# Patient Record
Sex: FEMALE | Race: WHITE | NOT HISPANIC OR LATINO | Employment: OTHER | URBAN - METROPOLITAN AREA
[De-identification: names, ages, dates, MRNs, and addresses within clinical notes are randomized per-mention and may not be internally consistent; named-entity substitution may affect disease eponyms.]

---

## 2017-01-02 DIAGNOSIS — M33.13 DERMATOMYOSITIS (H): Primary | ICD-10-CM

## 2017-01-03 RX ORDER — MYCOPHENOLATE MOFETIL 500 MG/1
TABLET ORAL
Qty: 90 TABLET | Refills: 0 | Status: SHIPPED | OUTPATIENT
Start: 2017-01-03 | End: 2017-01-24

## 2017-01-03 NOTE — TELEPHONE ENCOUNTER
mycophenolate /CELLCEPT 500 MG      Last Written Prescription Date:  7/7/16  Last Fill Quantity: 270,   # refills: 1  Last Office Visit: 7/26/16  Future Office visit:  1/24/17    CBC RESULTS:   Recent Labs   Lab Test  09/08/16   1101   WBC  3.6*   RBC  4.35   HGB  13.2   HCT  40.7   MCV  94   MCH  30.3   MCHC  32.4   RDW  13.2   PLT  189       CREATININE   Date Value Ref Range Status   09/08/2016 0.67 0.52 - 1.04 mg/dL Final   ]    Liver Function Studies -   Recent Labs   Lab Test  09/08/16   1101   05/27/15   0907   PROTTOTAL   --    --   6.3*   ALBUMIN  4.2   < >  3.9   BILITOTAL   --    --   0.5   ALKPHOS   --    --   35*   AST  27   < >  23   ALT  30   < >  24    < > = values in this interval not displayed.     Routing refill request to provider for review/approval because:OVER DUE LABS

## 2017-01-20 DIAGNOSIS — M33.13 DERMATOMYOSITIS (H): ICD-10-CM

## 2017-01-20 DIAGNOSIS — Z79.899 ENCOUNTER FOR LONG-TERM CURRENT USE OF MEDICATION: ICD-10-CM

## 2017-01-20 LAB
ALBUMIN SERPL-MCNC: 4.2 G/DL (ref 3.4–5)
ALT SERPL W P-5'-P-CCNC: 24 U/L (ref 0–50)
AST SERPL W P-5'-P-CCNC: 19 U/L (ref 0–45)
BASOPHILS # BLD AUTO: 0 10E9/L (ref 0–0.2)
BASOPHILS NFR BLD AUTO: 0.5 %
CK SERPL-CCNC: 68 U/L (ref 30–225)
CREAT SERPL-MCNC: 0.73 MG/DL (ref 0.52–1.04)
CRP SERPL-MCNC: <2.9 MG/L (ref 0–8)
DIFFERENTIAL METHOD BLD: ABNORMAL
EOSINOPHIL # BLD AUTO: 0 10E9/L (ref 0–0.7)
EOSINOPHIL NFR BLD AUTO: 0.8 %
ERYTHROCYTE [DISTWIDTH] IN BLOOD BY AUTOMATED COUNT: 12.9 % (ref 10–15)
GFR SERPL CREATININE-BSD FRML MDRD: 79 ML/MIN/1.7M2
HCT VFR BLD AUTO: 41.3 % (ref 35–47)
HGB BLD-MCNC: 13.4 G/DL (ref 11.7–15.7)
LYMPHOCYTES # BLD AUTO: 1 10E9/L (ref 0.8–5.3)
LYMPHOCYTES NFR BLD AUTO: 26.1 %
MCH RBC QN AUTO: 30.5 PG (ref 26.5–33)
MCHC RBC AUTO-ENTMCNC: 32.4 G/DL (ref 31.5–36.5)
MCV RBC AUTO: 94 FL (ref 78–100)
MONOCYTES # BLD AUTO: 0.4 10E9/L (ref 0–1.3)
MONOCYTES NFR BLD AUTO: 10.4 %
NEUTROPHILS # BLD AUTO: 2.3 10E9/L (ref 1.6–8.3)
NEUTROPHILS NFR BLD AUTO: 62.2 %
PLATELET # BLD AUTO: 201 10E9/L (ref 150–450)
RBC # BLD AUTO: 4.4 10E12/L (ref 3.8–5.2)
WBC # BLD AUTO: 3.8 10E9/L (ref 4–11)

## 2017-01-20 PROCEDURE — 86140 C-REACTIVE PROTEIN: CPT | Performed by: INTERNAL MEDICINE

## 2017-01-20 PROCEDURE — 82565 ASSAY OF CREATININE: CPT | Performed by: INTERNAL MEDICINE

## 2017-01-20 PROCEDURE — 84450 TRANSFERASE (AST) (SGOT): CPT | Performed by: INTERNAL MEDICINE

## 2017-01-20 PROCEDURE — 82040 ASSAY OF SERUM ALBUMIN: CPT | Performed by: INTERNAL MEDICINE

## 2017-01-20 PROCEDURE — 36415 COLL VENOUS BLD VENIPUNCTURE: CPT | Performed by: INTERNAL MEDICINE

## 2017-01-20 PROCEDURE — 84460 ALANINE AMINO (ALT) (SGPT): CPT | Performed by: INTERNAL MEDICINE

## 2017-01-20 PROCEDURE — 82550 ASSAY OF CK (CPK): CPT | Performed by: INTERNAL MEDICINE

## 2017-01-20 PROCEDURE — 85025 COMPLETE CBC W/AUTO DIFF WBC: CPT | Performed by: INTERNAL MEDICINE

## 2017-01-24 ENCOUNTER — OFFICE VISIT (OUTPATIENT)
Dept: RHEUMATOLOGY | Facility: CLINIC | Age: 72
End: 2017-01-24
Attending: INTERNAL MEDICINE
Payer: COMMERCIAL

## 2017-01-24 VITALS
OXYGEN SATURATION: 98 % | HEIGHT: 65 IN | HEART RATE: 73 BPM | WEIGHT: 139 LBS | BODY MASS INDEX: 23.16 KG/M2 | DIASTOLIC BLOOD PRESSURE: 91 MMHG | SYSTOLIC BLOOD PRESSURE: 134 MMHG

## 2017-01-24 DIAGNOSIS — M33.13 DERMATOMYOSITIS (H): Primary | ICD-10-CM

## 2017-01-24 DIAGNOSIS — Z79.899 HIGH RISK MEDICATIONS (NOT ANTICOAGULANTS) LONG-TERM USE: ICD-10-CM

## 2017-01-24 PROCEDURE — 99212 OFFICE O/P EST SF 10 MIN: CPT | Mod: ZF

## 2017-01-24 RX ORDER — MYCOPHENOLATE MOFETIL 500 MG/1
TABLET ORAL
Qty: 90 TABLET | Refills: 0 | Status: SHIPPED | OUTPATIENT
Start: 2017-01-24 | End: 2017-01-24

## 2017-01-24 RX ORDER — MYCOPHENOLATE MOFETIL 500 MG/1
TABLET ORAL
Qty: 240 TABLET | Refills: 0 | Status: SHIPPED | OUTPATIENT
Start: 2017-01-24 | End: 2017-04-22

## 2017-01-24 ASSESSMENT — PAIN SCALES - GENERAL: PAINLEVEL: NO PAIN (1)

## 2017-01-24 NOTE — PROGRESS NOTES
Rheumatology Clinic Visit     Mely Rashid MRN# [id number]   YOB: 1945 Age: 66 year old               Assessment and Plan:   71 year old female with  dermatomyositis presents for follow up. Disease is currently stable on 1500mg of mycophenylate and plaquenil 200mg once daily. She has no evidence of active myositis or synovitis at this time.      PLAN/RECOMMENDATION:     - Continue Plaquenil 200mg qday; f/u with ophthalmology once a year  - most recent PFT in 2014, completely normal. Will likely discuss repeating the PFT on our next visit, prior to making any changes in her CellCept. No concern for worsening lung disease at this time.  - Continue current dose of MMF 1.5g daily, will readdress at next visit whether we could taper down. As above, if we make change to the CellCept will need new baseline PFTs  -F/u w/ GI and yearly MRIs for pancreatic cysts, which have been stable  -F/u DEXA scan in 1-2 years; DEXA in 2016 compared from DEXA in 2012 is stable. T-score -2.1. Patient not taking medications that would increase her risk of fracture.  -CellCept labs q2-3 months; mildly low WBC is not unusual for patient taking CellCept    Follow up in 6 months     Patient seen with Dr. Farias who agrees with assessment and plan.    Boni Hernandez MD  Med-Derm, PGY-1    I saw the patient with the resident.  My exam and recomendations are as described.      Carlos Colon MD              Problem List:   1. Dermatomyositis with skin findings, minimal weakness on exam but some myalgias, and a daughter with a similar presentation. Steroid responsive  2. IgA deficiency per the outside records with high-titer BRE of 1:2560, other antibodies thus far negative.   3. 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          Interval History of Present Illness:   Mely Rashid is a 66 year old female who presents  "for follow up of dermatomyositis.      INTERVAL HISTORY:     The patient states that she is doing very well. Since 11/1/16, decreased her HCQ to 200 mg qday. The patient is also taking MMF 1.5 g daily. Strength and energy are good for her. Denies recent infections, F/C/night sweats. Denies weight loss. Denies SOB, CP cough. The patient is not walking as much since her previous dog was put down, so more deconditioned, but no TINEO. Denies lightheadedness, or headaches, but has some sinus congestion.  States that her finger are starting to show more of the Gottron's papules. Denies pain in the fingers. Used ointment previously for her fingers but hasn't used since her fingers haven't been bothering her. Fingers, ankles, and knees get stiff in the morning, but quickly improve once starts moving.  Deniews Raynauds. Denies new skin rash. Denies oral/genital ulcers. Denies photosensitivity. Mildly dry eyes and mouth. No visual changes.    The patient has history of cystic abnormalities of the pancreas and follows Dr. Hoang with GI and gets yearly MRI abdomen to monitor. Last MRI in October showed no worrisome changes compared to previous exam and recommended follow-up in 1 year. Patient denies N/V/abdominal pain. States she has chronic loose stools, but resolves with taking Imodium.    Last PFTs were done in 2014, which were WNL.    Patient's last DEXA was last year and T-score -2.1, stable from DEXA in 2012. Off fosamax since 2015 due to increased bleeding and jaw pain. States she has some jaw pain in the TMJ region. Unsure if she grinds her teeth at night. Has associated \"jaw cracking sound.\" Tenderness slightly worse in the morning.           Social History:     Social History     Social History     Marital Status: Single     Spouse Name: N/A     Number of Children: N/A     Years of Education: N/A     Occupational History     Not on file.     Social History Main Topics     Smoking status: Former Smoker     Quit date: " 04/10/1979     Smokeless tobacco: Never Used      Comment: quit 1985     Alcohol Use: 0.0 oz/week     0 Standard drinks or equivalent per week      Comment: rare     Drug Use: No     Sexual Activity: Not Currently     Other Topics Concern      Service No     Blood Transfusions No     Caffeine Concern No     Occupational Exposure No     Hobby Hazards No     Sleep Concern No     Stress Concern No     Weight Concern No     Special Diet No     Exercise Yes     walk, horseback ride     Seat Belt Yes     Parent/Sibling W/ Cabg, Mi Or Angioplasty Before 65f 55m? No     Social History Narrative       Lives independently          Family History:   Family History   Problem Relation Age of Onset     C.A.D. Father      Arthritis Father      DIABETES Brother      Glaucoma Brother      Breast Cancer Sister 35     Neurologic Disorder Sister      seizures     CANCER Sister      breast     Psoriasis Daughter      Rheumatologic Disease Daughter      Dermatomyositis     Hypertension No family hx of      CEREBROVASCULAR DISEASE No family hx of      Thyroid Disease No family hx of      Skin Cancer No family hx of      Macular Degeneration No family hx of               Allergies:   Allergies   Allergen Reactions     Penicillins Anaphylaxis              Medications:   Current Outpatient Prescriptions   Medication Sig Dispense Refill     mycophenolate (CELLCEPT - GENERIC EQUIVALENT) 500 MG tablet TAKE 2 TABLETS (1000 MG) EVERY MORNING AND TAKE 1 TABLET (500 MG) AT BEDTIME 90 tablet 0     diazepam (VALIUM) 5 MG tablet Take 1 tablet (5 mg) by mouth once as needed for anxiety or sleep (Take one on arrival for MRI. May repeat in 15 min if inadequate relief.) 2 tablet 0     latanoprost (XALATAN) 0.005 % ophthalmic solution Place 1 drop into the right eye At Bedtime 3 Bottle 3     Lactobacillus Rhamnosus, GG, (CULTURELLE PO)        hydroxychloroquine (PLAQUENIL) 200 MG tablet Take 1 tablet (200 mg) by mouth 2 times daily (Patient  "taking differently: Take 200 mg by mouth 2 times daily Patient states she is taking 1 tablet per day) 180 tablet 1     diazepam (VALIUM) 5 MG tablet Take 2 tablets as directed by mouth as needed. Take one 5 mg tablet of Valium 30 mins - 1 hour before scan. Then take the second 5 mg tab 15-20 mins before the scan if needed. 2 tablet 0     loperamide (IMODIUM A-D) 2 MG tablet Take 2 mg by mouth every morning       desoximetasone (TOPICORT) 0.25 % cream Apply topically 2 times daily 120 g 3     desonide (DESOWEN) 0.05 % cream Apply topically 2 times daily as needed (rash) 30 g 2     vitamin E 400 UNIT capsule Take by mouth daily       Calcium Carb-Cholecalciferol (CALCIUM 500 +D PO) Take 3 tablets by mouth daily.       omega-3 fatty acids (FISH OIL) 1200 MG capsule Take 1 capsule by mouth daily.       Cholecalciferol (VITAMIN D) 1000 UNIT capsule Take 1 capsule by mouth daily.       B Complex TABS Take 1 tablet by mouth daily.       glucosamine-chondroitinoitin (GLUCOSAMINE CHONDR COMPLEX) 500-400 MG CAPS Take 2 capsules by mouth daily.                Physical Exam:   Blood pressure 134/91, pulse 73, height 1.651 m (5' 5\"), weight 63.05 kg (139 lb), SpO2 98 %, not currently breastfeeding.  Wt Readings from Last 4 Encounters:   01/24/17 63.05 kg (139 lb)   10/17/16 63.73 kg (140 lb 8 oz)   07/26/16 63.957 kg (141 lb)   02/17/16 63.957 kg (141 lb)       Constitutional: well-appearing, well-developed, appearing stated age, cooperative, NAD  Eyes: EOMI, PERRL, normal conjunctiva and sclera  ENT: nl external ears, lips, teeth, gums, throat, no mucous membrane lesions, normal saliva pool  Neck: no mass or thyroid enlargement  Resp: lungs clear to auscultation, nl to palpation  CV: RRR, no murmurs, rubs or gallops, no edema  GI: no ABD mass or tenderness, no HSM  Lymph: no cervical, supraclavicular, or epitrochlear nodes  MS: All TMJ, neck, shoulder, elbow, wrist, MCP/PIP/DIP, spine, hip, knee, ankle, and foot MTP/IP joints " were examined and  found normal. +Heberden's and Tariq's nodes. No active synovitis or deformity other than mild findings of DJD. Full ROM.  Normal  strength. No dactylitis, tenosynovitis, enthesopathy.  Skin: no significant skin changes. +Periungual erythema and dilated capillary loops seen grossly on exam as well as on capillaroscopy, + modest Gottron's papules b/l  Neuro: nl cranial nerves, strength, sensation, DTRs. 5/5 strength hip flexors/extensors.Shoulder strength 5/5 b/l. Able to stand up without support of her arms.  Psych: nl judgement, orientation, memory, affect.         Data:     Results for orders placed or performed in visit on 01/20/17   Albumin level   Result Value Ref Range    Albumin 4.2 3.4 - 5.0 g/dL   ALT   Result Value Ref Range    ALT 24 0 - 50 U/L   AST   Result Value Ref Range    AST 19 0 - 45 U/L   CBC with platelets differential   Result Value Ref Range    WBC 3.8 (L) 4.0 - 11.0 10e9/L    RBC Count 4.40 3.8 - 5.2 10e12/L    Hemoglobin 13.4 11.7 - 15.7 g/dL    Hematocrit 41.3 35.0 - 47.0 %    MCV 94 78 - 100 fl    MCH 30.5 26.5 - 33.0 pg    MCHC 32.4 31.5 - 36.5 g/dL    RDW 12.9 10.0 - 15.0 %    Platelet Count 201 150 - 450 10e9/L    Diff Method Automated Method     % Neutrophils 62.2 %    % Lymphocytes 26.1 %    % Monocytes 10.4 %    % Eosinophils 0.8 %    % Basophils 0.5 %    Absolute Neutrophil 2.3 1.6 - 8.3 10e9/L    Absolute Lymphocytes 1.0 0.8 - 5.3 10e9/L    Absolute Monocytes 0.4 0.0 - 1.3 10e9/L    Absolute Eosinophils 0.0 0.0 - 0.7 10e9/L    Absolute Basophils 0.0 0.0 - 0.2 10e9/L   Creatinine   Result Value Ref Range    Creatinine 0.73 0.52 - 1.04 mg/dL    GFR Estimate 79 >60 mL/min/1.7m2    GFR Estimate If Black >90   GFR Calc   >60 mL/min/1.7m2   CRP inflammation   Result Value Ref Range    CRP Inflammation <2.9 0.0 - 8.0 mg/L   CK total   Result Value Ref Range    CK Total 68 30 - 225 U/L

## 2017-01-24 NOTE — NURSING NOTE
"Chief Complaint   Patient presents with     RECHECK     6 month follow up for Dermatomyositis        Initial /91 mmHg  Pulse 73  Ht 1.651 m (5' 5\")  Wt 63.05 kg (139 lb)  BMI 23.13 kg/m2  SpO2 98% Estimated body mass index is 23.13 kg/(m^2) as calculated from the following:    Height as of this encounter: 1.651 m (5' 5\").    Weight as of this encounter: 63.05 kg (139 lb).  BP completed using cuff size: mark Cao CMA    "

## 2017-01-24 NOTE — LETTER
1/24/2017      RE: Mely Rashid  [address] [place] [address]       Rheumatology Clinic Visit     Mely Rashid MRN# [id number]   YOB: 1945 Age: 66 year old               Assessment and Plan:   71 year old female with  dermatomyositis presents for follow up. Disease is currently stable on 1500mg of mycophenylate and plaquenil 200mg once daily. She has no evidence of active myositis or synovitis at this time.      PLAN/RECOMMENDATION:     - Continue Plaquenil 200mg qday; f/u with ophthalmology once a year  - most recent PFT in 2014, completely normal. Will likely discuss repeating the PFT on our next visit, prior to making any changes in her CellCept. No concern for worsening lung disease at this time.  - Continue current dose of MMF 1.5g daily, will readdress at next visit whether we could taper down. As above, if we make change to the CellCept will need new baseline PFTs  -F/u w/ GI and yearly MRIs for pancreatic cysts, which have been stable  -F/u DEXA scan in 1-2 years; DEXA in 2016 compared from DEXA in 2012 is stable. T-score -2.1. Patient not taking medications that would increase her risk of fracture.  -CellCept labs q2-3 months; mildly low WBC is not unusual for patient taking CellCept    Follow up in 6 months     Patient seen with Dr. Farias who agrees with assessment and plan.    Boni Hernandez MD  Med-Derm, PGY-1    I saw the patient with the resident.  My exam and recomendations are as described.      Carlos Colon MD              Problem List:   1. Dermatomyositis with skin findings, minimal weakness on exam but some myalgias, and a daughter with a similar presentation. Steroid responsive  2. IgA deficiency per the outside records with high-titer BRE of 1:2560, other antibodies thus far negative.   3. 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           "Interval History of Present Illness:   Mely Rashid is a 66 year old female who presents for follow up of dermatomyositis.      INTERVAL HISTORY:     The patient states that she is doing very well. Since 11/1/16, decreased her HCQ to 200 mg qday. The patient is also taking MMF 1.5 g daily. Strength and energy are good for her. Denies recent infections, F/C/night sweats. Denies weight loss. Denies SOB, CP cough. The patient is not walking as much since her previous dog was put down, so more deconditioned, but no TINEO. Denies lightheadedness, or headaches, but has some sinus congestion.  States that her finger are starting to show more of the Gottron's papules. Denies pain in the fingers. Used ointment previously for her fingers but hasn't used since her fingers haven't been bothering her. Fingers, ankles, and knees get stiff in the morning, but quickly improve once starts moving.  Deniews Raynauds. Denies new skin rash. Denies oral/genital ulcers. Denies photosensitivity. Mildly dry eyes and mouth. No visual changes.    The patient has history of cystic abnormalities of the pancreas and follows Dr. Hoang with GI and gets yearly MRI abdomen to monitor. Last MRI in October showed no worrisome changes compared to previous exam and recommended follow-up in 1 year. Patient denies N/V/abdominal pain. States she has chronic loose stools, but resolves with taking Imodium.    Last PFTs were done in 2014, which were WNL.    Patient's last DEXA was last year and T-score -2.1, stable from DEXA in 2012. Off fosamax since 2015 due to increased bleeding and jaw pain. States she has some jaw pain in the TMJ region. Unsure if she grinds her teeth at night. Has associated \"jaw cracking sound.\" Tenderness slightly worse in the morning.           Social History:     Social History     Social History     Marital Status: Single     Spouse Name: N/A     Number of Children: N/A     Years of Education: N/A     Occupational History     " Not on file.     Social History Main Topics     Smoking status: Former Smoker     Quit date: 04/10/1979     Smokeless tobacco: Never Used      Comment: quit 1985     Alcohol Use: 0.0 oz/week     0 Standard drinks or equivalent per week      Comment: rare     Drug Use: No     Sexual Activity: Not Currently     Other Topics Concern      Service No     Blood Transfusions No     Caffeine Concern No     Occupational Exposure No     Hobby Hazards No     Sleep Concern No     Stress Concern No     Weight Concern No     Special Diet No     Exercise Yes     walk, horseback ride     Seat Belt Yes     Parent/Sibling W/ Cabg, Mi Or Angioplasty Before 65f 55m? No     Social History Narrative       Lives independently          Family History:   Family History   Problem Relation Age of Onset     C.A.D. Father      Arthritis Father      DIABETES Brother      Glaucoma Brother      Breast Cancer Sister 35     Neurologic Disorder Sister      seizures     CANCER Sister      breast     Psoriasis Daughter      Rheumatologic Disease Daughter      Dermatomyositis     Hypertension No family hx of      CEREBROVASCULAR DISEASE No family hx of      Thyroid Disease No family hx of      Skin Cancer No family hx of      Macular Degeneration No family hx of               Allergies:   Allergies   Allergen Reactions     Penicillins Anaphylaxis              Medications:   Current Outpatient Prescriptions   Medication Sig Dispense Refill     mycophenolate (CELLCEPT - GENERIC EQUIVALENT) 500 MG tablet TAKE 2 TABLETS (1000 MG) EVERY MORNING AND TAKE 1 TABLET (500 MG) AT BEDTIME 90 tablet 0     diazepam (VALIUM) 5 MG tablet Take 1 tablet (5 mg) by mouth once as needed for anxiety or sleep (Take one on arrival for MRI. May repeat in 15 min if inadequate relief.) 2 tablet 0     latanoprost (XALATAN) 0.005 % ophthalmic solution Place 1 drop into the right eye At Bedtime 3 Bottle 3     Lactobacillus Rhamnosus, GG, (CULTURELLE PO)         "hydroxychloroquine (PLAQUENIL) 200 MG tablet Take 1 tablet (200 mg) by mouth 2 times daily (Patient taking differently: Take 200 mg by mouth 2 times daily Patient states she is taking 1 tablet per day) 180 tablet 1     diazepam (VALIUM) 5 MG tablet Take 2 tablets as directed by mouth as needed. Take one 5 mg tablet of Valium 30 mins - 1 hour before scan. Then take the second 5 mg tab 15-20 mins before the scan if needed. 2 tablet 0     loperamide (IMODIUM A-D) 2 MG tablet Take 2 mg by mouth every morning       desoximetasone (TOPICORT) 0.25 % cream Apply topically 2 times daily 120 g 3     desonide (DESOWEN) 0.05 % cream Apply topically 2 times daily as needed (rash) 30 g 2     vitamin E 400 UNIT capsule Take by mouth daily       Calcium Carb-Cholecalciferol (CALCIUM 500 +D PO) Take 3 tablets by mouth daily.       omega-3 fatty acids (FISH OIL) 1200 MG capsule Take 1 capsule by mouth daily.       Cholecalciferol (VITAMIN D) 1000 UNIT capsule Take 1 capsule by mouth daily.       B Complex TABS Take 1 tablet by mouth daily.       glucosamine-chondroitinoitin (GLUCOSAMINE CHONDR COMPLEX) 500-400 MG CAPS Take 2 capsules by mouth daily.                Physical Exam:   Blood pressure 134/91, pulse 73, height 1.651 m (5' 5\"), weight 63.05 kg (139 lb), SpO2 98 %, not currently breastfeeding.  Wt Readings from Last 4 Encounters:   01/24/17 63.05 kg (139 lb)   10/17/16 63.73 kg (140 lb 8 oz)   07/26/16 63.957 kg (141 lb)   02/17/16 63.957 kg (141 lb)       Constitutional: well-appearing, well-developed, appearing stated age, cooperative, NAD  Eyes: EOMI, PERRL, normal conjunctiva and sclera  ENT: nl external ears, lips, teeth, gums, throat, no mucous membrane lesions, normal saliva pool  Neck: no mass or thyroid enlargement  Resp: lungs clear to auscultation, nl to palpation  CV: RRR, no murmurs, rubs or gallops, no edema  GI: no ABD mass or tenderness, no HSM  Lymph: no cervical, supraclavicular, or epitrochlear nodes  MS: " All TMJ, neck, shoulder, elbow, wrist, MCP/PIP/DIP, spine, hip, knee, ankle, and foot MTP/IP joints were examined and  found normal. +Heberden's and Tariq's nodes. No active synovitis or deformity other than mild findings of DJD. Full ROM.  Normal  strength. No dactylitis, tenosynovitis, enthesopathy.  Skin: no significant skin changes. +Periungual erythema and dilated capillary loops seen grossly on exam as well as on capillaroscopy, + modest Gottron's papules b/l  Neuro: nl cranial nerves, strength, sensation, DTRs. 5/5 strength hip flexors/extensors.Shoulder strength 5/5 b/l. Able to stand up without support of her arms.  Psych: nl judgement, orientation, memory, affect.         Data:     Results for orders placed or performed in visit on 01/20/17   Albumin level   Result Value Ref Range    Albumin 4.2 3.4 - 5.0 g/dL   ALT   Result Value Ref Range    ALT 24 0 - 50 U/L   AST   Result Value Ref Range    AST 19 0 - 45 U/L   CBC with platelets differential   Result Value Ref Range    WBC 3.8 (L) 4.0 - 11.0 10e9/L    RBC Count 4.40 3.8 - 5.2 10e12/L    Hemoglobin 13.4 11.7 - 15.7 g/dL    Hematocrit 41.3 35.0 - 47.0 %    MCV 94 78 - 100 fl    MCH 30.5 26.5 - 33.0 pg    MCHC 32.4 31.5 - 36.5 g/dL    RDW 12.9 10.0 - 15.0 %    Platelet Count 201 150 - 450 10e9/L    Diff Method Automated Method     % Neutrophils 62.2 %    % Lymphocytes 26.1 %    % Monocytes 10.4 %    % Eosinophils 0.8 %    % Basophils 0.5 %    Absolute Neutrophil 2.3 1.6 - 8.3 10e9/L    Absolute Lymphocytes 1.0 0.8 - 5.3 10e9/L    Absolute Monocytes 0.4 0.0 - 1.3 10e9/L    Absolute Eosinophils 0.0 0.0 - 0.7 10e9/L    Absolute Basophils 0.0 0.0 - 0.2 10e9/L   Creatinine   Result Value Ref Range    Creatinine 0.73 0.52 - 1.04 mg/dL    GFR Estimate 79 >60 mL/min/1.7m2    GFR Estimate If Black >90   GFR Calc   >60 mL/min/1.7m2   CRP inflammation   Result Value Ref Range    CRP Inflammation <2.9 0.0 - 8.0 mg/L   CK total   Result Value  Ref Range    CK Total 68 30 - 225 U/L       Carlos Colon MD

## 2017-01-24 NOTE — MR AVS SNAPSHOT
After Visit Summary   1/24/2017    Mely Rashid    MRN: [id number]           Patient Information     Date Of Birth          1945        Visit Information        Provider Department      1/24/2017 1:00 PM Carlos Colon MD [facility] Rheumatology        Today's Diagnoses     Dermatomyositis (H)    -  1    High risk medications (not anticoagulants) long-term use           Follow-ups after your visit        Your next 10 appointments already scheduled     Aug 22, 2017  9:45 AM CDT   VISUAL FIELD with [facility] EYE VISUAL FIELD   Eye Clinic ([facility])    [address]  [address]   [address] [address]  [facility] [address]   [phone]            Aug 22, 2017 10:15 AM CDT   RETURN RETINA with Shakira Chamberlain MD   Eye Clinic ([facility])    [address]  [address]   [address] [address]  [facility] [address]   [phone]            Sep 29, 2017  9:15 AM CDT   VISUAL FIELD with [facility] EYE VISUAL FIELD   Eye Clinic ([facility])    [address]  [address]  [address] [address] [place] [address]   [phone]            Sep 29, 2017  9:45 AM CDT   RETURN GLAUCOMA with Sima Gambino MD   Eye Clinic ([facility])    [address]  [address]  [address] [address]  [facility] [address]   [phone]              Who to contact     If you have questions or need follow up information about today's clinic visit or your schedule please contact [facility] RHEUMATOLOGY directly at [phone].  Normal or non-critical lab and imaging results will be communicated to you by MyChart, letter or phone within 4 business days after the clinic has received the results. If you do not hear from us within 7 days, please contact the clinic through MyChart or phone. If you have a critical or abnormal lab result, we will notify you by phone as soon as possible.  Submit refill requests through [employer] or call your pharmacy  "and they will forward the refill request to us. Please allow 3 business days for your refill to be completed.          Additional Information About Your Visit        [employer] Information     [facility] gives you secure access to your electronic health record. If you see a primary care provider, you can also send messages to your care team and make appointments. If you have questions, please call your primary care clinic.  If you do not have a primary care provider, please call [phone] and they will assist you.        Care EveryWhere ID     This is your Care EveryWhere ID. This could be used by other organizations to access your [place] medical records  [id number]        Your Vitals Were     Pulse Height Pulse Oximetry BMI (Body Mass Index)          73 1.651 m (5' 5\") 98% 23.13 kg/m2         Blood Pressure from Last 3 Encounters:   01/24/17 (!) 134/91   10/17/16 118/79   07/26/16 113/69    Weight from Last 3 Encounters:   01/24/17 63 kg (139 lb)   10/17/16 63.7 kg (140 lb 8 oz)   07/26/16 64 kg (141 lb)                 Today's Medication Changes          These changes are accurate as of: 1/24/17 11:59 PM.  If you have any questions, ask your nurse or doctor.               Start taking these medicines.        Dose/Directions    mycophenolate 500 MG tablet   Commonly known as:  CELLCEPT - GENERIC EQUIVALENT   Used for:  Dermatomyositis (H)   Started by:  Carlos Colon MD        TAKE 2 TABLETS (1000 MG) EVERY MORNING AND TAKE 1 TABLET (500 MG) AT BEDTIME   Quantity:  240 tablet   Refills:  0         These medicines have changed or have updated prescriptions.        Dose/Directions    hydroxychloroquine 200 MG tablet   Commonly known as:  PLAQUENIL   This may have changed:  additional instructions   Used for:  Dermatomyositis (H)        Dose:  200 mg   Take 1 tablet (200 mg) by mouth 2 times daily   Quantity:  180 tablet   Refills:  1            Where to get your medicines      These medications were sent to " [facility] - [place], [place] - [address] [facility]  [address] [facility] [address]     Phone:  [phone]     mycophenolate 500 MG tablet                Primary Care Provider Office Phone # Fax #    ROSHAN Galloway [phone]       [facility] [address] [place] [address] [address]  [facility] [address]        Thank you!     Thank you for choosing [facility] RHEUMATOLOGY  for your care. Our goal is always to provide you with excellent care. Hearing back from our patients is one way we can continue to improve our services. Please take a few minutes to complete the written survey that you may receive in the mail after your visit with us. Thank you!             Your Updated Medication List - Protect others around you: Learn how to safely use, store and throw away your medicines at www.disposemymeds.org.          This list is accurate as of: 1/24/17 11:59 PM.  Always use your most recent med list.                   Brand Name Dispense Instructions for use    B Complex Tabs      Take 1 tablet by mouth daily.       CALCIUM 500 +D PO      Take 3 tablets by mouth daily.       CULTURELLE PO          desonide 0.05 % cream    DESOWEN    30 g    Apply topically 2 times daily as needed (rash)       desoximetasone 0.25 % cream    TOPICORT    120 g    Apply topically 2 times daily       * diazepam 5 MG tablet    VALIUM    2 tablet    Take 2 tablets as directed by mouth as needed. Take one 5 mg tablet of Valium 30 mins - 1 hour before scan. Then take the second 5 mg tab 15-20 mins before the scan if needed.       * diazepam 5 MG tablet    VALIUM    2 tablet    Take 1 tablet (5 mg) by mouth once as needed for anxiety or sleep (Take one on arrival for MRI. May repeat in 15 min if inadequate relief.)       Fish Oil 1200 MG Caps      Take 1 capsule by mouth daily.       GLUCOSAMINE CHONDR COMPLEX 500-400 MG Caps per capsule   Generic drug:  glucosamine-chondroitin      Take 2 capsules  by mouth daily.       hydroxychloroquine 200 MG tablet    PLAQUENIL    180 tablet    Take 1 tablet (200 mg) by mouth 2 times daily       latanoprost 0.005 % ophthalmic solution    XALATAN    3 Bottle    Place 1 drop into the right eye At Bedtime       loperamide 2 MG tablet    IMODIUM A-D     Take 2 mg by mouth every morning       mycophenolate 500 MG tablet    CELLCEPT - GENERIC EQUIVALENT    240 tablet    TAKE 2 TABLETS (1000 MG) EVERY MORNING AND TAKE 1 TABLET (500 MG) AT BEDTIME       vitamin D 1000 UNITS capsule      Take 1 capsule by mouth daily.       vitamin E 400 UNIT capsule      Take by mouth daily       * Notice:  This list has 2 medication(s) that are the same as other medications prescribed for you. Read the directions carefully, and ask your doctor or other care provider to review them with you.

## 2017-04-07 ENCOUNTER — MYC MEDICAL ADVICE (OUTPATIENT)
Dept: INTERNAL MEDICINE | Facility: CLINIC | Age: 72
End: 2017-04-07

## 2017-04-07 DIAGNOSIS — M33.13 DERMATOMYOSITIS (H): ICD-10-CM

## 2017-04-10 RX ORDER — HYDROXYCHLOROQUINE SULFATE 200 MG/1
200 TABLET, FILM COATED ORAL 2 TIMES DAILY
Qty: 180 TABLET | Refills: 0 | Status: SHIPPED | OUTPATIENT
Start: 2017-04-10 | End: 2017-10-01

## 2017-04-10 NOTE — TELEPHONE ENCOUNTER
PLAQUENIL  200 MG       Last Written Prescription Date:  6/3/16  Last Fill Quantity: 180,   # refills: 1  Last Office Visit : 1/24/17  Future Office visit:  NONE  EYE EXAM   8/29/16

## 2017-04-10 NOTE — TELEPHONE ENCOUNTER
Spoke with pt, stated that she is feeling much better and diarrhea is all cleared up.  Kelin Guerrier RN

## 2017-04-22 DIAGNOSIS — M33.13 DERMATOMYOSITIS (H): ICD-10-CM

## 2017-04-22 NOTE — LETTER
April 24, 2017      TO: Mely Rashid  [address] [place] [place] [address]         Dear Ms. Mely Rashid,  This letter is a reminder that you are overdue for needed labs. You must have appropriate labs drawn every 8-12 weeks for prescription refills.      You have been given a 30 day supply/refill of your mycophenolate/CELLCEPT while you get your labs completed.      Regards,  Rheumatology Clinic Staff

## 2017-04-24 RX ORDER — MYCOPHENOLATE MOFETIL 500 MG/1
TABLET ORAL
Qty: 90 TABLET | Refills: 0 | Status: SHIPPED | OUTPATIENT
Start: 2017-04-24 | End: 2017-05-30

## 2017-04-24 NOTE — TELEPHONE ENCOUNTER
mycophenolate       Last Written Prescription Date:  1/24/17  Last Fill Quantity: 240,   # refills: 0  Last Office Visit: 1/24/17  Future Office visit:  NONE    CBC RESULTS:   Recent Labs   Lab Test  01/20/17   1123   WBC  3.8*   RBC  4.40   HGB  13.4   HCT  41.3   MCV  94   MCH  30.5   MCHC  32.4   RDW  12.9   PLT  201       Creatinine   Date Value Ref Range Status   01/20/2017 0.73 0.52 - 1.04 mg/dL Final   ]    Liver Function Studies -   Recent Labs   Lab Test  01/20/17   1123   05/27/15   0907   PROTTOTAL   --    --   6.3*   ALBUMIN  4.2   < >  3.9   BILITOTAL   --    --   0.5   ALKPHOS   --    --   35*   AST  19   < >  23   ALT  24   < >  24    < > = values in this interval not displayed.       Routing refill request to provider for review/approval because:LABS OVER DUE  LABS DUE REMINDER LETTER SENT TO PT

## 2017-05-02 DIAGNOSIS — M33.13 DERMATOMYOSITIS (H): ICD-10-CM

## 2017-05-02 DIAGNOSIS — Z79.899 HIGH RISK MEDICATIONS (NOT ANTICOAGULANTS) LONG-TERM USE: ICD-10-CM

## 2017-05-02 LAB
ALBUMIN SERPL-MCNC: 4.4 G/DL (ref 3.4–5)
ALT SERPL W P-5'-P-CCNC: 29 U/L (ref 0–50)
AST SERPL W P-5'-P-CCNC: 22 U/L (ref 0–45)
BASOPHILS # BLD AUTO: 0 10E9/L (ref 0–0.2)
BASOPHILS NFR BLD AUTO: 0.6 %
CK SERPL-CCNC: 92 U/L (ref 30–225)
CREAT SERPL-MCNC: 0.57 MG/DL (ref 0.52–1.04)
CRP SERPL-MCNC: <2.9 MG/L (ref 0–8)
DIFFERENTIAL METHOD BLD: ABNORMAL
EOSINOPHIL # BLD AUTO: 0 10E9/L (ref 0–0.7)
EOSINOPHIL NFR BLD AUTO: 1.2 %
ERYTHROCYTE [DISTWIDTH] IN BLOOD BY AUTOMATED COUNT: 13.2 % (ref 10–15)
GFR SERPL CREATININE-BSD FRML MDRD: NORMAL ML/MIN/1.7M2
HCT VFR BLD AUTO: 44.8 % (ref 35–47)
HGB BLD-MCNC: 14.3 G/DL (ref 11.7–15.7)
LYMPHOCYTES # BLD AUTO: 0.8 10E9/L (ref 0.8–5.3)
LYMPHOCYTES NFR BLD AUTO: 25.8 %
MCH RBC QN AUTO: 30.3 PG (ref 26.5–33)
MCHC RBC AUTO-ENTMCNC: 31.9 G/DL (ref 31.5–36.5)
MCV RBC AUTO: 95 FL (ref 78–100)
MONOCYTES # BLD AUTO: 0.3 10E9/L (ref 0–1.3)
MONOCYTES NFR BLD AUTO: 9.3 %
NEUTROPHILS # BLD AUTO: 2 10E9/L (ref 1.6–8.3)
NEUTROPHILS NFR BLD AUTO: 63.1 %
PLATELET # BLD AUTO: 194 10E9/L (ref 150–450)
RBC # BLD AUTO: 4.72 10E12/L (ref 3.8–5.2)
WBC # BLD AUTO: 3.2 10E9/L (ref 4–11)

## 2017-05-02 PROCEDURE — 82550 ASSAY OF CK (CPK): CPT | Performed by: INTERNAL MEDICINE

## 2017-05-02 PROCEDURE — 36415 COLL VENOUS BLD VENIPUNCTURE: CPT | Performed by: INTERNAL MEDICINE

## 2017-05-02 PROCEDURE — 85025 COMPLETE CBC W/AUTO DIFF WBC: CPT | Performed by: INTERNAL MEDICINE

## 2017-05-02 PROCEDURE — 82040 ASSAY OF SERUM ALBUMIN: CPT | Performed by: INTERNAL MEDICINE

## 2017-05-02 PROCEDURE — 82565 ASSAY OF CREATININE: CPT | Performed by: INTERNAL MEDICINE

## 2017-05-02 PROCEDURE — 84450 TRANSFERASE (AST) (SGOT): CPT | Performed by: INTERNAL MEDICINE

## 2017-05-02 PROCEDURE — 86140 C-REACTIVE PROTEIN: CPT | Performed by: INTERNAL MEDICINE

## 2017-05-02 PROCEDURE — 84460 ALANINE AMINO (ALT) (SGPT): CPT | Performed by: INTERNAL MEDICINE

## 2017-05-15 ENCOUNTER — TELEPHONE (OUTPATIENT)
Dept: INTERNAL MEDICINE | Facility: CLINIC | Age: 72
End: 2017-05-15

## 2017-05-15 NOTE — TELEPHONE ENCOUNTER
Call from patient. She stubbed her right middle toe on Thu, 5/11/17. It was quite painful, but has improved somewhat with elevation of extremity, and icing. Toe is still quite bruised, and she has some tingling going up the back of her leg. Aside from the toe, no swelling if really noted. Patient has been walking on the outside of her foot more, and wondering if she maybe has a pinched nerve. Has been taking Tylenol as needed. She reports that when she wakes up in the morning, the area feels good, but symptoms increase as the day goes on. Advised that she is doing the correct things, but resting the area as able, elevating, and applying ice. If not otherwise contraindicated, could try ibuprofen of Aleve. Patient does have appointment for evaluation in [facility] tomorrow, which she will keep.

## 2017-05-16 ENCOUNTER — MYC MEDICAL ADVICE (OUTPATIENT)
Dept: RHEUMATOLOGY | Facility: CLINIC | Age: 72
End: 2017-05-16

## 2017-05-17 NOTE — TELEPHONE ENCOUNTER
Writer spoke with patient and informed her of Dr. Colon's note. Patient stated understanding and will schedule an appointment with the PFT clinic.   Shira Wisdom LPN

## 2017-05-17 NOTE — TELEPHONE ENCOUNTER
I think it is ok for her to decrease MMF to 500 mg bid.     However, she should get a new set of PFTs including volumes and DLCO AND she should get repeat labs with [facility] in about 2 mo; please set those orders up. I should see her in a few mo, as well.      Both drugs can photosensitize: HOWEVER, HER BIGGEST SUN EXPOSURE RISK IS  TRIGGERING A BAD DM FLARE, SO IT DOESN'T MATTER; SHE NEEDS TO AVOID DIRECT SUN EXPOSURE ANYWAY.

## 2017-05-18 ENCOUNTER — TELEPHONE (OUTPATIENT)
Dept: RHEUMATOLOGY | Facility: CLINIC | Age: 72
End: 2017-05-18

## 2017-05-18 DIAGNOSIS — R06.09 DOE (DYSPNEA ON EXERTION): ICD-10-CM

## 2017-05-18 DIAGNOSIS — M33.13 DERMATOMYOSITIS (H): Primary | ICD-10-CM

## 2017-05-18 NOTE — TELEPHONE ENCOUNTER
----- Message from Diego Michaud RN sent at 5/18/2017  9:42 AM CDT -----  Regarding: FW: Isaiah Pt- PFT order needed       ----- Message -----     From: Divya Danielle     Sent: 5/17/2017   2:26 PM       To: Adult Rheum Triage-  Subject: Isaiah Pt- PFT order needed                     Hello,     Pulmonary Function Lab called and advised that Dr Colon wants the pt to have a PFT test done and that an order needs to be entered.     Thank you!     Divya  Call Center    Please DO NOT send this message and/or reply back to sender.  Call Center Representatives DO NOT respond to messages.

## 2017-05-18 NOTE — TELEPHONE ENCOUNTER
Writer cued up order for provider to review and advise. No dx added, provider will need to provide a dx.   Shira Wisdom LPN

## 2017-05-30 DIAGNOSIS — M33.13 DERMATOMYOSITIS (H): ICD-10-CM

## 2017-05-31 RX ORDER — MYCOPHENOLATE MOFETIL 500 MG/1
TABLET ORAL
Qty: 90 TABLET | Refills: 2 | Status: SHIPPED | OUTPATIENT
Start: 2017-05-31 | End: 2017-07-20

## 2017-05-31 NOTE — TELEPHONE ENCOUNTER
CELLCEPT - GENERIC       Last Written Prescription Date:  4/24/17  Last Fill Quantity:  90   # refills: 0  Last Office Visit: 1/27/17  Future Office visit:  NONE    CBC RESULTS:   Recent Labs   Lab Test  05/02/17   1033   WBC  3.2*   RBC  4.72   HGB  14.3   HCT  44.8   MCV  95   MCH  30.3   MCHC  31.9   RDW  13.2   PLT  194       Creatinine   Date Value Ref Range Status   05/02/2017 0.57 0.52 - 1.04 mg/dL Final   ]    Liver Function Studies -   Recent Labs   Lab Test  05/02/17   1033   05/27/15   0907   PROTTOTAL   --    --   6.3*   ALBUMIN  4.4   < >  3.9   BILITOTAL   --    --   0.5   ALKPHOS   --    --   35*   AST  22   < >  23   ALT  29   < >  24    < > = values in this interval not displayed.

## 2017-06-21 ASSESSMENT — ACTIVITIES OF DAILY LIVING (ADL)
DO_MEMBERS_OF_YOUR_HOUSEHOLD_USE_SAFETY_HELMETS?: Y
ARE_THERE_SMOKE_DETECTORS_IN_YOUR_HOME?: Y
ARE_THERE_FIREARMS_IN_YOUR_HOME?: N
ARE_THERE_CARBON_MONOXIDE_DETECTORS_IN_YOUR_HOME?: Y
DO_MEMBERS_OF_YOUR_HOUSEHOLD_USE_SUNSCREEN?: Y
DO_MEMBERS_OF_YOUR_HOUSEHOLD_WEAR_SEAT_BELTS?: Y

## 2017-06-21 ASSESSMENT — ENCOUNTER SYMPTOMS
ARTHRALGIAS: 1
MUSCLE CRAMPS: 0
BACK PAIN: 0
MUSCLE WEAKNESS: 0
NAIL CHANGES: 1
MYALGIAS: 0
STIFFNESS: 1
JOINT SWELLING: 1
NECK PAIN: 0
SKIN CHANGES: 0
POOR WOUND HEALING: 0

## 2017-06-27 ENCOUNTER — OFFICE VISIT (OUTPATIENT)
Dept: DERMATOLOGY | Facility: CLINIC | Age: 72
End: 2017-06-27

## 2017-06-27 ENCOUNTER — OFFICE VISIT (OUTPATIENT)
Dept: INTERNAL MEDICINE | Facility: CLINIC | Age: 72
End: 2017-06-27

## 2017-06-27 VITALS
BODY MASS INDEX: 23.88 KG/M2 | RESPIRATION RATE: 16 BRPM | DIASTOLIC BLOOD PRESSURE: 80 MMHG | HEART RATE: 72 BPM | SYSTOLIC BLOOD PRESSURE: 119 MMHG | OXYGEN SATURATION: 100 % | HEIGHT: 65 IN | WEIGHT: 143.3 LBS

## 2017-06-27 DIAGNOSIS — L72.0 MILIA: ICD-10-CM

## 2017-06-27 DIAGNOSIS — Z85.828 HISTORY OF BASAL CELL CARCINOMA: ICD-10-CM

## 2017-06-27 DIAGNOSIS — Z92.25 HISTORY OF IMMUNOSUPPRESSION THERAPY: Primary | ICD-10-CM

## 2017-06-27 DIAGNOSIS — L82.1 SK (SEBORRHEIC KERATOSIS): ICD-10-CM

## 2017-06-27 DIAGNOSIS — L21.9 SEBORRHEIC DERMATITIS: Primary | ICD-10-CM

## 2017-06-27 DIAGNOSIS — D22.9 MULTIPLE NEVI: Primary | ICD-10-CM

## 2017-06-27 DIAGNOSIS — N63.10 BREAST MASS, RIGHT: ICD-10-CM

## 2017-06-27 DIAGNOSIS — M33.13 DERMATOMYOSITIS (H): ICD-10-CM

## 2017-06-27 DIAGNOSIS — C44.310 BCC (BASAL CELL CARCINOMA), FACE: ICD-10-CM

## 2017-06-27 RX ORDER — DESOXIMETASONE 2.5 MG/G
CREAM TOPICAL 2 TIMES DAILY
Qty: 120 G | Refills: 3 | Status: SHIPPED | OUTPATIENT
Start: 2017-06-27 | End: 2019-11-15

## 2017-06-27 RX ORDER — DESONIDE 0.5 MG/G
CREAM TOPICAL 2 TIMES DAILY PRN
Qty: 30 G | Refills: 2 | Status: SHIPPED | OUTPATIENT
Start: 2017-06-27 | End: 2020-04-09 | Stop reason: ALTCHOICE

## 2017-06-27 ASSESSMENT — ENCOUNTER SYMPTOMS
INSOMNIA: 0
MYALGIAS: 0
CHILLS: 0
SINUS PAIN: 0
WHEEZING: 0
TINGLING: 0
NUMBNESS: 0
RESPIRATORY PAIN: 0
NIGHT SWEATS: 0
SKIN CHANGES: 0
SHORTNESS OF BREATH: 0
LIGHT-HEADEDNESS: 0
COUGH DISTURBING SLEEP: 0
ABDOMINAL PAIN: 0
HYPOTENSION: 0
RECTAL PAIN: 0
SPEECH CHANGE: 0
EXTREMITY NUMBNESS: 0
POOR WOUND HEALING: 0
INCREASED ENERGY: 0
BLOOD IN STOOL: 0
SWOLLEN GLANDS: 0
SNORES LOUDLY: 0
ALTERED TEMPERATURE REGULATION: 0
TACHYCARDIA: 0
LEG SWELLING: 0
DECREASED LIBIDO: 0
EYE IRRITATION: 0
STIFFNESS: 1
DYSURIA: 0
VOMITING: 0
MEMORY LOSS: 0
FEVER: 0
NECK PAIN: 0
HEARTBURN: 0
NECK MASS: 0
BACK PAIN: 0
BLOATING: 0
DECREASED APPETITE: 0
BREAST MASS: 0
FATIGUE: 0
DECREASED CONCENTRATION: 0
DOUBLE VISION: 0
ORTHOPNEA: 0
TREMORS: 0
SINUS CONGESTION: 0
JOINT SWELLING: 1
EXERCISE INTOLERANCE: 0
CLAUDICATION: 0
POSTURAL DYSPNEA: 0
DIFFICULTY URINATING: 0
EYE REDNESS: 0
TASTE DISTURBANCE: 0
MUSCLE WEAKNESS: 0
PALPITATIONS: 0
POLYDIPSIA: 0
DIZZINESS: 0
WEAKNESS: 0
HOT FLASHES: 0
DISTURBANCES IN COORDINATION: 0
JAUNDICE: 0
SLEEP DISTURBANCES DUE TO BREATHING: 0
DEPRESSION: 0
BRUISES/BLEEDS EASILY: 0
SORE THROAT: 0
EYE WATERING: 0
WEIGHT LOSS: 0
SPUTUM PRODUCTION: 0
HALLUCINATIONS: 0
SYNCOPE: 0
EYE PAIN: 0
BOWEL INCONTINENCE: 0
BREAST PAIN: 0
HOARSE VOICE: 0
DIARRHEA: 0
NERVOUS/ANXIOUS: 0
RECTAL BLEEDING: 0
WEIGHT GAIN: 0
NAUSEA: 0
ARTHRALGIAS: 1
SEIZURES: 0
HEADACHES: 0
FLANK PAIN: 0
POLYPHAGIA: 0
PARALYSIS: 0
LEG PAIN: 0
CONSTIPATION: 0
NAIL CHANGES: 1
SMELL DISTURBANCE: 0
LOSS OF CONSCIOUSNESS: 0
DYSPNEA ON EXERTION: 0
MUSCLE CRAMPS: 0
HEMATURIA: 0
COUGH: 0
HYPERTENSION: 0
PANIC: 0
HEMOPTYSIS: 0
TROUBLE SWALLOWING: 0

## 2017-06-27 ASSESSMENT — PAIN SCALES - GENERAL: PAINLEVEL: NO PAIN (0)

## 2017-06-27 NOTE — NURSING NOTE
Dermatology Rooming Note    Mely Rashid's goals for this visit include:   Chief Complaint   Patient presents with     Skin Check     Skin check, hx of BCC. No lesions of cocnern noted.     Venice Cordero LPN

## 2017-06-27 NOTE — PATIENT INSTRUCTIONS
Primary Care Center Phone Number [phone]  Primary Care Center Medication Refill Request Information:  * Please contact your pharmacy regarding ANY request for medication refills.  ** [facility] Prescription Fax = [phone]  * Please allow 3 business days for routine medication refills.  * Please allow 5 business days for controlled substance medication refills.     Primary Care Center Test Result notification information:  *You will be notified with in 7-10 days of your appointment day regarding the results of your test.  If you are on MyChart you will be notified as soon as the provider has reviewed the results and signed off on them.    Please schedule the following appointments:  Dermatology [phone] ([facility] 3rd floor)

## 2017-06-27 NOTE — PROGRESS NOTES
Chief complaint:  Mely Rashid is a 71 year old female presents for   Chief Complaint   Patient presents with     Physical     pt is here for an annual physical        SUBJECTIVE:  Ms. Rashid is a 72 yo female with hx of dermatomyositis presents for her annual physical. The patient denies acute issues today. The patient does note that her balance has been more of an issue so she started doing Eleno Chi and has found this helpful. Ms. Rashid also reports that her daughter has mental health issues and has recently started living with the patient while her daughter gets help to find housing. Ms. Rashid states she took a course through [employer] and found it very helpful. She is not interested in seeing a counselor at this time.     Ms. Rashid notes that she had a spot removed from her back in the past and was supposed to follow up in a year for a full body scan but never did. The patient reports she sees her eye doctor annually for her glaucoma (R eye) and because she's on Plaquinil and will have an appointment in late summer. Ms. Rashid states that she had a colonoscopy through [facility] a while ago and thinks she had polyps removed.       Medications and allergies were reviewed and updated in the chart.     SocHx:   History   Smoking Status     Former Smoker     Quit date: 4/10/1979   Smokeless Tobacco     Never Used     Comment: quit 1985     Family history and PMH reviewed and updated in chart    Patient Active Problem List    Diagnosis Date Noted     Routine general medical examination at a health care facility 06/09/2016     Priority: Medium     Neoplasm of uncertain behavior of skin 02/28/2016     Priority: Medium     Osteopenia 04/16/2015     Priority: Medium     Oral ulcer 04/12/2015     Seborrheic dermatitis 04/12/2015     Encounter for long-term (current) use of steroids 04/17/2014     Encounter for long-term current use of medication 02/10/2014     Problem list name updated by automated process. Provider to review        Herpes zoster 06/14/2013     Problem list name updated by automated process. 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   HENT:  Negative for ear pain, hearing loss, tinnitus, nosebleeds, trouble swallowing, hoarse voice, mouth sores, sore throat, ear discharge, tooth pain, gum tenderness, taste disturbance, smell disturbance, hearing aid, bleeding gums, dry mouth, sinus pain, sinus congestion and neck mass.    Eyes:  Negative for double vision, pain, redness, eye pain, decreased vision, eye watering, eye bulging, eye dryness, flashing lights, spots, floaters, strabismus, tunnel vision, jaundice and eye irritation.   Respiratory:   Negative for cough, hemoptysis, sputum production, shortness of breath, wheezing, sleep disturbances due to breathing, snores loudly, respiratory pain, dyspnea on exertion, cough disturbing sleep and postural dyspnea.    Cardiovascular:  Negative for chest pain, dyspnea on exertion, palpitations, orthopnea, claudication, leg swelling, fingers/toes turn blue, hypertension, hypotension, syncope, history of heart murmur, chest pain on exertion, chest pain at rest, pacemaker, few scattered varicosities, leg pain, sleep disturbances due to breathing, tachycardia, light-headedness, exercise intolerance and edema.   Gastrointestinal:  Negative for heartburn, nausea, vomiting, abdominal pain, diarrhea, constipation, blood in stool, melena, rectal pain, bloating, hemorrhoids, bowel incontinence, jaundice, rectal bleeding, coffee ground emesis and change in stool.   Genitourinary:  Negative for bladder incontinence, dysuria,  "urgency, hematuria, flank pain, vaginal discharge, difficulty urinating, genital sores, dyspareunia, decreased libido, nocturia, voiding less frequently, arousal difficulty, abnormal vaginal bleeding, excessive menstruation, menstrual changes, hot flashes, vaginal dryness and postmenopausal bleeding.   Musculoskeletal:  Positive for joint swelling, arthralgias, stiffness and fracture. Negative for myalgias, back pain, muscle cramps, neck pain, bone pain and muscle weakness.   Skin:  Positive for nail changes, flaky skin and sensitivity to sunlight. Negative for itching, poor wound healing, rash, hair changes, skin changes, acne, warts, poor wound healing, scarring, Raynaud's phenomenon and skin thickening.   Neurological:  Negative for dizziness, tingling, tremors, speech change, seizures, loss of consciousness, weakness, light-headedness, numbness, headaches, disturbances in coordination, extremity numbness, memory loss, difficulty walking and paralysis.   Endo/Heme:  Negative for anemia, swollen glands and bruises/bleeds easily.   Psychiatric/Behavioral:  Negative for depression, hallucinations, memory loss, decreased concentration, mood swings and panic attacks.    Breast:  Negative for breast discharge, breast mass, breast pain and nipple retraction.   Endocrine:  Negative for altered temperature regulation, polyphagia, polydipsia, unwanted hair growth and change in facial hair.      /80  Pulse 72  Resp 16  Ht 1.651 m (5' 5\")  Wt 65 kg (143 lb 4.8 oz)  SpO2 100%  Breastfeeding? No  BMI 23.85 kg/m2  Physical Exam   Constitutional: She appears well-developed and well-nourished.   HENT:   Right Ear: External ear normal.   Left Ear: External ear normal.   Mouth/Throat: Oropharynx is clear and moist.   Eyes: EOM are normal. Pupils are equal, round, and reactive to light.   Neck: Neck supple. No thyromegaly present.   Cardiovascular: Normal rate and regular rhythm.    Pulmonary/Chest: Effort normal and " breath sounds normal. She has no wheezes. She has no rales.   Tenderness to palpation of lateral R breast with no masses noted.    Abdominal: Soft. She exhibits no distension. There is no tenderness.   Genitourinary: There is breast tenderness.   Musculoskeletal: She exhibits no edema.   Lymphadenopathy:     She has no cervical adenopathy.   Neurological: She is alert. She has normal reflexes.   Skin: Skin is warm and dry.   Psychiatric: She has a normal mood and affect. Her behavior is normal. Judgment and thought content normal.       ASSESSMENT/PLAN:    Right Breast Tenderness  Patient has not noted any issues, but tenderness to palpation of lateral right breast on exam. Last mammogram was 12/2016 and no sign of malignancy was noted.   -Referral to mammography    Hx of basal cell carcinoma  Immunosuppressed status  The patient reports she had a spot removed in the past and was supposed to follow up but didn't. Per EMR, Ms. Rashid had a basal cell carcinoma removed in 3/2016. She is also on mycophenolate for her dermatomyositis.   -Referral to derm    HM:  Colonoscopy  Patient states she had a colonoscopy through [facility] in [place] and thinks she had polyps removed but was advised to have usual 10 year follow-up. Patient will sign a release today to get records transferred here.  -ROBBIE to get records from [facility]    RTC:   Follow-up in one year or as needed.    Scribe Disclosure:   I, Dot Mccall, am serving as a scribe; to document services personally performed by Gavi Stacy- -based on data collection and the provider's statements to me.     Provider Disclosure:  I agree with above History, Review of Systems, Physical exam and Plan.  I have reviewed the content of the documentation and have edited it as needed. I have personally performed the services documented here and the documentation accurately represents those services and the decisions I have made.      Total time spent 40 minutes.  More than 50%  of the time spent with Ms. Rashid on counseling / coordinating her care

## 2017-06-27 NOTE — MR AVS SNAPSHOT
After Visit Summary   6/27/2017    Mely Rashid    MRN: [id number]           Patient Information     Date Of Birth          1945        Visit Information        Provider Department      6/27/2017 11:05 AM Gavi Stacy, APRN CNP M [facility]        Today's Diagnoses     History of immunosuppression therapy    -  1    BCC (basal cell carcinoma), face        Breast mass, right          Care Instructions    Primary Care Center Phone Number [phone]  Primary Care Center Medication Refill Request Information:  * Please contact your pharmacy regarding ANY request for medication refills.  ** [facility] Prescription Fax = [phone]  * Please allow 3 business days for routine medication refills.  * Please allow 5 business days for controlled substance medication refills.     Primary Care Center Test Result notification information:  *You will be notified with in 7-10 days of your appointment day regarding the results of your test.  If you are on MyChart you will be notified as soon as the provider has reviewed the results and signed off on them.    Please schedule the following appointments:  Dermatology [phone] ([facility]                  Follow-ups after your visit        Additional Services     DERMATOLOGY REFERRAL       Your provider has referred you to: [facility]: Dermatology Clinic [facility] [phone]   [email or web address]    Please be aware that coverage of these services is subject to the terms and limitations of your health insurance plan.  Call member services at your health plan with any benefit or coverage questions.      Please bring the following with you to your appointment:    (1) Any X-Rays, CTs or MRIs which have been performed.  Contact the facility where they were done to arrange for  prior to your scheduled appointment.    (2) List of current medications  (3) This referral request   (4) Any documents/labs  given to you for this referral                  Your next 10 appointments already scheduled     Aug 14, 2017  1:30 PM CDT   ERG with [facility] EYE ELECTRODIAGNOSTIC   Eye Clinic ([facility])    [address]  [address]  [address]  [facility] [address]   [phone]            Aug 28, 2017  9:45 AM CDT   VISUAL FIELD with [facility] EYE VISUAL FIELD   Eye Clinic ([facility])    [address]  [address]  [address]  [facility] [address]   [phone]            Aug 28, 2017 10:15 AM CDT   RETURN RETINA with Shakira Chamberlain MD   Eye Clinic ([facility])    [address]  [address]  [address] Clin [address]a  [facility] [address]   [phone]            Sep 29, 2017  9:15 AM CDT   VISUAL FIELD with [facility] EYE VISUAL FIELD   Eye Clinic ([facility])    [address]  [address]  [address] Clin [address]a  [facility] [address]   [phone]            Sep 29, 2017  9:45 AM CDT   RETURN GLAUCOMA with Sima Gambino MD   Eye Clinic ([facility])    [address]  [address]  [address] Clin [address]a  [facility] [address]   [phone]              Future tests that were ordered for you today     Open Future Orders        Priority Expected Expires Ordered    Mammogram, diagnostic Routine  6/27/2018 6/27/2017            Who to contact     Please call your clinic at [phone] to:    Ask questions about your health    Make or cancel appointments    Discuss your medicines    Learn about your test results    Speak to your doctor   If you have compliments or concerns about an experience at your clinic, or if you wish to file a complaint, please contact [facility] Patient Relations at [phone] or email us at [email or web address]         Additional Information About Your Visit        MyChart Information     [employer] gives you secure access to your electronic health record.  "If you see a primary care provider, you can also send messages to your care team and make appointments. If you have questions, please call your primary care clinic.  If you do not have a primary care provider, please call [phone] and they will assist you.      [employer] is an electronic gateway that provides easy, online access to your medical records. With [employer], you can request a clinic appointment, read your test results, renew a prescription or communicate with your care team.     To access your existing account, please contact your [facility] or call [phone] for assistance.        Care EveryWhere ID     This is your Care EveryWhere ID. This could be used by other organizations to access your [place] medical records  [id number]        Your Vitals Were     Pulse Respirations Height Pulse Oximetry Breastfeeding? BMI (Body Mass Index)    72 16 1.651 m (5' 5\") 100% No 23.85 kg/m2       Blood Pressure from Last 3 Encounters:   06/27/17 119/80   01/24/17 (!) 134/91   10/17/16 118/79    Weight from Last 3 Encounters:   06/27/17 65 kg (143 lb 4.8 oz)   01/24/17 63 kg (139 lb)   10/17/16 63.7 kg (140 lb 8 oz)              We Performed the Following     DERMATOLOGY REFERRAL          Today's Medication Changes          These changes are accurate as of: 6/27/17 11:45 AM.  If you have any questions, ask your nurse or doctor.               These medicines have changed or have updated prescriptions.        Dose/Directions    diazepam 5 MG tablet   Commonly known as:  VALIUM   This may have changed:  Another medication with the same name was removed. Continue taking this medication, and follow the directions you see here.   Used for:  Claustrophobia   Changed by:  Boston Rodriguez MD        Dose:  5 mg   Take 1 tablet (5 mg) by mouth once as needed for anxiety or sleep (Take one on arrival for MRI. May repeat in 15 min if inadequate relief.)   Quantity:  2 tablet   Refills:  0    "             Primary Care Provider Office Phone # Fax #    Gavi Stacy, ROSHAN [phone]       PRIMARY CARE CENTER [address] [place] [address] 741  [facility] [address]        Equal Access to Services     ELIAS FUCHS : Hadii aad ku hadelio Socucoali, waaxda luqadaha, qaybta kaalmada adeegyada, waxantonio echevarrian marehiginio covingtonedel minor. So [facility] [phone].    ATENCIÓN: Si habla español, tiene a puga disposición servicios gratuitos de asistencia lingüística. Llame al [phone].    We comply with applicable federal civil rights laws and [place] laws. We do not discriminate on the basis of race, color, national origin, age, disability sex, sexual orientation or gender identity.            Thank you!     Thank you for choosing [facility] PRIMARY CARE CLINIC  for your care. Our goal is always to provide you with excellent care. Hearing back from our patients is one way we can continue to improve our services. Please take a few minutes to complete the written survey that you may receive in the mail after your visit with us. Thank you!             Your Updated Medication List - Protect others around you: Learn how to safely use, store and throw away your medicines at www.disposemymeds.org.          This list is accurate as of: 6/27/17 11:45 AM.  Always use your most recent med list.                   Brand Name Dispense Instructions for use Diagnosis    B Complex Tabs      Take 1 tablet by mouth daily.    Myalgias, Rash       CALCIUM 500 +D PO      Take 3 tablets by mouth daily.    Myalgias, Rash       CULTURELLE PO           desonide 0.05 % cream    DESOWEN    30 g    Apply topically 2 times daily as needed (rash)    Dermatomyositis (H)       desoximetasone 0.25 % cream    TOPICORT    120 g    Apply topically 2 times daily    Dermatomyositis (H)       diazepam 5 MG tablet    VALIUM    2 tablet    Take 1 tablet (5 mg) by mouth once as needed for anxiety or sleep (Take one on arrival for MRI. May repeat in  15 min if inadequate relief.)    Claustrophobia       Fish Oil 1200 MG Caps      Take 1 capsule by mouth daily.    Myalgias, Rash       GLUCOSAMINE CHONDR COMPLEX 500-400 MG Caps per capsule   Generic drug:  glucosamine-chondroitin      Take 2 capsules by mouth daily.    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    Myalgias, Rash       vitamin E 400 UNIT capsule      Take by mouth daily    Dermatomyositis (H), Encounter for long-term (current) use of steroids

## 2017-06-27 NOTE — NURSING NOTE
Chief Complaint   Patient presents with     Physical     pt is here for an annual physical       Marilu Florez CMA at 10:20 AM on 6/27/2017

## 2017-06-27 NOTE — MR AVS SNAPSHOT
After Visit Summary   6/27/2017    Mely Rashid    MRN: [id number]           Patient Information     Date Of Birth          1945        Visit Information        Provider Department      6/27/2017 12:45 PM Shira Fragoso MD [facility] Dermatology        Today's Diagnoses     Dermatomyositis (H)          Care Instructions    Elta MD sunscreen          Follow-ups after your visit        Follow-up notes from your care team     Return in about 1 year (around 6/27/2018).      Your next 10 appointments already scheduled     Aug 14, 2017  1:30 PM CDT   ERG with [facility] EYE ELECTRODIAGNOSTIC   Eye Clinic ([facility])    [address]  [address]  [address] Clin [address]a  [facility] [address]   [phone]            Aug 28, 2017  9:45 AM CDT   VISUAL FIELD with [facility] EYE VISUAL FIELD   Eye Clinic ([facility])    [address]  [address]  [address]a  [facility] [address] [phone]            Aug 28, 2017 10:15 AM CDT   RETURN RETINA with Shakira Chamberlain MD   Eye Clinic ([facility])    [address]  [address]  [address] Clin [address]a  [facility] [address]   [phone]            Sep 29, 2017  9:15 AM CDT   VISUAL FIELD with [facility] EYE VISUAL FIELD   Eye Clinic ([facility])    [address]  [address]  [address] Clin [address]a  [facility] [address] [phone]            Sep 29, 2017  9:45 AM CDT   RETURN GLAUCOMA with Sima Gambino MD   Eye Clinic ([facility])    [address]  [address]  [address] Clin [address]a  [facility] [address]   [phone]              Future tests that were ordered for you today     Open Future Orders        Priority Expected Expires Ordered    Mammogram, diagnostic Routine  6/27/2018 6/27/2017            Who to contact     Please call your clinic at [phone] to:    Ask questions about your health    Make or cancel appointments    Discuss  your medicines    Learn about your test results    Speak to your doctor   If you have compliments or concerns about an experience at your clinic, or if you wish to file a complaint, please contact [facility] Patient Relations at [phone] or email us at [email or web address]         Additional Information About Your Visit        MyChart Information     [employer] corphart gives you secure access to your electronic health record. If you see a primary care provider, you can also send messages to your care team and make appointments. If you have questions, please call your primary care clinic.  If you do not have a primary care provider, please call [phone] and they will assist you.      [employer] is an electronic gateway that provides easy, online access to your medical records. With [employer], you can request a clinic appointment, read your test results, renew a prescription or communicate with your care team.     To access your existing account, please contact your [facility] or call [phone] for assistance.        Care EveryWhere ID     This is your Care EveryWhere ID. This could be used by other organizations to access your [place] medical records  [id number]         Blood Pressure from Last 3 Encounters:   06/27/17 119/80   01/24/17 (!) 134/91   10/17/16 118/79    Weight from Last 3 Encounters:   06/27/17 65 kg (143 lb 4.8 oz)   01/24/17 63 kg (139 lb)   10/17/16 63.7 kg (140 lb 8 oz)              Today, you had the following     No orders found for display         Today's Medication Changes          These changes are accurate as of: 6/27/17  1:05 PM.  If you have any questions, ask your nurse or doctor.               These medicines have changed or have updated prescriptions.        Dose/Directions    diazepam 5 MG tablet   Commonly known as:  VALIUM   This may have changed:  Another medication with the same name was removed. Continue taking this  medication, and follow the directions you see here.   Used for:  Claustrophobia   Changed by:  Boston Rodriguez MD        Dose:  5 mg   Take 1 tablet (5 mg) by mouth once as needed for anxiety or sleep (Take one on arrival for MRI. May repeat in 15 min if inadequate relief.)   Quantity:  2 tablet   Refills:  0            Where to get your medicines      These medications were sent to [facility] - [place] [address] AT [address] [facility] [address]     Phone:  [phone]     desonide 0.05 % cream    desoximetasone 0.25 % cream                Primary Care Provider Office Phone # Fax #    Gavi CHAVES Dainaangelicharvey, ROSHAN [phone]       PRIMARY CARE CENTER [address] [place] [address] 741  [facility] [address]        Equal Access to Services     SHANNON [facility] HospitalVERONICA : Hadii chris ku hadasho Soomaali, waaxda luqadaha, qaybta kaalmada adeegyada, waxay calebin hayaan franki borges . So [facility] [phone].    ATENCIÓN: Si habla español, tiene a puga disposición servicios gratuitos de asistencia lingüística. Alysha al [phone].    We comply with applicable federal civil rights laws and [place] laws. We do not discriminate on the basis of race, color, national origin, age, disability sex, sexual orientation or gender identity.            Thank you!     Thank you for choosing [facility] DERMATOLOGY  for your care. Our goal is always to provide you with excellent care. Hearing back from our patients is one way we can continue to improve our services. Please take a few minutes to complete the written survey that you may receive in the mail after your visit with us. Thank you!             Your Updated Medication List - Protect others around you: Learn how to safely use, store and throw away your medicines at www.disposemymeds.org.          This list is accurate as of: 6/27/17  1:05 PM.  Always use your most recent med list.                   Brand Name  Dispense Instructions for use Diagnosis    B Complex Tabs      Take 1 tablet by mouth daily.    Myalgias, Rash       CALCIUM 500 +D PO      Take 3 tablets by mouth daily.    Myalgias, Rash       CULTURELLE PO           desonide 0.05 % cream    DESOWEN    30 g    Apply topically 2 times daily as needed (rash)    Dermatomyositis (H)       desoximetasone 0.25 % cream    TOPICORT    120 g    Apply topically 2 times daily    Dermatomyositis (H)       diazepam 5 MG tablet    VALIUM    2 tablet    Take 1 tablet (5 mg) by mouth once as needed for anxiety or sleep (Take one on arrival for MRI. May repeat in 15 min if inadequate relief.)    Claustrophobia       Fish Oil 1200 MG Caps      Take 1 capsule by mouth daily.    Myalgias, Rash       GLUCOSAMINE CHONDR COMPLEX 500-400 MG Caps per capsule   Generic drug:  glucosamine-chondroitin      Take 2 capsules by mouth daily.    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    Myalgias, Rash       vitamin E 400 UNIT capsule      Take by mouth daily    Dermatomyositis (H), Encounter for long-term (current) use of steroids

## 2017-06-27 NOTE — PROGRESS NOTES
"[facility] Dermatology Note      Dermatology Problem List:  1.NMSC  - nBCC mid back s/p MMS 2/22/2016  2.DM dx in 2012 and is currently managed by Dr. Carlos Colon in Rheumatology with CellCept and hydroxychloroquine    Encounter Date: Jun 27, 2017    CC:   Chief Complaint   Patient presents with     Skin Check     Skin check, hx of BCC. No lesions of cocnern noted.         History of Present Illness:  Ms. Mely Rashid is a 71 year old female who presents as a follow-up for TBSE. The patient was last seen 3/2016 when she underwent MMS on her midback for a nBCC. No new areas of concern. Notes a small itchy area on L medial wrist which she says was a bug bite which became \"rashy\" and hasnt resolved. Very diligent with photoprotection in the setting of her dermatomyositis.     Past Medical History:   Patient Active Problem List   Diagnosis     Glaucoma, right     Dermatomyositis (H)     IgA deficiency (H)     Herpes zoster     Encounter for long-term current use of medication     Encounter for long-term (current) use of steroids     Oral ulcer     Seborrheic dermatitis     Osteopenia     Neoplasm of uncertain behavior of skin     Routine general medical examination at a health care facility     Past Medical History:   Diagnosis Date     Basal cell carcinoma      Dermatomyositis (H)      Glaucoma      IgA deficiency (H)      Nonsenile cataract      Past Surgical History:   Procedure Laterality Date     BIOPSY OF SKIN LESION       HC UGI ENDOSCOPY W EUS N/A 5/14/2014    Procedure: COMBINED ENDOSCOPIC ULTRASOUND, ESOPHAGOSCOPY, GASTROSCOPY, DUODENOSCOPY (EGD);  Surgeon: Boston Rodriguez MD;  Location: U GI     HYSTERECTOMY      partial, prolapse uterus.       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 May repeat in 15 min if inadequate relief.) 2 tablet 0     latanoprost (XALATAN) 0.005 % ophthalmic solution Place 1 drop into the right eye At Bedtime 3 Bottle 3     Lactobacillus Rhamnosus, GG, (CULTURELLE PO)        loperamide (IMODIUM A-D) 2 MG tablet Take 2 mg by mouth every morning       desoximetasone (TOPICORT) 0.25 % cream Apply topically 2 times daily 120 g 3     desonide (DESOWEN) 0.05 % cream Apply topically 2 times daily as needed (rash) 30 g 2     vitamin E 400 UNIT capsule Take by mouth daily       Calcium Carb-Cholecalciferol (CALCIUM 500 +D PO) Take 3 tablets by mouth daily.       omega-3 fatty acids (FISH OIL) 1200 MG capsule Take 1 capsule by mouth daily.       Cholecalciferol (VITAMIN D) 1000 UNIT capsule Take 1 capsule by mouth daily.       B Complex TABS Take 1 tablet by mouth daily.       glucosamine-chondroitinoitin (GLUCOSAMINE CHONDR COMPLEX) 500-400 MG CAPS Take 2 capsules by mouth daily.          Allergies   Allergen Reactions     Penicillins Anaphylaxis         Review of Systems:  -Constitutional: The patient denies fatigue, fevers, chills, unintended weight loss, and night sweats.  -HEENT: Patient denies nonhealing oral sores.  -Skin: As above in HPI. No additional skin concerns.    Physical exam:  Vitals: There were no vitals taken for this visit.  GEN: This is a well developed, well-nourished female in no acute distress, in a pleasant mood.    SKIN: Full skin, which includes the head/face, both arms, chest, back, abdomen,both legs, genitalia and/or groin buttocks, digits and/or nails, was  examined.  -well healed linear scar on mid lower back without erosions or telangectasias  -There are waxy stuck on tan to brown papules on the thighs, lower legs, inframammary folds.  -There are bright red ~1mm dome shaped papules and macules scattered on the trunk.   -There is a pink poorly defined erythematous patch on the volar L wrist.  -<1mm white round papules on forehead  -oval shaped dark brown <4mm papule on R flank with reticulated pigment network  -dilated capillaries on proximal nail folds  -No other lesions of concern on areas examined.     Impression/Plan:  1. Non irritated Seborrheic keratosis, milia on forehead, cherry angiomas    Reassured of benign nature of these processes. As they are not symptomatic, no intervention required    2. Dermatomyositis    Sun precaution was advised including the use of sun screens of SPF 30 or higher, sun protective clothing, and avoidance of tanning beds. Reviewed different sunscreens such as Elta MD.     Continue desoximetasone BID as needed to cuticles if they flare    3. Dermatitis L volar wrist    Irritation from excoriation, use desonide cream twice daily to this area until healed.     4. Milia forehead --- patient reassured benign  5. H/o NMSC and immunosuppression for her dermatomyositis    Continue annual skin checks        CC Gavi Stacy on close of this encounter.  Follow-up in 1 year, earlier for new or changing lesions.        staffed the patient.    Staff Involved:  Resident(Claribel Stone)/Staff(as above)    The Patient was seen in Dermatology Clinic by SHIRA FRAGOSO.  I performed the history, examination, and procedures noted above with the resident.  I have reviewed the history & exam as outlined in this note and made edits where appropriate. The assessment and plan were jointly made.    Shira Fragoso MD, PhD    Department of Dermatology

## 2017-06-27 NOTE — LETTER
"6/27/2017       RE: Mely Rashid  [address]   [place] [address]     Dear Colleague,    Thank you for referring your patient, Mely Rashid, to the [facility] DERMATOLOGY at [facility]. Please see a copy of my visit note below.    Formerly [facility] Dermatology Note      Dermatology Problem List:  1.NMSC  - nBCC mid back s/p MMS 2/22/2016  2.DM dx in 2012 and is currently managed by Dr. Carlos Colon in Rheumatology with CellCept and hydroxychloroquine    Encounter Date: Jun 27, 2017    CC:   Chief Complaint   Patient presents with     Skin Check     Skin check, hx of BCC. No lesions of cocnern noted.         History of Present Illness:  Ms. Mely Rashid is a 71 year old female who presents as a follow-up for TBSE. The patient was last seen 3/2016 when she underwent MMS on her midback for a nBCC. No new areas of concern. Notes a small itchy area on L medial wrist which she says was a bug bite which became \"rashy\" and hasnt resolved. Very diligent with photoprotection in the setting of her dermatomyositis.     Past Medical History:   Patient Active Problem List   Diagnosis     Glaucoma, right     Dermatomyositis (H)     IgA deficiency (H)     Herpes zoster     Encounter for long-term current use of medication     Encounter for long-term (current) use of steroids     Oral ulcer     Seborrheic dermatitis     Osteopenia     Neoplasm of uncertain behavior of skin     Routine general medical examination at a health care facility     Past Medical History:   Diagnosis Date     Basal cell carcinoma      Dermatomyositis (H)      Glaucoma      IgA deficiency (H)      Nonsenile cataract      Past Surgical History:   Procedure Laterality Date     BIOPSY OF SKIN LESION       HC UGI ENDOSCOPY W EUS N/A 5/14/2014    Procedure: COMBINED ENDOSCOPIC ULTRASOUND, ESOPHAGOSCOPY, GASTROSCOPY, DUODENOSCOPY (EGD);  Surgeon: Boston Rodriguez MD;  [facility] "     HYSTERECTOMY      partial, prolapse uterus.       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 May repeat in 15 min if inadequate relief.) 2 tablet 0     latanoprost (XALATAN) 0.005 % ophthalmic solution Place 1 drop into the right eye At Bedtime 3 Bottle 3     Lactobacillus Rhamnosus, GG, (CULTURELLE PO)        loperamide (IMODIUM A-D) 2 MG tablet Take 2 mg by mouth every morning       desoximetasone (TOPICORT) 0.25 % cream Apply topically 2 times daily 120 g 3     desonide (DESOWEN) 0.05 % cream Apply topically 2 times daily as needed (rash) 30 g 2     vitamin E 400 UNIT capsule Take by mouth daily       Calcium Carb-Cholecalciferol (CALCIUM 500 +D PO) Take 3 tablets by mouth daily.       omega-3 fatty acids (FISH OIL) 1200 MG capsule Take 1 capsule by mouth daily.       Cholecalciferol (VITAMIN D) 1000 UNIT capsule Take 1 capsule by mouth daily.       B Complex TABS Take 1 tablet by mouth daily.       glucosamine-chondroitinoitin (GLUCOSAMINE CHONDR COMPLEX) 500-400 MG CAPS Take 2 capsules by mouth daily.          Allergies   Allergen Reactions     Penicillins Anaphylaxis         Review of Systems:  -Constitutional: The patient denies fatigue, fevers, chills, unintended weight loss, and night sweats.  -HEENT: Patient denies nonhealing oral sores.  -Skin: As above in HPI. No additional skin concerns.    Physical exam:  Vitals: There  were no vitals taken for this visit.  GEN: This is a well developed, well-nourished female in no acute distress, in a pleasant mood.    SKIN: Full skin, which includes the head/face, both arms, chest, back, abdomen,both legs, genitalia and/or groin buttocks, digits and/or nails, was examined.  -well healed linear scar on mid lower back without erosions or telangectasias  -There are waxy stuck on tan to brown papules on the thighs, lower legs, inframammary folds.  -There are bright red ~1mm dome shaped papules and macules scattered on the trunk.   -There is a pink poorly defined erythematous patch on the volar L wrist.  -<1mm white round papules on forehead  -oval shaped dark brown <4mm papule on R flank with reticulated pigment network  -dilated capillaries on proximal nail folds  -No other lesions of concern on areas examined.     Impression/Plan:  1. Non irritated Seborrheic keratosis, milia on forehead, cherry angiomas    Reassured of benign nature of these processes. As they are not symptomatic, no intervention required    2. Dermatomyositis    Sun precaution was advised including the use of sun screens of SPF 30 or higher, sun protective clothing, and avoidance of tanning beds. Reviewed different sunscreens such as Elta MD.     Continue desoximetasone BID as needed to cuticles if they flare    3. Dermatitis L volar wrist    Irritation from excoriation, use desonide cream twice daily to this area until healed.     4. Milia forehead --- patient reassured benign  5. H/o NMSC and immunosuppression for her dermatomyositis    Continue annual skin checks    KIARA Stacy on close of this encounter.  Follow-up in 1 year, earlier for new or changing lesions.      staffed the patient.    Staff Involved:  Resident(Claribel Stone)/Staff(as above)    The Patient was seen in Dermatology Clinic by WALLY BANKS.  I performed the history, examination, and procedures noted above with the resident.   I have reviewed the history & exam as outlined in this note and made edits where appropriate. The assessment and plan were jointly made.    Shira Fragoso MD, PhD    Department of Dermatology

## 2017-07-12 ENCOUNTER — MYC REFILL (OUTPATIENT)
Dept: OPHTHALMOLOGY | Facility: CLINIC | Age: 72
End: 2017-07-12

## 2017-07-12 ENCOUNTER — MYC MEDICAL ADVICE (OUTPATIENT)
Dept: RHEUMATOLOGY | Facility: CLINIC | Age: 72
End: 2017-07-12

## 2017-07-12 DIAGNOSIS — M33.13 DERMATOMYOSITIS (H): ICD-10-CM

## 2017-07-12 DIAGNOSIS — H40.051 BORDERLINE GLAUCOMA OF RIGHT EYE WITH OCULAR HYPERTENSION: ICD-10-CM

## 2017-07-12 DIAGNOSIS — Z79.899 HIGH RISK MEDICATIONS (NOT ANTICOAGULANTS) LONG-TERM USE: ICD-10-CM

## 2017-07-12 LAB
ALBUMIN SERPL-MCNC: 4.1 G/DL (ref 3.4–5)
ALT SERPL W P-5'-P-CCNC: 31 U/L (ref 0–50)
AST SERPL W P-5'-P-CCNC: 21 U/L (ref 0–45)
BASOPHILS # BLD AUTO: 0 10E9/L (ref 0–0.2)
BASOPHILS NFR BLD AUTO: 0.3 %
CK SERPL-CCNC: 117 U/L (ref 30–225)
CREAT SERPL-MCNC: 0.67 MG/DL (ref 0.52–1.04)
CRP SERPL-MCNC: <2.9 MG/L (ref 0–8)
DIFFERENTIAL METHOD BLD: ABNORMAL
EOSINOPHIL # BLD AUTO: 0.1 10E9/L (ref 0–0.7)
EOSINOPHIL NFR BLD AUTO: 1.8 %
ERYTHROCYTE [DISTWIDTH] IN BLOOD BY AUTOMATED COUNT: 13 % (ref 10–15)
GFR SERPL CREATININE-BSD FRML MDRD: 87 ML/MIN/1.7M2
HCT VFR BLD AUTO: 41.4 % (ref 35–47)
HGB BLD-MCNC: 13.5 G/DL (ref 11.7–15.7)
LYMPHOCYTES # BLD AUTO: 0.9 10E9/L (ref 0.8–5.3)
LYMPHOCYTES NFR BLD AUTO: 23.7 %
MCH RBC QN AUTO: 30.8 PG (ref 26.5–33)
MCHC RBC AUTO-ENTMCNC: 32.6 G/DL (ref 31.5–36.5)
MCV RBC AUTO: 94 FL (ref 78–100)
MONOCYTES # BLD AUTO: 0.5 10E9/L (ref 0–1.3)
MONOCYTES NFR BLD AUTO: 12.1 %
NEUTROPHILS # BLD AUTO: 2.4 10E9/L (ref 1.6–8.3)
NEUTROPHILS NFR BLD AUTO: 62.1 %
PLATELET # BLD AUTO: 186 10E9/L (ref 150–450)
RBC # BLD AUTO: 4.39 10E12/L (ref 3.8–5.2)
WBC # BLD AUTO: 3.9 10E9/L (ref 4–11)

## 2017-07-12 PROCEDURE — 85025 COMPLETE CBC W/AUTO DIFF WBC: CPT | Performed by: INTERNAL MEDICINE

## 2017-07-12 PROCEDURE — 84450 TRANSFERASE (AST) (SGOT): CPT | Performed by: INTERNAL MEDICINE

## 2017-07-12 PROCEDURE — 84460 ALANINE AMINO (ALT) (SGPT): CPT | Performed by: INTERNAL MEDICINE

## 2017-07-12 PROCEDURE — 36415 COLL VENOUS BLD VENIPUNCTURE: CPT | Performed by: INTERNAL MEDICINE

## 2017-07-12 PROCEDURE — 82040 ASSAY OF SERUM ALBUMIN: CPT | Performed by: INTERNAL MEDICINE

## 2017-07-12 PROCEDURE — 82565 ASSAY OF CREATININE: CPT | Performed by: INTERNAL MEDICINE

## 2017-07-12 PROCEDURE — 86140 C-REACTIVE PROTEIN: CPT | Performed by: INTERNAL MEDICINE

## 2017-07-12 PROCEDURE — 82550 ASSAY OF CK (CPK): CPT | Performed by: INTERNAL MEDICINE

## 2017-07-14 RX ORDER — LATANOPROST 50 UG/ML
1 SOLUTION/ DROPS OPHTHALMIC AT BEDTIME
Qty: 3 BOTTLE | Refills: 0 | Status: SHIPPED | OUTPATIENT
Start: 2017-07-14 | End: 2017-09-29

## 2017-07-14 NOTE — TELEPHONE ENCOUNTER
latanoprost (XALATAN) 0.005 % ophthalmic solution       Last Written Prescription Date:  9-30-17  Last Fill Quantity: 3 btl,   # refills: 3  Last Office Visit: 9-30-17  Future Office visit:   9-29-17  Last Note:  Progress Notes      CC: ocular HTN f/u     HPI:Ocular HTN right eye . Denies any vision changes since MARIA LUISA.      Ocular gtts:   - latanoprost qhs RE      SWAP VF 9-30-16  Within normal limits both eyes     A/P:   1) history ocular hypertension, RE with cup:disc asymmetry                         Good intraocular pressure today                        Continue Latanoprost at bedtime right eye                         Octopus 24-2 (ordered as SWAP) reliable OU and within normal limits     2) Plaquenil usage                         Since 2011                        Followed by Dr. Hernandes (last visit 8/29/16)                        No toxicity. Decreasing dosage, monitored by rheumatology. Now taking 200mg BID four days a week Tu/Th/Sat/Sun, 200mg once daily M/W/F     3) Trace NS cataracts, OU                        Not visually significant      RTC 1 year with SWAP visual field both eyes and OCT retinal nerve fiber layer as baseline   Continue Latanoprost right eye at bedtime            Kathleen M Doege RN

## 2017-07-14 NOTE — TELEPHONE ENCOUNTER
Message from [facility]:  Fermín Hudson RN Wed Jul 12, 2017 2:35 PM        ----- Message -----   From: Mely Rashid   Sent: 7/12/2017 2:26 PM   To: Eye Triage-[facility]  Subject: Medication Renewal Request     Original authorizing provider: MD Mely Cason would like a refill of the following medications:  latanoprost (XALATAN) 0.005 % ophthalmic solution [Sima Gambino MD]    Preferred pharmacy: [facility] - [address]    Comment:  Dr Gambino-- I am starting my last bottle today. 7/12/17

## 2017-07-20 RX ORDER — MYCOPHENOLATE MOFETIL 500 MG/1
500 TABLET ORAL 2 TIMES DAILY
Qty: 180 TABLET | Refills: 0 | Status: SHIPPED | OUTPATIENT
Start: 2017-07-20 | End: 2017-10-01

## 2017-07-20 NOTE — TELEPHONE ENCOUNTER
Call placed to pt to discuss current Mycophenolate dosing. Mely said she decreased it after recent lab work.  Review of EMR and found MyChart note that it was ok to decrease it to 500 mg bid. Prescription updated to reflect current med dosing and routed to Dr Peralta for completion as Dr Colon is out of clinic.  Note from 5/16/17:   Carlos Colon MD   7:52 AM   Note      I think it is ok for her to decrease MMF to 500 mg bid.      However, she should get a new set of PFTs including volumes and DLCO AND she should get repeat labs with CK in about 2 mo; please set those orders up. I should see her in a few mo, as well.       Both drugs can photosensitize: HOWEVER, HER BIGGEST SUN EXPOSURE RISK IS  TRIGGERING A BAD DM FLARE, SO IT DOESN'T MATTER; SHE NEEDS TO AVOID DIRECT SUN EXPOSURE ANYWAY.         Sandor Mclaughlin, NOHEMYN RN  Rheumatology RN Coordinator  [facility]

## 2017-07-20 NOTE — TELEPHONE ENCOUNTER
Last seen: 1/24/17  Pending appt:  None   Medication: Mycophenolate  Last given: 5/35/17  Qty: 90  Lab:    WBC   Date Value Ref Range Status   07/12/2017 3.9 (L) 4.0 - 11.0 10e9/L Final     RBC Count   Date Value Ref Range Status   07/12/2017 4.39 3.8 - 5.2 10e12/L Final     Hemoglobin   Date Value Ref Range Status   07/12/2017 13.5 11.7 - 15.7 g/dL Final     Hematocrit   Date Value Ref Range Status   07/12/2017 41.4 35.0 - 47.0 % Final     MCV   Date Value Ref Range Status   07/12/2017 94 78 - 100 fl Final     MCH   Date Value Ref Range Status   07/12/2017 30.8 26.5 - 33.0 pg Final     Platelet Count   Date Value Ref Range Status   07/12/2017 186 150 - 450 10e9/L Final     % Lymphocytes   Date Value Ref Range Status   07/12/2017 23.7 % Final     AST   Date Value Ref Range Status   07/12/2017 21 0 - 45 U/L Final     ALT   Date Value Ref Range Status   07/12/2017 31 0 - 50 U/L Final     Albumin   Date Value Ref Range Status   07/12/2017 4.1 3.4 - 5.0 g/dL Final     Alkaline Phosphatase   Date Value Ref Range Status   05/27/2015 35 (L) 40 - 150 U/L Final     Creatinine   Date Value Ref Range Status   07/12/2017 0.67 0.52 - 1.04 mg/dL Final     GFR Estimate   Date Value Ref Range Status   07/12/2017 87 >60 mL/min/1.7m2 Final     Comment:     Non  GFR Calc     GFR Estimate If Black   Date Value Ref Range Status   07/12/2017 >90   GFR Calc   >60 mL/min/1.7m2 Final     Sed Rate   Date Value Ref Range Status   11/30/2015 5 0 - 30 mm/h Final     CRP Inflammation   Date Value Ref Range Status   07/12/2017 <2.9 0.0 - 8.0 mg/L Final     Previous CBC (5/2/17)  Component      Latest Ref Rng & Units 5/2/2017   WBC      4.0 - 11.0 10e9/L 3.2 (L)   RBC Count      3.8 - 5.2 10e12/L 4.72   Hemoglobin      11.7 - 15.7 g/dL 14.3   Hematocrit      35.0 - 47.0 % 44.8   MCV      78 - 100 fl 95   MCH      26.5 - 33.0 pg 30.3   MCHC      31.5 - 36.5 g/dL 31.9   RDW      10.0 - 15.0 % 13.2   Platelet Count       150 - 450 10e9/L 194   Diff Method       Automated Method   % Neutrophils      % 63.1   % Lymphocytes      % 25.8   % Monocytes      % 9.3   % Eosinophils      % 1.2   % Basophils      % 0.6   Absolute Neutrophil      1.6 - 8.3 10e9/L 2.0   Absolute Lymphocytes      0.8 - 5.3 10e9/L 0.8   Absolute Monocytes      0.0 - 1.3 10e9/L 0.3   Absolute Eosinophils      0.0 - 0.7 10e9/L 0.0   Absolute Basophils      0.0 - 0.2 10e9/L 0.0     Routed to on call rheumatologist.    NOHEMY FontaineN RN  Rheumatology RN Coordinator  [facility]

## 2017-08-14 ENCOUNTER — ALLIED HEALTH/NURSE VISIT (OUTPATIENT)
Dept: OPHTHALMOLOGY | Facility: CLINIC | Age: 72
End: 2017-08-14
Attending: OPHTHALMOLOGY
Payer: COMMERCIAL

## 2017-08-14 DIAGNOSIS — Z79.899 ENCOUNTER FOR LONG-TERM CURRENT USE OF MEDICATION: ICD-10-CM

## 2017-08-14 PROCEDURE — 40000269 ZZH STATISTIC NO CHARGE FACILITY FEE: Mod: ZF

## 2017-08-14 PROCEDURE — 92275 MFERG OU (BOTH EYES): CPT | Mod: ZF

## 2017-08-14 ASSESSMENT — VISUAL ACUITY
OS_CC+: -1
METHOD: SNELLEN - LINEAR
OS_CC: 20/20
OD_CC: 20/20

## 2017-08-14 ASSESSMENT — REFRACTION_WEARINGRX
OD_ADD: +2.25
OS_CYLINDER: +0.50
OD_SPHERE: -0.25
SPECS_TYPE: PAL
OS_AXIS: 045
OS_SPHERE: -0.75
OD_CYLINDER: +1.00
OS_ADD: +2.25
OD_AXIS: 053

## 2017-08-14 NOTE — MR AVS SNAPSHOT
After Visit Summary   8/14/2017    Mely Rashid    MRN: [id number]           Patient Information     Date Of Birth          1945        Visit Information        Provider Department      8/14/2017 1:30 PM [facility] EYE ELECTRODIAGNOSTIC Eye Clinic        Today's Diagnoses     Encounter for long-term current use of medication           Follow-ups after your visit        Your next 10 appointments already scheduled     Sep 29, 2017  9:15 AM CDT   VISUAL FIELD with [facility] EYE VISUAL FIELD   Eye Clinic ([facility])    Anil Bennettg  [address] [place] [address]   [phone]            Sep 29, 2017  9:45 AM CDT   RETURN GLAUCOMA with Sima Gambino MD   Eye Clinic ([facility])    Anil Bennettg  [address] [address] [phone]              Who to contact     Please call your clinic at [phone] to:    Ask questions about your health    Make or cancel appointments    Discuss your medicines    Learn about your test results    Speak to your doctor   If you have compliments or concerns about an experience at your clinic, or if you wish to file a complaint, please contact [facility] Patient Relations at [phone] or email us at [email or web address]         Additional Information About Your Visit        MyChart Information     [employer] gives you secure access to your electronic health record. If you see a primary care provider, you can also send messages to your care team and make appointments. If you have questions, please call your primary care clinic.  If you do not have a primary care provider, please call [phone] and they will assist you.      [employer] is an electronic gateway that provides easy, online access to your medical records. With [employer], you can request a clinic appointment, read your test results, renew a prescription or communicate with your  care team.     To access your existing account, please contact your [facility] or call [phone] for assistance.        Care EveryWhere ID     This is your Care EveryWhere ID. This could be used by other organizations to access your [facility] medical records  [id number]         Blood Pressure from Last 3 Encounters:   06/27/17 119/80   01/24/17 (!) 134/91   10/17/16 118/79    Weight from Last 3 Encounters:   06/27/17 65 kg (143 lb 4.8 oz)   01/24/17 63 kg (139 lb)   10/17/16 63.7 kg (140 lb 8 oz)              We Performed the Following     MFERG OU (both eyes)        Primary Care Provider Office Phone # Fax #    Gavi CHAVES Dainamary APRRIYA [phone]       [address] [place] [address] [address] [place] [id number]        Equal Access to Services     SHANNON [facility] CenterVERONICA : Hadii aad ku hadasho Soomaali, waaxda luqadaha, qaybta kaalmada adeegyada, waxay calebin hayaan adehiginio borges . So [facility] [phone].    ATENCIÓN: Si habla español, tiene a puga disposición servicios gratuitos de asistencia lingüística. Llame al [phone].    We comply with applicable federal civil rights laws and [place] laws. We do not discriminate on the basis of race, color, national origin, age, disability sex, sexual orientation or gender identity.            Thank you!     Thank you for choosing EYE CLINIC  for your care. Our goal is always to provide you with excellent care. Hearing back from our patients is one way we can continue to improve our services. Please take a few minutes to complete the written survey that you may receive in the mail after your visit with us. Thank you!             Your Updated Medication List - Protect others around you: Learn how to safely use, store and throw away your medicines at www.disposemymeds.org.          This list is accurate as of: 8/14/17 11:59 PM.  Always use your most recent med list.                   Brand Name Dispense Instructions for use Diagnosis    B  Complex Tabs      Take 1 tablet by mouth daily.    Myalgias, Rash       CALCIUM 500 +D PO      Take 3 tablets by mouth daily.    Myalgias, Rash       CULTURELLE PO           desonide 0.05 % cream    DESOWEN    30 g    Apply topically 2 times daily as needed (rash)    Dermatomyositis (H)       desoximetasone 0.25 % cream    TOPICORT    120 g    Apply topically 2 times daily    Dermatomyositis (H)       diazepam 5 MG tablet    VALIUM    2 tablet    Take 1 tablet (5 mg) by mouth once as needed for anxiety or sleep (Take one on arrival for MRI. May repeat in 15 min if inadequate relief.)    Claustrophobia       Fish Oil 1200 MG Caps      Take 1 capsule by mouth daily.    Myalgias, Rash       GLUCOSAMINE CHONDR COMPLEX 500-400 MG Caps per capsule   Generic drug:  glucosamine-chondroitin      Take 2 capsules by mouth daily.    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    Dermatomyositis (H)       vitamin D 1000 UNITS capsule      Take 1 capsule by mouth daily.    Myalgias, Rash       vitamin E 400 UNIT capsule      Take by mouth daily    Dermatomyositis (H), Encounter for long-term (current) use of steroids

## 2017-08-14 NOTE — NURSING NOTE
"Chief Complaints and History of Present Illnesses   Patient presents with     Procedure     mfERG for plaquenil monitoring     HPI    Symptoms:           Do you have eye pain now?:  No      Comments:  mfERG for plaquenil monitoring  History of Dermatomyositis  400mg/day since 2011-8/2016 then 200mg day since 8/2016  Height 5'6\", weight 130  Xalatan qd RE  Last mfERG 2-  Alanis Mccray COA 1:27 PM August 14, 2017                  "

## 2017-08-28 ENCOUNTER — APPOINTMENT (OUTPATIENT)
Dept: OPHTHALMOLOGY | Facility: CLINIC | Age: 72
End: 2017-08-28
Attending: OPHTHALMOLOGY
Payer: COMMERCIAL

## 2017-08-28 DIAGNOSIS — Z79.899 ENCOUNTER FOR LONG-TERM CURRENT USE OF MEDICATION: ICD-10-CM

## 2017-08-28 PROCEDURE — 92082 INTERMEDIATE VISUAL FIELD XM: CPT | Mod: ZF | Performed by: OPHTHALMOLOGY

## 2017-08-28 PROCEDURE — 92134 CPTRZ OPH DX IMG PST SGM RTA: CPT | Mod: ZF | Performed by: OPHTHALMOLOGY

## 2017-08-28 PROCEDURE — 92250 FUNDUS PHOTOGRAPHY W/I&R: CPT | Mod: ZF | Performed by: OPHTHALMOLOGY

## 2017-08-28 PROCEDURE — 99213 OFFICE O/P EST LOW 20 MIN: CPT | Mod: ZF

## 2017-08-28 ASSESSMENT — CONF VISUAL FIELD
OS_NORMAL: 1
OD_NORMAL: 1
METHOD: COUNTING FINGERS

## 2017-08-28 ASSESSMENT — REFRACTION_WEARINGRX
OD_ADD: +2.25
OS_CYLINDER: +0.50
OS_AXIS: 045
OS_ADD: +2.25
SPECS_TYPE: PAL
OD_CYLINDER: +1.00
OD_SPHERE: -0.25
OD_AXIS: 053
OS_SPHERE: -0.75

## 2017-08-28 ASSESSMENT — EXTERNAL EXAM - RIGHT EYE: OD_EXAM: NORMAL

## 2017-08-28 ASSESSMENT — CUP TO DISC RATIO
OS_RATIO: 0.4
OD_RATIO: 0.75

## 2017-08-28 ASSESSMENT — VISUAL ACUITY
METHOD: SNELLEN - LINEAR
OS_CC: 20/20
CORRECTION_TYPE: GLASSES
OD_CC: 20/20

## 2017-08-28 ASSESSMENT — SLIT LAMP EXAM - LIDS
COMMENTS: NORMAL
COMMENTS: NORMAL

## 2017-08-28 ASSESSMENT — TONOMETRY
OD_IOP_MMHG: 22
OS_IOP_MMHG: 20
IOP_METHOD: TONOPEN

## 2017-08-28 ASSESSMENT — EXTERNAL EXAM - LEFT EYE: OS_EXAM: NORMAL

## 2017-08-28 NOTE — PROGRESS NOTES
"CC -   plaquenil    INTERVAL HISTORY -   VA stable, plaquenil reduced to 200/day since MARIA LUISA with me.    HPI -   Mely Rashid is a  72 year old year-old patient presenting for plaquenil.  400 mg/day since 2011-8/2016, then 200/day since (brief period of 300/day)  height 5'6\", weigth 145#., no renal disease  H/o dermatomyositis, on plaquenil and cellcept.  Sees Isaiah.    Retired surgery scheduler for ENT @ [employer]    PAST OCULAR HISTORY  No surgeries    RETINAL IMAGING  OCT   8-28-17  OD-  Mild difuse outer changes, ?PVD  OS -  Mild diffuse outer changes, ?PVD    AF  8-28-17  OU - mild irregular, stable    OVF 10-2   8-28-17  OD - reliable, few spots, stable  OS - reliable, few spots, stable    mfERG 8/14/17  OU - normal      ASSESSMENT & PLAN    1.  Plaquenil use since 2011   - 400/day till 8/2016, then 200/day height 5'6\", weight 145#     - last mfERG 8/2017 normal   - mild irregularity on OVF 10-2, OCT, and AF stable   - ?d/t age, not typical for plaquenil     - will need to repeat mfERG every 2 years or so given difficulty interpreting other tests    2.   vitreous syneresis OU vs PVD   - advised S/Sx RD    3.  Glaucoma OU   - sees Immanuel   - on xalatan   - IOP good today    4.  Tr NS OU      return to clinic: 1 year, OCT/AF/OVF 10-2       ATTESTATION     Attending Physician Attestation:      Complete documentation of historical and exam elements from today's encounter can be found in the full encounter summary report (not reduplicated in this progress note).  I personally obtained the chief complaint(s) and history of present illness.  I confirmed and edited as necessary the review of systems, past medical/surgical history, family history, social history, and examination findings as documented by others; and I examined the patient myself.  I personally reviewed the relevant tests, images, and reports as documented above.  I formulated and edited as necessary the assessment and plan and discussed the findings and " management plan with the patient and family        Shakira Chamberlain MD, PhD  , Vitreoretinal Surgery  Department of Ophthalmology  [facility]

## 2017-08-28 NOTE — MR AVS SNAPSHOT
After Visit Summary   8/28/2017    Mely Rashid    MRN: [id number]           Patient Information     Date Of Birth          1945        Visit Information        Provider Department      8/28/2017 10:15 AM Shakira Chamberlain MD [facility]        Today's Diagnoses     Encounter for long-term current use of medication           Follow-ups after your visit        Follow-up notes from your care team     Return in about 1 year (around 8/28/2018) for OCT OU, AF OU, OVF 10-2 OU.      Your next 10 appointments already scheduled     Sep 29, 2017  9:15 AM CDT   VISUAL FIELD with [facility] EYE VISUAL FIELD   Eye Clinic ([facility])    [address]  [address]  [facility] [address]   [phone]            Sep 29, 2017  9:45 AM CDT   RETURN GLAUCOMA with Sima Gambino MD   Eye Clinic ([facility])    [address]  [address]  [facility] [address]   [phone]              Future tests that were ordered for you today     Open Future Orders        Priority Expected Expires Ordered    OCT Retina Spectralis OU (both eyes) Routine  3/1/2019 8/28/2017    Fundus Autofluorescence Image (FAF) OU (both eyes) Routine  3/1/2019 8/28/2017    OVF 10-2 Macula OU Routine  3/1/2019 8/28/2017            Who to contact     Please call your clinic at [phone] to:    Ask questions about your health    Make or cancel appointments    Discuss your medicines    Learn about your test results    Speak to your doctor   If you have compliments or concerns about an experience at your clinic, or if you wish to file a complaint, please contact [facility] Patient Relations at [phone] or email us at [email or web address]         Additional Information About Your Visit        MyChart Information     MyChart gives you secure access to your electronic health record. If you see a primary care provider,  you can also send messages to your care team and make appointments. If you have questions, please call your primary care clinic.  If you do not have a primary care provider, please call [phone] and they will assist you.      [employer] is an electronic gateway that provides easy, online access to your medical records. With [employer], you can request a clinic appointment, read your test results, renew a prescription or communicate with your care team.     To access your existing account, please contact your [facility] or call [phone] for assistance.        Care EveryWhere ID     This is your Care EveryWhere ID. This could be used by other organizations to access your [place] medical records  [id number]         Blood Pressure from Last 3 Encounters:   06/27/17 119/80   01/24/17 (!) 134/91   10/17/16 118/79    Weight from Last 3 Encounters:   06/27/17 65 kg (143 lb 4.8 oz)   01/24/17 63 kg (139 lb)   10/17/16 63.7 kg (140 lb 8 oz)              We Performed the Following     Fundus Autofluorescence Image (FAF) OU (both eyes)     OCT Retina Spectralis OU (both eyes)     OVF 10-2 Macula OU        Primary Care Provider Office Phone # Fax #    Gavi CHAVES Tawanna, APRN [phone]       [address] [place] [address]        Equal Access to Services     ELIAS FUCHS AH: Hadii chris ku hadasho Socucoali, waaxda luqadaha, qaybta kaalmada beatriz, ajith minor. So [facility] [phone].    ATENCIÓN: Si habla español, tiene a puga disposición servicios gratuitos de asistencia lingüística. Llame al [phone].    We comply with applicable federal civil rights laws and [place] laws. We do not discriminate on the basis of race, color, national origin, age, disability sex, sexual orientation or gender identity.            Thank you!     Thank you for choosing EYE CLINIC  for your care. Our goal is always to provide you with excellent care.  Hearing back from our patients is one way we can continue to improve our services. Please take a few minutes to complete the written survey that you may receive in the mail after your visit with us. Thank you!             Your Updated Medication List - Protect others around you: Learn how to safely use, store and throw away your medicines at www.disposemymeds.org.          This list is accurate as of: 8/28/17 11:10 PM.  Always use your most recent med list.                   Brand Name Dispense Instructions for use Diagnosis    B Complex Tabs      Take 1 tablet by mouth daily.    Myalgias, Rash       CALCIUM 500 +D PO      Take 3 tablets by mouth daily.    Myalgias, Rash       CULTURELLE PO           desonide 0.05 % cream    DESOWEN    30 g    Apply topically 2 times daily as needed (rash)    Dermatomyositis (H)       desoximetasone 0.25 % cream    TOPICORT    120 g    Apply topically 2 times daily    Dermatomyositis (H)       diazepam 5 MG tablet    VALIUM    2 tablet    Take 1 tablet (5 mg) by mouth once as needed for anxiety or sleep (Take one on arrival for MRI. May repeat in 15 min if inadequate relief.)    Claustrophobia       Fish Oil 1200 MG Caps      Take 1 capsule by mouth daily.    Myalgias, Rash       GLUCOSAMINE CHONDR COMPLEX 500-400 MG Caps per capsule   Generic drug:  glucosamine-chondroitin      Take 2 capsules by mouth daily.    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    Dermatomyositis (H)       vitamin D 1000 UNITS capsule      Take 1 capsule by mouth daily.    Myalgias, Rash       vitamin E 400 UNIT capsule      Take by mouth daily    Dermatomyositis (H), Encounter for long-term (current) use of steroids

## 2017-08-28 NOTE — NURSING NOTE
Chief Complaints and History of Present Illnesses   Patient presents with     Follow Up For     Plaquenil usage      HPI    Affected eye(s):  Both   Symptoms:     No blurred vision   No decreased vision   Dryness         Do you have eye pain now?:  No      Comments:  Jayda PERRY 10:27 AM August 28, 2017

## 2017-09-11 NOTE — PROGRESS NOTES
Multifocal ERG Results    Diagnostic Indication:   Plaquenil use at 400 mg/day from 3927-2084 then 200/day since.  Weight 145#.   An mfERG was obtained due to persistent difficulty interpreting OVF 10-2 and imaging results.    Diagnostic findings:  A 103 hexagon stimulus pattern was used to obtain a mfERG in both eyes.    For the right eye, the test was noted to be reliable by the technician.  The waveform tracings were normal with good SNR.  The amplitude plot showed a well formed foveal peak.  The amplitude values were within normal limits. The latencies did not show any abnormal increase.  The ring tracings were essentially normal.  The ring ratios were essentially within normal limits for ring amplitudes.     For the left eye, the test was noted to be reliable by the technician.  The waveform tracings were normal with good SNR.  The amplitude plot showed a well formed foveal peak.  The amplitude values were within normal limits. The latencies did not show any abnormal increase.  The ring tracings were essentially normal.  The ring ratios were essentially within normal limits for ring amplitudes.       Diagnostic impression:    1.  Normal mfERG both eyes.    2.  No evidence of  plaquenil toxicity.    Clinical recommendations: clinical correlation recommended.

## 2017-09-29 ENCOUNTER — OFFICE VISIT (OUTPATIENT)
Dept: OPHTHALMOLOGY | Facility: CLINIC | Age: 72
End: 2017-09-29
Attending: OPHTHALMOLOGY
Payer: COMMERCIAL

## 2017-09-29 DIAGNOSIS — H40.9 UNSPECIFIED GLAUCOMA(365.9): Primary | ICD-10-CM

## 2017-09-29 DIAGNOSIS — H40.051 OCULAR HYPERTENSION, RIGHT: ICD-10-CM

## 2017-09-29 DIAGNOSIS — H40.051 BORDERLINE GLAUCOMA OF RIGHT EYE WITH OCULAR HYPERTENSION: ICD-10-CM

## 2017-09-29 DIAGNOSIS — H40.1211 LOW TENSION GLAUCOMA OF RIGHT EYE, MILD STAGE: ICD-10-CM

## 2017-09-29 PROCEDURE — 92133 CPTRZD OPH DX IMG PST SGM ON: CPT | Mod: ZF | Performed by: OPHTHALMOLOGY

## 2017-09-29 PROCEDURE — 92083 EXTENDED VISUAL FIELD XM: CPT | Mod: ZF | Performed by: OPHTHALMOLOGY

## 2017-09-29 PROCEDURE — 99213 OFFICE O/P EST LOW 20 MIN: CPT | Mod: ZF

## 2017-09-29 RX ORDER — LATANOPROST 50 UG/ML
1 SOLUTION/ DROPS OPHTHALMIC AT BEDTIME
Qty: 3 BOTTLE | Refills: 11 | Status: SHIPPED | OUTPATIENT
Start: 2017-09-29 | End: 2018-11-14

## 2017-09-29 ASSESSMENT — SLIT LAMP EXAM - LIDS
COMMENTS: NORMAL
COMMENTS: NORMAL

## 2017-09-29 ASSESSMENT — CONF VISUAL FIELD
OD_INFERIOR_NASAL_RESTRICTION: 3
OS_NORMAL: 1
METHOD: COUNTING FINGERS

## 2017-09-29 ASSESSMENT — EXTERNAL EXAM - RIGHT EYE: OD_EXAM: NORMAL

## 2017-09-29 ASSESSMENT — CUP TO DISC RATIO
OD_RATIO: 0.7
OS_RATIO: 0.3

## 2017-09-29 ASSESSMENT — VISUAL ACUITY
OS_CC+: -1
METHOD: SNELLEN - LINEAR
OS_CC: 20/20
OD_CC+: -1
CORRECTION_TYPE: GLASSES
OD_CC: 20/20

## 2017-09-29 ASSESSMENT — EXTERNAL EXAM - LEFT EYE: OS_EXAM: NORMAL

## 2017-09-29 ASSESSMENT — TONOMETRY
IOP_METHOD: APPLANATION
OS_IOP_MMHG: 16
OD_IOP_MMHG: 15

## 2017-09-29 NOTE — PROGRESS NOTES
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 Pt reports vision has been stable. Eyes feel dry w/ change in weather. Otherwise no acute concerns.     Ocular gtts:    latanoprost at bedtime right eye     artificial tears as needed     VF SWAP testing 9/29/17:  OD: 20% FP. No deficits noted   OS: reliable. No deficits noted.     OCT RNFL 9/29/17 :  OD: superior and inferior significant thinning, w/ nasal borderline thinning (baseline)  OS: WNL all green (baseline)    A/P:   1) history ocular hypertension/preperimetric glaucoma, RE with cup:disc asymmetry    Sister w/ hx of low tension glaucoma- required surgery tube?   Good intraocular pressure today   Continue Latanoprost at bedtime right eye    Octopus SWAP both eyes within normal limits   OCT RNFL today 9/29/17: baseline testing demonstrating superior and inferior thinning in RE, w/ borderline nasal thinning.    2) Plaquenil usage    Since 2011   Followed by Dr. Hinds (last visit 8/29/16)   Mf ERG 8/2017- wnl no signs of palquenil toxicity     No toxicity. Decreasing dosage, monitored by rheumatology. Now taking 200mg BID four days a week Tu/Th/Sat/Sun, 200mg once daily M/W/F    3) Trace NS cataracts, OU   Not visually significant     Plan:  Diagnosis normotensive glaucoma right eye, mild/preperimetric.   Continue Latanoprost right eye at bedtime   RTC  1 year Octopus visual field SWAP and OCT retinal nerve fiber layer     Lucero Ott MD  Ophthalmology PGY3    Attending Physician Attestation:  Complete documentation of historical and exam elements from today's encounter can be found in the full encounter summary report (not reduplicated in this progress note). I personally obtained the chief complaint(s) and history of present illness. I confirmed and edited asnecessary the review of systems, past medical/surgical history, family history, social history, and examination findings as documented by others; and I  examined the patient myself. I personally reviewed the relevant tests, images, and reports as documented above. I formulated and edited as necessary the assessment and plan and discussed the findings and management plan with the patient and family.  - Sima Gambino MD 11:24 AM 9/29/2017

## 2017-09-29 NOTE — MR AVS SNAPSHOT
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      Who to contact     Please call your clinic at [phone] to:    Ask questions about your health    Make or cancel appointments    Discuss your medicines    Learn about your test results    Speak to your doctor   If you have compliments or concerns about an experience at your clinic, or if you wish to file a complaint, please contact [facility] Patient Relations at [phone] or email us at [email or web address]         Additional Information About Your Visit        MyChart Information     People [employer] Pages gives you secure access to your electronic health record. If you see a primary care provider, you can also send messages to your care team and make appointments. If you have questions, please call your primary care clinic.  If you do not have a primary care provider, please call [phone] and they will assist you.      People [employer] is an electronic gateway that provides easy, online access to your medical records. With [employer], you can request a clinic appointment, read your test results, renew a prescription or communicate with your care team.     To access your existing account, please contact your [facility] Clinic or call [phone] for assistance.        Care EveryWhere ID     This is your Care EveryWhere ID. This could be used by other  organizations to access your [place] medical records  [id number]         Blood Pressure from Last 3 Encounters:   06/27/17 119/80   01/24/17 (!) 134/91   10/17/16 118/79    Weight from Last 3 Encounters:   06/27/17 65 kg (143 lb 4.8 oz)   01/24/17 63 kg (139 lb)   10/17/16 63.7 kg (140 lb 8 oz)              We Performed the Following     OCT Optic Nerve RNFL Spectralis OU (both eyes)     Swap OU          Where to get your medicines      These medications were sent to [facility] - [address]  [address] [place] [address]     Phone:  [phone]     latanoprost 0.005 % ophthalmic solution          Primary Care Provider Office Phone # Fax #    Gavi Stacy, ROSHAN [phone]       [address] [place] [address]  [facility] [address]        Equal Access to Services     ELIAS FUCHS : Hadii aad ku hadasho Soomaali, waaxda luqadaha, qaybta kaalmada adeegyada, waxay calebin haypablo borges . So [facility] [phone].    ATENCIÓN: Si moriah lynn, tiene a puga disposición servicios gratuitos de asistencia lingüística. Llame al [phone].    We comply with applicable federal civil rights laws and [place] laws. We do not discriminate on the basis of race, color, national origin, age, disability sex, sexual orientation or gender identity.            Thank you!     Thank you for choosing EYE CLINIC  for your care. Our goal is always to provide you with excellent care. Hearing back from our patients is one way we can continue to improve our services. Please take a few minutes to complete the written survey that you may receive in the mail after your visit with us. Thank you!             Your Updated Medication List - Protect others around you: Learn how to safely use, store and throw away your medicines at www.disposemymeds.org.          This list is accurate as of: 9/29/17 11:35 AM.  Always use your most recent med list.                    Brand Name Dispense Instructions for use Diagnosis    B Complex Tabs      Take 1 tablet by mouth daily.    Myalgias, Rash       CALCIUM 500 +D PO      Take 3 tablets by mouth daily.    Myalgias, Rash       CULTURELLE PO           desonide 0.05 % cream    DESOWEN    30 g    Apply topically 2 times daily as needed (rash)    Dermatomyositis (H)       desoximetasone 0.25 % cream    TOPICORT    120 g    Apply topically 2 times daily    Dermatomyositis (H)       diazepam 5 MG tablet    VALIUM    2 tablet    Take 1 tablet (5 mg) by mouth once as needed for anxiety or sleep (Take one on arrival for MRI. May repeat in 15 min if inadequate relief.)    Claustrophobia       Fish Oil 1200 MG Caps      Take 1 capsule by mouth daily.    Myalgias, Rash       GLUCOSAMINE CHONDR COMPLEX 500-400 MG Caps per capsule   Generic drug:  glucosamine-chondroitin      Take 2 capsules by mouth daily.    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    Dermatomyositis (H)       vitamin D 1000 UNITS capsule      Take 1 capsule by mouth daily.    Myalgias, Rash       vitamin E 400 UNIT capsule      Take by mouth daily    Dermatomyositis (H), Encounter for long-term (current) use of steroids

## 2017-09-29 NOTE — NURSING NOTE
Chief Complaints and History of Present Illnesses   Patient presents with     Follow Up For     history ocular hypertension, RE with cup:disc asymmetry      HPI    Affected eye(s):  Both   Symptoms:     No blurred vision   No decreased vision         Do you have eye pain now?:  No      Comments:  Jayda PERRY 9:24 AM September 29, 2017

## 2017-10-01 DIAGNOSIS — M33.13 DERMATOMYOSITIS (H): ICD-10-CM

## 2017-10-01 NOTE — LETTER
October 3, 2017      TO: Mely Rashid  [address] [place] [place] [address]     Dear Ms. Mely Rashid,    Monitoring labs required every 8-12 weeks for medication refills.  LABS  7/12/17    PLEASE, SCHEDULE AN APPOINTMENT WITH CARLOS CASTANEDA MD.    Sincerely, [facility]

## 2017-10-03 DIAGNOSIS — M33.13 DERMATOMYOSITIS (H): ICD-10-CM

## 2017-10-03 LAB
DLCOUNC-%PRED-PRE: 87 %
DLCOUNC-PRE: 18.22 ML/MIN/MMHG
DLCOUNC-PRED: 20.92 ML/MIN/MMHG
ERV-%PRED-PRE: 147 %
ERV-PRE: 1.18 L
ERV-PRED: 0.8 L
EXPTIME-PRE: 7.29 SEC
FEF2575-%PRED-PRE: 97 %
FEF2575-PRE: 1.76 L/SEC
FEF2575-PRED: 1.81 L/SEC
FEFMAX-%PRED-PRE: 111 %
FEFMAX-PRE: 6.48 L/SEC
FEFMAX-PRED: 5.8 L/SEC
FEV1-%PRED-PRE: 118 %
FEV1-PRE: 2.53 L
FEV1FEV6-PRE: 74 %
FEV1FEV6-PRED: 79 %
FEV1FVC-PRE: 73 %
FEV1FVC-PRED: 79 %
FEV1SVC-PRE: 76 %
FEV1SVC-PRED: 67 %
FIFMAX-PRE: 5.88 L/SEC
FRCPLETH-%PRED-PRE: 121 %
FRCPLETH-PRE: 3.37 L
FRCPLETH-PRED: 2.77 L
FVC-%PRED-PRE: 127 %
FVC-PRE: 3.46 L
FVC-PRED: 2.72 L
IC-%PRED-PRE: 90 %
IC-PRE: 2.16 L
IC-PRED: 2.39 L
RVPLETH-%PRED-PRE: 103 %
RVPLETH-PRE: 2.19 L
RVPLETH-PRED: 2.11 L
TLCPLETH-%PRED-PRE: 108 %
TLCPLETH-PRE: 5.53 L
TLCPLETH-PRED: 5.11 L
VA-%PRED-PRE: 94 %
VA-PRE: 4.95 L
VC-%PRED-PRE: 104 %
VC-PRE: 3.34 L
VC-PRED: 3.19 L

## 2017-10-03 RX ORDER — MYCOPHENOLATE MOFETIL 500 MG/1
500 TABLET ORAL 2 TIMES DAILY
Qty: 60 TABLET | Refills: 0 | Status: SHIPPED | OUTPATIENT
Start: 2017-10-03 | End: 2017-10-24

## 2017-10-03 RX ORDER — MYCOPHENOLATE MOFETIL 500 MG/1
TABLET ORAL
Qty: 180 TABLET | Refills: 0 | OUTPATIENT
Start: 2017-10-03

## 2017-10-03 RX ORDER — HYDROXYCHLOROQUINE SULFATE 200 MG/1
200 TABLET, FILM COATED ORAL 2 TIMES DAILY
Qty: 180 TABLET | Refills: 0 | Status: SHIPPED | OUTPATIENT
Start: 2017-10-03 | End: 2018-01-24

## 2017-10-03 NOTE — TELEPHONE ENCOUNTER
PLAQUENIL 200 MG       Last Written Prescription Date:  4/10/17  Last Fill Quantity: 180,   # refills: 0  Last Office Visit : 1/24/17  Future Office visit:  NONE  EYE EXAM    8/28/17     OVERDUE MD APPT.  / REMINDER LETTER SENT

## 2017-10-03 NOTE — TELEPHONE ENCOUNTER
CELLCEPT      Last Written Prescription Date:  7/20/17  Last Fill Quantity: 180,   # refills: 0  Last Office Visit: 1/24/17  Future Office visit:  NONE    CBC RESULTS:   Recent Labs   Lab Test  07/12/17   1048   WBC  3.9*   RBC  4.39   HGB  13.5   HCT  41.4   MCV  94   MCH  30.8   MCHC  32.6   RDW  13.0   PLT  186       Creatinine   Date Value Ref Range Status   07/12/2017 0.67 0.52 - 1.04 mg/dL Final   ]    Liver Function Studies -   Recent Labs   Lab Test  07/12/17   1048   05/27/15   0907   PROTTOTAL   --    --   6.3*   ALBUMIN  4.1   < >  3.9   BILITOTAL   --    --   0.5   ALKPHOS   --    --   35*   AST  21   < >  23   ALT  31   < >  24    < > = values in this interval not displayed.       Routing refill request to provider for review/approval because: OVER DUE LABS/ MD APPT.  REMINDER LETTER SENT

## 2017-10-09 DIAGNOSIS — M33.13 DERMATOMYOSITIS (H): ICD-10-CM

## 2017-10-09 DIAGNOSIS — Z79.899 HIGH RISK MEDICATIONS (NOT ANTICOAGULANTS) LONG-TERM USE: ICD-10-CM

## 2017-10-09 LAB
ALBUMIN SERPL-MCNC: 3.9 G/DL (ref 3.4–5)
ALT SERPL W P-5'-P-CCNC: 30 U/L (ref 0–50)
AST SERPL W P-5'-P-CCNC: 20 U/L (ref 0–45)
BASOPHILS # BLD AUTO: 0 10E9/L (ref 0–0.2)
BASOPHILS NFR BLD AUTO: 0.5 %
CK SERPL-CCNC: 97 U/L (ref 30–225)
CREAT SERPL-MCNC: 0.72 MG/DL (ref 0.52–1.04)
CRP SERPL-MCNC: <2.9 MG/L (ref 0–8)
DIFFERENTIAL METHOD BLD: NORMAL
EOSINOPHIL # BLD AUTO: 0.1 10E9/L (ref 0–0.7)
EOSINOPHIL NFR BLD AUTO: 2.4 %
ERYTHROCYTE [DISTWIDTH] IN BLOOD BY AUTOMATED COUNT: 13.1 % (ref 10–15)
GFR SERPL CREATININE-BSD FRML MDRD: 80 ML/MIN/1.7M2
HCT VFR BLD AUTO: 39.7 % (ref 35–47)
HGB BLD-MCNC: 13 G/DL (ref 11.7–15.7)
LYMPHOCYTES # BLD AUTO: 1.1 10E9/L (ref 0.8–5.3)
LYMPHOCYTES NFR BLD AUTO: 25.4 %
MCH RBC QN AUTO: 30.7 PG (ref 26.5–33)
MCHC RBC AUTO-ENTMCNC: 32.7 G/DL (ref 31.5–36.5)
MCV RBC AUTO: 94 FL (ref 78–100)
MONOCYTES # BLD AUTO: 0.4 10E9/L (ref 0–1.3)
MONOCYTES NFR BLD AUTO: 10.7 %
NEUTROPHILS # BLD AUTO: 2.5 10E9/L (ref 1.6–8.3)
NEUTROPHILS NFR BLD AUTO: 61 %
PLATELET # BLD AUTO: 186 10E9/L (ref 150–450)
RBC # BLD AUTO: 4.23 10E12/L (ref 3.8–5.2)
WBC # BLD AUTO: 4.1 10E9/L (ref 4–11)

## 2017-10-09 PROCEDURE — 82040 ASSAY OF SERUM ALBUMIN: CPT | Performed by: INTERNAL MEDICINE

## 2017-10-09 PROCEDURE — 82565 ASSAY OF CREATININE: CPT | Performed by: INTERNAL MEDICINE

## 2017-10-09 PROCEDURE — 84460 ALANINE AMINO (ALT) (SGPT): CPT | Performed by: INTERNAL MEDICINE

## 2017-10-09 PROCEDURE — 86140 C-REACTIVE PROTEIN: CPT | Performed by: INTERNAL MEDICINE

## 2017-10-09 PROCEDURE — 82550 ASSAY OF CK (CPK): CPT | Performed by: INTERNAL MEDICINE

## 2017-10-09 PROCEDURE — 84450 TRANSFERASE (AST) (SGOT): CPT | Performed by: INTERNAL MEDICINE

## 2017-10-09 PROCEDURE — 36415 COLL VENOUS BLD VENIPUNCTURE: CPT | Performed by: INTERNAL MEDICINE

## 2017-10-09 PROCEDURE — 85025 COMPLETE CBC W/AUTO DIFF WBC: CPT | Performed by: INTERNAL MEDICINE

## 2017-10-24 DIAGNOSIS — M33.13 DERMATOMYOSITIS (H): ICD-10-CM

## 2017-10-26 RX ORDER — MYCOPHENOLATE MOFETIL 500 MG/1
500 TABLET ORAL 2 TIMES DAILY
Qty: 60 TABLET | Refills: 2 | Status: SHIPPED | OUTPATIENT
Start: 2017-10-26 | End: 2017-12-13

## 2017-10-26 NOTE — TELEPHONE ENCOUNTER
mycophenolate       Last Written Prescription Date:  10/3/17  Last Fill Quantity: 60,   # refills: 0  Last Office Visit: 1/24/17  Future Office visit:  NONE    CBC RESULTS:   Recent Labs   Lab Test  10/09/17   1403   WBC  4.1   RBC  4.23   HGB  13.0   HCT  39.7   MCV  94   MCH  30.7   MCHC  32.7   RDW  13.1   PLT  186       Creatinine   Date Value Ref Range Status   10/09/2017 0.72 0.52 - 1.04 mg/dL Final   ]    Liver Function Studies -   Recent Labs   Lab Test  10/09/17   1403   05/27/15   0907   PROTTOTAL   --    --   6.3*   ALBUMIN  3.9   < >  3.9   BILITOTAL   --    --   0.5   ALKPHOS   --    --   35*   AST  20   < >  23   ALT  30   < >  24    < > = values in this interval not displayed.

## 2017-12-06 DIAGNOSIS — M33.13 DERMATOMYOSITIS (H): ICD-10-CM

## 2017-12-06 DIAGNOSIS — Z79.899 HIGH RISK MEDICATIONS (NOT ANTICOAGULANTS) LONG-TERM USE: ICD-10-CM

## 2017-12-06 LAB
ALBUMIN SERPL-MCNC: 4.1 G/DL (ref 3.4–5)
ALT SERPL W P-5'-P-CCNC: 26 U/L (ref 0–50)
AST SERPL W P-5'-P-CCNC: 18 U/L (ref 0–45)
BASOPHILS # BLD AUTO: 0 10E9/L (ref 0–0.2)
BASOPHILS NFR BLD AUTO: 0.6 %
CK SERPL-CCNC: 77 U/L (ref 30–225)
CREAT SERPL-MCNC: 0.7 MG/DL (ref 0.52–1.04)
CRP SERPL-MCNC: <2.9 MG/L (ref 0–8)
DIFFERENTIAL METHOD BLD: ABNORMAL
EOSINOPHIL # BLD AUTO: 0.1 10E9/L (ref 0–0.7)
EOSINOPHIL NFR BLD AUTO: 2.6 %
ERYTHROCYTE [DISTWIDTH] IN BLOOD BY AUTOMATED COUNT: 12.9 % (ref 10–15)
GFR SERPL CREATININE-BSD FRML MDRD: 83 ML/MIN/1.7M2
HCT VFR BLD AUTO: 40.8 % (ref 35–47)
HGB BLD-MCNC: 13.3 G/DL (ref 11.7–15.7)
LYMPHOCYTES # BLD AUTO: 0.8 10E9/L (ref 0.8–5.3)
LYMPHOCYTES NFR BLD AUTO: 22.7 %
MCH RBC QN AUTO: 30.6 PG (ref 26.5–33)
MCHC RBC AUTO-ENTMCNC: 32.6 G/DL (ref 31.5–36.5)
MCV RBC AUTO: 94 FL (ref 78–100)
MONOCYTES # BLD AUTO: 0.5 10E9/L (ref 0–1.3)
MONOCYTES NFR BLD AUTO: 14.8 %
NEUTROPHILS # BLD AUTO: 2 10E9/L (ref 1.6–8.3)
NEUTROPHILS NFR BLD AUTO: 59.3 %
PLATELET # BLD AUTO: 203 10E9/L (ref 150–450)
RBC # BLD AUTO: 4.34 10E12/L (ref 3.8–5.2)
WBC # BLD AUTO: 3.4 10E9/L (ref 4–11)

## 2017-12-06 PROCEDURE — 84460 ALANINE AMINO (ALT) (SGPT): CPT | Performed by: INTERNAL MEDICINE

## 2017-12-06 PROCEDURE — 36415 COLL VENOUS BLD VENIPUNCTURE: CPT | Performed by: INTERNAL MEDICINE

## 2017-12-06 PROCEDURE — 85025 COMPLETE CBC W/AUTO DIFF WBC: CPT | Performed by: INTERNAL MEDICINE

## 2017-12-06 PROCEDURE — 82565 ASSAY OF CREATININE: CPT | Performed by: INTERNAL MEDICINE

## 2017-12-06 PROCEDURE — 82040 ASSAY OF SERUM ALBUMIN: CPT | Performed by: INTERNAL MEDICINE

## 2017-12-06 PROCEDURE — 86140 C-REACTIVE PROTEIN: CPT | Performed by: INTERNAL MEDICINE

## 2017-12-06 PROCEDURE — 84450 TRANSFERASE (AST) (SGOT): CPT | Performed by: INTERNAL MEDICINE

## 2017-12-06 PROCEDURE — 82550 ASSAY OF CK (CPK): CPT | Performed by: INTERNAL MEDICINE

## 2017-12-12 ENCOUNTER — TRANSFERRED RECORDS (OUTPATIENT)
Dept: HEALTH INFORMATION MANAGEMENT | Facility: CLINIC | Age: 72
End: 2017-12-12

## 2017-12-13 DIAGNOSIS — M33.13 DERMATOMYOSITIS (H): ICD-10-CM

## 2017-12-13 RX ORDER — MYCOPHENOLATE MOFETIL 500 MG/1
500 TABLET ORAL 2 TIMES DAILY
Qty: 180 TABLET | Refills: 0 | Status: SHIPPED | OUTPATIENT
Start: 2017-12-13 | End: 2018-02-28

## 2017-12-13 NOTE — TELEPHONE ENCOUNTER
Cellcept   Last Written Prescription Date:  10/26/17  Last Fill Quantity: 60,   # refills: 2  Last Office Visit: 1/24/17  Future Office visit:  1/24/18    CBC RESULTS:   Recent Labs   Lab Test  12/06/17   1146   WBC  3.4*   RBC  4.34   HGB  13.3   HCT  40.8   MCV  94   MCH  30.6   MCHC  32.6   RDW  12.9   PLT  203       Creatinine   Date Value Ref Range Status   12/06/2017 0.70 0.52 - 1.04 mg/dL Final   ]    Liver Function Studies -   Recent Labs   Lab Test  12/06/17   1146   05/27/15   0907   PROTTOTAL   --    --   6.3*   ALBUMIN  4.1   < >  3.9   BILITOTAL   --    --   0.5   ALKPHOS   --    --   35*   AST  18   < >  23   ALT  26   < >  24    < > = values in this interval not displayed.       Routing refill request to provider for review/approval because:  Drug not on the [facility], [facility] or [facility] refill protocol or controlled substance  Pt requesting 90 day supply

## 2018-01-24 ENCOUNTER — OFFICE VISIT (OUTPATIENT)
Dept: RHEUMATOLOGY | Facility: CLINIC | Age: 73
End: 2018-01-24
Payer: COMMERCIAL

## 2018-01-24 VITALS
DIASTOLIC BLOOD PRESSURE: 88 MMHG | BODY MASS INDEX: 24.25 KG/M2 | WEIGHT: 145.7 LBS | OXYGEN SATURATION: 97 % | TEMPERATURE: 98.2 F | HEART RATE: 70 BPM | SYSTOLIC BLOOD PRESSURE: 120 MMHG

## 2018-01-24 DIAGNOSIS — Z79.899 LONG-TERM USE OF PLAQUENIL: ICD-10-CM

## 2018-01-24 DIAGNOSIS — M33.13 DERMATOMYOSITIS (H): ICD-10-CM

## 2018-01-24 DIAGNOSIS — Z79.899 ENCOUNTER FOR LONG-TERM CURRENT USE OF MEDICATION: Primary | ICD-10-CM

## 2018-01-24 PROCEDURE — 99214 OFFICE O/P EST MOD 30 MIN: CPT | Performed by: INTERNAL MEDICINE

## 2018-01-24 RX ORDER — HYDROXYCHLOROQUINE SULFATE 200 MG/1
200 TABLET, FILM COATED ORAL DAILY
Qty: 90 TABLET | Refills: 3 | Status: SHIPPED | OUTPATIENT
Start: 2018-01-24 | End: 2019-04-09

## 2018-01-24 RX ORDER — MYCOPHENOLATE MOFETIL 250 MG/1
750 CAPSULE ORAL DAILY
Qty: 270 CAPSULE | Refills: 3 | Status: SHIPPED | OUTPATIENT
Start: 2018-01-24 | End: 2018-12-06

## 2018-01-24 NOTE — NURSING NOTE
"  Mely Rashid's goals for this visit include: Dermatomyositis    She requests these members of her care team be copied on today's visit information: yes    PCP: Gavi Stacy    Referring Provider:  No referring provider defined for this encounter.    Chief Complaint   Patient presents with     RECHECK       Initial /88 (BP Location: Left arm, Patient Position: Chair, Cuff Size: Adult Regular)  Pulse 70  Temp 98.2  F (36.8  C) (Oral)  Wt 66.1 kg (145 lb 11.2 oz)  SpO2 97%  BMI 24.25 kg/m2 Estimated body mass index is 24.25 kg/(m^2) as calculated from the following:    Height as of 6/27/17: 1.651 m (5' 5\").    Weight as of this encounter: 66.1 kg (145 lb 11.2 oz).  Medication Reconciliation: complete    Do you need any medication refills at today's visit? no    "

## 2018-01-24 NOTE — MR AVS SNAPSHOT
After Visit Summary   1/24/2018    Mely Rashid    MRN: [id number]           Patient Information     Date Of Birth          1945        Visit Information        Provider Department      1/24/2018 2:30 PM Carlos Colon MD [facility]        Today's Diagnoses     Encounter for long-term current use of medication    -  1    Dermatomyositis (H)        Long-term use of Plaquenil           Follow-ups after your visit        Your next 10 appointments already scheduled     Sep 26, 2018 10:00 AM CDT   Office Visit with PFT LAB   [facility] ([facility])    [address] [place] [id number] [phone]           Bring a current list of meds and any records pertaining to this visit. For Physicals, please bring immunization records and any forms needing to be filled out. Please arrive 10 minutes early to complete paperwork.            Sep 26, 2018 11:00 AM CDT   Return Visit with Carlos Colon MD   [facility] ([facility])    [address] [place] [id number] [phone]              Future tests that were ordered for you today     Open Future Orders        Priority Expected Expires Ordered    General PFT Lab (Please always keep checked) Routine  1/24/2019 1/24/2018    Pulmonary Function Test Routine  1/24/2019 1/24/2018            Who to contact     If you have questions or need follow up information about today's clinic visit or your schedule please contact [facility] directly at [phone].  Normal or non-critical lab and imaging results will be communicated to you by MyChart, letter or phone within 4 business days after the clinic has received the results. If you do not hear from us within 7 days, please contact the clinic through MyChart or phone. If you have a critical or abnormal lab result, we will notify you by phone as soon as possible.  Submit refill  requests through [employer] or call your pharmacy and they will forward the refill request to us. Please allow 3 business days for your refill to be completed.          Additional Information About Your Visit        [employer] Information     [employer] gives you secure access to your electronic health record. If you see a primary care provider, you can also send messages to your care team and make appointments. If you have questions, please call your primary care clinic.  If you do not have a primary care provider, please call [phone] and they will assist you.      [employer] is an electronic gateway that provides easy, online access to your medical records. With [employer], you can request a clinic appointment, read your test results, renew a prescription or communicate with your care team.     To access your existing account, please contact your [facility] or call [phone] for assistance.        Care EveryWhere ID     This is your Care EveryWhere ID. This could be used by other organizations to access your [place] medical records  [id number]        Your Vitals Were     Pulse Temperature Pulse Oximetry BMI (Body Mass Index)          70 98.2  F (36.8  C) (Oral) 97% 24.25 kg/m2         Blood Pressure from Last 3 Encounters:   01/24/18 120/88   06/27/17 119/80   01/24/17 (!) 134/91    Weight from Last 3 Encounters:   01/24/18 66.1 kg (145 lb 11.2 oz)   06/27/17 65 kg (143 lb 4.8 oz)   01/24/17 63 kg (139 lb)                 Today's Medication Changes          These changes are accurate as of 1/24/18  3:32 PM.  If you have any questions, ask your nurse or doctor.               These medicines have changed or have updated prescriptions.        Dose/Directions    hydroxychloroquine 200 MG tablet   Commonly known as:  PLAQUENIL   This may have changed:  when to take this   Used for:  Dermatomyositis (H), Encounter for long-term current use of medication, Long-term use of Plaquenil   Changed  by:  Carlos Colon MD        Dose:  200 mg   Take 1 tablet (200 mg) by mouth daily *PLEASE SCHEDULE MD APPT.*   Quantity:  90 tablet   Refills:  3       * mycophenolate 500 MG tablet   Commonly known as:  GENERIC EQUIVALENT   This may have changed:  Another medication with the same name was added. Make sure you understand how and when to take each.   Used for:  Dermatomyositis (H)   Changed by:  Carlos Colon MD        Dose:  500 mg   Take 1 tablet (500 mg) by mouth 2 times daily LABS DUE EVERY 8-12 WEEKS   Quantity:  180 tablet   Refills:  0       * mycophenolate 250 MG capsule   Commonly known as:  GENERIC EQUIVALENT   This may have changed:  You were already taking a medication with the same name, and this prescription was added. Make sure you understand how and when to take each.   Used for:  Dermatomyositis (H), Encounter for long-term current use of medication, Long-term use of Plaquenil   Changed by:  Carlos Colon MD        Dose:  750 mg   Take 3 capsules (750 mg) by mouth daily   Quantity:  270 capsule   Refills:  3       * Notice:  This list has 2 medication(s) that are the same as other medications prescribed for you. Read the directions carefully, and ask your doctor or other care provider to review them with you.         Where to get your medicines      These medications were sent to [facility] - [address] [address]     Phone:  [phone]     hydroxychloroquine 200 MG tablet    mycophenolate 250 MG capsule                Primary Care Provider Office Phone # Fax #    ROSHAN Galloway [phone]       [address] [place] [address]        Equal Access to Services     [facility]: Dany Robin, edson ellis, ajith lofton. So [facility] [phone].    ATENCIÓN: Si habla español, tiene a puga disposición  servicios gratuitos de asistencia lingüística. Alysha singh [phone].    We comply with applicable federal civil rights laws and [place] laws. We do not discriminate on the basis of race, color, national origin, age, disability, sex, sexual orientation, or gender identity.            Thank you!     Thank you for choosing [facility]  for your care. Our goal is always to provide you with excellent care. Hearing back from our patients is one way we can continue to improve our services. Please take a few minutes to complete the written survey that you may receive in the mail after your visit with us. Thank you!             Your Updated Medication List - Protect others around you: Learn how to safely use, store and throw away your medicines at www.disposemymeds.org.          This list is accurate as of 1/24/18  3:32 PM.  Always use your most recent med list.                   Brand Name Dispense Instructions for use Diagnosis    B Complex Tabs      Take 1 tablet by mouth daily.    Myalgias, Rash       CALCIUM 500 +D PO      Take 3 tablets by mouth daily.    Myalgias, Rash       CULTURELLE PO           desonide 0.05 % cream    DESOWEN    30 g    Apply topically 2 times daily as needed (rash)    Dermatomyositis (H)       desoximetasone 0.25 % cream    TOPICORT    120 g    Apply topically 2 times daily    Dermatomyositis (H)       diazepam 5 MG tablet    VALIUM    2 tablet    Take 1 tablet (5 mg) by mouth once as needed for anxiety or sleep (Take one on arrival for MRI. May repeat in 15 min if inadequate relief.)    Claustrophobia       fish Oil 1200 MG capsule      Take 1 capsule by mouth daily.    Myalgias, Rash       GLUCOSAMINE CHONDR COMPLEX 500-400 MG Caps per capsule   Generic drug:  glucosamine-chondroitin      Take 2 capsules by mouth daily.    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       loperamide 2 MG tablet    IMODIUM A-D     Take 2 mg by mouth every morning        * mycophenolate 500 MG tablet    GENERIC EQUIVALENT    180 tablet    Take 1 tablet (500 mg) by mouth 2 times daily LABS DUE EVERY 8-12 WEEKS    Dermatomyositis (H)       * mycophenolate 250 MG capsule    GENERIC EQUIVALENT    270 capsule    Take 3 capsules (750 mg) by mouth daily    Dermatomyositis (H), Encounter for long-term current use of medication, Long-term use of Plaquenil       PARoxetine 10 MG tablet    PAXIL    30 tablet    Take 1 tablet (10 mg) by mouth At Bedtime    Adjustment disorder with mixed anxiety and depressed mood       vitamin D 1000 UNITS capsule      Take 1 capsule by mouth daily.    Myalgias, Rash       vitamin E 400 UNIT capsule      Take by mouth daily    Dermatomyositis (H), Encounter for long-term (current) use of steroids       * Notice:  This list has 2 medication(s) that are the same as other medications prescribed for you. Read the directions carefully, and ask your doctor or other care provider to review them with you.

## 2018-02-12 NOTE — PROGRESS NOTES
Rheumatology Clinic Visit     Mely Rashid MRN# [id number]   YOB: 1945 Age: 66 year old               Assessment and Plan:   71 year old female with  dermatomyositis presents for follow up. Disease is currently stable on 1500mg of mycophenylate and plaquenil 200mg once daily. She has no evidence of active myositis or synovitis at this time.      PLAN/RECOMMENDATION:     - Continue Plaquenil 200mg qday; f/u with ophthalmology once a year  - most recent PFT in 2014, completely normal.  She does not have any new development of dyspnea so I think we do not need to be excessively worried about this but I would like her to have these tests repeated when she next sees me in 6-12 months.  In the meantime she is doing so well I think she can go down to 750 mg of MMF per day.    This is a very low dose but she is doing well on 1000 mg a day and she is small so she may be able to continue to do well on the lower dose or possibly is even in a long-term remission where she might not need it at all.  I do not want to take the chance however of taking her totally off of drug and would prefer to go down slowly.                  Problem List:   1. Dermatomyositis with skin findings, minimal weakness on exam but some myalgias, and a daughter with a similar presentation. Steroid responsive  2. IgA deficiency per the outside records with high-titer BRE of 1:2560, other antibodies thus far negative.   3. 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          Interval History of Present Illness:   Mely Rashid is a 66 year old female who presents for follow up of dermatomyositis.      INTERVAL HISTORY:     The patient states that she is doing very well. Since 11/1/16, decreased her HCQ to 200 mg qday. The patient is also taking MMF 1.5 g daily. Strength and energy are good for her. Denies recent infections, F/C/night  "sweats. Denies weight loss. Denies SOB, CP cough. The patient is not walking as much since her previous dog was put down, so more deconditioned, but no TINEO. Denies lightheadedness, or headaches, but has some sinus congestion.  States that her finger are starting to show more of the Gottron's papules. Denies pain in the fingers. Used ointment previously for her fingers but hasn't used since her fingers haven't been bothering her. Fingers, ankles, and knees get stiff in the morning, but quickly improve once starts moving.  Deniews Raynauds. Denies new skin rash. Denies oral/genital ulcers. Denies photosensitivity. Mildly dry eyes and mouth. No visual changes.    The patient has history of cystic abnormalities of the pancreas and follows Dr. Hoang with GI and gets yearly MRI abdomen to monitor. Last MRI in October showed no worrisome changes compared to previous exam and recommended follow-up in 1 year. Patient denies N/V/abdominal pain. States she has chronic loose stools, but resolves with taking Imodium.    Last PFTs were done in 2014, which were WNL.    Patient's last DEXA was last year and T-score -2.1, stable from DEXA in 2012. Off fosamax since 2015 due to increased bleeding and jaw pain. States she has some jaw pain in the TMJ region. Unsure if she grinds her teeth at night. Has associated \"jaw cracking sound.\" Tenderness slightly worse in the morning.    Jan 24, 2018 interval history: Over the last year the patient is continued to do much better than she had historically..  She also denies any significant rashes and really has not even been having Raynaud's phenomena.    Although she had had a lot of severe fatigue earlier on that is not bothering her much either.  She continues on MMF and has been taking 2 tablets every morning.  She has trouble remembering the second dose in the evening so is decided on this modification which she tolerates well.  She has no side effects with it and her laboratory tests " have also been stable with it.  She has not had any significant fevers or chills or other signs or symptoms of infection.    She denies any significant muscle weakness         Social History:     Social History     Social History     Marital status: Single     Spouse name: N/A     Number of children: N/A     Years of education: N/A     Occupational History     Not on file.     Social History Main Topics     Smoking status: Former Smoker     Quit date: 4/10/1979     Smokeless tobacco: Never Used      Comment: quit 1985     Alcohol use 0.0 oz/week     0 Standard drinks or equivalent per week      Comment: rare     Drug use: No     Sexual activity: Not Currently     Other Topics Concern      Service No     Blood Transfusions No     Caffeine Concern No     Occupational Exposure No     Hobby Hazards No     Sleep Concern No     Stress Concern No     Weight Concern No     Special Diet No     Exercise Yes     walk, horseback ride     Seat Belt Yes     Parent/Sibling W/ Cabg, Mi Or Angioplasty Before 65f 55m? No     Social History Narrative       Lives independently          Family History:   Family History   Problem Relation Age of Onset     C.A.D. Father      Arthritis Father      DIABETES Brother      Glaucoma Brother      Breast Cancer Sister 35     Neurologic Disorder Sister      seizures     CANCER Sister      breast     Psoriasis Daughter      Rheumatologic Disease Daughter      Dermatomyositis     Alzheimer Disease Mother      onset in 60s     Hypertension No family hx of      CEREBROVASCULAR DISEASE No family hx of      Thyroid Disease No family hx of      Skin Cancer No family hx of      Macular Degeneration No family hx of               Allergies:   Allergies   Allergen Reactions     Penicillins Anaphylaxis              Medications:   Current Outpatient Prescriptions   Medication Sig Dispense Refill     hydroxychloroquine (PLAQUENIL) 200 MG tablet Take 1 tablet (200 mg) by mouth daily *PLEASE  SCHEDULE MD APPT.* 90 tablet 3     mycophenolate (GENERIC EQUIVALENT) 250 MG capsule Take 3 capsules (750 mg) by mouth daily 270 capsule 3     PARoxetine (PAXIL) 10 MG tablet Take 1 tablet (10 mg) by mouth At Bedtime 30 tablet 3     mycophenolate (GENERIC EQUIVALENT) 500 MG tablet Take 1 tablet (500 mg) by mouth 2 times daily LABS DUE EVERY 8-12 WEEKS 180 tablet 0     latanoprost (XALATAN) 0.005 % ophthalmic solution Place 1 drop into the right eye At Bedtime 3 Bottle 11     desoximetasone (TOPICORT) 0.25 % cream Apply topically 2 times daily 120 g 3     desonide (DESOWEN) 0.05 % cream Apply topically 2 times daily as needed (rash) 30 g 2     diazepam (VALIUM) 5 MG tablet Take 1 tablet (5 mg) by mouth once as needed for anxiety or sleep (Take one on arrival for MRI. May repeat in 15 min if inadequate relief.) 2 tablet 0     Lactobacillus Rhamnosus, GG, (CULTURELLE PO)        loperamide (IMODIUM A-D) 2 MG tablet Take 2 mg by mouth every morning       vitamin E 400 UNIT capsule Take by mouth daily       Calcium Carb-Cholecalciferol (CALCIUM 500 +D PO) Take 3 tablets by mouth daily.       omega-3 fatty acids (FISH OIL) 1200 MG capsule Take 1 capsule by mouth daily.       Cholecalciferol (VITAMIN D) 1000 UNIT capsule Take 1 capsule by mouth daily.       B Complex TABS Take 1 tablet by mouth daily.       glucosamine-chondroitinoitin (GLUCOSAMINE CHONDR COMPLEX) 500-400 MG CAPS Take 2 capsules by mouth daily.                Physical Exam:   Blood pressure 120/88, pulse 70, temperature 98.2  F (36.8  C), temperature source Oral, weight 66.1 kg (145 lb 11.2 oz), SpO2 97 %, not currently breastfeeding.  Wt Readings from Last 4 Encounters:   01/24/18 66.1 kg (145 lb 11.2 oz)   06/27/17 65 kg (143 lb 4.8 oz)   01/24/17 63 kg (139 lb)   10/17/16 63.7 kg (140 lb 8 oz)       Constitutional: well-appearing, well-developed, appearing stated age, cooperative, NAD  Eyes: EOMI, PERRL, normal conjunctiva and sclera  ENT: nl external  ears, lips, teeth, gums, throat, no mucous membrane lesions, normal saliva pool  Neck: no mass or thyroid enlargement  Resp: lungs clear to auscultation, nl to palpation  CV: RRR, no murmurs, rubs or gallops, no edema  GI: no ABD mass or tenderness, no HSM  Lymph: no cervical, supraclavicular, or epitrochlear nodes  MS: All TMJ, neck, shoulder, elbow, wrist, MCP/PIP/DIP, spine, hip, knee, ankle, and foot MTP/IP joints were examined and  found normal. +Heberden's and Tariq's nodes. No active synovitis or deformity other than mild findings of DJD. Full ROM.  Normal  strength. No dactylitis, tenosynovitis, enthesopathy.  Skin: no significant skin changes. +Periungual erythema and dilated capillary loops seen grossly on exam as well as on capillaroscopy, + modest Gottron's papules b/l  Neuro: nl cranial nerves, strength, sensation, DTRs. 5/5 strength hip flexors/extensors.Shoulder strength 5/5 b/l. Able to stand up without support of her arms.  Psych: nl judgement, orientation, memory, affect.         Data:     Results for orders placed or performed in visit on 12/06/17   Albumin level   Result Value Ref Range    Albumin 4.1 3.4 - 5.0 g/dL   ALT   Result Value Ref Range    ALT 26 0 - 50 U/L   AST   Result Value Ref Range    AST 18 0 - 45 U/L   CBC with platelets differential   Result Value Ref Range    WBC 3.4 (L) 4.0 - 11.0 10e9/L    RBC Count 4.34 3.8 - 5.2 10e12/L    Hemoglobin 13.3 11.7 - 15.7 g/dL    Hematocrit 40.8 35.0 - 47.0 %    MCV 94 78 - 100 fl    MCH 30.6 26.5 - 33.0 pg    MCHC 32.6 31.5 - 36.5 g/dL    RDW 12.9 10.0 - 15.0 %    Platelet Count 203 150 - 450 10e9/L    Diff Method Automated Method     % Neutrophils 59.3 %    % Lymphocytes 22.7 %    % Monocytes 14.8 %    % Eosinophils 2.6 %    % Basophils 0.6 %    Absolute Neutrophil 2.0 1.6 - 8.3 10e9/L    Absolute Lymphocytes 0.8 0.8 - 5.3 10e9/L    Absolute Monocytes 0.5 0.0 - 1.3 10e9/L    Absolute Eosinophils 0.1 0.0 - 0.7 10e9/L    Absolute  Basophils 0.0 0.0 - 0.2 10e9/L   Creatinine   Result Value Ref Range    Creatinine 0.70 0.52 - 1.04 mg/dL    GFR Estimate 83 >60 mL/min/1.7m2    GFR Estimate If Black >90 >60 mL/min/1.7m2   CRP inflammation   Result Value Ref Range    CRP Inflammation <2.9 0.0 - 8.0 mg/L   CK total   Result Value Ref Range    CK Total 77 30 - 225 U/L

## 2018-02-16 ENCOUNTER — TRANSFERRED RECORDS (OUTPATIENT)
Dept: HEALTH INFORMATION MANAGEMENT | Facility: CLINIC | Age: 73
End: 2018-02-16

## 2018-02-28 ENCOUNTER — TELEPHONE (OUTPATIENT)
Dept: RHEUMATOLOGY | Facility: CLINIC | Age: 73
End: 2018-02-28

## 2018-02-28 DIAGNOSIS — M33.13 DERMATOMYOSITIS (H): ICD-10-CM

## 2018-02-28 NOTE — TELEPHONE ENCOUNTER
Called and left a message reminding patient is due for labs now. Advised did receive a request for a refill of the Cellcept, and will route to Dr Colon to review. Explained he will want her to get labs drawn now as she is due for them.

## 2018-02-28 NOTE — TELEPHONE ENCOUNTER
Patient returned clinics call, stated she does not need refill. If you have questions, you can call the patient. Thank you

## 2018-02-28 NOTE — TELEPHONE ENCOUNTER
Patient was called and message left that she is due for labs to be drawn now, please call and have drawn.     Medication/Dose: mycophenolate (GENERIC EQUIVALENT) 500 MG tablet [Pharmacy Med Name: MYCOPHENOLATE MOFETIL TABS 500MG]  TAKE 1 TABLET TWICE A DAY (LABS DUE EVERY 8 TO 12 WEEKS),   Last Written Prescription Date: 1213/17  Last Fill Quantity: 180, # refills: 0  Last Office Visit with Rheumatology Provider: 1/24/18     WBC   Date Value Ref Range Status   12/06/2017 3.4 (L) 4.0 - 11.0 10e9/L Final     RBC Count   Date Value Ref Range Status   12/06/2017 4.34 3.8 - 5.2 10e12/L Final     Hemoglobin   Date Value Ref Range Status   12/06/2017 13.3 11.7 - 15.7 g/dL Final     Hematocrit   Date Value Ref Range Status   12/06/2017 40.8 35.0 - 47.0 % Final     MCV   Date Value Ref Range Status   12/06/2017 94 78 - 100 fl Final     MCH   Date Value Ref Range Status   12/06/2017 30.6 26.5 - 33.0 pg Final     MCHC   Date Value Ref Range Status   12/06/2017 32.6 31.5 - 36.5 g/dL Final     RDW   Date Value Ref Range Status   12/06/2017 12.9 10.0 - 15.0 % Final     Platelet Count   Date Value Ref Range Status   12/06/2017 203 150 - 450 10e9/L Final     AST   Date Value Ref Range Status   12/06/2017 18 0 - 45 U/L Final     ALT   Date Value Ref Range Status   12/06/2017 26 0 - 50 U/L Final     Creatinine   Date Value Ref Range Status   12/06/2017 0.70 0.52 - 1.04 mg/dL Final     Albumin   Date Value Ref Range Status   12/06/2017 4.1 3.4 - 5.0 g/dL Final     Color Urine (no units)   Date Value   05/27/2015 Yellow     Appearance Urine (no units)   Date Value   05/27/2015 Clear     Glucose Urine (mg/dL)   Date Value   05/27/2015 Negative     Bilirubin Urine (no units)   Date Value   05/27/2015 Negative     Ketones Urine (mg/dL)   Date Value   05/27/2015 Negative     Specific Gravity Urine (no units)   Date Value   05/27/2015 1.018     pH Urine (pH)   Date Value   05/27/2015 5.5     Protein Albumin Urine (mg/dL)   Date Value    05/27/2015 10 (A)     Nitrite Urine (no units)   Date Value   05/27/2015 Negative     Leukocyte Esterase Urine (no units)   Date Value   05/27/2015 Negative       Prescription pended per Rheumatology Refill Protocol for 90 day supply and 0 refills.  Neeta Gutierrez LPN  [facility]  Rheumatology

## 2018-03-01 ENCOUNTER — TELEPHONE (OUTPATIENT)
Dept: GASTROENTEROLOGY | Facility: CLINIC | Age: 73
End: 2018-03-01

## 2018-03-01 RX ORDER — MYCOPHENOLATE MOFETIL 500 MG/1
TABLET ORAL
Qty: 180 TABLET | Refills: 0 | Status: SHIPPED | OUTPATIENT
Start: 2018-03-01 | End: 2018-12-06

## 2018-03-05 NOTE — TELEPHONE ENCOUNTER
Returned call to [facility]. Told pharmacist that pt did not need a refill according to refill note dated 2/28/18. They will void it.    Sandor Mclaughlin, BSN RN  Rheumatology RN Coordinator   [place]

## 2018-03-08 DIAGNOSIS — Z79.899 HIGH RISK MEDICATIONS (NOT ANTICOAGULANTS) LONG-TERM USE: ICD-10-CM

## 2018-03-08 DIAGNOSIS — M33.13 DERMATOMYOSITIS (H): ICD-10-CM

## 2018-03-08 LAB
ALBUMIN SERPL-MCNC: 3.8 G/DL (ref 3.4–5)
ALT SERPL W P-5'-P-CCNC: 21 U/L (ref 0–50)
AST SERPL W P-5'-P-CCNC: 18 U/L (ref 0–45)
BASOPHILS # BLD AUTO: 0 10E9/L (ref 0–0.2)
BASOPHILS NFR BLD AUTO: 0.2 %
CK SERPL-CCNC: 63 U/L (ref 30–225)
CREAT SERPL-MCNC: 0.58 MG/DL (ref 0.52–1.04)
CRP SERPL-MCNC: <2.9 MG/L (ref 0–8)
DIFFERENTIAL METHOD BLD: NORMAL
EOSINOPHIL # BLD AUTO: 0.1 10E9/L (ref 0–0.7)
EOSINOPHIL NFR BLD AUTO: 3.3 %
ERYTHROCYTE [DISTWIDTH] IN BLOOD BY AUTOMATED COUNT: 13.2 % (ref 10–15)
GFR SERPL CREATININE-BSD FRML MDRD: >90 ML/MIN/1.7M2
HCT VFR BLD AUTO: 40.1 % (ref 35–47)
HGB BLD-MCNC: 13.1 G/DL (ref 11.7–15.7)
LYMPHOCYTES # BLD AUTO: 1 10E9/L (ref 0.8–5.3)
LYMPHOCYTES NFR BLD AUTO: 23.1 %
MCH RBC QN AUTO: 30.8 PG (ref 26.5–33)
MCHC RBC AUTO-ENTMCNC: 32.7 G/DL (ref 31.5–36.5)
MCV RBC AUTO: 94 FL (ref 78–100)
MONOCYTES # BLD AUTO: 0.4 10E9/L (ref 0–1.3)
MONOCYTES NFR BLD AUTO: 9.6 %
NEUTROPHILS # BLD AUTO: 2.7 10E9/L (ref 1.6–8.3)
NEUTROPHILS NFR BLD AUTO: 63.8 %
PLATELET # BLD AUTO: 171 10E9/L (ref 150–450)
RBC # BLD AUTO: 4.26 10E12/L (ref 3.8–5.2)
WBC # BLD AUTO: 4.3 10E9/L (ref 4–11)

## 2018-03-08 PROCEDURE — 36415 COLL VENOUS BLD VENIPUNCTURE: CPT | Performed by: INTERNAL MEDICINE

## 2018-03-08 PROCEDURE — 82040 ASSAY OF SERUM ALBUMIN: CPT | Performed by: INTERNAL MEDICINE

## 2018-03-08 PROCEDURE — 84460 ALANINE AMINO (ALT) (SGPT): CPT | Performed by: INTERNAL MEDICINE

## 2018-03-08 PROCEDURE — 86140 C-REACTIVE PROTEIN: CPT | Performed by: INTERNAL MEDICINE

## 2018-03-08 PROCEDURE — 82550 ASSAY OF CK (CPK): CPT | Performed by: INTERNAL MEDICINE

## 2018-03-08 PROCEDURE — 85025 COMPLETE CBC W/AUTO DIFF WBC: CPT | Performed by: INTERNAL MEDICINE

## 2018-03-08 PROCEDURE — 82565 ASSAY OF CREATININE: CPT | Performed by: INTERNAL MEDICINE

## 2018-03-08 PROCEDURE — 84450 TRANSFERASE (AST) (SGOT): CPT | Performed by: INTERNAL MEDICINE

## 2018-06-13 DIAGNOSIS — Z79.899 HIGH RISK MEDICATIONS (NOT ANTICOAGULANTS) LONG-TERM USE: ICD-10-CM

## 2018-06-13 DIAGNOSIS — M33.13 DERMATOMYOSITIS (H): ICD-10-CM

## 2018-06-13 LAB
ALBUMIN SERPL-MCNC: 4 G/DL (ref 3.4–5)
ALT SERPL W P-5'-P-CCNC: 25 U/L (ref 0–50)
AST SERPL W P-5'-P-CCNC: 24 U/L (ref 0–45)
BASOPHILS # BLD AUTO: 0 10E9/L (ref 0–0.2)
BASOPHILS NFR BLD AUTO: 0.7 %
CK SERPL-CCNC: 108 U/L (ref 30–225)
CREAT SERPL-MCNC: 0.61 MG/DL (ref 0.52–1.04)
CRP SERPL-MCNC: <2.9 MG/L (ref 0–8)
DIFFERENTIAL METHOD BLD: ABNORMAL
EOSINOPHIL # BLD AUTO: 0.1 10E9/L (ref 0–0.7)
EOSINOPHIL NFR BLD AUTO: 3.3 %
ERYTHROCYTE [DISTWIDTH] IN BLOOD BY AUTOMATED COUNT: 12.8 % (ref 10–15)
GFR SERPL CREATININE-BSD FRML MDRD: >90 ML/MIN/1.7M2
HCT VFR BLD AUTO: 41.4 % (ref 35–47)
HGB BLD-MCNC: 13.4 G/DL (ref 11.7–15.7)
LYMPHOCYTES # BLD AUTO: 0.7 10E9/L (ref 0.8–5.3)
LYMPHOCYTES NFR BLD AUTO: 16.2 %
MCH RBC QN AUTO: 30.7 PG (ref 26.5–33)
MCHC RBC AUTO-ENTMCNC: 32.4 G/DL (ref 31.5–36.5)
MCV RBC AUTO: 95 FL (ref 78–100)
MONOCYTES # BLD AUTO: 0.4 10E9/L (ref 0–1.3)
MONOCYTES NFR BLD AUTO: 9.6 %
NEUTROPHILS # BLD AUTO: 3 10E9/L (ref 1.6–8.3)
NEUTROPHILS NFR BLD AUTO: 70.2 %
PLATELET # BLD AUTO: 193 10E9/L (ref 150–450)
RBC # BLD AUTO: 4.36 10E12/L (ref 3.8–5.2)
WBC # BLD AUTO: 4.3 10E9/L (ref 4–11)

## 2018-06-13 PROCEDURE — 85025 COMPLETE CBC W/AUTO DIFF WBC: CPT | Performed by: INTERNAL MEDICINE

## 2018-06-13 PROCEDURE — 82040 ASSAY OF SERUM ALBUMIN: CPT | Performed by: INTERNAL MEDICINE

## 2018-06-13 PROCEDURE — 82550 ASSAY OF CK (CPK): CPT | Performed by: INTERNAL MEDICINE

## 2018-06-13 PROCEDURE — 86140 C-REACTIVE PROTEIN: CPT | Performed by: INTERNAL MEDICINE

## 2018-06-13 PROCEDURE — 36415 COLL VENOUS BLD VENIPUNCTURE: CPT | Performed by: INTERNAL MEDICINE

## 2018-06-13 PROCEDURE — 84460 ALANINE AMINO (ALT) (SGPT): CPT | Performed by: INTERNAL MEDICINE

## 2018-06-13 PROCEDURE — 82565 ASSAY OF CREATININE: CPT | Performed by: INTERNAL MEDICINE

## 2018-06-13 PROCEDURE — 84450 TRANSFERASE (AST) (SGOT): CPT | Performed by: INTERNAL MEDICINE

## 2018-09-14 ENCOUNTER — DOCUMENTATION ONLY (OUTPATIENT)
Dept: LAB | Facility: CLINIC | Age: 73
End: 2018-09-14

## 2018-09-14 DIAGNOSIS — M33.13 DERMATOMYOSITIS (H): ICD-10-CM

## 2018-09-14 DIAGNOSIS — Z79.899 ENCOUNTER FOR LONG-TERM CURRENT USE OF MEDICATION: ICD-10-CM

## 2018-09-14 DIAGNOSIS — Z79.899 ENCOUNTER FOR LONG-TERM CURRENT USE OF MEDICATION: Primary | ICD-10-CM

## 2018-09-14 LAB
ALBUMIN SERPL-MCNC: 4 G/DL (ref 3.4–5)
BASOPHILS # BLD AUTO: 0 10E9/L (ref 0–0.2)
BASOPHILS NFR BLD AUTO: 0.5 %
CRP SERPL-MCNC: <2.9 MG/L (ref 0–8)
DIFFERENTIAL METHOD BLD: NORMAL
EOSINOPHIL # BLD AUTO: 0.1 10E9/L (ref 0–0.7)
EOSINOPHIL NFR BLD AUTO: 2.5 %
ERYTHROCYTE [DISTWIDTH] IN BLOOD BY AUTOMATED COUNT: 12.8 % (ref 10–15)
HCT VFR BLD AUTO: 41.1 % (ref 35–47)
HGB BLD-MCNC: 13.6 G/DL (ref 11.7–15.7)
LYMPHOCYTES # BLD AUTO: 0.9 10E9/L (ref 0.8–5.3)
LYMPHOCYTES NFR BLD AUTO: 22.3 %
MCH RBC QN AUTO: 31.1 PG (ref 26.5–33)
MCHC RBC AUTO-ENTMCNC: 33.1 G/DL (ref 31.5–36.5)
MCV RBC AUTO: 94 FL (ref 78–100)
MONOCYTES # BLD AUTO: 0.5 10E9/L (ref 0–1.3)
MONOCYTES NFR BLD AUTO: 12.4 %
NEUTROPHILS # BLD AUTO: 2.5 10E9/L (ref 1.6–8.3)
NEUTROPHILS NFR BLD AUTO: 62.3 %
PLATELET # BLD AUTO: 194 10E9/L (ref 150–450)
RBC # BLD AUTO: 4.37 10E12/L (ref 3.8–5.2)
WBC # BLD AUTO: 4 10E9/L (ref 4–11)

## 2018-09-14 PROCEDURE — 86140 C-REACTIVE PROTEIN: CPT | Performed by: INTERNAL MEDICINE

## 2018-09-14 PROCEDURE — 85025 COMPLETE CBC W/AUTO DIFF WBC: CPT | Performed by: INTERNAL MEDICINE

## 2018-09-14 PROCEDURE — 82040 ASSAY OF SERUM ALBUMIN: CPT | Performed by: INTERNAL MEDICINE

## 2018-09-14 PROCEDURE — 36415 COLL VENOUS BLD VENIPUNCTURE: CPT | Performed by: INTERNAL MEDICINE

## 2018-09-14 NOTE — PROGRESS NOTES
Patient is scheduled for a lab apt today, and the apt note states 'standing labs for .' Please place orders needed.  Thanks,   Jeremi Dawn [facility]

## 2018-09-28 ENCOUNTER — TELEPHONE (OUTPATIENT)
Dept: OPHTHALMOLOGY | Facility: CLINIC | Age: 73
End: 2018-09-28

## 2018-09-28 NOTE — TELEPHONE ENCOUNTER
Scheduled November 14th  Pt satisfied with date/time    Note to facilitator for review   Fermín Hudson RN 3:47 PM 09/28/18

## 2018-09-28 NOTE — TELEPHONE ENCOUNTER
[facility] Call Center    Phone Message    May a detailed message be left on voicemail: yes    Reason for Call: Other: Pt called and thought she had an appointment schedule for return with Dr. Gambino, but nothing is scheduled. Pt wondering if okay to schedule out in January, which is Dr. Gambino's first available.      Action Taken: Message routed to:  [facility]

## 2018-11-14 ENCOUNTER — OFFICE VISIT (OUTPATIENT)
Dept: OPHTHALMOLOGY | Facility: CLINIC | Age: 73
End: 2018-11-14
Attending: OPHTHALMOLOGY
Payer: COMMERCIAL

## 2018-11-14 DIAGNOSIS — H40.051 OCULAR HYPERTENSION, RIGHT: Primary | ICD-10-CM

## 2018-11-14 DIAGNOSIS — H40.051 BORDERLINE GLAUCOMA OF RIGHT EYE WITH OCULAR HYPERTENSION: ICD-10-CM

## 2018-11-14 DIAGNOSIS — H40.9 GLAUCOMA: Primary | ICD-10-CM

## 2018-11-14 DIAGNOSIS — H40.9 GLAUCOMA: ICD-10-CM

## 2018-11-14 PROCEDURE — 92133 CPTRZD OPH DX IMG PST SGM ON: CPT | Mod: ZF | Performed by: OPHTHALMOLOGY

## 2018-11-14 PROCEDURE — G0463 HOSPITAL OUTPT CLINIC VISIT: HCPCS | Mod: ZF

## 2018-11-14 PROCEDURE — 92083 EXTENDED VISUAL FIELD XM: CPT | Mod: ZF | Performed by: OPHTHALMOLOGY

## 2018-11-14 RX ORDER — LATANOPROST 50 UG/ML
1 SOLUTION/ DROPS OPHTHALMIC AT BEDTIME
Qty: 3 BOTTLE | Refills: 11 | Status: SHIPPED | OUTPATIENT
Start: 2018-11-14 | End: 2019-11-15

## 2018-11-14 ASSESSMENT — TONOMETRY
OS_IOP_MMHG: 18
OD_IOP_MMHG: 17
OD_IOP_MMHG: 17
IOP_METHOD: TONOPEN
IOP_METHOD: APPLANATION
OS_IOP_MMHG: 17

## 2018-11-14 ASSESSMENT — CUP TO DISC RATIO
OS_RATIO: 0.3
OD_RATIO: 0.7

## 2018-11-14 ASSESSMENT — REFRACTION_WEARINGRX
OS_CYLINDER: +0.50
SPECS_TYPE: PAL
OD_ADD: +2.25
OS_ADD: +2.25
OD_CYLINDER: +1.00
OS_AXIS: 045
OD_AXIS: 053
OD_SPHERE: -0.25
OS_SPHERE: -0.75

## 2018-11-14 ASSESSMENT — EXTERNAL EXAM - LEFT EYE: OS_EXAM: NORMAL

## 2018-11-14 ASSESSMENT — SLIT LAMP EXAM - LIDS
COMMENTS: NORMAL
COMMENTS: NORMAL

## 2018-11-14 ASSESSMENT — VISUAL ACUITY
OD_CC: 20/20
METHOD: SNELLEN - LINEAR
CORRECTION_TYPE: GLASSES
OS_CC+: -1
OS_CC: 20/20

## 2018-11-14 ASSESSMENT — EXTERNAL EXAM - RIGHT EYE: OD_EXAM: NORMAL

## 2018-11-14 NOTE — PATIENT INSTRUCTIONS
Future Appointments  Date Time Provider Department Center   12/4/2018 1:00 PM  ODETTE OJEDA [facility]   12/4/2018 2:00 PM Carlos Colon MD [facility]   11/15/2019 9:45 AM Sima Gambino MD [facility]

## 2018-11-14 NOTE — MR AVS SNAPSHOT
After Visit Summary   11/14/2018    Mely Rashid    MRN: [id number]           Patient Information     Date Of Birth          1945        Visit Information        Provider Department      11/14/2018 10:00 AM Sima Gambino MD [facility]        Today's Diagnoses     Ocular hypertension, right [365.04]    -  1    Glaucoma        Borderline glaucoma of right eye with ocular hypertension          Care Instructions    Future Appointments  Date Time Provider Department Center   12/4/2018 1:00 PM UC PFL A PFT [facility]   12/4/2018 2:00 PM Carlos Colon MD RHEU [facility]   11/15/2019 9:45 AM Sima Gambino MD [facility] CLIN               Follow-ups after your visit        Follow-up notes from your care team     Return in about 1 year (around 11/14/2019) for OCT rnfl, visual field GTOP.      Your next 10 appointments already scheduled     Dec 04, 2018  1:00 PM CST   FULL PULMONARY FUNCTION with UC PFL A   [facility] Pulmonary Function Testing ([facility] [place])    [address]  3rd Floor  [facility] [address] [phone]            Dec 04, 2018  2:00 PM CST   (Arrive by 1:45 PM)   Return Visit with Carlos Colon MD   [facility] Rheumatology ([facility])    [address]  [address] [place] [address] [phone]              Who to contact     Please call your clinic at [phone] to:    Ask questions about your health    Make or cancel appointments    Discuss your medicines    Learn about your test results    Speak to your doctor            Additional Information About Your Visit        MyChart Information     [employer] gives you secure access to your electronic health record. If you see a primary care provider, you can also send messages to your care team and make appointments. If you have questions, please call your primary care clinic.  If you do not have a primary care provider, please call [phone] and they  will assist you.      [employer]eg[employer] is an electronic gateway that provides easy, online access to your medical records. With Meg[employer], you can request a clinic appointment, read your test results, renew a prescription or communicate with your care team.     To access your existing account, please contact your [facility] or call [phone] for assistance.        Care EveryWhere ID     This is your Care EveryWhere ID. This could be used by other organizations to access your [place] medical records  [id number]         Blood Pressure from Last 3 Encounters:   01/24/18 120/88   06/27/17 119/80   01/24/17 (!) 134/91    Weight from Last 3 Encounters:   01/24/18 66.1 kg (145 lb 11.2 oz)   06/27/17 65 kg (143 lb 4.8 oz)   01/24/17 63 kg (139 lb)              We Performed the Following     OCT Optic Nerve RNFL Spectralis OU (both eyes)     Swap OU          Where to get your medicines      These medications were sent to [facility] [address] [address]     Phone:  [phone]     latanoprost 0.005 % ophthalmic solution          Primary Care Provider Office Phone # Fax #    Gavi CHAVES Tawanna, APRN [phone]       [address] [place] [id number]        Equal Access to Services     ELIAS FUCHS AH: Hadii chris ibarra hadasho Sobernarda, waaxda luqadaha, qaybta kaalmada beatriz, ajith minor. So [facility] [phone].    ATENCIÓN: Si habla español, tiene a puga disposición servicios gratuitos de asistencia lingüística. Geenaame al [phone].    We comply with applicable federal civil rights laws and [place] laws. We do not discriminate on the basis of race, color, national origin, age, disability, sex, sexual orientation, or gender identity.            Thank you!     Thank you for choosing EYE CLINIC  for your care. Our goal is always to provide you with excellent care.  Hearing back from our patients is one way we can continue to improve our services. Please take a few minutes to complete the written survey that you may receive in the mail after your visit with us. Thank you!             Your Updated Medication List - Protect others around you: Learn how to safely use, store and throw away your medicines at www.disposemymeds.org.          This list is accurate as of 11/14/18 11:58 AM.  Always use your most recent med list.                   Brand Name Dispense Instructions for use Diagnosis    B Complex Tabs      Take 1 tablet by mouth daily.    Myalgias, Rash       CALCIUM 500 +D PO      Take 3 tablets by mouth daily.    Myalgias, Rash       desonide 0.05 % cream    DESOWEN    30 g    Apply topically 2 times daily as needed (rash)    Dermatomyositis (H)       desoximetasone 0.25 % cream    TOPICORT    120 g    Apply topically 2 times daily    Dermatomyositis (H)       diazepam 5 MG tablet    VALIUM    2 tablet    Take 1 tablet (5 mg) by mouth once as needed for anxiety or sleep (Take one on arrival for MRI. May repeat in 15 min if inadequate relief.)    Claustrophobia       fish Oil 1200 MG capsule      Take 1 capsule by mouth daily.    Myalgias, Rash       GLUCOSAMINE CHONDR COMPLEX 500-400 MG Caps per capsule   Generic drug:  glucosamine-chondroitin      Take 2 capsules by mouth daily.    Myalgias, Rash       hydroxychloroquine 200 MG tablet    PLAQUENIL    90 tablet    Take 1 tablet (200 mg) by mouth daily *PLEASE SCHEDULE MD APPT.*    Dermatomyositis (H), Encounter for long-term current use of medication, Long-term use of Plaquenil       latanoprost 0.005 % ophthalmic solution    XALATAN    3 Bottle    Place 1 drop into the right eye At Bedtime    Borderline glaucoma of right eye with ocular hypertension       loperamide 2 MG tablet    IMODIUM A-D     Take 2 mg by mouth every morning        * mycophenolate 250 MG capsule    GENERIC EQUIVALENT    270 capsule    Take 3 capsules  (750 mg) by mouth daily    Dermatomyositis (H), Encounter for long-term current use of medication, Long-term use of Plaquenil       * mycophenolate 500 MG tablet    GENERIC EQUIVALENT    180 tablet    TAKE 1 TABLET TWICE A DAY (LABS DUE EVERY 8 TO 12 WEEKS)    Dermatomyositis (H)       vitamin D 1000 units capsule      Take 1 capsule by mouth daily.    Myalgias, Rash       vitamin E 400 UNIT capsule      Take by mouth daily    Dermatomyositis (H), Encounter for long-term (current) use of steroids       * Notice:  This list has 2 medication(s) that are the same as other medications prescribed for you. Read the directions carefully, and ask your doctor or other care provider to review them with you.

## 2018-11-14 NOTE — PROGRESS NOTES
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 Pt reports vision is slightly worse compared to last year and notes that she sees better without glasses when driving. Using artificial tears again for dryness.     Ocular gtts:    latanoprost at bedtime right eye     artificial tears as needed     VF SWAP testing 11/14/18:  OD: reliable. Decreased sensitivity paracentrally compared to prior. MD -2.9 from -1.6. Likely testing artifact.     OS: reliable. No deficits noted.     OCT RNFL 11/14/18 :  OD: superior and inferior significant thinning, w/ nasal borderline thinning, inferotemporal change compared to prior  OS: WNL all green (baseline)    A/P:   1) history ocular hypertension/preperimetric glaucoma, RE with cup:disc asymmetry    Sister w/ hx of low tension glaucoma- required surgery tube?   Good intraocular pressure today   Continue Latanoprost at bedtime right eye    Octopus SWAP both eyes within normal limits likely testing artifact. Defect does not correspond to inferotemporal thinning on OCT rNFL   OCT RNFL today 11/14/18: inferotemporal change in the right eye compared to last visit, otherwise stable    2) Plaquenil usage    Since 2011   Followed by Dr. Hinds (last visit 8/29/16)   Mf ERG 8/2017- wnl no signs of palquenil toxicity     No toxicity. Decreasing dosage, monitored by rheumatology. Now taking 200mg BID four days a week Tu/Th/Sat/Sun, 200mg once daily M/W/F    3) Trace NS cataracts, OU   Not visually significant     Plan:  Diagnosis ocular hypertension vs poag glaucoma right eye, mild/preperimetric.   Continue Latanoprost right eye at bedtime   RTC  1 year Octopus visual field SWAP and OCT retinal nerve fiber layer     Asad Mcwilliams MD  Ophthalmology, PGY-4    Attending Physician Attestation:  Complete documentation of historical and exam elements from today's encounter can be found in the full encounter summary report (not reduplicated in this  progress note). I personally obtained the chief complaint(s) and history of present illness. I confirmed and edited asnecessary the review of systems, past medical/surgical history, family history, social history, and examination findings as documented by others; and I examined the patient myself. I personally reviewed the relevant tests, images, and reports as documented above. I formulated and edited as necessary the assessment and plan and discussed the findings and management plan with the patient and family.  - Sima Gambino MD 11:43 AM 11/14/2018

## 2018-11-14 NOTE — NURSING NOTE
Chief Complaints and History of Present Illnesses   Patient presents with     Follow Up For     Ocular systemic hypertension/early open angle glaucoma right eye     HPI    Affected eye(s):  Both   Symptoms:     No blurred vision   No floaters   No flashes   Dryness      Duration:  1 year   Frequency:  Constant       Do you have eye pain now?:  No      Comments:  Patient presents for 1yr f/u of Ocular systemic hypertension/early open angle glaucoma right eye. Since the last visit the vision has been stable, wants new rx soon. The eyes are tearing with the change in weather, cold now, otherwise they feel dry. No redness or itching. Started using ATS PRN BE. No flashes or floaters. No CLS wear.

## 2018-11-29 ENCOUNTER — PATIENT OUTREACH (OUTPATIENT)
Dept: CARE COORDINATION | Facility: CLINIC | Age: 73
End: 2018-11-29

## 2018-11-29 NOTE — PROGRESS NOTES
[facility]  Rheumatology Clinic  Carlos Colon MD  2018     Name: Mely Rashid  MRN: [id number]  Age: 73 year old  : 1945  Referring provider: Referred Self     Problem list:   1. Dermatomyositis with skin findings, minimal weakness on exam but some myalgias, and a daughter with a similar presentation. Steroid responsive  2. IgA deficiency per the outside records with high-titer BRE of 1:2560, other antibodies thus far negative.   3. 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   Assessment and Plan:  # Dermatomyositis (H)  # Encounter for long-term current use of medication  Today the patient is doing well. Her breathing is stable. Her PFTs from today show stability over the past 5 years. CRP from 12/3/2018 is normal and stable. HGB has been normal and gradually improving over the past 6 months. CK was last checked in 2018, at which time it was normal. We will repeat this with next labs.     We will plan to redo an HRCT in the next several weeks prior to further reducing MMF, as we need to ensure she does not have  low grade ILD that is being controlled with Cellcept. The patient is unclear what dose of Cellcept she has been taking and will update us on this. If she is on 1500 mg, we will decrease to 1000 mg, if she is on 1000 mg, we will decrease to 750, and if she is on 750 mg, we will decrease to 500 mg. If in the future she is stable at 500 mg, we will plan to discontinue the MMF. She will monitor for any skin symptoms, muscle aches, muscle weakness, fatigue, breathing changes, or persistent dry cough lasting for longer than 6-8 weeks. We will repeat PFTs in 6-9 months after decreasing MMF.     - CT Chest w/o contrast (High Resolution)  - General PFT Lab (Please always keep checked)  - 6 minute walk test  - Pulmonary Function Test  - CK total     Follow-up:  6-9 months, with PFTs at that time      HPI:   Mely Rashid is a 73 year old female with a history of dermatomyositis who presents for follow up. The patient was last evaluated on 01/24/2018, at which time disease was stable on 1500 mg mycophenolate and plaquenil 200 mg daily. She had no evidence of active myositis or synovitis. She was continued on 200 mg plaquenil crooks and MMF was decreased to 750 mg daily.     The patient was evaluated by Dr. Gambino of ophthalmology on 11/14/2018, at which time there was no evidence of plaquenil toxicity.     Today, the patient is doing well. She does develop mild shortness of breath on exertion but feels stable. 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 She reports that her nails and hair are continuing to grow faster but she denies any rash on the hands. She denies any Raynaud's symptoms. She has morning stiffness only briefly and has no further stiffness throughout the day. She has continued on 200 mg plaquenil but is unsure what dose of Cellcept she is taking daily.     Review of Systems:   Pertinent items are noted in HPI or as below, remainder of complete ROS is negative.      No recent problems with hearing or vision. No swallowing problems.   No coughing, or wheezing  No chest pain or palpitations  No heart burn, indigestion, abdominal pain, nausea, vomiting, diarrhea  No urination problems, no bloody, cloudy urine, no dysuria  No numbing, tingling, weakness  No headaches or confusion  No rashes. No easy bleeding or bruising.   + morning stiffness  + dyspnea on exertion, stable     Active Medications:     Current Outpatient Prescriptions:      B Complex TABS, Take 1 tablet by mouth daily., Disp: , Rfl:      Calcium Carb-Cholecalciferol (CALCIUM 500 +D PO), Take 3 tablets by mouth daily., Disp: , Rfl:      Cholecalciferol (VITAMIN D) 1000 UNIT capsule, Take 1 capsule by mouth daily., Disp: , Rfl:      desonide (DESOWEN) 0.05 % cream, Apply topically 2 times daily as needed  (rash), Disp: 30 g, Rfl: 2     desoximetasone (TOPICORT) 0.25 % cream, Apply topically 2 times daily, Disp: 120 g, Rfl: 3     diazepam (VALIUM) 5 MG tablet, Take 1 tablet (5 mg) by mouth once as needed for anxiety or sleep (Take one on arrival for MRI. May repeat in 15 min if inadequate relief.), Disp: 2 tablet, Rfl: 0     glucosamine-chondroitinoitin (GLUCOSAMINE CHONDR COMPLEX) 500-400 MG CAPS, Take 2 capsules by mouth daily., Disp: , Rfl:      hydroxychloroquine (PLAQUENIL) 200 MG tablet, Take 1 tablet (200 mg) by mouth daily *PLEASE SCHEDULE MD APPT.*, Disp: 90 tablet, Rfl: 3     latanoprost (XALATAN) 0.005 % ophthalmic solution, Place 1 drop into the right eye At Bedtime, Disp: 3 Bottle, Rfl: 11     loperamide (IMODIUM A-D) 2 MG tablet, Take 2 mg by mouth every morning, Disp: , Rfl:      mycophenolate (GENERIC EQUIVALENT) 250 MG capsule, Take 3 capsules (750 mg) by mouth daily, Disp: 270 capsule, Rfl: 3     mycophenolate (GENERIC EQUIVALENT) 500 MG tablet, TAKE 1 TABLET TWICE A DAY (LABS DUE EVERY 8 TO 12 WEEKS), Disp: 180 tablet, Rfl: 0     omega-3 fatty acids (FISH OIL) 1200 MG capsule, Take 1 capsule by mouth daily., Disp: , Rfl:      vitamin E 400 UNIT capsule, Take by mouth daily, Disp: , Rfl:       Allergies:   Penicillins      Past Medical History:  Basal cell carcinoma   Dermatomyositis   Glaucoma   IgA deficiency   Non senile cataract   Herpes zoster   Oral ulcer   Seborrheic dermatitis   Osteopenia   Skin neoplasm     Past Surgical History:  Skin lesion biopsy   Esophagogastroduodenoscopy 2014   Hysterectomy partial, prolapse uterus     Family History:   Father: coronary artery disease, arthritis   Brother: diabetes mellitus, glaucoma  Sister: breast cancer, glaucoma, seizures  Daughter: psoriasis, dermatomyositis   Mother: alzheimer's     Social History:   Former smoker, quit on 04/10/1079.   Rare alcohol use.   Lives independently      Physical Exam:   /82  Pulse 61  Temp 98  F (36.7  C)  "(Oral)  Ht 1.651 m (5' 5\")  Wt 66 kg (145 lb 6.4 oz)  SpO2 98%  BMI 24.2 kg/m2   Wt Readings from Last 4 Encounters:   12/04/18 66 kg (145 lb 6.4 oz)   01/24/18 66.1 kg (145 lb 11.2 oz)   06/27/17 65 kg (143 lb 4.8 oz)   01/24/17 63 kg (139 lb)     Constitutional: Well-developed, appearing stated age; cooperative  Eyes: Normal EOM, PERRLA, vision, conjunctiva, sclera  ENT: Normal external ears, nose, hearing, lips, teeth, gums, throat. No mucous membrane lesions, normal saliva pool  Neck: No mass or thyroid enlargement  Resp: Lungs clear to auscultation, nl to palpation  CV: RRR, no murmurs, rubs or gallops, no edema  GI: No ABD mass or tenderness, no HSM  : Not tested  Lymph: No cervical, supraclavicular, inguinal or epitrochlear nodes  MS: The TMJ, neck, shoulder, elbow, wrist, MCP/PIP/DIP, spine, hip, knee, ankle, and foot MTP/IP joints were examined and found normal. No active synovitis or altered joint anatomy. Full joint ROM. Normal  strength. No dactylitis,  tenosynovitis, enthesopathy.  Skin: No nail pitting, alopecia, rash, nodules or lesions  Neuro: Normal cranial nerves, sensation, DTRs. 4+/5 strength in the lower extremities. 5/5 strength in the shoulders.   Psych: Normal judgement, orientation, memory, affect.     Laboratory:   RHEUM RESULTS Latest Ref Rng & Units 6/13/2018 9/14/2018 12/3/2018   COMPLEMENT C3 76 - 169 mg/dL - - -   COMPLEMENT C4 15 - 50 mg/dL - - -   SED RATE 0 - 30 mm/h - - -   CRP, INFLAMMATION 0.0 - 8.0 mg/L <2.9 <2.9 <2.9   CK TOTAL 30 - 225 U/L 108 - -   AST 0 - 45 U/L 24 - -   ALT 0 - 50 U/L 25 - -   ALBUMIN 3.4 - 5.0 g/dL 4.0 4.0 4.4   WBC 4.0 - 11.0 10e9/L 4.3 4.0 4.2   RBC 3.8 - 5.2 10e12/L 4.36 4.37 4.52   HGB 11.7 - 15.7 g/dL 13.4 13.6 14.0   HCT 35.0 - 47.0 % 41.4 41.1 42.4   MCV 78 - 100 fl 95 94 94   MCHC 31.5 - 36.5 g/dL 32.4 33.1 33.0   RDW 10.0 - 15.0 % 12.8 12.8 12.6    - 450 10e9/L 193 194 186   CREATININE 0.52 - 1.04 mg/dL 0.61 - -   GFR ESTIMATE, IF " BLACK >60 mL/min/1.7m2 >90 - -   GFR ESTIMATE >60 mL/min/1.7m2 >90 - -   HEPATITIS C ANTIBODY NR - - -       Centromere Trisha IgG   Date Value Ref Range Status   03/22/2012 0  Final     Comment:     Reference range: 0 to 40  Unit: AU/mL  (Note)  INTERPRETIVE INFORMATION: Centromere Ab, IgG   29 AU/mL or Less ............. Negative   30 - 40 AU/mL ................ Equivocal   41 AU/mL or Greater .......... Positive  Centromere antibodies are present in 80-90% of individuals  with CREST variant scleroderma.  This antibody is also seen  in 30% of Raynaud patients, 12% of patients with mixed  connective-tissue disease, diffuse scleroderma,  interstitial pulmonary fibrosis, primary biliary cirrhosis,  and in a smaller percent of patients with systemic lupus  erythematosus (SLE) and RA.  Performed by [employer],  [address] [place] [id number] [phone]  [email or web address], Dayana Kraft MD, Lab. Director     Scribe Disclosure:   I, Sulma Cordova, am serving as a scribe to document services personally performed by Carlos Colon MD at this visit, based upon the provider's statements to me. All documentation has been reviewed by the aforementioned provider prior to being entered into the official medical record.     Portions of this medical record were completed by a scribe. UPON MY REVIEW AND AUTHENTICATION BY ELECTRONIC SIGNATURE, this confirms (a) I performed the applicable clinical services, and (b) the record is accurate.    ASHISH Colon MD, PhD    Rheumatology

## 2018-12-03 DIAGNOSIS — Z79.899 ENCOUNTER FOR LONG-TERM CURRENT USE OF MEDICATION: ICD-10-CM

## 2018-12-03 DIAGNOSIS — M33.13 DERMATOMYOSITIS (H): ICD-10-CM

## 2018-12-03 LAB
ALBUMIN SERPL-MCNC: 4.4 G/DL (ref 3.4–5)
BASOPHILS # BLD AUTO: 0 10E9/L (ref 0–0.2)
BASOPHILS NFR BLD AUTO: 0.5 %
CRP SERPL-MCNC: <2.9 MG/L (ref 0–8)
DIFFERENTIAL METHOD BLD: NORMAL
EOSINOPHIL # BLD AUTO: 0.1 10E9/L (ref 0–0.7)
EOSINOPHIL NFR BLD AUTO: 1.2 %
ERYTHROCYTE [DISTWIDTH] IN BLOOD BY AUTOMATED COUNT: 12.6 % (ref 10–15)
HCT VFR BLD AUTO: 42.4 % (ref 35–47)
HGB BLD-MCNC: 14 G/DL (ref 11.7–15.7)
LYMPHOCYTES # BLD AUTO: 0.8 10E9/L (ref 0.8–5.3)
LYMPHOCYTES NFR BLD AUTO: 20.2 %
MCH RBC QN AUTO: 31 PG (ref 26.5–33)
MCHC RBC AUTO-ENTMCNC: 33 G/DL (ref 31.5–36.5)
MCV RBC AUTO: 94 FL (ref 78–100)
MONOCYTES # BLD AUTO: 0.4 10E9/L (ref 0–1.3)
MONOCYTES NFR BLD AUTO: 9.9 %
NEUTROPHILS # BLD AUTO: 2.8 10E9/L (ref 1.6–8.3)
NEUTROPHILS NFR BLD AUTO: 68.2 %
PLATELET # BLD AUTO: 186 10E9/L (ref 150–450)
RBC # BLD AUTO: 4.52 10E12/L (ref 3.8–5.2)
WBC # BLD AUTO: 4.2 10E9/L (ref 4–11)

## 2018-12-03 PROCEDURE — 36415 COLL VENOUS BLD VENIPUNCTURE: CPT | Performed by: INTERNAL MEDICINE

## 2018-12-03 PROCEDURE — 82040 ASSAY OF SERUM ALBUMIN: CPT | Performed by: INTERNAL MEDICINE

## 2018-12-03 PROCEDURE — 85025 COMPLETE CBC W/AUTO DIFF WBC: CPT | Performed by: INTERNAL MEDICINE

## 2018-12-03 PROCEDURE — 86140 C-REACTIVE PROTEIN: CPT | Performed by: INTERNAL MEDICINE

## 2018-12-04 ENCOUNTER — OFFICE VISIT (OUTPATIENT)
Dept: RHEUMATOLOGY | Facility: CLINIC | Age: 73
End: 2018-12-04
Attending: INTERNAL MEDICINE
Payer: COMMERCIAL

## 2018-12-04 VITALS
OXYGEN SATURATION: 98 % | BODY MASS INDEX: 24.22 KG/M2 | WEIGHT: 145.4 LBS | HEIGHT: 65 IN | SYSTOLIC BLOOD PRESSURE: 134 MMHG | TEMPERATURE: 98 F | HEART RATE: 61 BPM | DIASTOLIC BLOOD PRESSURE: 82 MMHG

## 2018-12-04 DIAGNOSIS — M33.13 DERMATOMYOSITIS (H): Primary | ICD-10-CM

## 2018-12-04 DIAGNOSIS — Z79.899 ENCOUNTER FOR LONG-TERM CURRENT USE OF MEDICATION: ICD-10-CM

## 2018-12-04 DIAGNOSIS — M33.13 DERMATOMYOSITIS (H): ICD-10-CM

## 2018-12-04 DIAGNOSIS — Z79.899 LONG-TERM USE OF PLAQUENIL: ICD-10-CM

## 2018-12-04 PROCEDURE — G0463 HOSPITAL OUTPT CLINIC VISIT: HCPCS | Mod: ZF

## 2018-12-04 ASSESSMENT — PAIN SCALES - GENERAL: PAINLEVEL: NO PAIN (0)

## 2018-12-04 NOTE — LETTER
2018      RE: Mely Rashid  [address] [facility]   [facility] [address]       [facility]  Rheumatology Clinic  Carlos Colon MD  2018     Name: Mely Rashid  MRN: [id number]  Age: 73 year old  : 1945  Referring provider: Referred Self     Problem list:   1. Dermatomyositis with skin findings, minimal weakness on exam but some myalgias, and a daughter with a similar presentation. Steroid responsive  2. IgA deficiency per the outside records with high-titer BRE of 1:2560, other antibodies thus far negative.   3. 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   Assessment and Plan:  # Dermatomyositis (H)  # Encounter for long-term current use of medication  Today the patient is doing well. Her breathing is stable. Her PFTs from today show stability over the past 5 years. CRP from 12/3/2018 is normal and stable. HGB has been normal and gradually improving over the past 6 months. CK was last checked in 2018, at which time it was normal. We will repeat this with next labs.     We will plan to redo an HRCT in the next several weeks prior to further reducing MMF, as we need to ensure she does not have  low grade ILD that is being controlled with Cellcept. The patient is unclear what dose of Cellcept she has been taking and will update us on this. If she is on 1500 mg, we will decrease to 1000 mg, if she is on 1000 mg, we will decrease to 750, and if she is on 750 mg, we will decrease to 500 mg. If in the future she is stable at 500 mg, we will plan to discontinue the MMF. She will monitor for any skin symptoms, muscle aches, muscle weakness, fatigue, breathing changes, or persistent dry cough lasting for longer than 6-8 weeks. We will repeat PFTs in 6-9 months after decreasing MMF.     - CT Chest w/o contrast (High Resolution)  - General PFT Lab (Please always keep checked)  - 6 minute walk test  -  Pulmonary Function Test  - CK total     Follow-up:  6-9 months, with PFTs at that time     HPI:   Mely Rashid is a 73 year old female with a history of dermatomyositis who presents for follow up. The patient was last evaluated on 01/24/2018, at which time disease was stable on 1500 mg mycophenolate and plaquenil 200 mg daily. She had no evidence of active myositis or synovitis. She was continued on 200 mg plaquenil crooks and MMF was decreased to 750 mg daily.     The patient was evaluated by Dr. Gambino of ophthalmology on 11/14/2018, at which time there was no evidence of plaquenil toxicity.     Today, the patient is doing well. She does develop mild shortness of breath on exertion but feels stable. 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 She reports that her nails and hair are continuing to grow faster but she denies any rash on the hands. She denies any Raynaud's symptoms. She has morning stiffness only briefly and has no further stiffness throughout the day. She has continued on 200 mg plaquenil but is unsure what dose of Cellcept she is taking daily.     Review of Systems:   Pertinent items are noted in HPI or as below, remainder of complete ROS is negative.      No recent problems with hearing or vision. No swallowing problems.   No coughing, or wheezing  No chest pain or palpitations  No heart burn, indigestion, abdominal pain, nausea, vomiting, diarrhea  No urination problems, no bloody, cloudy urine, no dysuria  No numbing, tingling, weakness  No headaches or confusion  No rashes. No easy bleeding or bruising.   + morning stiffness  + dyspnea on exertion, stable     Active Medications:     Current Outpatient Prescriptions:      B Complex TABS, Take 1 tablet by mouth daily., Disp: , Rfl:      Calcium Carb-Cholecalciferol (CALCIUM 500 +D PO), Take 3 tablets by mouth daily., Disp: , Rfl:      Cholecalciferol (VITAMIN D) 1000 UNIT capsule, Take 1 capsule by mouth daily.,  Disp: , Rfl:      desonide (DESOWEN) 0.05 % cream, Apply topically 2 times daily as needed (rash), Disp: 30 g, Rfl: 2     desoximetasone (TOPICORT) 0.25 % cream, Apply topically 2 times daily, Disp: 120 g, Rfl: 3     diazepam (VALIUM) 5 MG tablet, Take 1 tablet (5 mg) by mouth once as needed for anxiety or sleep (Take one on arrival for MRI. May repeat in 15 min if inadequate relief.), Disp: 2 tablet, Rfl: 0     glucosamine-chondroitinoitin (GLUCOSAMINE CHONDR COMPLEX) 500-400 MG CAPS, Take 2 capsules by mouth daily., Disp: , Rfl:      hydroxychloroquine (PLAQUENIL) 200 MG tablet, Take 1 tablet (200 mg) by mouth daily *PLEASE SCHEDULE MD APPT.*, Disp: 90 tablet, Rfl: 3     latanoprost (XALATAN) 0.005 % ophthalmic solution, Place 1 drop into the right eye At Bedtime, Disp: 3 Bottle, Rfl: 11     loperamide (IMODIUM A-D) 2 MG tablet, Take 2 mg by mouth every morning, Disp: , Rfl:      mycophenolate (GENERIC EQUIVALENT) 250 MG capsule, Take 3 capsules (750 mg) by mouth daily, Disp: 270 capsule, Rfl: 3     mycophenolate (GENERIC EQUIVALENT) 500 MG tablet, TAKE 1 TABLET TWICE A DAY (LABS DUE EVERY 8 TO 12 WEEKS), Disp: 180 tablet, Rfl: 0     omega-3 fatty acids (FISH OIL) 1200 MG capsule, Take 1 capsule by mouth daily., Disp: , Rfl:      vitamin E 400 UNIT capsule, Take by mouth daily, Disp: , Rfl:       Allergies:   Penicillins      Past Medical History:  Basal cell carcinoma   Dermatomyositis   Glaucoma   IgA deficiency   Non senile cataract   Herpes zoster   Oral ulcer   Seborrheic dermatitis   Osteopenia   Skin neoplasm     Past Surgical History:  Skin lesion biopsy   Esophagogastroduodenoscopy 2014   Hysterectomy partial, prolapse uterus     Family History:   Father: coronary artery disease, arthritis   Brother: diabetes mellitus, glaucoma  Sister: breast cancer, glaucoma, seizures  Daughter: psoriasis, dermatomyositis   Mother: alzheimer's     Social History:   Former smoker, quit on 04/10/1079.   Rare alcohol use.  "  Lives independently      Physical Exam:   /82  Pulse 61  Temp 98  F (36.7  C) (Oral)  Ht 1.651 m (5' 5\")  Wt 66 kg (145 lb 6.4 oz)  SpO2 98%  BMI 24.2 kg/m2   Wt Readings from Last 4 Encounters:   12/04/18 66 kg (145 lb 6.4 oz)   01/24/18 66.1 kg (145 lb 11.2 oz)   06/27/17 65 kg (143 lb 4.8 oz)   01/24/17 63 kg (139 lb)     Constitutional: Well-developed, appearing stated age; cooperative  Eyes: Normal EOM, PERRLA, vision, conjunctiva, sclera  ENT: Normal external ears, nose, hearing, lips, teeth, gums, throat. No mucous membrane lesions, normal saliva pool  Neck: No mass or thyroid enlargement  Resp: Lungs clear to auscultation, nl to palpation  CV: RRR, no murmurs, rubs or gallops, no edema  GI: No ABD mass or tenderness, no HSM  : Not tested  Lymph: No cervical, supraclavicular, inguinal or epitrochlear nodes  MS: The TMJ, neck, shoulder, elbow, wrist, MCP/PIP/DIP, spine, hip, knee, ankle, and foot MTP/IP joints were examined and found normal. No active synovitis or altered joint anatomy. Full joint ROM. Normal  strength. No dactylitis,  tenosynovitis, enthesopathy.  Skin: No nail pitting, alopecia, rash, nodules or lesions  Neuro: Normal cranial nerves, sensation, DTRs. 4+/5 strength in the lower extremities. 5/5 strength in the shoulders.   Psych: Normal judgement, orientation, memory, affect.     Laboratory:   RHEUM RESULTS Latest Ref Rng & Units 6/13/2018 9/14/2018 12/3/2018   COMPLEMENT C3 76 - 169 mg/dL - - -   COMPLEMENT C4 15 - 50 mg/dL - - -   SED RATE 0 - 30 mm/h - - -   CRP, INFLAMMATION 0.0 - 8.0 mg/L <2.9 <2.9 <2.9   CK TOTAL 30 - 225 U/L 108 - -   AST 0 - 45 U/L 24 - -   ALT 0 - 50 U/L 25 - -   ALBUMIN 3.4 - 5.0 g/dL 4.0 4.0 4.4   WBC 4.0 - 11.0 10e9/L 4.3 4.0 4.2   RBC 3.8 - 5.2 10e12/L 4.36 4.37 4.52   HGB 11.7 - 15.7 g/dL 13.4 13.6 14.0   HCT 35.0 - 47.0 % 41.4 41.1 42.4   MCV 78 - 100 fl 95 94 94   MCHC 31.5 - 36.5 g/dL 32.4 33.1 33.0   RDW 10.0 - 15.0 % 12.8 12.8 12.6   PLT " 150 - 450 10e9/L 193 194 186   CREATININE 0.52 - 1.04 mg/dL 0.61 - -   GFR ESTIMATE, IF BLACK >60 mL/min/1.7m2 >90 - -   GFR ESTIMATE >60 mL/min/1.7m2 >90 - -   HEPATITIS C ANTIBODY NR - - -       Centromere Trisha IgG   Date Value Ref Range Status   03/22/2012 0  Final     Comment:     Reference range: 0 to 40  Unit: AU/mL  (Note)  INTERPRETIVE INFORMATION: Centromere Ab, IgG   29 AU/mL or Less ............. Negative   30 - 40 AU/mL ................ Equivocal   41 AU/mL or Greater .......... Positive  Centromere antibodies are present in 80-90% of individuals  with CREST variant scleroderma.  This antibody is also seen  in 30% of Raynaud patients, 12% of patients with mixed  connective-tissue disease, diffuse scleroderma,  interstitial pulmonary fibrosis, primary biliary cirrhosis,  and in a smaller percent of patients with systemic lupus  erythematosus (SLE) and RA.  Performed by [employer],  [address] [place] [id number] [phone]  [email or web address], Dayana Kraft MD, Lab. Director     Scribe Disclosure:   I, Sulma Cordova, am serving as a scribe to document services personally performed by Carlos Colon MD at this visit, based upon the provider's statements to me. All documentation has been reviewed by the aforementioned provider prior to being entered into the official medical record.     Portions of this medical record were completed by a scribe. UPON MY REVIEW AND AUTHENTICATION BY ELECTRONIC SIGNATURE, this confirms (a) I performed the applicable clinical services, and (b) the record is accurate.    ASHISH Colon MD, PhD    Rheumatology

## 2018-12-04 NOTE — MR AVS SNAPSHOT
After Visit Summary   12/4/2018    Mely Rashid    MRN: [id number]           Patient Information     Date Of Birth          1945        Visit Information        Provider Department      12/4/2018 2:00 PM Carlos Colon MD [facility] Rheumatology        Today's Diagnoses     Dermatomyositis (H)    -  1    Encounter for long-term current use of medication           Follow-ups after your visit        Your next 10 appointments already scheduled     Nov 15, 2019  9:45 AM CST   RETURN GLAUCOMA with Sima Gambino MD   Eye Clinic ([facility])    [address]  [address]  [facility] [address]   [phone]              Future tests that were ordered for you today     Open Standing Orders        Priority Remaining Interval Expires Ordered    CK total Routine 99/99 q 2 mo  12/4/2019 12/4/2018          Open Future Orders        Priority Expected Expires Ordered    CT Chest w/o contrast (High Resolution) Routine  7/2/2019 12/4/2018    General PFT Lab (Please always keep checked) Routine  12/4/2019 12/4/2018    6 minute walk test Routine  12/4/2019 12/4/2018    Pulmonary Function Test Routine  12/4/2019 12/4/2018            Who to contact     If you have questions or need follow up information about today's clinic visit or your schedule please contact [facility] RHEUMATOLOGY directly at [phone].  Normal or non-critical lab and imaging results will be communicated to you by MyChart, letter or phone within 4 business days after the clinic has received the results. If you do not hear from us within 7 days, please contact the clinic through MyChart or phone. If you have a critical or abnormal lab result, we will notify you by phone as soon as possible.  Submit refill requests through [employer] or call your pharmacy and they will forward the refill request to us. Please allow 3 business days for your refill to be completed.          Additional Information  "About Your Visit        MyChart Information     [employer] gives you secure access to your electronic health record. If you see a primary care provider, you can also send messages to your care team and make appointments. If you have questions, please call your primary care clinic.  If you do not have a primary care provider, please call [phone] and they will assist you.        Care EveryWhere ID     This is your Care EveryWhere ID. This could be used by other organizations to access your [facility] medical records  [id number]        Your Vitals Were     Pulse Temperature Height Pulse Oximetry BMI (Body Mass Index)       61 98  F (36.7  C) (Oral) 1.651 m (5' 5\") 98% 24.2 kg/m2        Blood Pressure from Last 3 Encounters:   12/04/18 134/82   01/24/18 120/88   06/27/17 119/80    Weight from Last 3 Encounters:   12/04/18 66 kg (145 lb 6.4 oz)   01/24/18 66.1 kg (145 lb 11.2 oz)   06/27/17 65 kg (143 lb 4.8 oz)               Primary Care Provider Office Phone # Fax #    Gavi Stacy, APRN [phone]       [address] [place] [address] [phone]  [place] [address]        Equal Access to Services     ELIAS FUCHS AH: Hadii aad ku hadasho Soomaali, waaxda luqadaha, qaybta kaalmada adeegyada, waxay idiin hayfelixn franki cifuentes laalpesh . So [facility] [phone].    ATENCIÓN: Si habla español, tiene a puga disposición servicios gratuitos de asistencia lingüística. Llame al [phone].    We comply with applicable federal civil rights laws and [place] laws. We do not discriminate on the basis of race, color, national origin, age, disability, sex, sexual orientation, or gender identity.            Thank you!     Thank you for choosing [facility]  for your care. Our goal is always to provide you with excellent care. Hearing back from our patients is one way we can continue to improve our services. Please take a few minutes to complete the written survey that you may receive in the mail after your visit with " us. Thank you!             Your Updated Medication List - Protect others around you: Learn how to safely use, store and throw away your medicines at www.disposemymeds.org.          This list is accurate as of 12/4/18  4:15 PM.  Always use your most recent med list.                   Brand Name Dispense Instructions for use Diagnosis    CALCIUM 500 +D PO      Take 3 tablets by mouth daily.    Myalgias, Rash       desonide 0.05 % external cream    DESOWEN    30 g    Apply topically 2 times daily as needed (rash)    Dermatomyositis (H)       desoximetasone 0.25 % external cream    TOPICORT    120 g    Apply topically 2 times daily    Dermatomyositis (H)       diazepam 5 MG tablet    VALIUM    2 tablet    Take 1 tablet (5 mg) by mouth once as needed for anxiety or sleep (Take one on arrival for MRI. May repeat in 15 min if inadequate relief.)    Claustrophobia       fish Oil 1200 MG capsule      Take 1 capsule by mouth daily.    Myalgias, Rash       GLUCOSAMINE CHONDR COMPLEX 500-400 MG Caps per capsule   Generic drug:  glucosamine-chondroitin      Take 2 capsules by mouth daily.    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    Myalgias, Rash       vitamin E 400 units (180 mg) capsule    TOCOPHEROL     Take by mouth daily    Dermatomyositis (H), Encounter for long-term (current) use of steroids       vitamin tablet    B COMPLEX     Take 1 tablet by mouth daily.    Myalgias, Rash       * Notice:  This list has 2 medication(s) that are the same as other medications prescribed for you. Read the directions carefully, and ask your doctor or other care provider to review them with you.

## 2018-12-05 LAB
DLCOCOR-%PRED-PRE: 83 %
DLCOCOR-PRE: 17.43 ML/MIN/MMHG
DLCOUNC-%PRED-PRE: 85 %
DLCOUNC-PRE: 17.74 ML/MIN/MMHG
DLCOUNC-PRED: 20.76 ML/MIN/MMHG
ERV-%PRED-PRE: 135 %
ERV-PRE: 1.08 L
ERV-PRED: 0.8 L
EXPTIME-PRE: 7.59 SEC
FEF2575-%PRED-PRE: 92 %
FEF2575-PRE: 1.63 L/SEC
FEF2575-PRED: 1.77 L/SEC
FEFMAX-%PRED-PRE: 110 %
FEFMAX-PRE: 6.3 L/SEC
FEFMAX-PRED: 5.69 L/SEC
FEV1-%PRED-PRE: 117 %
FEV1-PRE: 2.45 L
FEV1FEV6-PRE: 73 %
FEV1FEV6-PRED: 79 %
FEV1FVC-PRE: 73 %
FEV1FVC-PRED: 76 %
FEV1SVC-PRE: 74 %
FEV1SVC-PRED: 66 %
FIFMAX-PRE: 3.88 L/SEC
FRCPLETH-%PRED-PRE: 124 %
FRCPLETH-PRE: 3.45 L
FRCPLETH-PRED: 2.77 L
FVC-%PRED-PRE: 126 %
FVC-PRE: 3.37 L
FVC-PRED: 2.67 L
IC-%PRED-PRE: 94 %
IC-PRE: 2.22 L
IC-PRED: 2.36 L
RVPLETH-%PRED-PRE: 110 %
RVPLETH-PRE: 2.36 L
RVPLETH-PRED: 2.13 L
TLCPLETH-%PRED-PRE: 110 %
TLCPLETH-PRE: 5.67 L
TLCPLETH-PRED: 5.11 L
VA-%PRED-PRE: 85 %
VA-PRE: 4.46 L
VC-%PRED-PRE: 104 %
VC-PRE: 3.31 L
VC-PRED: 3.16 L

## 2018-12-14 ENCOUNTER — TRANSFERRED RECORDS (OUTPATIENT)
Dept: HEALTH INFORMATION MANAGEMENT | Facility: CLINIC | Age: 73
End: 2018-12-14

## 2018-12-18 ENCOUNTER — ANCILLARY PROCEDURE (OUTPATIENT)
Dept: CT IMAGING | Facility: CLINIC | Age: 73
End: 2018-12-18
Attending: INTERNAL MEDICINE
Payer: COMMERCIAL

## 2018-12-18 DIAGNOSIS — Z79.899 ENCOUNTER FOR LONG-TERM CURRENT USE OF MEDICATION: ICD-10-CM

## 2018-12-18 DIAGNOSIS — M33.13 DERMATOMYOSITIS (H): ICD-10-CM

## 2018-12-18 PROCEDURE — 71250 CT THORAX DX C-: CPT | Performed by: RADIOLOGY

## 2019-01-23 ENCOUNTER — DOCUMENTATION ONLY (OUTPATIENT)
Dept: INTERNAL MEDICINE | Facility: CLINIC | Age: 74
End: 2019-01-23

## 2019-02-26 DIAGNOSIS — M33.13 DERMATOMYOSITIS (H): ICD-10-CM

## 2019-02-26 DIAGNOSIS — Z79.899 ENCOUNTER FOR LONG-TERM CURRENT USE OF MEDICATION: ICD-10-CM

## 2019-02-26 LAB
ALBUMIN SERPL-MCNC: 4.1 G/DL (ref 3.4–5)
BASOPHILS # BLD AUTO: 0 10E9/L (ref 0–0.2)
BASOPHILS NFR BLD AUTO: 0.5 %
CK SERPL-CCNC: 69 U/L (ref 30–225)
CRP SERPL-MCNC: <2.9 MG/L (ref 0–8)
DIFFERENTIAL METHOD BLD: NORMAL
EOSINOPHIL # BLD AUTO: 0.1 10E9/L (ref 0–0.7)
EOSINOPHIL NFR BLD AUTO: 1.5 %
ERYTHROCYTE [DISTWIDTH] IN BLOOD BY AUTOMATED COUNT: 12.8 % (ref 10–15)
HCT VFR BLD AUTO: 42 % (ref 35–47)
HGB BLD-MCNC: 13.8 G/DL (ref 11.7–15.7)
LYMPHOCYTES # BLD AUTO: 0.9 10E9/L (ref 0.8–5.3)
LYMPHOCYTES NFR BLD AUTO: 21.7 %
MCH RBC QN AUTO: 30.9 PG (ref 26.5–33)
MCHC RBC AUTO-ENTMCNC: 32.9 G/DL (ref 31.5–36.5)
MCV RBC AUTO: 94 FL (ref 78–100)
MONOCYTES # BLD AUTO: 0.4 10E9/L (ref 0–1.3)
MONOCYTES NFR BLD AUTO: 10.8 %
NEUTROPHILS # BLD AUTO: 2.6 10E9/L (ref 1.6–8.3)
NEUTROPHILS NFR BLD AUTO: 65.5 %
PLATELET # BLD AUTO: 208 10E9/L (ref 150–450)
RBC # BLD AUTO: 4.46 10E12/L (ref 3.8–5.2)
WBC # BLD AUTO: 4 10E9/L (ref 4–11)

## 2019-02-26 PROCEDURE — 85025 COMPLETE CBC W/AUTO DIFF WBC: CPT | Performed by: INTERNAL MEDICINE

## 2019-02-26 PROCEDURE — 36415 COLL VENOUS BLD VENIPUNCTURE: CPT | Performed by: INTERNAL MEDICINE

## 2019-02-26 PROCEDURE — 82040 ASSAY OF SERUM ALBUMIN: CPT | Performed by: INTERNAL MEDICINE

## 2019-02-26 PROCEDURE — 82550 ASSAY OF CK (CPK): CPT | Performed by: INTERNAL MEDICINE

## 2019-02-26 PROCEDURE — 86140 C-REACTIVE PROTEIN: CPT | Performed by: INTERNAL MEDICINE

## 2019-03-01 DIAGNOSIS — Z79.899 LONG-TERM USE OF PLAQUENIL: ICD-10-CM

## 2019-03-01 DIAGNOSIS — M33.13 DERMATOMYOSITIS (H): ICD-10-CM

## 2019-03-01 DIAGNOSIS — Z79.899 ENCOUNTER FOR LONG-TERM CURRENT USE OF MEDICATION: ICD-10-CM

## 2019-03-01 NOTE — TELEPHONE ENCOUNTER
mycophenolate (GENERIC EQUIVALENT) 250 MG capsule        Last Written Prescription Date:  Not on med list;   Last Office Visit: 12-4-18  Future Office visit:  7-30-19    CBC RESULTS:   Recent Labs   Lab Test 02/26/19  1229   WBC 4.0   RBC 4.46   HGB 13.8   HCT 42.0   MCV 94   MCH 30.9   MCHC 32.9   RDW 12.8          Creatinine   Date Value Ref Range Status   06/13/2018 0.61 0.52 - 1.04 mg/dL Final   ]    Liver Function Studies -   Recent Labs   Lab Test 02/26/19  1229  06/13/18  1033  05/27/15  0907   PROTTOTAL  --   --   --   --  6.3*   ALBUMIN 4.1   < > 4.0   < > 3.9   BILITOTAL  --   --   --   --  0.5   ALKPHOS  --   --   --   --  35*   AST  --   --  24   < > 23   ALT  --   --  25   < > 24    < > = values in this interval not displayed.       Routing refill request to  RN for review/approval because:  Drug not active on med list  Clarify dosage change    The source prescription was discontinued on 12/6/2018 by Sandor Mclaughlin, RN for the following reason: Medication Reconciliation Clean Up.  On Med list: Mycophenolate 500 mg tab, take one tab by mouth daily: Listed as Historical.  12-5-19 my chart encounter- Dosage change

## 2019-04-09 DIAGNOSIS — M33.13 DERMATOMYOSITIS (H): ICD-10-CM

## 2019-04-09 DIAGNOSIS — Z79.899 LONG-TERM USE OF PLAQUENIL: ICD-10-CM

## 2019-04-09 DIAGNOSIS — Z79.899 ENCOUNTER FOR LONG-TERM CURRENT USE OF MEDICATION: ICD-10-CM

## 2019-04-10 RX ORDER — HYDROXYCHLOROQUINE SULFATE 200 MG/1
200 TABLET, FILM COATED ORAL DAILY
Qty: 90 TABLET | Refills: 1 | Status: SHIPPED | OUTPATIENT
Start: 2019-04-10 | End: 2019-10-04

## 2019-04-10 NOTE — TELEPHONE ENCOUNTER
Last Clinic Visit: 12/4/2018 [facility] Rheumatology  Last Eye Exam: 11-14-18  Next Clinic Visit: 7-30-18

## 2019-05-17 NOTE — TELEPHONE ENCOUNTER
It had been deleted because there are 2 Mycophenolates listed on pt's med list. Will route this to Dr Colon.    Sandor Mclaughlin, BSN RN  Rheumatology RN Coordinator   [place]

## 2019-05-19 RX ORDER — MYCOPHENOLATE MOFETIL 250 MG/1
750 CAPSULE ORAL DAILY
Qty: 270 CAPSULE | Refills: 0 | Status: SHIPPED | OUTPATIENT
Start: 2019-05-19 | End: 2019-07-16 | Stop reason: DRUGHIGH

## 2019-05-23 DIAGNOSIS — Z79.899 ENCOUNTER FOR LONG-TERM CURRENT USE OF MEDICATION: ICD-10-CM

## 2019-05-23 DIAGNOSIS — M33.13 DERMATOMYOSITIS (H): ICD-10-CM

## 2019-05-23 LAB
ALBUMIN SERPL-MCNC: 3.9 G/DL (ref 3.4–5)
BASOPHILS # BLD AUTO: 0 10E9/L (ref 0–0.2)
BASOPHILS NFR BLD AUTO: 0.6 %
CK SERPL-CCNC: 116 U/L (ref 30–225)
CRP SERPL-MCNC: <2.9 MG/L (ref 0–8)
DIFFERENTIAL METHOD BLD: ABNORMAL
EOSINOPHIL # BLD AUTO: 0.1 10E9/L (ref 0–0.7)
EOSINOPHIL NFR BLD AUTO: 3 %
ERYTHROCYTE [DISTWIDTH] IN BLOOD BY AUTOMATED COUNT: 13.1 % (ref 10–15)
HCT VFR BLD AUTO: 40.4 % (ref 35–47)
HGB BLD-MCNC: 13.1 G/DL (ref 11.7–15.7)
LYMPHOCYTES # BLD AUTO: 0.8 10E9/L (ref 0.8–5.3)
LYMPHOCYTES NFR BLD AUTO: 24.2 %
MCH RBC QN AUTO: 31 PG (ref 26.5–33)
MCHC RBC AUTO-ENTMCNC: 32.4 G/DL (ref 31.5–36.5)
MCV RBC AUTO: 96 FL (ref 78–100)
MONOCYTES # BLD AUTO: 0.4 10E9/L (ref 0–1.3)
MONOCYTES NFR BLD AUTO: 13 %
NEUTROPHILS # BLD AUTO: 2 10E9/L (ref 1.6–8.3)
NEUTROPHILS NFR BLD AUTO: 59.2 %
PLATELET # BLD AUTO: 185 10E9/L (ref 150–450)
RBC # BLD AUTO: 4.23 10E12/L (ref 3.8–5.2)
WBC # BLD AUTO: 3.3 10E9/L (ref 4–11)

## 2019-05-23 PROCEDURE — 82550 ASSAY OF CK (CPK): CPT | Performed by: INTERNAL MEDICINE

## 2019-05-23 PROCEDURE — 85025 COMPLETE CBC W/AUTO DIFF WBC: CPT | Performed by: INTERNAL MEDICINE

## 2019-05-23 PROCEDURE — 86140 C-REACTIVE PROTEIN: CPT | Performed by: INTERNAL MEDICINE

## 2019-05-23 PROCEDURE — 82040 ASSAY OF SERUM ALBUMIN: CPT | Performed by: INTERNAL MEDICINE

## 2019-05-23 PROCEDURE — 36415 COLL VENOUS BLD VENIPUNCTURE: CPT | Performed by: INTERNAL MEDICINE

## 2019-05-24 ENCOUNTER — TELEPHONE (OUTPATIENT)
Dept: RHEUMATOLOGY | Facility: CLINIC | Age: 74
End: 2019-05-24

## 2019-05-24 DIAGNOSIS — D72.9 ABNORMAL WBC COUNT: ICD-10-CM

## 2019-05-24 DIAGNOSIS — Z79.899 HIGH RISK MEDICATION USE: ICD-10-CM

## 2019-05-24 DIAGNOSIS — M33.13 DERMATOMYOSITIS (H): Primary | ICD-10-CM

## 2019-05-24 NOTE — TELEPHONE ENCOUNTER
----- Message from Paige Brownlee RN sent at 5/24/2019  8:51 AM CDT -----      ----- Message -----  From: Carlos Colon MD  Sent: 5/23/2019   5:37 PM  To: Adult Rheum Triage-[facility]    Please clarify what dose of CellCept she is taking.  Her white count is slightly low.  This is not by itself worrisome but we might want to consider repeating this in a month and if it is lower would lower her CellCept dose slightly.

## 2019-05-24 NOTE — TELEPHONE ENCOUNTER
Returned call to pt. She is currently taking 500 mg of Mycophenolate per day, and hydroxychloroquine 200 mg per day Mely states her dermatomyositis has been stable, skin has been fine. She will have days where she aches and other days she doesn't. Pt is has been working outside more, has been wearing a broad spectrum sunscreen, SPF 70 and wearing sun block clothing.  Reminded pt to wear a hat and lip balm with a sunscreen.     Pt is agreeable to repeat the CBC in 1 month. Order for CBC placed.    The patient was given an opportunity to ask questions and have those questions answered to her satisfaction.      MARIE Fontaine RN  Rheumatology RN Coordinator  [facility]

## 2019-06-18 ENCOUNTER — DOCUMENTATION ONLY (OUTPATIENT)
Dept: CARE COORDINATION | Facility: CLINIC | Age: 74
End: 2019-06-18

## 2019-06-25 DIAGNOSIS — Z79.899 HIGH RISK MEDICATION USE: ICD-10-CM

## 2019-06-25 DIAGNOSIS — M33.13 DERMATOMYOSITIS (H): ICD-10-CM

## 2019-06-25 DIAGNOSIS — D72.9 ABNORMAL WBC COUNT: ICD-10-CM

## 2019-06-25 LAB
BASOPHILS # BLD AUTO: 0 10E9/L (ref 0–0.2)
BASOPHILS NFR BLD AUTO: 0.5 %
DIFFERENTIAL METHOD BLD: NORMAL
EOSINOPHIL # BLD AUTO: 0.1 10E9/L (ref 0–0.7)
EOSINOPHIL NFR BLD AUTO: 2.1 %
ERYTHROCYTE [DISTWIDTH] IN BLOOD BY AUTOMATED COUNT: 13.1 % (ref 10–15)
HCT VFR BLD AUTO: 39.4 % (ref 35–47)
HGB BLD-MCNC: 12.8 G/DL (ref 11.7–15.7)
LYMPHOCYTES # BLD AUTO: 1 10E9/L (ref 0.8–5.3)
LYMPHOCYTES NFR BLD AUTO: 23 %
MCH RBC QN AUTO: 30.8 PG (ref 26.5–33)
MCHC RBC AUTO-ENTMCNC: 32.5 G/DL (ref 31.5–36.5)
MCV RBC AUTO: 95 FL (ref 78–100)
MONOCYTES # BLD AUTO: 0.5 10E9/L (ref 0–1.3)
MONOCYTES NFR BLD AUTO: 12.6 %
NEUTROPHILS # BLD AUTO: 2.6 10E9/L (ref 1.6–8.3)
NEUTROPHILS NFR BLD AUTO: 61.8 %
PLATELET # BLD AUTO: 176 10E9/L (ref 150–450)
RBC # BLD AUTO: 4.15 10E12/L (ref 3.8–5.2)
WBC # BLD AUTO: 4.2 10E9/L (ref 4–11)

## 2019-06-25 PROCEDURE — 36415 COLL VENOUS BLD VENIPUNCTURE: CPT | Performed by: INTERNAL MEDICINE

## 2019-06-25 PROCEDURE — 85025 COMPLETE CBC W/AUTO DIFF WBC: CPT | Performed by: INTERNAL MEDICINE

## 2019-07-16 ENCOUNTER — MYC MEDICAL ADVICE (OUTPATIENT)
Dept: RHEUMATOLOGY | Facility: CLINIC | Age: 74
End: 2019-07-16

## 2019-07-16 ENCOUNTER — OFFICE VISIT (OUTPATIENT)
Dept: RHEUMATOLOGY | Facility: CLINIC | Age: 74
End: 2019-07-16
Attending: INTERNAL MEDICINE
Payer: COMMERCIAL

## 2019-07-16 VITALS
WEIGHT: 145.4 LBS | DIASTOLIC BLOOD PRESSURE: 67 MMHG | OXYGEN SATURATION: 97 % | HEART RATE: 71 BPM | TEMPERATURE: 97.8 F | SYSTOLIC BLOOD PRESSURE: 127 MMHG | BODY MASS INDEX: 24.22 KG/M2 | HEIGHT: 65 IN

## 2019-07-16 DIAGNOSIS — M33.13 DERMATOMYOSITIS (H): ICD-10-CM

## 2019-07-16 DIAGNOSIS — Z79.899 ENCOUNTER FOR LONG-TERM CURRENT USE OF MEDICATION: ICD-10-CM

## 2019-07-16 DIAGNOSIS — Z79.899 LONG-TERM USE OF PLAQUENIL: ICD-10-CM

## 2019-07-16 DIAGNOSIS — M33.13 DERMATOMYOSITIS (H): Primary | ICD-10-CM

## 2019-07-16 DIAGNOSIS — Z79.899 HIGH RISK MEDICATION USE: ICD-10-CM

## 2019-07-16 PROCEDURE — G0463 HOSPITAL OUTPT CLINIC VISIT: HCPCS | Mod: ZF

## 2019-07-16 ASSESSMENT — PAIN SCALES - GENERAL: PAINLEVEL: NO PAIN (0)

## 2019-07-16 ASSESSMENT — MIFFLIN-ST. JEOR: SCORE: 1170.41

## 2019-07-16 NOTE — NURSING NOTE
"Chief Complaint   Patient presents with     RECHECK     Follow up     /67 (BP Location: Right arm, Patient Position: Sitting, Cuff Size: Adult Regular)   Pulse 71   Temp 97.8  F (36.6  C) (Oral)   Ht 1.651 m (5' 5\")   Wt 66 kg (145 lb 6.4 oz)   SpO2 97%   BMI 24.20 kg/m    Lucie Rice on 7/16/2019 at 2:57 PM    "

## 2019-07-16 NOTE — LETTER
2019      RE: Mely Rashid  [address]   [place] [address]       [facility]  Rheumatology Clinic  Carlos Colon MD  2019     Name: Mely Rashid  MRN: [id number]  Age: 74 year old  : 1945  Referring provider: Referred Self     Problem list:   1. Dermatomyositis with skin findings, minimal weakness on exam but some myalgias, and a daughter with a similar presentation. Steroid responsive  2. IgA deficiency per the outside records with high-titer BRE of 1:2560, other antibodies thus far negative.   3. 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     Assessment and Plan:  Patient relates seasonal worsening of muscle aches, skin symptoms, and fatigue this summer. However, her strength has been stable. Labs are largely unchanged since . PFTs are stable. She may decrease her CellCept from 500mg to 250mg daily for the time being. If this is going well, she may discontinue the medication completely. While off of CellCept, we will evaluate her muscle enzymes on a monthly basis and she should diligently monitor for rashes and for any significant TINEO, though she has not had evidence of significant ILD . If her symptoms worsen while off of CellCept, she should contact us immediately and she will resume the medication. She should also continue with her current plaquenil dose of 200mg daily.       Follow-up: 1 year.     HPI:   Mely Rashid is a 74 year old female with a history of dermatomyositis who presents for follow-up. I last evaluated her on 18 at which time she reported mild shortness of breath on exertion, but was otherwise stable and had no skin, muscle, or joint symptoms. Plan was to redo HRCT prior to further reducing MMF.     Today, she reports that she is doing okay overall. She feels that some of her symptoms have worsened this summer, noting worsening symptoms of  "muscle aches. These symptoms are mostly limited to her upper body, and she denies deficits in strength. She also feels that her skin symptoms have worsened, noting that her hands feel \"raw.\" She has been treating her skin symptoms with a topical cream, but she is unsure what kind of cream. He fatigue has also worsened in the summer, which she attributes to sleeping less. She also notes that her scalp has been itching, causing her to lose hair. She is continued on 1 tablet of plaquenil (200mg) per day. Her last ophthalmologic exam in 11/2018 was unremarkable for plaquenil toxicity. She is also continued on 500mg CellCept daily.        Review of Systems:   Pertinent items are noted in HPI or as below, remainder of complete ROS is negative.      No recent problems with hearing or vision. No swallowing problems.   No breathing difficulty, shortness of breath, coughing, or wheezing  No chest pain or palpitations  No heart burn, indigestion, abdominal pain, nausea, vomiting, diarrhea  No urination problems, no bloody, cloudy urine, no dysuria  No numbing, tingling, weakness  No headaches or confusion  No rashes. No easy bleeding or bruising.     Active Medications:   Current Outpatient Medications:      B Complex TABS, Take 1 tablet by mouth daily., Disp: , Rfl:      Calcium Carb-Cholecalciferol (CALCIUM 500 +D PO), Take 3 tablets by mouth daily., Disp: , Rfl:      Cholecalciferol (VITAMIN D) 1000 UNIT capsule, Take 1 capsule by mouth daily., Disp: , Rfl:      desonide (DESOWEN) 0.05 % cream, Apply topically 2 times daily as needed (rash), Disp: 30 g, Rfl: 2     desoximetasone (TOPICORT) 0.25 % cream, Apply topically 2 times daily, Disp: 120 g, Rfl: 3     diazepam (VALIUM) 5 MG tablet, Take 1 tablet (5 mg) by mouth once as needed for anxiety or sleep (Take one on arrival for MRI. May repeat in 15 min if inadequate relief.), Disp: 2 tablet, Rfl: 0     glucosamine-chondroitinoitin (GLUCOSAMINE CHONDR COMPLEX) 500-400 MG " "CAPS, Take 2 capsules by mouth daily., Disp: , Rfl:      hydroxychloroquine (PLAQUENIL) 200 MG tablet, Take 1 tablet (200 mg) by mouth daily (*annual eye exam/plaquenil screening- due ), Disp: 90 tablet, Rfl: 1     latanoprost (XALATAN) 0.005 % ophthalmic solution, Place 1 drop into the right eye At Bedtime, Disp: 3 Bottle, Rfl: 11     loperamide (IMODIUM A-D) 2 MG tablet, Take 2 mg by mouth every morning, Disp: , Rfl:      mycophenolate (GENERIC EQUIVALENT) 500 MG tablet, Take 1 tablet (500 mg) by mouth daily, Disp: 1 tablet, Rfl: 0     omega-3 fatty acids (FISH OIL) 1200 MG capsule, Take 1 capsule by mouth daily., Disp: , Rfl:      vitamin E 400 UNIT capsule, Take by mouth daily, Disp: , Rfl:       Allergies:   Penicillins      Past Medical History:  Basal cell carcinoma   Dermatomyositis   Glaucoma   IgA deficiency   Non senile cataract   Herpes zoster   Oral ulcer   Seborrheic dermatitis   Osteopenia   Skin neoplasm      Past Surgical History:  Skin lesion biopsy   Esophagogastroduodenoscopy 2014   Hysterectomy partial, prolapse uterus     Family History:   Father: coronary artery disease, arthritis   Brother: diabetes mellitus, glaucoma  Sister: breast cancer, glaucoma, seizures  Daughter: psoriasis, dermatomyositis   Mother: alzheimer's        Social History:   Former smoker, quit on 04/10/1079.   Rare alcohol use.   Lives independently      Physical Exam:   /67 (BP Location: Right arm, Patient Position: Sitting, Cuff Size: Adult Regular)   Pulse 71   Temp 97.8  F (36.6  C) (Oral)   Ht 1.651 m (5' 5\")   Wt 66 kg (145 lb 6.4 oz)   SpO2 97%   BMI 24.20 kg/m      Wt Readings from Last 4 Encounters:   07/16/19 66 kg (145 lb 6.4 oz)   12/04/18 66 kg (145 lb 6.4 oz)   01/24/18 66.1 kg (145 lb 11.2 oz)   06/27/17 65 kg (143 lb 4.8 oz)     Constitutional: Well-developed, appearing stated age; cooperative  Eyes: Normal EOM, PERRLA, vision, conjunctiva, sclera  ENT: Normal external ears, nose, " hearing, lips, teeth, gums, throat. No mucous membrane lesions, normal saliva pool  Neck: No mass or thyroid enlargement  Resp: Lungs clear to auscultation, nl to palpation  CV: RRR, no murmurs, rubs or gallops, no edema  GI: No ABD mass or tenderness, no HSM  : Not tested  Lymph: No cervical, supraclavicular, inguinal or epitrochlear nodes  MS: The TMJ, neck, shoulder, elbow, wrist, spine, hip, knee, ankle, and foot MTP/IP joints were examined and found normal. No active synovitis or altered joint anatomy. Full joint ROM. Normal  strength. No dactylitis,  tenosynovitis, enthesopathy.  Skin: No nail pitting, alopecia, rash, or lesions. 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 This is tender. Subtle erythema over MCP joints with a slight scale.   Neuro: Normal cranial nerves, strength, sensation, DTRs.   Psych: Normal judgement, orientation, memory, affect.     Laboratory:   RHEUM RESULTS Latest Ref Rng & Units 2/26/2019 5/23/2019 6/25/2019   COMPLEMENT C3 76 - 169 mg/dL - - -   COMPLEMENT C4 15 - 50 mg/dL - - -   SED RATE 0 - 30 mm/h - - -   CRP, INFLAMMATION 0.0 - 8.0 mg/L <2.9 <2.9 -   CK TOTAL 30 - 225 U/L 69 116 -   AST 0 - 45 U/L - - -   ALT 0 - 50 U/L - - -   ALBUMIN 3.4 - 5.0 g/dL 4.1 3.9 -   WBC 4.0 - 11.0 10e9/L 4.0 3.3(L) 4.2   RBC 3.8 - 5.2 10e12/L 4.46 4.23 4.15   HGB 11.7 - 15.7 g/dL 13.8 13.1 12.8   HCT 35.0 - 47.0 % 42.0 40.4 39.4   MCV 78 - 100 fl 94 96 95   MCHC 31.5 - 36.5 g/dL 32.9 32.4 32.5   RDW 10.0 - 15.0 % 12.8 13.1 13.1    - 450 10e9/L 208 185 176   CREATININE 0.52 - 1.04 mg/dL - - -   GFR ESTIMATE, IF BLACK >60 mL/min/1.7m2 - - -   GFR ESTIMATE >60 mL/min/1.7m2 - - -   HEPATITIS C ANTIBODY NR - - -         Ribonucleic Protein IgG Antibody   Date Value Ref Range Status   03/22/2012 0  Final     Comment:     Reference range: 0 to 40  Unit: AU/mL  (Note)  INTERPRETIVE INFORMATION:  Ribonucleic Protein (LIGIA), IgG   29 AU/mL or Less ............. Negative   30 - 40 AU/mL ................ Equivocal   41 AU/mL or Greater .......... Positive  RNP antibody is seen in % of mixed connective tissue  disease and is considered specific for this syndrome if  other antibodies are negative. RNP is also present in  20-30% of systemic lupus erythematosus (SLE) and 15-25% of  progressive systemic sclerosis (PSS).     Smith Antibody IgG   Date Value Ref Range Status   03/22/2012 0  Final     Comment:     Reference range: 0 to 40  Unit: AU/mL  (Note)  INTERPRETIVE INFORMATION: FITZGERALD (LIGIA) Ab, IgG   29 AU/mL or Less ............. Negative   30 - 40 AU/mL ................ Equivocal   41 AU/mL or Greater .......... Positive  Fitzgerald antibody is very specific for systemic lupus  erythematosus (SLE) but only occurs in 30-35% of SLE cases.  The presence of antibodies to Fitzgerald is often associated  with renal disease.     SSA (RO) Antibody IgG   Date Value Ref Range Status   03/22/2012 0  Final     Comment:     Reference range: 0 to 40  Unit: AU/mL  (Note)  INTERPRETIVE INFORMATION: SSA (Ro) (LIGIA) Ab, IgG   29 AU/mL or Less ............. Negative   30 - 40 AU/mL ................ Equivocal   41 AU/mL or Greater .......... Positive  SSA (Ro) antibody is seen in 70-75% of Sjogren syndrome  cases, 30-40% of systemic lupus erythematosus (SLE) and  5-10% of progressive systemic sclerosis (PSS).     SSB (LA) Antibody IgG   Date Value Ref Range Status   03/22/2012 0  Final     Comment:     Reference range: 0 to 40  Unit: AU/mL  (Note)  INTERPRETIVE INFORMATION: SSB (La) (LIGIA) Ab, IgG   29 AU/mL or Less ............. Negative   30 - 40 AU/mL ................ Equivocal   41 AU/mL or Greater .......... Positive  SSB (La) antibody is seen in 50-60% of Sjogren syndrome  cases and is specific if it is the only LIGIA antibody  present. 15-25% of patients with systemic lupus  erythematosus (SLE) and 5-10% of patients with  progressive  systemic sclerosis (PSS) also have this antibody.     Scleroderma Antibody IgG   Date Value Ref Range Status   03/22/2012 0  Final     Comment:     Reference range: 0 to 40  Unit: AU/mL  (Note)  INTERPRETIVE INFORMATION: Scleroderma (Scl-70) (LIGIA) Ab, IgG   29 AU/mL or Less ............. Negative   30 - 40 AU/mL ................ Equivocal   41 AU/mL or Greater .......... Positive  Scleroderma (Scl-70) antibody is seen in 20-60% of patients  with scleroderma and is considered diagnostic and specific  for scleroderma if it is the only LIGIA antibody present.  Scl-70 is also seen in approximately 25% of progressive  systemic sclerosis (PSS).  Performed by [facility],  [address],[place] [id number] [phone]  [email or web address], Dayana Kraft MD, Lab. Director     Centromere Trisha IgG   Date Value Ref Range Status   03/22/2012 0  Final     Comment:     Reference range: 0 to 40  Unit: AU/mL  (Note)  INTERPRETIVE INFORMATION: Centromere Ab, IgG   29 AU/mL or Less ............. Negative   30 - 40 AU/mL ................ Equivocal   41 AU/mL or Greater .......... Positive  Centromere antibodies are present in 80-90% of individuals  with CREST variant scleroderma.  This antibody is also seen  in 30% of Raynaud patients, 12% of patients with mixed  connective-tissue disease, diffuse scleroderma,  interstitial pulmonary fibrosis, primary biliary cirrhosis,  and in a smaller percent of patients with systemic lupus  erythematosus (SLE) and RA.  Performed by [facility],  [address] [facility],[place] [id number] [phone]  [email or web address], Dayana Kraft MD, Lab. Director     Scribe Disclosure:  IYovany, am serving as a scribe to document services personally performed by Carlos Colon MD at this visit, based upon the provider's statements to me. All documentation has been reviewed by the aforementioned provider prior to being entered into the official medical record.      Carlos Colon  MD

## 2019-07-16 NOTE — PROGRESS NOTES
[facility]  Rheumatology Clinic  Carlos Colon MD  2019     Name: Mely Rashid  MRN: [id number]  Age: 74 year old  : 1945  Referring provider: Referred Self     Problem list:   1. Dermatomyositis with skin findings, minimal weakness on exam but some myalgias, and a daughter with a similar presentation. Steroid responsive  2. IgA deficiency per the outside records with high-titer BRE of 1:2560, other antibodies thus far negative.   3. 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     Assessment and Plan:  Patient relates seasonal worsening of muscle aches, skin symptoms, and fatigue this summer. However, her strength has been stable. Labs are largely unchanged since . PFTs are stable. She may decrease her CellCept from 500mg to 250mg daily for the time being. If this is going well, she may discontinue the medication completely. While off of CellCept, we will evaluate her muscle enzymes on a monthly basis and she should diligently monitor for rashes and for any significant TINEO, though she has not had evidence of significant ILD . If her symptoms worsen while off of CellCept, she should contact us immediately and she will resume the medication. She should also continue with her current plaquenil dose of 200mg daily.       Follow-up: 1 year.     HPI:   Mely Rashid is a 74 year old female with a history of dermatomyositis who presents for follow-up. I last evaluated her on 18 at which time she reported mild shortness of breath on exertion, but was otherwise stable and had no skin, muscle, or joint symptoms. Plan was to redo HRCT prior to further reducing MMF.     Today, she reports that she is doing okay overall. She feels that some of her symptoms have worsened this summer, noting worsening symptoms of muscle aches. These symptoms are mostly limited to her upper body, and she denies deficits in  "strength. She also feels that her skin symptoms have worsened, noting that her hands feel \"raw.\" She has been treating her skin symptoms with a topical cream, but she is unsure what kind of cream. He fatigue has also worsened in the summer, which she attributes to sleeping less. She also notes that her scalp has been itching, causing her to lose hair. She is continued on 1 tablet of plaquenil (200mg) per day. Her last ophthalmologic exam in 11/2018 was unremarkable for plaquenil toxicity. She is also continued on 500mg CellCept daily.        Review of Systems:   Pertinent items are noted in HPI or as below, remainder of complete ROS is negative.      No recent problems with hearing or vision. No swallowing problems.   No breathing difficulty, shortness of breath, coughing, or wheezing  No chest pain or palpitations  No heart burn, indigestion, abdominal pain, nausea, vomiting, diarrhea  No urination problems, no bloody, cloudy urine, no dysuria  No numbing, tingling, weakness  No headaches or confusion  No rashes. No easy bleeding or bruising.     Active Medications:   Current Outpatient Medications:      B Complex TABS, Take 1 tablet by mouth daily., Disp: , Rfl:      Calcium Carb-Cholecalciferol (CALCIUM 500 +D PO), Take 3 tablets by mouth daily., Disp: , Rfl:      Cholecalciferol (VITAMIN D) 1000 UNIT capsule, Take 1 capsule by mouth daily., Disp: , Rfl:      desonide (DESOWEN) 0.05 % cream, Apply topically 2 times daily as needed (rash), Disp: 30 g, Rfl: 2     desoximetasone (TOPICORT) 0.25 % cream, Apply topically 2 times daily, Disp: 120 g, Rfl: 3     diazepam (VALIUM) 5 MG tablet, Take 1 tablet (5 mg) by mouth once as needed for anxiety or sleep (Take one on arrival for MRI. May repeat in 15 min if inadequate relief.), Disp: 2 tablet, Rfl: 0     glucosamine-chondroitinoitin (GLUCOSAMINE CHONDR COMPLEX) 500-400 MG CAPS, Take 2 capsules by mouth daily., Disp: , Rfl:      hydroxychloroquine (PLAQUENIL) 200 MG " "tablet, Take 1 tablet (200 mg) by mouth daily (*annual eye exam/plaquenil screening- due ), Disp: 90 tablet, Rfl: 1     latanoprost (XALATAN) 0.005 % ophthalmic solution, Place 1 drop into the right eye At Bedtime, Disp: 3 Bottle, Rfl: 11     loperamide (IMODIUM A-D) 2 MG tablet, Take 2 mg by mouth every morning, Disp: , Rfl:      mycophenolate (GENERIC EQUIVALENT) 500 MG tablet, Take 1 tablet (500 mg) by mouth daily, Disp: 1 tablet, Rfl: 0     omega-3 fatty acids (FISH OIL) 1200 MG capsule, Take 1 capsule by mouth daily., Disp: , Rfl:      vitamin E 400 UNIT capsule, Take by mouth daily, Disp: , Rfl:       Allergies:   Penicillins      Past Medical History:  Basal cell carcinoma   Dermatomyositis   Glaucoma   IgA deficiency   Non senile cataract   Herpes zoster   Oral ulcer   Seborrheic dermatitis   Osteopenia   Skin neoplasm      Past Surgical History:  Skin lesion biopsy   Esophagogastroduodenoscopy 2014   Hysterectomy partial, prolapse uterus     Family History:   Father: coronary artery disease, arthritis   Brother: diabetes mellitus, glaucoma  Sister: breast cancer, glaucoma, seizures  Daughter: psoriasis, dermatomyositis   Mother: alzheimer's       Social History:   Former smoker, quit on 04/10/1079.   Rare alcohol use.   Lives independently      Physical Exam:   /67 (BP Location: Right arm, Patient Position: Sitting, Cuff Size: Adult Regular)   Pulse 71   Temp 97.8  F (36.6  C) (Oral)   Ht 1.651 m (5' 5\")   Wt 66 kg (145 lb 6.4 oz)   SpO2 97%   BMI 24.20 kg/m     Wt Readings from Last 4 Encounters:   07/16/19 66 kg (145 lb 6.4 oz)   12/04/18 66 kg (145 lb 6.4 oz)   01/24/18 66.1 kg (145 lb 11.2 oz)   06/27/17 65 kg (143 lb 4.8 oz)     Constitutional: Well-developed, appearing stated age; cooperative  Eyes: Normal EOM, PERRLA, vision, conjunctiva, sclera  ENT: Normal external ears, nose, hearing, lips, teeth, gums, throat. No mucous membrane lesions, normal saliva pool  Neck: No mass or " thyroid enlargement  Resp: Lungs clear to auscultation, nl to palpation  CV: RRR, no murmurs, rubs or gallops, no edema  GI: No ABD mass or tenderness, no HSM  : Not tested  Lymph: No cervical, supraclavicular, inguinal or epitrochlear nodes  MS: The TMJ, neck, shoulder, elbow, wrist, spine, hip, knee, ankle, and foot MTP/IP joints were examined and found normal. No active synovitis or altered joint anatomy. Full joint ROM. Normal  strength. No dactylitis,  tenosynovitis, enthesopathy.  Skin: No nail pitting, alopecia, rash, or lesions. 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 This is tender. Subtle erythema over MCP joints with a slight scale.   Neuro: Normal cranial nerves, strength, sensation, DTRs.   Psych: Normal judgement, orientation, memory, affect.     Laboratory:   RHEUM RESULTS Latest Ref Rng & Units 2/26/2019 5/23/2019 6/25/2019   COMPLEMENT C3 76 - 169 mg/dL - - -   COMPLEMENT C4 15 - 50 mg/dL - - -   SED RATE 0 - 30 mm/h - - -   CRP, INFLAMMATION 0.0 - 8.0 mg/L <2.9 <2.9 -   CK TOTAL 30 - 225 U/L 69 116 -   AST 0 - 45 U/L - - -   ALT 0 - 50 U/L - - -   ALBUMIN 3.4 - 5.0 g/dL 4.1 3.9 -   WBC 4.0 - 11.0 10e9/L 4.0 3.3(L) 4.2   RBC 3.8 - 5.2 10e12/L 4.46 4.23 4.15   HGB 11.7 - 15.7 g/dL 13.8 13.1 12.8   HCT 35.0 - 47.0 % 42.0 40.4 39.4   MCV 78 - 100 fl 94 96 95   MCHC 31.5 - 36.5 g/dL 32.9 32.4 32.5   RDW 10.0 - 15.0 % 12.8 13.1 13.1    - 450 10e9/L 208 185 176   CREATININE 0.52 - 1.04 mg/dL - - -   GFR ESTIMATE, IF BLACK >60 mL/min/1.7m2 - - -   GFR ESTIMATE >60 mL/min/1.7m2 - - -   HEPATITIS C ANTIBODY NR - - -         Ribonucleic Protein IgG Antibody   Date Value Ref Range Status   03/22/2012 0  Final     Comment:     Reference range: 0 to 40  Unit: AU/mL  (Note)  INTERPRETIVE INFORMATION: Ribonucleic Protein (LIGIA), IgG   29 AU/mL or Less ............. Negative   30 - 40 AU/mL ................  Equivocal   41 AU/mL or Greater .......... Positive  RNP antibody is seen in % of mixed connective tissue  disease and is considered specific for this syndrome if  other antibodies are negative. RNP is also present in  20-30% of systemic lupus erythematosus (SLE) and 15-25% of  progressive systemic sclerosis (PSS).     Smith Antibody IgG   Date Value Ref Range Status   03/22/2012 0  Final     Comment:     Reference range: 0 to 40  Unit: AU/mL  (Note)  INTERPRETIVE INFORMATION: FITZGERALD (LIGIA) Ab, IgG   29 AU/mL or Less ............. Negative   30 - 40 AU/mL ................ Equivocal   41 AU/mL or Greater .......... Positive  Fitzgerald antibody is very specific for systemic lupus  erythematosus (SLE) but only occurs in 30-35% of SLE cases.  The presence of antibodies to Fitzgerald is often associated  with renal disease.     SSA (RO) Antibody IgG   Date Value Ref Range Status   03/22/2012 0  Final     Comment:     Reference range: 0 to 40  Unit: AU/mL  (Note)  INTERPRETIVE INFORMATION: SSA (Ro) (LIGIA) Ab, IgG   29 AU/mL or Less ............. Negative   30 - 40 AU/mL ................ Equivocal   41 AU/mL or Greater .......... Positive  SSA (Ro) antibody is seen in 70-75% of Sjogren syndrome  cases, 30-40% of systemic lupus erythematosus (SLE) and  5-10% of progressive systemic sclerosis (PSS).     SSB (LA) Antibody IgG   Date Value Ref Range Status   03/22/2012 0  Final     Comment:     Reference range: 0 to 40  Unit: AU/mL  (Note)  INTERPRETIVE INFORMATION: SSB (La) (LIGIA) Ab, IgG   29 AU/mL or Less ............. Negative   30 - 40 AU/mL ................ Equivocal   41 AU/mL or Greater .......... Positive  SSB (La) antibody is seen in 50-60% of Sjogren syndrome  cases and is specific if it is the only LIIGA antibody  present. 15-25% of patients with systemic lupus  erythematosus (SLE) and 5-10% of patients with progressive  systemic sclerosis (PSS) also have this antibody.     Scleroderma Antibody IgG   Date Value Ref Range  Status   03/22/2012 0  Final     Comment:     Reference range: 0 to 40  Unit: AU/mL  (Note)  INTERPRETIVE INFORMATION: Scleroderma (Scl-70) (LIGIA) Ab, IgG   29 AU/mL or Less ............. Negative   30 - 40 AU/mL ................ Equivocal   41 AU/mL or Greater .......... Positive  Scleroderma (Scl-70) antibody is seen in 20-60% of patients  with scleroderma and is considered diagnostic and specific  for scleroderma if it is the only LIGIA antibody present.  Scl-70 is also seen in approximately 25% of progressive  systemic sclerosis (PSS).  Performed by [email or web address],  [address] [facility],[place] [id number] [phone]  [email or web address], Dayana Kraft MD, Lab. Director     Centromere Trisha IgG   Date Value Ref Range Status   03/22/2012 0  Final     Comment:     Reference range: 0 to 40  Unit: AU/mL  (Note)  INTERPRETIVE INFORMATION: Centromere Ab, IgG   29 AU/mL or Less ............. Negative   30 - 40 AU/mL ................ Equivocal   41 AU/mL or Greater .......... Positive  Centromere antibodies are present in 80-90% of individuals  with CREST variant scleroderma.  This antibody is also seen  in 30% of Raynaud patients, 12% of patients with mixed  connective-tissue disease, diffuse scleroderma,  interstitial pulmonary fibrosis, primary biliary cirrhosis,  and in a smaller percent of patients with systemic lupus  erythematosus (SLE) and RA.  Performed by [email or web address],  [address] [facility],[place] [id number] [phone]  [email or web address], Dayana Kraft MD, Lab. Director              Scribe Disclosure:  I, Yovany Rdz, am serving as a scribe to document services personally performed by Carlos Colon MD at this visit, based upon the provider's statements to me. All documentation has been reviewed by the aforementioned provider prior to being entered into the official medical record.

## 2019-07-18 LAB
DLCOUNC-%PRED-PRE: 88 %
DLCOUNC-PRE: 17.69 ML/MIN/MMHG
DLCOUNC-PRED: 19.98 ML/MIN/MMHG
ERV-%PRED-PRE: 150 %
ERV-PRE: 1.19 L
ERV-PRED: 0.79 L
EXPTIME-PRE: 7.3 SEC
FEF2575-%PRED-PRE: 103 %
FEF2575-PRE: 1.81 L/SEC
FEF2575-PRED: 1.75 L/SEC
FEFMAX-%PRED-PRE: 110 %
FEFMAX-PRE: 6.22 L/SEC
FEFMAX-PRED: 5.65 L/SEC
FEV1-%PRED-PRE: 115 %
FEV1-PRE: 2.39 L
FEV1FEV6-PRE: 75 %
FEV1FEV6-PRED: 79 %
FEV1FVC-PRE: 75 %
FEV1FVC-PRED: 79 %
FEV1SVC-PRE: 75 %
FEV1SVC-PRED: 66 %
FIFMAX-PRE: 4 L/SEC
FRCPLETH-%PRED-PRE: 114 %
FRCPLETH-PRE: 3.17 L
FRCPLETH-PRED: 2.77 L
FVC-%PRED-PRE: 120 %
FVC-PRE: 3.2 L
FVC-PRED: 2.65 L
IC-%PRED-PRE: 84 %
IC-PRE: 1.98 L
IC-PRED: 2.35 L
RVPLETH-%PRED-PRE: 92 %
RVPLETH-PRE: 1.98 L
RVPLETH-PRED: 2.14 L
TLCPLETH-%PRED-PRE: 100 %
TLCPLETH-PRE: 5.15 L
TLCPLETH-PRED: 5.11 L
VA-%PRED-PRE: 94 %
VA-PRE: 4.61 L
VC-%PRED-PRE: 100 %
VC-PRE: 3.17 L
VC-PRED: 3.15 L

## 2019-07-24 ENCOUNTER — MYC MEDICAL ADVICE (OUTPATIENT)
Dept: RHEUMATOLOGY | Facility: CLINIC | Age: 74
End: 2019-07-24

## 2019-07-24 DIAGNOSIS — M33.13 DERMATOMYOSITIS (H): Primary | ICD-10-CM

## 2019-07-24 NOTE — TELEPHONE ENCOUNTER
In MyChart message pt reported she needed prescription for Desoximetasone  cream USP, 0.25%. This was set up and routed to Dr Colon to review and approve.    NOHEMY FontaineN RN  Rheumatology RN Coordinator   [place]

## 2019-07-25 RX ORDER — DESOXIMETASONE 2.5 MG/G
CREAM TOPICAL 2 TIMES DAILY
Qty: 60 G | Refills: 3 | Status: SHIPPED | OUTPATIENT
Start: 2019-07-25 | End: 2019-11-15

## 2019-07-29 ENCOUNTER — TELEPHONE (OUTPATIENT)
Dept: RHEUMATOLOGY | Facility: CLINIC | Age: 74
End: 2019-07-29

## 2019-07-29 NOTE — TELEPHONE ENCOUNTER
Prior Authorization Retail Medication Request    Medication/Dose: desoximetasone 0.25% cream  ICD code (if different than what is on RX):  M33.90  Previously Tried and Failed:    Rationale:      Insurance Name:    Insurance ID:        Pharmacy Information (if different than what is on RX)  Name:    Phone:

## 2019-08-02 DIAGNOSIS — Z79.899 ENCOUNTER FOR LONG-TERM CURRENT USE OF MEDICATION: ICD-10-CM

## 2019-08-02 DIAGNOSIS — Z79.899 LONG-TERM USE OF PLAQUENIL: ICD-10-CM

## 2019-08-02 DIAGNOSIS — Z79.899 HIGH RISK MEDICATION USE: Primary | ICD-10-CM

## 2019-08-02 DIAGNOSIS — M33.13 DERMATOMYOSITIS (H): ICD-10-CM

## 2019-08-02 NOTE — TELEPHONE ENCOUNTER
Prior Authorization Approval    Authorization Effective Date: 7/3/2019  Authorization Expiration Date: 8/1/2020  Medication: desoximetasone (TOPICORT) 0.25 % external cream-APPROVED  Approved Dose/Quantity:   Reference #: [id number]   Insurance Company: [employer] - Phone [phone] Fax [phone]  Expected CoPay:       CoPay Card Available:      Foundation Assistance Needed:    Which Pharmacy is filling the prescription (Not needed for infusion/clinic administered): [id number] - [address]  AT [facility]  Pharmacy Notified: Yes-Pharmacy will notify patient when ready.  Patient Notified: No

## 2019-08-07 RX ORDER — MYCOPHENOLATE MOFETIL 250 MG/1
500 CAPSULE ORAL DAILY
Qty: 180 CAPSULE | Refills: 2 | Status: SHIPPED | OUTPATIENT
Start: 2019-08-07 | End: 2019-11-15

## 2019-08-07 NOTE — TELEPHONE ENCOUNTER
Called pt to verify her current dose of Mycophenolate. She is taking 500 mg daily, decided to wait to decrease dose to 250 mg daily until the fall. Mely just didn't want to take a chance of not feeling well with the recent hot and humid weather we have been having. Prescription set up for 500 mg daily and routed to Dr Colon to complete. Reminded pt that she is due for her med monitoring labs the end of this month. Standing orders were placed along with [facility] as a disease monitoring lab.    Refill request for MMF    Medication last filled 12/6/18 Quantity 180 Refills 0    Last rheumatology office visit 7/16/19  Future rheumatology office visit: not scheduled, to be seen in 1 year.    WBC   Date Value Ref Range Status   06/25/2019 4.2 4.0 - 11.0 10e9/L Final     RBC Count   Date Value Ref Range Status   06/25/2019 4.15 3.8 - 5.2 10e12/L Final     Hemoglobin   Date Value Ref Range Status   06/25/2019 12.8 11.7 - 15.7 g/dL Final     Hematocrit   Date Value Ref Range Status   06/25/2019 39.4 35.0 - 47.0 % Final     MCV   Date Value Ref Range Status   06/25/2019 95 78 - 100 fl Final     MCH   Date Value Ref Range Status   06/25/2019 30.8 26.5 - 33.0 pg Final     Platelet Count   Date Value Ref Range Status   06/25/2019 176 150 - 450 10e9/L Final     % Lymphocytes   Date Value Ref Range Status   06/25/2019 23.0 % Final     AST   Date Value Ref Range Status   06/13/2018 24 0 - 45 U/L Final     ALT   Date Value Ref Range Status   06/13/2018 25 0 - 50 U/L Final     Albumin   Date Value Ref Range Status   05/23/2019 3.9 3.4 - 5.0 g/dL Final     Alkaline Phosphatase   Date Value Ref Range Status   05/27/2015 35 (L) 40 - 150 U/L Final     Creatinine   Date Value Ref Range Status   06/13/2018 0.61 0.52 - 1.04 mg/dL Final     GFR Estimate   Date Value Ref Range Status   06/13/2018 >90 >60 mL/min/1.7m2 Final     Comment:     Non  GFR Calc     GFR Estimate If Black   Date Value Ref Range Status   06/13/2018 >90  >60 mL/min/1.7m2 Final     Comment:      GFR Calc     Sed Rate   Date Value Ref Range Status   11/30/2015 5 0 - 30 mm/h Final     CRP Inflammation   Date Value Ref Range Status   05/23/2019 <2.9 0.0 - 8.0 mg/L Final         NOHEMY FontaineN RN  Rheumatology RN Coordinator  [facility]

## 2019-08-13 NOTE — TELEPHONE ENCOUNTER
This was completed in encounter dated 7/24/19.    Sandor Mclaughlin, BSN RN  Rheumatology RN Coordinator  [facility]

## 2019-09-05 DIAGNOSIS — Z79.899 HIGH RISK MEDICATION USE: ICD-10-CM

## 2019-09-05 DIAGNOSIS — M33.13 DERMATOMYOSITIS (H): ICD-10-CM

## 2019-09-05 LAB
ALBUMIN SERPL-MCNC: 4.1 G/DL (ref 3.4–5)
BASOPHILS # BLD AUTO: 0 10E9/L (ref 0–0.2)
BASOPHILS NFR BLD AUTO: 0.3 %
CK SERPL-CCNC: 160 U/L (ref 30–225)
CRP SERPL-MCNC: <2.9 MG/L (ref 0–8)
DIFFERENTIAL METHOD BLD: NORMAL
EOSINOPHIL # BLD AUTO: 0.1 10E9/L (ref 0–0.7)
EOSINOPHIL NFR BLD AUTO: 2.8 %
ERYTHROCYTE [DISTWIDTH] IN BLOOD BY AUTOMATED COUNT: 13.2 % (ref 10–15)
HCT VFR BLD AUTO: 40 % (ref 35–47)
HGB BLD-MCNC: 12.9 G/DL (ref 11.7–15.7)
LYMPHOCYTES # BLD AUTO: 0.8 10E9/L (ref 0.8–5.3)
LYMPHOCYTES NFR BLD AUTO: 21.1 %
MCH RBC QN AUTO: 30.7 PG (ref 26.5–33)
MCHC RBC AUTO-ENTMCNC: 32.3 G/DL (ref 31.5–36.5)
MCV RBC AUTO: 95 FL (ref 78–100)
MONOCYTES # BLD AUTO: 0.6 10E9/L (ref 0–1.3)
MONOCYTES NFR BLD AUTO: 15.6 %
NEUTROPHILS # BLD AUTO: 2.4 10E9/L (ref 1.6–8.3)
NEUTROPHILS NFR BLD AUTO: 60.2 %
PLATELET # BLD AUTO: 192 10E9/L (ref 150–450)
RBC # BLD AUTO: 4.2 10E12/L (ref 3.8–5.2)
WBC # BLD AUTO: 4 10E9/L (ref 4–11)

## 2019-09-05 PROCEDURE — 82550 ASSAY OF CK (CPK): CPT | Performed by: INTERNAL MEDICINE

## 2019-09-05 PROCEDURE — 82040 ASSAY OF SERUM ALBUMIN: CPT | Performed by: INTERNAL MEDICINE

## 2019-09-05 PROCEDURE — 85025 COMPLETE CBC W/AUTO DIFF WBC: CPT | Performed by: INTERNAL MEDICINE

## 2019-09-05 PROCEDURE — 86140 C-REACTIVE PROTEIN: CPT | Performed by: INTERNAL MEDICINE

## 2019-09-05 PROCEDURE — 36415 COLL VENOUS BLD VENIPUNCTURE: CPT | Performed by: INTERNAL MEDICINE

## 2019-10-03 ENCOUNTER — HEALTH MAINTENANCE LETTER (OUTPATIENT)
Age: 74
End: 2019-10-03

## 2019-10-04 DIAGNOSIS — Z79.899 ENCOUNTER FOR LONG-TERM CURRENT USE OF MEDICATION: ICD-10-CM

## 2019-10-04 DIAGNOSIS — Z79.899 LONG-TERM USE OF PLAQUENIL: ICD-10-CM

## 2019-10-04 DIAGNOSIS — M33.13 DERMATOMYOSITIS (H): ICD-10-CM

## 2019-10-07 RX ORDER — HYDROXYCHLOROQUINE SULFATE 200 MG/1
TABLET, FILM COATED ORAL
Qty: 90 TABLET | Refills: 0 | Status: SHIPPED | OUTPATIENT
Start: 2019-10-07 | End: 2019-12-19

## 2019-10-07 NOTE — TELEPHONE ENCOUNTER
Last Clinic Visit: 7/16/2019  [facility]  Last Eye Exam: 11-14-18

## 2019-10-21 DIAGNOSIS — M33.13 DERMATOMYOSITIS (H): ICD-10-CM

## 2019-10-21 DIAGNOSIS — Z79.899 ENCOUNTER FOR LONG-TERM CURRENT USE OF MEDICATION: ICD-10-CM

## 2019-10-21 DIAGNOSIS — Z79.899 HIGH RISK MEDICATION USE: ICD-10-CM

## 2019-10-21 LAB
ALBUMIN SERPL-MCNC: 4 G/DL (ref 3.4–5)
BASOPHILS # BLD AUTO: 0 10E9/L (ref 0–0.2)
BASOPHILS NFR BLD AUTO: 0.5 %
CK SERPL-CCNC: 152 U/L (ref 30–225)
CRP SERPL-MCNC: <2.9 MG/L (ref 0–8)
DIFFERENTIAL METHOD BLD: NORMAL
EOSINOPHIL # BLD AUTO: 0.1 10E9/L (ref 0–0.7)
EOSINOPHIL NFR BLD AUTO: 2.5 %
ERYTHROCYTE [DISTWIDTH] IN BLOOD BY AUTOMATED COUNT: 13.5 % (ref 10–15)
HCT VFR BLD AUTO: 39.6 % (ref 35–47)
HGB BLD-MCNC: 12.7 G/DL (ref 11.7–15.7)
LYMPHOCYTES # BLD AUTO: 0.8 10E9/L (ref 0.8–5.3)
LYMPHOCYTES NFR BLD AUTO: 20.5 %
MCH RBC QN AUTO: 30.9 PG (ref 26.5–33)
MCHC RBC AUTO-ENTMCNC: 32.1 G/DL (ref 31.5–36.5)
MCV RBC AUTO: 96 FL (ref 78–100)
MONOCYTES # BLD AUTO: 0.6 10E9/L (ref 0–1.3)
MONOCYTES NFR BLD AUTO: 14.6 %
NEUTROPHILS # BLD AUTO: 2.5 10E9/L (ref 1.6–8.3)
NEUTROPHILS NFR BLD AUTO: 61.9 %
PLATELET # BLD AUTO: 193 10E9/L (ref 150–450)
RBC # BLD AUTO: 4.11 10E12/L (ref 3.8–5.2)
WBC # BLD AUTO: 4.1 10E9/L (ref 4–11)

## 2019-10-21 PROCEDURE — 85025 COMPLETE CBC W/AUTO DIFF WBC: CPT | Performed by: INTERNAL MEDICINE

## 2019-10-21 PROCEDURE — 36415 COLL VENOUS BLD VENIPUNCTURE: CPT | Performed by: INTERNAL MEDICINE

## 2019-10-21 PROCEDURE — 82550 ASSAY OF CK (CPK): CPT | Performed by: INTERNAL MEDICINE

## 2019-10-21 PROCEDURE — 86140 C-REACTIVE PROTEIN: CPT | Performed by: INTERNAL MEDICINE

## 2019-10-21 PROCEDURE — 82040 ASSAY OF SERUM ALBUMIN: CPT | Performed by: INTERNAL MEDICINE

## 2019-10-28 ENCOUNTER — MYC MEDICAL ADVICE (OUTPATIENT)
Dept: RHEUMATOLOGY | Facility: CLINIC | Age: 74
End: 2019-10-28

## 2019-10-28 NOTE — TELEPHONE ENCOUNTER
Her lab tests are fine; bentley make sure she is not noting any weaknes.     Assumming that's ok, I would REC she be seen by Dr. Garcias in Derm if he has availability. This sounds like dominant skin manifestations of her DM.     Failing that, I will need to find a time to add her.     Pics might be useful.     Shekhar, can you add her?

## 2019-10-29 NOTE — TELEPHONE ENCOUNTER
Spoke with patient and scheduled her 11/07/19 with Dr. Garcias.     Earlene Davidson, [facility]

## 2019-10-29 NOTE — TELEPHONE ENCOUNTER
Shekhar Garcias MD  You; Carlos Colon MD; Madai Mcgee, RN 3 hours ago (8:55 AM)      I'd be happy to see her.     Earlene - can you contact the patient and put her in a NEX slot at next available?     Thanks!  Keagan

## 2019-10-29 NOTE — TELEPHONE ENCOUNTER
She should resume her MMF.     If she wishes, we can give her a burst of prednisone; I would REC she start at 30 mg/day, and we can see how she does and taper depending on that starting in 1-2 weeks.     Will need to get her scheduled for f/u as well.

## 2019-10-30 NOTE — TELEPHONE ENCOUNTER
FUTURE VISIT INFORMATION      FUTURE VISIT INFORMATION:    Date: 11.07.2019    Time: 12:40pm    Location: [facility]  REFERRAL INFORMATION:    Referring provider:  Dr. Colon    Referring providers clinic:  [facility]    Reason for visit/diagnosis  DM    RECORDS REQUESTED FROM:       Clinic name Comments Records Status Imaging Status   [facility] 07.16.2019 Office visit  Plus many more encounters with Provider Epic N/A

## 2019-11-07 ENCOUNTER — PRE VISIT (OUTPATIENT)
Dept: DERMATOLOGY | Facility: CLINIC | Age: 74
End: 2019-11-07

## 2019-11-07 ENCOUNTER — OFFICE VISIT (OUTPATIENT)
Dept: DERMATOLOGY | Facility: CLINIC | Age: 74
End: 2019-11-07
Payer: COMMERCIAL

## 2019-11-07 VITALS — SYSTOLIC BLOOD PRESSURE: 134 MMHG | DIASTOLIC BLOOD PRESSURE: 84 MMHG | HEART RATE: 92 BPM

## 2019-11-07 DIAGNOSIS — M33.13 DERMATOMYOSITIS (H): Primary | ICD-10-CM

## 2019-11-07 RX ORDER — BETAMETHASONE DIPROPIONATE 0.5 MG/G
CREAM TOPICAL 2 TIMES DAILY
Qty: 150 G | Refills: 3 | Status: SHIPPED | OUTPATIENT
Start: 2019-11-07 | End: 2020-12-08

## 2019-11-07 RX ORDER — MYCOPHENOLATE MOFETIL 500 MG/1
500 TABLET ORAL 2 TIMES DAILY
Qty: 180 TABLET | Refills: 3 | Status: SHIPPED | OUTPATIENT
Start: 2019-11-07 | End: 2020-04-09 | Stop reason: ALTCHOICE

## 2019-11-07 RX ORDER — CLOBETASOL PROPIONATE 0.5 MG/ML
SOLUTION TOPICAL 2 TIMES DAILY
Qty: 150 ML | Refills: 3 | Status: SHIPPED | OUTPATIENT
Start: 2019-11-07 | End: 2022-08-08

## 2019-11-07 ASSESSMENT — PATIENT HEALTH QUESTIONNAIRE - PHQ9: SUM OF ALL RESPONSES TO PHQ QUESTIONS 1-9: 0

## 2019-11-07 ASSESSMENT — PAIN SCALES - GENERAL: PAINLEVEL: NO PAIN (0)

## 2019-11-07 NOTE — LETTER
11/7/2019       RE: Mely Rashid  [address]   [place] [address]     Dear Colleague,    Thank you for referring your patient, Mely Rashid, to the [facility] at [facility]. Please see a copy of my visit note below.    [facility] Dermatology Note      Dermatology Problem List:  1. 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  2. History of IgA deficiency  3. History of BCC on back s/p excision (2016)    Encounter Date: Nov 7, 2019    CC:  Chief Complaint   Patient presents with     Derm Problem     Mely is here today for consult for DM with Dr. Garcias         History of Present Illness:  Ms. Mely Rashid is a 74 year old female who presents as a referral from Dr. Colon for dermatomyositis. She was diagnosed with dermatomyositis in 2012 after experiencing cuticle pain and fatigue, and her daughter had previously been diagnosed with dermatomyositis. She follows with rheumatology and gets regular PFTs (DLCO 88% 7/16/19). She comes to clinic with worsening of hand pain, bleeding, and redness, as well as joint pain of wrists, hands, and digits. She feels like she has lost her  and no longer has fingerprints. If she hits her thumb, it feels like an electric shock. Usually her hand symptoms are only active in the summer months. Of note, she discontinued mycophenolate in September after discussing the low dose she was taking with Dr. Colon. Since then, her joint symptoms have worsened. She has been sleeping with wrist splints on to alleviate joint pain. She  admits to redness of periorbital area, forehead, temples, lateral thighs, knees, and elbows. She has difficulty opening jars, putting her dog's leash on, and it takes her 5 minutes to get dressed or undressed. She cannot lift above her head well. She has pain and soreness of her muscles. She denies any shortness of breath. One time a couple weeks ago, she had trouble swallowing a pill, but denies any other troubles with swallowing. Her only joint pain is that of her wrists and hands. She denies Raynaud's symptoms. She has also noticed increasing hair loss, scalp pruritus, and scalp pain since the summer. She also states she has pruritus of the ears and upper back.     Past Medical History:   Patient Active Problem List   Diagnosis     Glaucoma, right     Dermatomyositis (H)     IgA deficiency (H)     Herpes zoster     Encounter for long-term current use of medication     Encounter for long-term (current) use of steroids     Oral ulcer     Seborrheic dermatitis     Osteopenia     Neoplasm of uncertain behavior of skin     Routine general medical examination at a health care facility     Past Medical History:   Diagnosis Date     Basal cell carcinoma      Dermatomyositis (H)      Glaucoma      IgA deficiency (H)      Nonsenile cataract      Past Surgical History:   Procedure Laterality Date     BIOPSY OF SKIN LESION       [facility] UGI ENDOSCOPY W EUS N/A 5/14/2014    Procedure: COMBINED ENDOSCOPIC ULTRASOUND, ESOPHAGOSCOPY, GASTROSCOPY, DUODENOSCOPY (EGD);  Surgeon: Boston Rodriguez MD;  Location:  GI     HYSTERECTOMY      partial, prolapse uterus.       Social History:  Patient reports that she quit smoking about 40 years ago. She has never used smokeless tobacco. She reports current alcohol use. She reports that she does not use drugs. She is retired and formerly worked as an ENT surgery coordinator.    Family History:  Family History   Problem Relation Age of Onset     C.A.D. Father      Arthritis Father       Diabetes Brother      Glaucoma Brother      Breast Cancer Sister 35     Glaucoma Sister      Neurologic Disorder Sister         seizures     Cancer Sister         breast     Psoriasis Daughter      Rheumatologic Disease Daughter         Dermatomyositis     Alzheimer Disease Mother         onset in 60s     Hypertension No family hx of      Cerebrovascular Disease No family hx of      Thyroid Disease No family hx of      Skin Cancer No family hx of      Macular Degeneration No family hx of        Medications:  Current Outpatient Medications   Medication Sig Dispense Refill     augmented betamethasone dipropionate (DIPROLENE-AF) 0.05 % external cream Apply topically 2 times daily For hands only 150 g 3     B Complex TABS Take 1 tablet by mouth daily.       Calcium Carb-Cholecalciferol (CALCIUM 500 +D PO) Take 3 tablets by mouth daily.       Cholecalciferol (VITAMIN D) 1000 UNIT capsule Take 1 capsule by mouth daily.       clobetasol (TEMOVATE) 0.05 % external solution Apply topically 2 times daily For scalp 150 mL 3     desonide (DESOWEN) 0.05 % cream Apply topically 2 times daily as needed (rash) 30 g 2     diazepam (VALIUM) 5 MG tablet Take 1 tablet (5 mg) by mouth once as needed for anxiety or sleep (Take one on arrival for MRI. May repeat in 15 min if inadequate relief.) 2 tablet 0     glucosamine-chondroitinoitin (GLUCOSAMINE CHONDR COMPLEX) 500-400 MG CAPS Take 2 capsules by mouth daily.       hydroxychloroquine (PLAQUENIL) 200 MG tablet TAKE 1 TABLET DAILY (ANNUAL EYE EXAM/PLAQUENIL SCREENING DUE ) 90 tablet 0     latanoprost (XALATAN) 0.005 % ophthalmic solution Place 1 drop into the right eye At Bedtime 3 Bottle 11     loperamide (IMODIUM A-D) 2 MG tablet Take 2 mg by mouth every morning       mycophenolate (GENERIC EQUIVALENT) 500 MG tablet Take 1 tablet (500 mg) by mouth 2 times daily 180 tablet 3     omega-3 fatty acids (FISH OIL) 1200 MG capsule Take 1 capsule by mouth daily.       vitamin E 400  UNIT capsule Take by mouth daily       desoximetasone (TOPICORT) 0.25 % cream Apply topically 2 times daily (Patient not taking: Reported on 11/7/2019) 120 g 3     desoximetasone (TOPICORT) 0.25 % external cream Apply topically 2 times daily (Patient not taking: Reported on 11/7/2019) 60 g 3     mycophenolate (GENERIC EQUIVALENT) 250 MG capsule Take 2 capsules (500 mg) by mouth daily 180 capsule 2     Allergies   Allergen Reactions     Penicillins Anaphylaxis         Review of Systems:  -As per HPI  -Constitutional: Otherwise feeling well today, in usual state of health.  -CV: Denies shortness of breath.  -HEENT: Denies trouble swallowing.  -Musculoskel: Joint pain in wrists and hands.  -Skin: As above in HPI. No additional skin concerns.    Physical exam:  Vitals: /84 (BP Location: Right arm, Patient Position: Sitting, Cuff Size: Adult Regular)   Pulse 92   GEN: This is a well developed, well-nourished female in no acute distress, in a pleasant mood.    SKIN: Total skin excluding the undergarment and feet areas was performed. The exam included the head/face, neck, both arms, chest, back, abdomen, both legs, digits and/or nails.   -Pryor skin type: II  -There is mild erythema of the scalp with minimal scale.  -There is pink erythema of the periorbital area, temples, and lateral forehead. There is poikiloderma of the R temple.  -There are telangiectasias on the lateral face.  -There is pinkish to red erythema of the posterior neck and upper L back.  -There is pink erythema with scale and telangiectasias on the bilateral lateral thighs.  -There is pink erythema of the bilateral arms.  -There is reddish erythema, reddish papules, and scale on the lateral 2nd digits and over the joints of the hands.  -There are scattered papules and erythema of the dorsal digits excluding the skin overlying joints.  -There are many periungual telangiectasias.  -There is erythema and scale overlying the knees and  elbows.  -No other lesions of concern on areas examined.     Impression/Plan:  1. Dermatomyositis, flaring in past two months after discontinuing mycophenolate with joint pain of the hands and wrists, muscle weakness and pain, and moderate skin findings including Gottron's papules of the hands.    Continue hydroxychloroquine 200 mg daily.    Restart mycophenolate at 500 mg twice daily.    Start betamethasone cream to hands twice daily.    Start clobetasol solution to scalp twice daily.    Discussed possible treatment with prednisone for flare, patient preferred to avoid this treatment.    Labs (CBC, CMP) in two weeks.    Follow up in 6-8 weeks.    2. Intraductal papillary mucinous neoplasm of the pancreas.    Recommend another visit with gastroenterology as 3 years since last MRI.    CC Carlos Colon MD  [address] [place] [address] on close of this encounter.  Follow-up in 6-8 weeks, earlier for new or changing lesions.     Staff Involved:  I, Lucero Bueno, MS3, saw and examined the patient in the presence of Dr. Shekhar Garcias MD.       Staff Physician:  I was present with the medical student who participated in the service and in the documentation of the note. I have verified the history and personally performed the physical exam and medical decision making. I edited the assessment and plan of care as documented in the note.     Shekhar Garcias MD   of Dermatology  Department of Dermatology  [facility] [employer]        Cutaneous Dermatomyositis Disease Area and Severity Index (CDASI) ver02    Activity     Damage    Anatomic location Erythema Scale Erosion/  ulceration  Poikiloderma (dyspigmentation or telangiectasia) Calcinosis    0-absent  1-pink; faint erythema  2-red  3-dark red 0-absent  1-scale  2-crust; lichenification 0-absent  1-present  0-absent  1-present 0-absent  1-present   Scalp 1 1 0  0 0   Malar area 1 0 0  0 0   Periorbital 1 0 0  1  0   Rest of face 1 0 0  1 0   V-area neck (frontal) 1 0 0  1 0   Posterior neck 2 0 0  0 0   Upper back & shoulders 1 0 0  0 0   Rest of back & buttocks 0 0 0  0 0   Abdomen 0 0 0  0 0   Lateral upper thigh 1 1 0  1 0   Rest of leg & feet 0 0 0  0 0   Arm 1 0 0  1 0   's hand 2 2 0  0 0   Dorsum of hands (not over joints) 2 2 0  0 0   Gottron's - not on hands 2 1 0  1 0            Gottron's - hands  - Activity: double erythema score if papules present  - Damage: 0-absent, 1-dyspigmentation;  2-scarring 2x2  0  0    Periungual changes  0-absent  1-pink; red erythema/ microscopic telangiectasias  2-visible telangiectasia 2 0 0      Alopecia (last 30d as reported by patient)  0-absent  1-present 1 0 0        Total activity score: 30    Total damage score: 6

## 2019-11-07 NOTE — PATIENT INSTRUCTIONS
1. Restart mycophenolate at 500 mg twice daily.  2. Use betamethasone cream for hands twice daily.  3. Use clobetasol solution for scalp twice daily.  4. Labs in 2 weeks.    Follow up in 6-8 weeks.    Preventive Care:    Colorectal Cancer Screening: During our visit today, we discussed that it is recommended you receive colorectal cancer screening. Please call or make an appointment with your primary care provider to discuss this. You may also call the [employer] scheduling line ([phone]) to set up a colonoscopy appointment.

## 2019-11-07 NOTE — PROGRESS NOTES
[facility] Dermatology Note      Dermatology Problem List:  1. 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  2. History of IgA deficiency  3. History of BCC on back s/p excision (2016)    Encounter Date: Nov 7, 2019    CC:  Chief Complaint   Patient presents with     Derm Problem     Mely is here today for consult for DM with Dr. Garcias         History of Present Illness:  Ms. Mely Rashid is a 74 year old female who presents as a referral from Dr. Colon for dermatomyositis. She was diagnosed with dermatomyositis in 2012 after experiencing cuticle pain and fatigue, and her daughter had previously been diagnosed with dermatomyositis. She follows with rheumatology and gets regular PFTs (DLCO 88% 7/16/19). She comes to clinic with worsening of hand pain, bleeding, and redness, as well as joint pain of wrists, hands, and digits. She feels like she has lost her  and no longer has fingerprints. If she hits her thumb, it feels like an electric shock. Usually her hand symptoms are only active in the summer months. Of note, she discontinued mycophenolate in September after discussing the low dose she was taking with Dr. Colon. Since then, her joint symptoms have worsened. She has been sleeping with wrist splints on to alleviate joint pain. She admits to redness of periorbital area, forehead, temples, lateral thighs, knees, and elbows. She has difficulty opening jars, putting her dog's leash on, and it takes her 5 minutes to get dressed or undressed. She cannot lift above her head well. She has pain and soreness of her muscles.  She denies any shortness of breath. One time a couple weeks ago, she had trouble swallowing a pill, but denies any other troubles with swallowing. Her only joint pain is that of her wrists and hands. She denies Raynaud's symptoms. She has also noticed increasing hair loss, scalp pruritus, and scalp pain since the summer. She also states she has pruritus of the ears and upper back.     Past Medical History:   Patient Active Problem List   Diagnosis     Glaucoma, right     Dermatomyositis (H)     IgA deficiency (H)     Herpes zoster     Encounter for long-term current use of medication     Encounter for long-term (current) use of steroids     Oral ulcer     Seborrheic dermatitis     Osteopenia     Neoplasm of uncertain behavior of skin     Routine general medical examination at a health care facility     Past Medical History:   Diagnosis Date     Basal cell carcinoma      Dermatomyositis (H)      Glaucoma      IgA deficiency (H)      Nonsenile cataract      Past Surgical History:   Procedure Laterality Date     BIOPSY OF SKIN LESION       [facility] UGI ENDOSCOPY W EUS N/A 5/14/2014    Procedure: COMBINED ENDOSCOPIC ULTRASOUND, ESOPHAGOSCOPY, GASTROSCOPY, DUODENOSCOPY (EGD);  Surgeon: Boston Rodriguez MD;  Location: [facility]     HYSTERECTOMY      partial, prolapse uterus.       Social History:  Patient reports that she quit smoking about 40 years ago. She has never used smokeless tobacco. She reports current alcohol use. She reports that she does not use drugs. She is retired and formerly worked as an ENT surgery coordinator.    Family History:  Family History   Problem Relation Age of Onset     C.A.D. Father      Arthritis Father      Diabetes Brother      Glaucoma Brother      Breast Cancer Sister 35     Glaucoma Sister      Neurologic Disorder Sister         seizures     Cancer Sister         breast     Psoriasis Daughter      Rheumatologic Disease Daughter         Dermatomyositis     Alzheimer Disease Mother          onset in 60s     Hypertension No family hx of      Cerebrovascular Disease No family hx of      Thyroid Disease No family hx of      Skin Cancer No family hx of      Macular Degeneration No family hx of        Medications:  Current Outpatient Medications   Medication Sig Dispense Refill     augmented betamethasone dipropionate (DIPROLENE-AF) 0.05 % external cream Apply topically 2 times daily For hands only 150 g 3     B Complex TABS Take 1 tablet by mouth daily.       Calcium Carb-Cholecalciferol (CALCIUM 500 +D PO) Take 3 tablets by mouth daily.       Cholecalciferol (VITAMIN D) 1000 UNIT capsule Take 1 capsule by mouth daily.       clobetasol (TEMOVATE) 0.05 % external solution Apply topically 2 times daily For scalp 150 mL 3     desonide (DESOWEN) 0.05 % cream Apply topically 2 times daily as needed (rash) 30 g 2     diazepam (VALIUM) 5 MG tablet Take 1 tablet (5 mg) by mouth once as needed for anxiety or sleep (Take one on arrival for MRI. May repeat in 15 min if inadequate relief.) 2 tablet 0     glucosamine-chondroitinoitin (GLUCOSAMINE CHONDR COMPLEX) 500-400 MG CAPS Take 2 capsules by mouth daily.       hydroxychloroquine (PLAQUENIL) 200 MG tablet TAKE 1 TABLET DAILY (ANNUAL EYE EXAM/PLAQUENIL SCREENING DUE ) 90 tablet 0     latanoprost (XALATAN) 0.005 % ophthalmic solution Place 1 drop into the right eye At Bedtime 3 Bottle 11     loperamide (IMODIUM A-D) 2 MG tablet Take 2 mg by mouth every morning       mycophenolate (GENERIC EQUIVALENT) 500 MG tablet Take 1 tablet (500 mg) by mouth 2 times daily 180 tablet 3     omega-3 fatty acids (FISH OIL) 1200 MG capsule Take 1 capsule by mouth daily.       vitamin E 400 UNIT capsule Take by mouth daily       desoximetasone (TOPICORT) 0.25 % cream Apply topically 2 times daily (Patient not taking: Reported on 11/7/2019) 120 g 3     desoximetasone (TOPICORT) 0.25 % external cream Apply topically 2 times daily (Patient not taking: Reported on 11/7/2019) 60 g 3      mycophenolate (GENERIC EQUIVALENT) 250 MG capsule Take 2 capsules (500 mg) by mouth daily 180 capsule 2     Allergies   Allergen Reactions     Penicillins Anaphylaxis         Review of Systems:  -As per HPI  -Constitutional: Otherwise feeling well today, in usual state of health.  -CV: Denies shortness of breath.  -HEENT: Denies trouble swallowing.  -Musculoskel: Joint pain in wrists and hands.  -Skin: As above in HPI. No additional skin concerns.    Physical exam:  Vitals: /84 (BP Location: Right arm, Patient Position: Sitting, Cuff Size: Adult Regular)   Pulse 92   GEN: This is a well developed, well-nourished female in no acute distress, in a pleasant mood.    SKIN: Total skin excluding the undergarment and feet areas was performed. The exam included the head/face, neck, both arms, chest, back, abdomen, both legs, digits and/or nails.   -Pryor skin type: II  -There is mild erythema of the scalp with minimal scale.  -There is pink erythema of the periorbital area, temples, and lateral forehead. There is poikiloderma of the R temple.  -There are telangiectasias on the lateral face.  -There is pinkish to red erythema of the posterior neck and upper L back.  -There is pink erythema with scale and telangiectasias on the bilateral lateral thighs.  -There is pink erythema of the bilateral arms.  -There is reddish erythema, reddish papules, and scale on the lateral 2nd digits and over the joints of the hands.  -There are scattered papules and erythema of the dorsal digits excluding the skin overlying joints.  -There are many periungual telangiectasias.  -There is erythema and scale overlying the knees and elbows.  -No other lesions of concern on areas examined.     Impression/Plan:  1. Dermatomyositis, flaring in past two months after discontinuing mycophenolate with joint pain of the hands and wrists, muscle weakness and pain, and moderate skin findings including Gottron's papules of the  hands.    Continue hydroxychloroquine 200 mg daily.    Restart mycophenolate at 500 mg twice daily.    Start betamethasone cream to hands twice daily.    Start clobetasol solution to scalp twice daily.    Discussed possible treatment with prednisone for flare, patient preferred to avoid this treatment.    Labs (CBC, CMP) in two weeks.    Follow up in 6-8 weeks.    2. Intraductal papillary mucinous neoplasm of the pancreas.    Recommend another visit with gastroenterology as 3 years since last MRI.    CC Carlos Colon MD  [address] [place] [address] on close of this encounter.  Follow-up in 6-8 weeks, earlier for new or changing lesions.     Staff Involved:  I, Lucero Bueno, MS3, saw and examined the patient in the presence of Dr. Shekhar Garcias MD.       Staff Physician:  I was present with the medical student who participated in the service and in the documentation of the note. I have verified the history and personally performed the physical exam and medical decision making. I edited the assessment and plan of care as documented in the note.     Shekhar Garcias MD   of Dermatology  Department of Dermatology  [employer]

## 2019-11-07 NOTE — NURSING NOTE
Chief Complaint   Patient presents with     Derm Problem     Mely is here today for consult for DM with Dr. Katerine Sanchez, EDDIEN

## 2019-11-08 NOTE — PROGRESS NOTES
Cutaneous Dermatomyositis Disease Area and Severity Index (CDASI) ver02    Activity     Damage    Anatomic location Erythema Scale Erosion/  ulceration  Poikiloderma (dyspigmentation or telangiectasia) Calcinosis    0-absent  1-pink; faint erythema  2-red  3-dark red 0-absent  1-scale  2-crust; lichenification 0-absent  1-present  0-absent  1-present 0-absent  1-present   Scalp 1 1 0  0 0   Malar area 1 0 0  0 0   Periorbital 1 0 0  1 0   Rest of face 1 0 0  1 0   V-area neck (frontal) 1 0 0  1 0   Posterior neck 2 0 0  0 0   Upper back & shoulders 1 0 0  0 0   Rest of back & buttocks 0 0 0  0 0   Abdomen 0 0 0  0 0   Lateral upper thigh 1 1 0  1 0   Rest of leg & feet 0 0 0  0 0   Arm 1 0 0  1 0   's hand 2 2 0  0 0   Dorsum of hands (not over joints) 2 2 0  0 0   Gottron's - not on hands 2 1 0  1 0            Gottron's - hands  - Activity: double erythema score if papules present  - Damage: 0-absent, 1-dyspigmentation;  2-scarring 2x2  0  0    Periungual changes  0-absent  1-pink; red erythema/ microscopic telangiectasias  2-visible telangiectasia 2 0 0      Alopecia (last 30d as reported by patient)  0-absent  1-present 1 0 0        Total activity score: 30    Total damage score: 6

## 2019-11-10 DIAGNOSIS — M33.13 DERMATOMYOSITIS (H): Primary | ICD-10-CM

## 2019-11-10 RX ORDER — PREDNISONE 10 MG/1
30 TABLET ORAL DAILY
Qty: 90 TABLET | Refills: 1 | Status: SHIPPED | OUTPATIENT
Start: 2019-11-10 | End: 2020-01-14

## 2019-11-15 ENCOUNTER — OFFICE VISIT (OUTPATIENT)
Dept: OPHTHALMOLOGY | Facility: CLINIC | Age: 74
End: 2019-11-15
Attending: OPHTHALMOLOGY
Payer: COMMERCIAL

## 2019-11-15 DIAGNOSIS — H40.051 BORDERLINE GLAUCOMA OF RIGHT EYE WITH OCULAR HYPERTENSION: Primary | ICD-10-CM

## 2019-11-15 PROCEDURE — 92083 EXTENDED VISUAL FIELD XM: CPT | Mod: ZF | Performed by: OPHTHALMOLOGY

## 2019-11-15 PROCEDURE — G0463 HOSPITAL OUTPT CLINIC VISIT: HCPCS | Mod: ZF

## 2019-11-15 PROCEDURE — 92133 CPTRZD OPH DX IMG PST SGM ON: CPT | Mod: ZF | Performed by: OPHTHALMOLOGY

## 2019-11-15 RX ORDER — LATANOPROST 50 UG/ML
1 SOLUTION/ DROPS OPHTHALMIC AT BEDTIME
Qty: 3 BOTTLE | Refills: 11 | Status: SHIPPED | OUTPATIENT
Start: 2019-11-15 | End: 2020-11-18

## 2019-11-15 ASSESSMENT — SLIT LAMP EXAM - LIDS
COMMENTS: NORMAL
COMMENTS: NORMAL

## 2019-11-15 ASSESSMENT — VISUAL ACUITY
OD_CC: 20/20
METHOD: SNELLEN - LINEAR
CORRECTION_TYPE: GLASSES
OS_CC: 20/20
OS_CC+: -1

## 2019-11-15 ASSESSMENT — CONF VISUAL FIELD
OD_NORMAL: 1
METHOD: COUNTING FINGERS
OS_NORMAL: 1

## 2019-11-15 ASSESSMENT — TONOMETRY
OD_IOP_MMHG: 17
IOP_METHOD: APPLANATION
OS_IOP_MMHG: 19

## 2019-11-15 ASSESSMENT — REFRACTION_WEARINGRX
OS_ADD: +2.25
OD_SPHERE: +0.25
SPECS_TYPE: PAL
OS_SPHERE: PLANO
OS_AXIS: 175
OS_CYLINDER: +0.50
OD_CYLINDER: SPHERE
OD_ADD: +2.25

## 2019-11-15 ASSESSMENT — EXTERNAL EXAM - LEFT EYE: OS_EXAM: NORMAL

## 2019-11-15 ASSESSMENT — EXTERNAL EXAM - RIGHT EYE: OD_EXAM: NORMAL

## 2019-11-15 ASSESSMENT — CUP TO DISC RATIO
OD_RATIO: 0.7
OS_RATIO: 0.3

## 2019-11-15 NOTE — PROGRESS NOTES
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 Pt reports vision is slightly worse compared to last year and notes that she sees better without glasses when driving. Using artificial tears again for dryness.     Ocular gtts:    latanoprost at bedtime right eye     artificial tears as needed     VF SWAP testing 11/15/19:  OD: reliable. Decreased MD -2.9 from -1.6..     OS: reliable. No deficits noted.     OCT RNFL 11/14/18 :  OD: superior and inferior significant thinning, w/ nasal borderline thinning, inferotemporal change compared to prior  OS: WNL all green (baseline)    A/P:   1) history ocular hypertension/preperimetric glaucoma, RE with cup:disc asymmetry    Sister w/ hx of low tension glaucoma- required surgery tube?   Good intraocular pressure today   Continue Latanoprost at bedtime right eye    Octopus SWAP both eyes without focal defect   OCT RNFL today slight downward slope right eye     2) Plaquenil usage    Since 2011   Followed by Dr. Hinds (last visit 8/29/16)   Mf ERG 8/2017- wnl no signs of palquenil toxicity     No toxicity. Decreasing dosage, monitored by rheumatology. Now taking 200mg BID four days a week Tu/Th/Sat/Sun, 200mg once daily M/W/F    3) Trace NS cataracts, OU   Not visually significant     Plan:  Diagnosis ocular hypertension vs poag glaucoma right eye, mild/preperimetric.   Continue Latanoprost right eye at bedtime   RTC  1 year Octopus visual field SWAP and OCT retinal nerve fiber layer   See Retina for Plaquenil monitoring    Attending Physician Attestation:  Complete documentation of historical and exam elements from today's encounter can be found in the full encounter summary report (not reduplicated in this progress note). I personally obtained the chief complaint(s) and history of present illness. I confirmed and edited asnecessary the review of systems, past medical/surgical history, family history, social history, and  examination findings as documented by others; and I examined the patient myself. I personally reviewed the relevant tests, images, and reports as documented above. I formulated and edited as necessary the assessment and plan and discussed the findings and management plan with the patient and family.  - Sima Gambino MD 11:37 AM 11/15/2019

## 2019-11-15 NOTE — PATIENT INSTRUCTIONS
Future Appointments   Date Time Provider Department Center   1/3/2020 10:10 AM Shekhar Garcias MD [facility]   1/7/2020 10:10 AM Shakira Chamberlain MD [facility]   7/28/2020  2:00 PM Carlos Colon MD [facility]   11/20/2020  9:45 AM Sima Gambino MD [facility]

## 2019-11-15 NOTE — NURSING NOTE
Chief Complaints and History of Present Illnesses   Patient presents with     Follow Up     Ocular hypertension     Chief Complaint(s) and History of Present Illness(es)     Follow Up     Laterality: right eye    Associated symptoms: dryness and tearing (if outside in the wind and the cold).  Negative for eye pain and redness    Pain scale: 0/10    Comments: Ocular hypertension              Comments     She states that her vision has seemed stable in both eyes since her last eye exam.   Both eyes seem intermittently dry.  Using artificial tears does resolve the discomfort.  She is very compliant in her use of Latanoprost as instructed.    AKILA Varma 10:13 AM  November 15, 2019

## 2019-11-21 DIAGNOSIS — M33.13 DERMATOMYOSITIS (H): ICD-10-CM

## 2019-11-21 DIAGNOSIS — Z79.899 HIGH RISK MEDICATION USE: ICD-10-CM

## 2019-11-21 DIAGNOSIS — Z79.899 ENCOUNTER FOR LONG-TERM CURRENT USE OF MEDICATION: ICD-10-CM

## 2019-11-21 LAB
ALBUMIN SERPL-MCNC: 4.1 G/DL (ref 3.4–5)
BASOPHILS # BLD AUTO: 0 10E9/L (ref 0–0.2)
BASOPHILS NFR BLD AUTO: 0.4 %
CK SERPL-CCNC: 72 U/L (ref 30–225)
CRP SERPL-MCNC: <2.9 MG/L (ref 0–8)
DIFFERENTIAL METHOD BLD: NORMAL
EOSINOPHIL # BLD AUTO: 0.1 10E9/L (ref 0–0.7)
EOSINOPHIL NFR BLD AUTO: 1.6 %
ERYTHROCYTE [DISTWIDTH] IN BLOOD BY AUTOMATED COUNT: 13.6 % (ref 10–15)
HCT VFR BLD AUTO: 42.2 % (ref 35–47)
HGB BLD-MCNC: 13.8 G/DL (ref 11.7–15.7)
LYMPHOCYTES # BLD AUTO: 1 10E9/L (ref 0.8–5.3)
LYMPHOCYTES NFR BLD AUTO: 20.1 %
MCH RBC QN AUTO: 31.3 PG (ref 26.5–33)
MCHC RBC AUTO-ENTMCNC: 32.7 G/DL (ref 31.5–36.5)
MCV RBC AUTO: 96 FL (ref 78–100)
MONOCYTES # BLD AUTO: 0.9 10E9/L (ref 0–1.3)
MONOCYTES NFR BLD AUTO: 16.9 %
NEUTROPHILS # BLD AUTO: 3.1 10E9/L (ref 1.6–8.3)
NEUTROPHILS NFR BLD AUTO: 61 %
PLATELET # BLD AUTO: 199 10E9/L (ref 150–450)
RBC # BLD AUTO: 4.41 10E12/L (ref 3.8–5.2)
WBC # BLD AUTO: 5.1 10E9/L (ref 4–11)

## 2019-11-21 PROCEDURE — 86140 C-REACTIVE PROTEIN: CPT | Performed by: INTERNAL MEDICINE

## 2019-11-21 PROCEDURE — 82040 ASSAY OF SERUM ALBUMIN: CPT | Performed by: INTERNAL MEDICINE

## 2019-11-21 PROCEDURE — 36415 COLL VENOUS BLD VENIPUNCTURE: CPT | Performed by: INTERNAL MEDICINE

## 2019-11-21 PROCEDURE — 82550 ASSAY OF CK (CPK): CPT | Performed by: INTERNAL MEDICINE

## 2019-11-21 PROCEDURE — 85025 COMPLETE CBC W/AUTO DIFF WBC: CPT | Performed by: INTERNAL MEDICINE

## 2019-12-05 ENCOUNTER — DOCUMENTATION ONLY (OUTPATIENT)
Dept: CARE COORDINATION | Facility: CLINIC | Age: 74
End: 2019-12-05

## 2019-12-16 ENCOUNTER — TRANSFERRED RECORDS (OUTPATIENT)
Dept: HEALTH INFORMATION MANAGEMENT | Facility: CLINIC | Age: 74
End: 2019-12-16

## 2019-12-18 DIAGNOSIS — M33.13 DERMATOMYOSITIS (H): ICD-10-CM

## 2019-12-18 DIAGNOSIS — Z79.899 LONG-TERM USE OF PLAQUENIL: ICD-10-CM

## 2019-12-18 DIAGNOSIS — Z79.899 ENCOUNTER FOR LONG-TERM CURRENT USE OF MEDICATION: ICD-10-CM

## 2019-12-18 NOTE — LETTER
Mely Rashid  [address] [place] [address]    Dear Mely Rashid,     Regular eye exams are required while taking hydroxychloroquine (Plaquenil). Eye exams should be completed by an eye specialist who is experienced in monitoring for hydroxychloroquine toxicity (a rare effect of the drug that can damage your eyes and vision).  These may be yearly or as determined by your eye specialist.     Although vision problems and loss of sight while taking hydroxychloroquine are very rare, notify your doctor immediately if you notice changes in your vision. The goal of screening is to detect toxicity before your vision is significantly or noticeably impacted. Failing to get proper screening exams puts you at risk of vision changes which may or may not be reversible.    Per the American Academy of Ophthalmology recommendations (2016), screening tests performed may include a 10-2 visual field test, spectral-domain optical coherence tomography (SD OCT), or other screening tests as determined by the eye specialist, including a multifocal electroretinogram (mfERG) or fundus auto-fluorescence (FAF).    We received a refill request from your pharmacy. A copy of your current eye exam was not found in your medical record. Your hydroxychloroquine prescription has been refilled with a limited supply pending confirmation you have had an eye exam to test for hydroxychloroquine toxicity.      We encourage you to bring this letter to your eye exam to discuss the exams that will be performed during your visit. Please request your eye clinic fax or mail a copy of your eye exam report to our clinic indicating that testing was completed for hydroxychloroquine toxicity screening. The exam notes must specifically comment on the potential for hydroxychloroquine toxicity and outline recommended follow-up.    If you have questions about hydroxychloroquine or the information in this letter, please call the clinic at [phone]  or talk to your provider at your next office visit.                        2    Eye Specialist Letter  Dear Eye Specialist,  To ensure safe use of Plaquenil (hydroxychloroquine), we are requesting your assistance in providing the following information. Please return this form to our clinic via mail or fax, or incorporate this information into your visit summary. Your note must indicate whether or not there is evidence of toxicity from Plaquenil use. For questions regarding this form, please call [phone].  Patient Name:   :             Date of Exam:    The following exams were completed during this visit in accordance with the American Academy of Ophthalmology recommendations (2016):  ? 10-2 automated visual field  ? Spectral-domain optical coherence tomography (SD OCT)  ? Multifocal electroretinogram (mfERG)  ? Fundus autofluorescence (FAF)  ? Other (please specify)  Please select from the following:  ? The findings from the above exams are not suggestive of toxicity from Plaquenil (hydroxychloroquine).  ? The findings from the above exams may suggest toxicity from Plaquenil (hydroxychloroquine).  Directly contact the clinic and fax this form.  Please provide additional guidance on whether or not the medication may be continued from your perspective:  Date of next recommended Plaquenil (hydroxychloroquine) screening eye exam:   ? 5 years  ? 1 year  ? 6 months  Other (please specify):   Eye Specialist Name (print):                                                                Date:  Eye Specialist Signature:

## 2019-12-19 NOTE — TELEPHONE ENCOUNTER
HYDROXYCHLOROQUINE SULFATE TABS 200MG  Plaquenil      Last Written Prescription Date:  10/7/2019  Last Fill Quantity: 90,   # refills: 0  Last Office Visit: 7/16/2019  Future Office visit: 7/28/2020  Last Eye Exam: 11/14/2018    Routing refill request to provider for review/approval because:  Eye Exam Due      Letter Sent        Referred to Provider for review.  Drug not on the [facility], P or  Health refill protocol or controlled substance      Kacy Muller RN  Central Triage Red Flags/Med Refills

## 2019-12-20 RX ORDER — HYDROXYCHLOROQUINE SULFATE 200 MG/1
200 TABLET, FILM COATED ORAL DAILY
Qty: 90 TABLET | Refills: 1 | Status: SHIPPED | OUTPATIENT
Start: 2019-12-20 | End: 2020-07-13

## 2019-12-23 ENCOUNTER — PRE VISIT (OUTPATIENT)
Dept: INTERNAL MEDICINE | Facility: CLINIC | Age: 74
End: 2019-12-23

## 2019-12-23 DIAGNOSIS — Z00.00 HEALTHCARE MAINTENANCE: Primary | ICD-10-CM

## 2019-12-23 NOTE — TELEPHONE ENCOUNTER
Sent DFine message to patient to have fasting lab completed before physical appointment Lisa Luo CMA at 9:25 AM on 12/23/2019.

## 2019-12-27 ENCOUNTER — DOCUMENTATION ONLY (OUTPATIENT)
Dept: INTERNAL MEDICINE | Facility: CLINIC | Age: 74
End: 2019-12-27

## 2020-01-02 DIAGNOSIS — Z79.899 ENCOUNTER FOR LONG-TERM (CURRENT) USE OF HIGH-RISK MEDICATION: Primary | ICD-10-CM

## 2020-01-03 ENCOUNTER — OFFICE VISIT (OUTPATIENT)
Dept: DERMATOLOGY | Facility: CLINIC | Age: 75
End: 2020-01-03
Payer: COMMERCIAL

## 2020-01-03 VITALS — DIASTOLIC BLOOD PRESSURE: 78 MMHG | SYSTOLIC BLOOD PRESSURE: 124 MMHG | HEART RATE: 78 BPM

## 2020-01-03 DIAGNOSIS — M33.13 DERMATOMYOSITIS (H): ICD-10-CM

## 2020-01-03 DIAGNOSIS — Z79.899 HIGH RISK MEDICATION USE: ICD-10-CM

## 2020-01-03 DIAGNOSIS — M33.13 DERMATOMYOSITIS (H): Primary | ICD-10-CM

## 2020-01-03 DIAGNOSIS — Z00.00 HEALTHCARE MAINTENANCE: ICD-10-CM

## 2020-01-03 DIAGNOSIS — K86.2 PANCREATIC CYST: ICD-10-CM

## 2020-01-03 LAB
ALBUMIN SERPL-MCNC: 4.2 G/DL (ref 3.4–5)
ALP SERPL-CCNC: 48 U/L (ref 40–150)
ALT SERPL W P-5'-P-CCNC: 40 U/L (ref 0–50)
ANION GAP SERPL CALCULATED.3IONS-SCNC: 4 MMOL/L (ref 3–14)
AST SERPL W P-5'-P-CCNC: 36 U/L (ref 0–45)
BASOPHILS # BLD AUTO: 0 10E9/L (ref 0–0.2)
BASOPHILS NFR BLD AUTO: 0.7 %
BILIRUB SERPL-MCNC: 0.8 MG/DL (ref 0.2–1.3)
BUN SERPL-MCNC: 10 MG/DL (ref 7–30)
CALCIUM SERPL-MCNC: 9.4 MG/DL (ref 8.5–10.1)
CHLORIDE SERPL-SCNC: 108 MMOL/L (ref 94–109)
CHOLEST SERPL-MCNC: 216 MG/DL
CK SERPL-CCNC: 121 U/L (ref 30–225)
CO2 SERPL-SCNC: 28 MMOL/L (ref 20–32)
CREAT SERPL-MCNC: 0.5 MG/DL (ref 0.52–1.04)
CRP SERPL-MCNC: <2.9 MG/L (ref 0–8)
DIFFERENTIAL METHOD BLD: ABNORMAL
EOSINOPHIL # BLD AUTO: 0.1 10E9/L (ref 0–0.7)
EOSINOPHIL NFR BLD AUTO: 1.7 %
ERYTHROCYTE [DISTWIDTH] IN BLOOD BY AUTOMATED COUNT: 13.4 % (ref 10–15)
GFR SERPL CREATININE-BSD FRML MDRD: >90 ML/MIN/{1.73_M2}
GLUCOSE SERPL-MCNC: 86 MG/DL (ref 70–99)
HCT VFR BLD AUTO: 43.4 % (ref 35–47)
HDLC SERPL-MCNC: 67 MG/DL
HGB BLD-MCNC: 13.6 G/DL (ref 11.7–15.7)
IMM GRANULOCYTES # BLD: 0 10E9/L (ref 0–0.4)
IMM GRANULOCYTES NFR BLD: 0.2 %
LDLC SERPL CALC-MCNC: 131 MG/DL
LYMPHOCYTES # BLD AUTO: 0.8 10E9/L (ref 0.8–5.3)
LYMPHOCYTES NFR BLD AUTO: 18.5 %
MCH RBC QN AUTO: 30 PG (ref 26.5–33)
MCHC RBC AUTO-ENTMCNC: 31.3 G/DL (ref 31.5–36.5)
MCV RBC AUTO: 96 FL (ref 78–100)
MONOCYTES # BLD AUTO: 0.6 10E9/L (ref 0–1.3)
MONOCYTES NFR BLD AUTO: 15.6 %
NEUTROPHILS # BLD AUTO: 2.6 10E9/L (ref 1.6–8.3)
NEUTROPHILS NFR BLD AUTO: 63.3 %
NONHDLC SERPL-MCNC: 150 MG/DL
NRBC # BLD AUTO: 0 10*3/UL
NRBC BLD AUTO-RTO: 0 /100
PLATELET # BLD AUTO: 187 10E9/L (ref 150–450)
POTASSIUM SERPL-SCNC: 4.1 MMOL/L (ref 3.4–5.3)
PROT SERPL-MCNC: 6.8 G/DL (ref 6.8–8.8)
RBC # BLD AUTO: 4.53 10E12/L (ref 3.8–5.2)
SODIUM SERPL-SCNC: 140 MMOL/L (ref 133–144)
TRIGL SERPL-MCNC: 91 MG/DL
WBC # BLD AUTO: 4.1 10E9/L (ref 4–11)

## 2020-01-03 ASSESSMENT — PAIN SCALES - GENERAL: PAINLEVEL: MODERATE PAIN (5)

## 2020-01-03 NOTE — PATIENT INSTRUCTIONS
- Follow up with gastroenterology given history of pancreatic cystic neoplasm, referral placed today    - Continue medications without any changes   - Discuss mental fog and mood changes with PCP at upcoming appointment   - Get labs done today

## 2020-01-03 NOTE — PROGRESS NOTES
Cutaneous Dermatomyositis Disease Area and Severity Index (CDASI) ver02    Activity     Damage    Anatomic location Erythema Scale Erosion/  ulceration  Poikiloderma (dyspigmentation or telangiectasia) Calcinosis    0-absent  1-pink; faint erythema  2-red  3-dark red 0-absent  1-scale  2-crust; lichenification 0-absent  1-present  0-absent  1-present 0-absent  1-present   Scalp 1 0 0  0 0   Malar area 1 0 0  1 0   Periorbital 0 0 0  1 0   Rest of face 1 1 0  1 0   V-area neck (frontal) 1 0 0  1 0   Posterior neck 2 0 0  1 0   Upper back & shoulders 2 1 0  1 0   Rest of back & buttocks 0 0 0  0 0   Abdomen 0 0 0  0 0   Lateral upper thigh 1 0 0  0 0   Rest of leg & feet 0 0 0  0 0   Arm 1 0 0  0 0   's hand 0 1 0  0 0   Dorsum of hands (not over joints) 1 0 0  0 0   Gottron's - not on hands 1 1 0  1 0            Gottron's - hands  - Activity: double erythema score if papules present  - Damage: 0-absent, 1-dyspigmentation;  2-scarring 1x2  0  1    Periungual changes  0-absent  1-pink; red erythema/ microscopic telangiectasias  2-visible telangiectasia 2 0 0      Alopecia (last 30d as reported by patient)  0-absent  1-present 1 0 0        Total activity score: 21    Total damage score: 8     [FreeTextEntry1] : 29 year old male returns for follow up. We reviewed blood work which was essentially normal, no hormonal abnormalities. He still has not achieved paternity. Wife has undergone female evaluation and she has no barriers to fertility. \par \par PMH: Patient is a 29-year-old gentleman who presents with the chief complaint of impaired fertility for evaluation. I reviewed the questionnaire he had completed with him in detail. His wife, age 26, was able to accompany him to the visit today. She has not had any prior pregnancies. She has irregular cycles and is being evaluated by a provider already. They have been trying to achieve pregnancy for the past 10 months without conception. He has no known drug allergies. His past medical history demonstrates no significant urologic issues (see patient questionnaire). In his present occupation he has no known toxin exposure. He does not smoke and drinks only socially. He has no known drug allergies. His review of systems is non-contributory. His family history is not significant.\par \par They bring in a SA that shows: \par -conc: 56.5 mil/cc\par -motility 57%\par -morphology: 3

## 2020-01-03 NOTE — NURSING NOTE
Dermatology Rooming Note    Mely Rashid's goals for this visit include:   Chief Complaint   Patient presents with     Derm Problem     Dermatomyositis - Mely says there has been improvement but taking longer than she thought it would     Earlene Davidson, CMA

## 2020-01-03 NOTE — LETTER
"1/3/2020       RE: Mely Rashid  [address]   [place] [address]     Dear Colleague,    Thank you for referring your patient, Mely Rashid, to the [facility] DERMATOLOGY at [facility]. Please see a copy of my visit note below.    [facility] Dermatology Note      Dermatology Problem List:  1. 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  2. History of IgA deficiency  3. History of BCC on back s/p excision (2016)    Encounter Date: Chaz 3, 2020    CC:   Chief Complaint   Patient presents with     Derm Problem     Dermatomyositis - Mely says there has been improvement but taking longer than she thought it would         History of Present Illness:  Ms. Mely Rashid is a 74 year old female who presents today for follow-up of her classic dermatomyositis.  Since her last visit on 11/7/19, she has been taking mycophenolate 500 mg twice daily and hydroxychloroquine 200 mg daily as advised.  She is using the betamethasone cream only once daily as she frequently washes her hands during the day.  She uses the clobetasol solution only intermittently, less than once a week when she feels scalp symptoms.    Has been having some balance issues and brain fog since re-starting the mycophenolate. In retrospect, she believes that she had similar symptoms when she was previously on mycophenolate. Also having an \"achy, heavy feeling\" in the head, which she did not have previously. Has not been severe enough to require OTC pain medication. Does not necessarily " interfere with activities of daily living, but is always present. Denies any falls, just has to hold on to the rails when walking down the stairs. Denies any GI distress, increased infections, or vision changes. Has regular follow up with Ophthalmology. Joint pains and muscle pains wax and wane.  Overall, she feels that her symptoms are improving since starting the mycophenolate.  However, she continues to have some depressed mood as she feels that she may never fully improved.  She lives alone with her dog but has family and friends who are supportive.  She denies any SI or HI.  Has previously tried a short course of oral antidepressant therapy and is not interested in that at this time.  She will see her PCP soon and will bring of her mood changes at that visit.    No other concerns today.      Past Medical History:   Patient Active Problem List   Diagnosis     Glaucoma, right     Dermatomyositis (H)     IgA deficiency (H)     Herpes zoster     Encounter for long-term current use of medication     Encounter for long-term (current) use of steroids     Oral ulcer     Seborrheic dermatitis     Osteopenia     Neoplasm of uncertain behavior of skin     Routine general medical examination at a health care facility     Past Medical History:   Diagnosis Date     Basal cell carcinoma      Dermatomyositis (H)      Glaucoma      IgA deficiency (H)      Nonsenile cataract      Past Surgical History:   Procedure Laterality Date     BIOPSY OF SKIN LESION       [facility] UGI ENDOSCOPY W EUS N/A 5/14/2014    Procedure: COMBINED ENDOSCOPIC ULTRASOUND, ESOPHAGOSCOPY, GASTROSCOPY, DUODENOSCOPY (EGD);  Surgeon: Boston Rodriguez MD;  Location: [facility]     HYSTERECTOMY      partial, prolapse uterus.       Social History:  Patient reports that she quit smoking about 40 years ago. She has never used smokeless tobacco. She reports current alcohol use. She reports that she does not use drugs.    Family History:  Family History   Problem  Relation Age of Onset     C.A.D. Father      Arthritis Father      Diabetes Brother      Glaucoma Brother      Breast Cancer Sister 35     Glaucoma Sister      Neurologic Disorder Sister         seizures     Cancer Sister         breast     Psoriasis Daughter      Rheumatologic Disease Daughter         Dermatomyositis     Alzheimer Disease Mother         onset in 60s     Hypertension No family hx of      Cerebrovascular Disease No family hx of      Thyroid Disease No family hx of      Skin Cancer No family hx of      Macular Degeneration No family hx of        Medications:  Current Outpatient Medications   Medication Sig Dispense Refill     augmented betamethasone dipropionate (DIPROLENE-AF) 0.05 % external cream Apply topically 2 times daily For hands only 150 g 3     B Complex TABS Take 1 tablet by mouth daily.       Calcium Carb-Cholecalciferol (CALCIUM 500 +D PO) Take 3 tablets by mouth daily.       Cholecalciferol (VITAMIN D) 1000 UNIT capsule Take 1 capsule by mouth daily.       clobetasol (TEMOVATE) 0.05 % external solution Apply topically 2 times daily For scalp 150 mL 3     desonide (DESOWEN) 0.05 % cream Apply topically 2 times daily as needed (rash) 30 g 2     diazepam (VALIUM) 5 MG tablet Take 1 tablet (5 mg) by mouth once as needed for anxiety or sleep (Take one on arrival for MRI. May repeat in 15 min if inadequate relief.) 2 tablet 0     glucosamine-chondroitinoitin (GLUCOSAMINE CHONDR COMPLEX) 500-400 MG CAPS Take 2 capsules by mouth daily.       hydroxychloroquine (PLAQUENIL) 200 MG tablet Take 1 tablet (200 mg) by mouth daily 90 tablet 1     latanoprost (XALATAN) 0.005 % ophthalmic solution Place 1 drop into the right eye At Bedtime 3 Bottle 11     loperamide (IMODIUM A-D) 2 MG tablet Take 2 mg by mouth every morning       mycophenolate (GENERIC EQUIVALENT) 500 MG tablet Take 1 tablet (500 mg) by mouth 2 times daily 180 tablet 3     omega-3 fatty acids (FISH OIL) 1200 MG capsule Take 1 capsule by  mouth daily.       vitamin C w/JD HIPS 500 MG tablet        vitamin E 400 UNIT capsule Take by mouth daily       predniSONE (DELTASONE) 10 MG tablet Take 3 tablets (30 mg) by mouth daily (Patient not taking: Reported on 1/3/2020) 90 tablet 1        Allergies   Allergen Reactions     Penicillins Anaphylaxis       Review of Systems:  -As per HPI  -Constitutional: Otherwise feeling well today, in usual state of health.  -HEENT: Patient denies nonhealing oral sores.  -Skin: As above in HPI. No additional skin concerns.    Physical exam:  Vitals: /78 (BP Location: Right arm, Patient Position: Sitting, Cuff Size: Adult Regular)   Pulse 78   GEN: This is a well developed, well-nourished female in no acute distress, in a pleasant mood.    SKIN: Total skin excluding the undergarment areas was performed. The exam included the head/face, neck, both arms, chest, back, abdomen, both legs, digits and/or nails.   -Pryor skin type: II  -Scalp with mild erythema  -Periorbital areas, temples, lateral forehead with pink erythema and scale.  Poikiloderma of the right temple.  -Left posterior neck with red erythema and poikiloderma.  -Pink erythema and telangiectasias on the lateral thighs.  -Pink erythema with some subtle papules and scale on the digits.  -Many periungual telangiectasias.  -Arms and remainder of legs clear.  -No other lesions of concern on areas examined.     Impression/Plan:  1. Dermatomyositis: now improving on mycophenolate. Continues to have some muscle stiffness and joint pains as well as mental fog. Moderate skin findings today including Gottron's papules of the hands.  Patient's brain fog may be related to the mycophenolate, however expect this to improve over time.  She will discuss her mood changes with her PCP.  -Continue mycophenolate 500 mg twice daily, can consider decreasing dose to 250 mg twice or once daily once disease is stabilized  -Continue hydroxychloroquine 200 mg daily  -Continue  betamethasone cream to hands twice daily as needed  -Continue clobetasol solution to scalp twice daily as needed  -CBC, CMP, and CK today  -Follow-up in 3 months    2. Intraductal papillary mucinous neoplasm of the pancreas  - Referral to GI placed today       CC Shekhar Garcias MD  [address] [place] [address] on close of this encounter.  Follow-up in 3 months, earlier for new or changing lesions.     Dr. Garcias  staffed the patient.    Staff Involved:  Beatriz Fair MD (PGY2)/Staff    Staff Physician Comments:   I saw and evaluated the patient with the resident and I edited the assessment and plan as documented in the note. I was present for the examination.    Shekhar Garcias MD   of Dermatology  Department of Dermatology  [facility]    Cutaneous Dermatomyositis Disease Area and Severity Index (CDASI) ver02    Activity     Damage    Anatomic location Erythema Scale Erosion/  ulceration  Poikiloderma (dyspigmentation or telangiectasia) Calcinosis    0-absent  1-pink; faint erythema  2-red  3-dark red 0-absent  1-scale  2-crust; lichenification 0-absent  1-present  0-absent  1-present 0-absent  1-present   Scalp 1 0 0  0 0   Malar area 1 0 0  1 0   Periorbital 0 0 0  1 0   Rest of face 1 1 0  1 0   V-area neck (frontal) 1 0 0  1 0   Posterior neck 2 0 0  1 0   Upper back & shoulders 2 1 0  1 0   Rest of back & buttocks 0 0 0  0 0   Abdomen 0 0 0  0 0   Lateral upper thigh 1 0 0  0 0   Rest of leg & feet 0 0 0  0 0   Arm 1 0 0  0 0   's hand 0 1 0  0 0   Dorsum of hands (not over joints) 1 0 0  0 0   Gottron's - not on hands 1 1 0  1 0            Gottron's - hands  - Activity: double erythema score if papules present  - Damage: 0-absent, 1-dyspigmentation;  2-scarring 1x2  0  1    Periungual changes  0-absent  1-pink; red erythema/ microscopic telangiectasias  2-visible telangiectasia 2 0 0      Alopecia (last 30d as reported by  patient)  0-absent  1-present 1 0 0        Total activity score: 21    Total damage score: 8

## 2020-01-03 NOTE — PROGRESS NOTES
"[facility] Dermatology Note      Dermatology Problem List:  1. 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  2. History of IgA deficiency  3. History of BCC on back s/p excision (2016)    Encounter Date: Chaz 3, 2020    CC:   Chief Complaint   Patient presents with     Derm Problem     Dermatomyositis - Mely says there has been improvement but taking longer than she thought it would         History of Present Illness:  Ms. Mely Rashid is a 74 year old female who presents today for follow-up of her classic dermatomyositis.  Since her last visit on 11/7/19, she has been taking mycophenolate 500 mg twice daily and hydroxychloroquine 200 mg daily as advised.  She is using the betamethasone cream only once daily as she frequently washes her hands during the day.  She uses the clobetasol solution only intermittently, less than once a week when she feels scalp symptoms.    Has been having some balance issues and brain fog since re-starting the mycophenolate. In retrospect, she believes that she had similar symptoms when she was previously on mycophenolate. Also having an \"achy, heavy feeling\" in the head, which she did not have previously. Has not been severe enough to require OTC pain medication. Does not necessarily interfere with activities of daily living, but is always present. Denies any falls, just has to hold on to the rails when walking down the stairs. Denies any GI distress, increased infections, or vision changes. Has regular follow up with Ophthalmology. Joint pains and muscle pains wax " and wane.  Overall, she feels that her symptoms are improving since starting the mycophenolate.  However, she continues to have some depressed mood as she feels that she may never fully improved.  She lives alone with her dog but has family and friends who are supportive.  She denies any SI or HI.  Has previously tried a short course of oral antidepressant therapy and is not interested in that at this time.  She will see her PCP soon and will bring of her mood changes at that visit.    No other concerns today.      Past Medical History:   Patient Active Problem List   Diagnosis     Glaucoma, right     Dermatomyositis (H)     IgA deficiency (H)     Herpes zoster     Encounter for long-term current use of medication     Encounter for long-term (current) use of steroids     Oral ulcer     Seborrheic dermatitis     Osteopenia     Neoplasm of uncertain behavior of skin     Routine general medical examination at a health care facility     Past Medical History:   Diagnosis Date     Basal cell carcinoma      Dermatomyositis (H)      Glaucoma      IgA deficiency (H)      Nonsenile cataract      Past Surgical History:   Procedure Laterality Date     BIOPSY OF SKIN LESION       [facility] UGI ENDOSCOPY W EUS N/A 5/14/2014    Procedure: COMBINED ENDOSCOPIC ULTRASOUND, ESOPHAGOSCOPY, GASTROSCOPY, DUODENOSCOPY (EGD);  Surgeon: Boston Rodriguez MD;  Location: [facility]     HYSTERECTOMY      partial, prolapse uterus.       Social History:  Patient reports that she quit smoking about 40 years ago. She has never used smokeless tobacco. She reports current alcohol use. She reports that she does not use drugs.    Family History:  Family History   Problem Relation Age of Onset     C.A.D. Father      Arthritis Father      Diabetes Brother      Glaucoma Brother      Breast Cancer Sister 35     Glaucoma Sister      Neurologic Disorder Sister         seizures     Cancer Sister         breast     Psoriasis Daughter      Rheumatologic Disease  Daughter         Dermatomyositis     Alzheimer Disease Mother         onset in 60s     Hypertension No family hx of      Cerebrovascular Disease No family hx of      Thyroid Disease No family hx of      Skin Cancer No family hx of      Macular Degeneration No family hx of        Medications:  Current Outpatient Medications   Medication Sig Dispense Refill     augmented betamethasone dipropionate (DIPROLENE-AF) 0.05 % external cream Apply topically 2 times daily For hands only 150 g 3     B Complex TABS Take 1 tablet by mouth daily.       Calcium Carb-Cholecalciferol (CALCIUM 500 +D PO) Take 3 tablets by mouth daily.       Cholecalciferol (VITAMIN D) 1000 UNIT capsule Take 1 capsule by mouth daily.       clobetasol (TEMOVATE) 0.05 % external solution Apply topically 2 times daily For scalp 150 mL 3     desonide (DESOWEN) 0.05 % cream Apply topically 2 times daily as needed (rash) 30 g 2     diazepam (VALIUM) 5 MG tablet Take 1 tablet (5 mg) by mouth once as needed for anxiety or sleep (Take one on arrival for MRI. May repeat in 15 min if inadequate relief.) 2 tablet 0     glucosamine-chondroitinoitin (GLUCOSAMINE CHONDR COMPLEX) 500-400 MG CAPS Take 2 capsules by mouth daily.       hydroxychloroquine (PLAQUENIL) 200 MG tablet Take 1 tablet (200 mg) by mouth daily 90 tablet 1     latanoprost (XALATAN) 0.005 % ophthalmic solution Place 1 drop into the right eye At Bedtime 3 Bottle 11     loperamide (IMODIUM A-D) 2 MG tablet Take 2 mg by mouth every morning       mycophenolate (GENERIC EQUIVALENT) 500 MG tablet Take 1 tablet (500 mg) by mouth 2 times daily 180 tablet 3     omega-3 fatty acids (FISH OIL) 1200 MG capsule Take 1 capsule by mouth daily.       vitamin C w/JD HIPS 500 MG tablet        vitamin E 400 UNIT capsule Take by mouth daily       predniSONE (DELTASONE) 10 MG tablet Take 3 tablets (30 mg) by mouth daily (Patient not taking: Reported on 1/3/2020) 90 tablet 1        Allergies   Allergen Reactions      Penicillins Anaphylaxis       Review of Systems:  -As per HPI  -Constitutional: Otherwise feeling well today, in usual state of health.  -HEENT: Patient denies nonhealing oral sores.  -Skin: As above in HPI. No additional skin concerns.    Physical exam:  Vitals: /78 (BP Location: Right arm, Patient Position: Sitting, Cuff Size: Adult Regular)   Pulse 78   GEN: This is a well developed, well-nourished female in no acute distress, in a pleasant mood.    SKIN: Total skin excluding the undergarment areas was performed. The exam included the head/face, neck, both arms, chest, back, abdomen, both legs, digits and/or nails.   -Pryor skin type: II  -Scalp with mild erythema  -Periorbital areas, temples, lateral forehead with pink erythema and scale.  Poikiloderma of the right temple.  -Left posterior neck with red erythema and poikiloderma.  -Pink erythema and telangiectasias on the lateral thighs.  -Pink erythema with some subtle papules and scale on the digits.  -Many periungual telangiectasias.  -Arms and remainder of legs clear.  -No other lesions of concern on areas examined.     Impression/Plan:  1. Dermatomyositis: now improving on mycophenolate. Continues to have some muscle stiffness and joint pains as well as mental fog. Moderate skin findings today including Gottron's papules of the hands.  Patient's brain fog may be related to the mycophenolate, however expect this to improve over time.  She will discuss her mood changes with her PCP.  -Continue mycophenolate 500 mg twice daily, can consider decreasing dose to 250 mg twice or once daily once disease is stabilized  -Continue hydroxychloroquine 200 mg daily  -Continue betamethasone cream to hands twice daily as needed  -Continue clobetasol solution to scalp twice daily as needed  -CBC, CMP, and CK today  -Follow-up in 3 months    2. Intraductal papillary mucinous neoplasm of the pancreas  - Referral to GI placed today       CC Shekhar Garcias,  MD  [address] [place] [id number] on close of this encounter.  Follow-up in 3 months, earlier for new or changing lesions.     Dr. Garcias  staffed the patient.    Staff Involved:  Beatriz Fair MD (PGY2)/Staff    Staff Physician Comments:   I saw and evaluated the patient with the resident and I edited the assessment and plan as documented in the note. I was present for the examination.    Shekhar Garcias MD   of Dermatology  Department of Dermatology  [facility]

## 2020-01-06 ENCOUNTER — MYC MEDICAL ADVICE (OUTPATIENT)
Dept: INTERNAL MEDICINE | Facility: CLINIC | Age: 75
End: 2020-01-06

## 2020-01-06 DIAGNOSIS — Z13.220 SCREENING FOR HYPERLIPIDEMIA: Primary | ICD-10-CM

## 2020-01-07 ENCOUNTER — OFFICE VISIT (OUTPATIENT)
Dept: OPHTHALMOLOGY | Facility: CLINIC | Age: 75
End: 2020-01-07
Attending: OPHTHALMOLOGY
Payer: COMMERCIAL

## 2020-01-07 ENCOUNTER — CARE COORDINATION (OUTPATIENT)
Dept: GASTROENTEROLOGY | Facility: CLINIC | Age: 75
End: 2020-01-07

## 2020-01-07 DIAGNOSIS — F40.240 CLAUSTROPHOBIA: ICD-10-CM

## 2020-01-07 DIAGNOSIS — Z79.899 ENCOUNTER FOR LONG-TERM (CURRENT) USE OF HIGH-RISK MEDICATION: ICD-10-CM

## 2020-01-07 DIAGNOSIS — K86.2 PANCREAS CYST: Primary | ICD-10-CM

## 2020-01-07 PROCEDURE — 92082 INTERMEDIATE VISUAL FIELD XM: CPT | Mod: ZF | Performed by: OPHTHALMOLOGY

## 2020-01-07 PROCEDURE — 92134 CPTRZ OPH DX IMG PST SGM RTA: CPT | Mod: ZF | Performed by: OPHTHALMOLOGY

## 2020-01-07 PROCEDURE — G0463 HOSPITAL OUTPT CLINIC VISIT: HCPCS | Mod: ZF

## 2020-01-07 PROCEDURE — 92250 FUNDUS PHOTOGRAPHY W/I&R: CPT | Mod: ZF | Performed by: OPHTHALMOLOGY

## 2020-01-07 RX ORDER — DIAZEPAM 5 MG
5 TABLET ORAL ONCE
Qty: 2 TABLET | Refills: 0 | Status: SHIPPED | OUTPATIENT
Start: 2020-01-07 | End: 2020-01-07

## 2020-01-07 ASSESSMENT — REFRACTION_WEARINGRX
OS_CYLINDER: +0.50
OS_SPHERE: PLANO
OD_SPHERE: +0.25
OD_ADD: +2.25
OD_CYLINDER: SPHERE
SPECS_TYPE: PAL
OS_ADD: +2.25
OS_AXIS: 175

## 2020-01-07 ASSESSMENT — TONOMETRY
OS_IOP_MMHG: 19
OD_IOP_MMHG: 20
IOP_METHOD: TONOPEN

## 2020-01-07 ASSESSMENT — CONF VISUAL FIELD
METHOD: COUNTING FINGERS
OS_NORMAL: 1
OD_NORMAL: 1

## 2020-01-07 ASSESSMENT — SLIT LAMP EXAM - LIDS
COMMENTS: NORMAL
COMMENTS: NORMAL

## 2020-01-07 ASSESSMENT — VISUAL ACUITY
CORRECTION_TYPE: GLASSES
OS_CC: 20/20
OD_CC+: -1
OD_CC: 20/20
METHOD: SNELLEN - LINEAR

## 2020-01-07 ASSESSMENT — EXTERNAL EXAM - LEFT EYE: OS_EXAM: NORMAL

## 2020-01-07 ASSESSMENT — CUP TO DISC RATIO
OS_RATIO: 0.4
OD_RATIO: 0.75

## 2020-01-07 ASSESSMENT — EXTERNAL EXAM - RIGHT EYE: OD_EXAM: NORMAL

## 2020-01-07 NOTE — NURSING NOTE
Chief Complaints and History of Present Illnesses   Patient presents with     Retinal Evaluation     Retina Eval for plaquenil use     Chief Complaint(s) and History of Present Illness(es)     Retinal Evaluation     Laterality: both eyes    Course: stable    Associated symptoms: tearing.  Negative for eye pain, discharge, dryness, flashes and floaters    Treatments tried: eye drops    Pain scale: 0/10    Comments: Retina Eval for plaquenil use              Comments     Pt denies any significant vision changes in either eye since last visit.  Pt notes she has been on plaquenil since 2012.  Denies any flashes, floaters, pain, pressure, irritation, and discharge.  Currently using Latanoprost at bedtime LILIBETH Giraldo OT 10:28 AM January 7, 2020

## 2020-01-07 NOTE — PROGRESS NOTES
Care Coordination Telephone Call  [facility]   Called patient to discuss plan for follow up that was due in 2017 with Dr. Rodriguez with MRI of pancreas and clinic visit.     Per patient she remembered that it was due but unpleasant to have MRI and since we didn't call she decided maybe not necessary.    I have asked the patient to call with any additional questions or concerns and have provided my contact information.    Plan:  MRI with Valium for claustrophobia - prescription called to [facility] per patient request  Clinic visit with Dr. Jennifer Gaffney  BSN, HNBC, STAR-T  RN Care Coordinator  [facility]  Ph: [phone]  FAX: [phone]

## 2020-01-07 NOTE — PROGRESS NOTES
"CC -   plaquenil    INTERVAL HISTORY -   VA stable, plaquenil reduced to 200/day still, last saw me 2 years ago    HPI -   Mely Rashid is a  74 year old year-old patient presenting for plaquenil.  400 mg/day since 2011-8/2016, then 200/day since (brief period of 300/day)  height 5'6\", weigth 145#., no renal disease  H/o dermatomyositis, on plaquenil and cellcept.  Sees Isaiah.    Retired surgery scheduler for ENT @ [employer]    PAST OCULAR HISTORY  No surgeries    RETINAL IMAGING  OCT   1-7-20  OD-  Mild difuse outer changes, ?PVD  OS -  Mild diffuse outer changes, ?PVD    AF  1-7-20  OU - mild irregular, stable    OVF 10-2   1-7-20  OD - reliable, several spots worse from 2017  OS - reliable, few spots, worse from 2017    mfERG 8/14/17  OU - normal      ASSESSMENT & PLAN    1.  Plaquenil use since 2011   - 400/day till 8/2016, then 200/day height 5'6\", weight 145#     - last mfERG 8/2017 normal   - mild irregularity on OVF 10-2, OCT, and AF    - ?worse OVF 10-2 OD   - ?d/t age, not typical for plaquenil   - repeat mfERG 3 monts   - return to see me in 4 months with repeat testing       - will need to repeat mfERG every 2 years or so given difficulty interpreting other tests    2.   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  Glaucoma OU   - sees Immanuel   - on xalatan   - has right APD likely d/t glaucoma    4.  Tr NS OU      return to clinic: 4 months mfERG, repeat OCT/FAF/OVF 10-2       ATTESTATION     Attending Physician Attestation:      Complete documentation of historical and exam elements from today's encounter can be found in the full encounter summary report (not reduplicated in this progress note).  I personally obtained the chief complaint(s) and history of present illness.  I confirmed and edited as necessary the review of systems, past medical/surgical history, family history, social history, and examination findings as documented by others; and I examined the patient myself.  I " personally reviewed the relevant tests, images, and reports as documented above.  I formulated and edited as necessary the assessment and plan and discussed the findings and management plan with the patient and family    Shakira Chamberlain MD, PhD  , Vitreoretinal Surgery  Department of Ophthalmology  [facility]

## 2020-01-09 ENCOUNTER — PRE VISIT (OUTPATIENT)
Dept: INTERNAL MEDICINE | Facility: CLINIC | Age: 75
End: 2020-01-09

## 2020-01-13 DIAGNOSIS — Z13.220 SCREENING FOR HYPERLIPIDEMIA: ICD-10-CM

## 2020-01-13 LAB
CHOLEST SERPL-MCNC: 207 MG/DL
HDLC SERPL-MCNC: 63 MG/DL
LDLC SERPL CALC-MCNC: 127 MG/DL
NONHDLC SERPL-MCNC: 144 MG/DL
TRIGL SERPL-MCNC: 85 MG/DL

## 2020-01-13 PROCEDURE — 36415 COLL VENOUS BLD VENIPUNCTURE: CPT | Performed by: NURSE PRACTITIONER

## 2020-01-13 PROCEDURE — 80061 LIPID PANEL: CPT | Performed by: NURSE PRACTITIONER

## 2020-01-13 ASSESSMENT — ENCOUNTER SYMPTOMS
DEPRESSION: 1
NECK MASS: 0
SINUS CONGESTION: 0
WEAKNESS: 1
PANIC: 0
ARTHRALGIAS: 1
SPEECH CHANGE: 0
BOWEL INCONTINENCE: 0
NAUSEA: 1
MYALGIAS: 1
BACK PAIN: 0
CONSTIPATION: 0
SMELL DISTURBANCE: 0
JOINT SWELLING: 1
BLOATING: 0
HEARTBURN: 1
TREMORS: 0
NUMBNESS: 0
RECTAL PAIN: 0
NAIL CHANGES: 1
MUSCLE WEAKNESS: 1
HOARSE VOICE: 0
HEADACHES: 0
MEMORY LOSS: 0
ABDOMINAL PAIN: 0
VOMITING: 0
DISTURBANCES IN COORDINATION: 0
NERVOUS/ANXIOUS: 0
NECK PAIN: 0
DIARRHEA: 0
TINGLING: 0
LOSS OF CONSCIOUSNESS: 0
SKIN CHANGES: 0
TASTE DISTURBANCE: 0
POOR WOUND HEALING: 0
MUSCLE CRAMPS: 0
SINUS PAIN: 0
DECREASED CONCENTRATION: 1
DIZZINESS: 1
STIFFNESS: 1
INSOMNIA: 1
SEIZURES: 0
TROUBLE SWALLOWING: 0
PARALYSIS: 0
SORE THROAT: 0
JAUNDICE: 0
BLOOD IN STOOL: 0

## 2020-01-13 ASSESSMENT — ACTIVITIES OF DAILY LIVING (ADL)
IN_THE_PAST_7_DAYS,_DID_YOU_NEED_HELP_FROM_OTHERS_TO_TAKE_CARE_OF_THINGS_SUCH_AS_LAUNDRY_AND_HOUSEKEEPING,_BANKING,_SHOPPING,_USING_THE_TELEPHONE,_FOOD_PREPARATION,_TRANSPORTATION,_OR_TAKING_YOUR_OWN_MEDICATIONS?: N
IN_THE_PAST_7_DAYS,_DID_YOU_NEED_HELP_FROM_OTHERS_TO_PERFORM_EVERYDAY_ACTIVITIES_SUCH_AS_EATING,_GETTING_DRESSED,_GROOMING,_BATHING,_WALKING,_OR_USING_THE_TOILET: N

## 2020-01-14 ENCOUNTER — OFFICE VISIT (OUTPATIENT)
Dept: INTERNAL MEDICINE | Facility: CLINIC | Age: 75
End: 2020-01-14
Payer: COMMERCIAL

## 2020-01-14 VITALS
OXYGEN SATURATION: 97 % | DIASTOLIC BLOOD PRESSURE: 70 MMHG | HEART RATE: 88 BPM | SYSTOLIC BLOOD PRESSURE: 113 MMHG | BODY MASS INDEX: 22.8 KG/M2 | WEIGHT: 137 LBS

## 2020-01-14 DIAGNOSIS — Z12.11 SCREENING FOR COLON CANCER: Primary | ICD-10-CM

## 2020-01-14 DIAGNOSIS — Z13.820 SCREENING FOR OSTEOPOROSIS: ICD-10-CM

## 2020-01-14 DIAGNOSIS — M85.80 OSTEOPENIA, UNSPECIFIED LOCATION: ICD-10-CM

## 2020-01-14 DIAGNOSIS — Z78.0 ASYMPTOMATIC MENOPAUSAL STATE: ICD-10-CM

## 2020-01-14 ASSESSMENT — PAIN SCALES - GENERAL: PAINLEVEL: NO PAIN (0)

## 2020-01-14 NOTE — PROGRESS NOTES
[facility]  Primary Care Center   Gavi CHAVESAurora Tawanna, ROSHAN CNP  01/14/2020      Chief Complaint:   Physical    History of Present Illness:   Mely Rashid is a 74 year old female with a history of basal cell carcinoma, glaucoma, dermatomyositis, osteopenia, and seborrheic dermatitis who presents for a physical.    Colitis: She used to have diarrhea, but has improved over the past 6 months. She attributes this possibly to medications or lessened stress since her daughter has moved out. She last colonoscopy with polyps removed was in 2011 and showed microscopic colitis.    Eye: She has glaucoma in one eye and is on Plaquenil 200 mg per day. She follows up with Dr. Chamberlain at ophthalmology. Last visit was last week.     Dermatomyositis: She follows up with Dr. Garcias for itchiness and pain on her hands and scalp. She is managing with mycophenolate 500 mg twice daily, hydroxychloroquine 200 mg daily, betamethasone cream to hands twice daily, clobetasol solution to scalp twice daily.     She is also seeing rheumatologist Dr. Colon to help monitor. Has had flare ups recently causing pain and was given Prednisone. 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 She is now only taking mycophenolate 500 mg twice daily. Per patient the only consequence of not taking the prednisone is that the mycophenolate will take longer to improve symptoms. She was unable to receive Zoster due to it being a live vaccine, but will discuss with Dr. Colon about whether or not she can receive Shingrix.    Mood: She has a history of feeling depressed and has been prescribed antidepressants in the past. However, she only took them for a few days and stopped because she did not believed she felt bad enough. She believes the low mood may be due to issues with her daughter, which has since improved.    Health Maintenance:  - Last mammogram 12/16/2019. Mammogram was normal.  - Last  colonoscopy at [facility] with polyps removed 2011. It showed microscopic colitis. Due.  - Has not had Shingrix. She is unable to have live vaccines.  - Immunizations up to date  - Eye exams up to date. She visits often and her last visit was last week  - Dental up to date. Visits every 6 months.  - Last bone density test 2016, which showed low bone density. - On calcium, vitamin D, B complex. Also receives Ca through diet.     Other concerns discussed:  - Recent blood work done 1/3/2020 was discussed. Results were normal other than LDL at 127.   - She has an MRI soon for her pancreas to follow-up on pancreatic cyst and was prescribed Valium for the imaging.   - She has been exercising on an indoor bike.  - She has lost weight from 145 lbs -> 137 lbs unintentionally.     Review of Systems:   Pertinent items are noted in HPI or as in patient entered ROS below, remainder of complete ROS is negative.  Answers for HPI/ROS submitted by the patient on 1/13/2020   General Symptoms: No  Skin Symptoms: Yes  HENT Symptoms: Yes  EYE SYMPTOMS: No  HEART SYMPTOMS: No  LUNG SYMPTOMS: No  INTESTINAL SYMPTOMS: Yes  URINARY SYMPTOMS: No  GYNECOLOGIC SYMPTOMS: No  BREAST SYMPTOMS: No  SKELETAL SYMPTOMS: Yes  BLOOD SYMPTOMS: No  NERVOUS SYSTEM SYMPTOMS: Yes  MENTAL HEALTH SYMPTOMS: Yes  Changes in hair: Yes  Changes in moles/birth marks: No  Itching: Yes  Rashes: Yes  Changes in nails: Yes  Acne: No  Hair in places you don't want it: No  Change in facial hair: No  Warts: No  Non-healing sores: No  Scarring: No  Flaking of skin: No  Color changes of hands/feet in cold : No  Sun sensitivity: No  Skin thickening: No  Ear pain: No  Ear discharge: No  Hearing loss: No  Tinnitus: No  Nosebleeds: No  Congestion: No  Sinus pain: No  Trouble swallowing: No   Voice hoarseness: No  Mouth sores: No  Sore throat: No  Tooth pain: No  Gum tenderness: Yes  Bleeding gums: Yes  Change in taste: No  Change in sense of smell: No  Dry mouth:  Yes  Hearing aid used: No  Neck lump: No  Heart burn or indigestion: Yes  Nausea: Yes  Vomiting: No  Abdominal pain: No  Bloating: No  Constipation: No  Diarrhea: No  Blood in stool: No  Black stools: No  Rectal or Anal pain: No  Fecal incontinence: No  Yellowing of skin or eyes: No  Vomit with blood: No  Change in stools: No  Back pain: No  Muscle aches: Yes  Neck pain: No  Swollen joints: Yes  Joint pain: Yes  Bone pain: No  Muscle cramps: No  Muscle weakness: Yes  Joint stiffness: Yes  Bone fracture: No  Trouble with coordination: No  Dizziness or trouble with balance: Yes  Fainting or black-out spells: No  Memory loss: No  Headache: No  Seizures: No  Speech problems: No  Tingling: No  Tremor: No  Weakness: Yes  Difficulty walking: No  Paralysis: No  Numbness: No  Nervous or Anxious: No  Depression: Yes  Trouble sleeping: Yes  Trouble thinking or concentrating: Yes  Mood changes: No  Panic attacks: No       Active Medications:      augmented betamethasone dipropionate (DIPROLENE-AF) 0.05 % external cream, Apply topically 2 times daily For hands only, Disp: 150 g, Rfl: 3     B Complex TABS, Take 1 tablet by mouth daily., Disp: , Rfl:      Calcium Carb-Cholecalciferol (CALCIUM 500 +D PO), Take 3 tablets by mouth daily., Disp: , Rfl:      Cholecalciferol (VITAMIN D) 1000 UNIT capsule, Take 1 capsule by mouth daily., Disp: , Rfl:      clobetasol (TEMOVATE) 0.05 % external solution, Apply topically 2 times daily For scalp, Disp: 150 mL, Rfl: 3     desonide (DESOWEN) 0.05 % cream, Apply topically 2 times daily as needed (rash), Disp: 30 g, Rfl: 2     diazepam (VALIUM) 5 MG tablet, Take 1 tablet (5 mg) by mouth once as needed for anxiety or sleep (Take one on arrival for MRI. May repeat in 15 min if inadequate relief.), Disp: 2 tablet, Rfl: 0     glucosamine-chondroitinoitin (GLUCOSAMINE CHONDR COMPLEX) 500-400 MG CAPS, Take 2 capsules by mouth daily., Disp: , Rfl:      hydroxychloroquine (PLAQUENIL) 200 MG tablet, Take  1 tablet (200 mg) by mouth daily, Disp: 90 tablet, Rfl: 1     latanoprost (XALATAN) 0.005 % ophthalmic solution, Place 1 drop into the right eye At Bedtime, Disp: 3 Bottle, Rfl: 11     loperamide (IMODIUM A-D) 2 MG tablet, Take 2 mg by mouth every morning, Disp: , Rfl:      mycophenolate (GENERIC EQUIVALENT) 500 MG tablet, Take 1 tablet (500 mg) by mouth 2 times daily, Disp: 180 tablet, Rfl: 3     omega-3 fatty acids (FISH OIL) 1200 MG capsule, Take 1 capsule by mouth daily., Disp: , Rfl:      predniSONE (DELTASONE) 10 MG tablet, Take 3 tablets (30 mg) by mouth daily, Disp: 90 tablet, Rfl: 1     vitamin C w/JD HIPS 500 MG tablet, , Disp: , Rfl:      vitamin E 400 UNIT capsule, Take by mouth daily, Disp: , Rfl:       Allergies:   Penicillins      Past Medical History:  Basal cell carcinoma  Dermatomyositis  Glaucoma  IgA deficiency  nonsenile cataract  Herpes zoster  Oral ulcer  Seborrheic dermatitis  osteopenia     Past Surgical History:  Biopsy of skin lesion  UGI endoscopy w EUS  Partial hysterectomy    Family History:   CAD - father  Arthritis - father  Diabetes - brother  glaucoma - brother, sister  Seizures - sister  Breast cancer - sister  Dermatomyositis - daughter  Alzheimer disease - mother     Immunizations:  Immunization History   Administered Date(s) Administered     Flu 65+ Years 09/28/2018, 10/04/2019     Influenza (H1N1) 11/13/2009     Influenza (High Dose) 3 valent vaccine 09/24/2015, 10/23/2017     Influenza (IIV3) PF 09/28/2009, 10/01/2011, 10/23/2014     Pneumo Conj 13-V (2010&after) 12/31/2015     Pneumococcal 23 valent 06/10/2014     Tdap (Adacel,Boostrix) 03/20/2009, 03/12/2012          Social History:   This patient presented alone.  Smoking status: former smoker, quit 1985  Smokeless tobacco: no  Alcohol use: rarely  Drug use: no     Physical Exam:   /70 (BP Location: Right arm, Patient Position: Sitting, Cuff Size: Adult Regular)   Pulse 88   Wt 62.1 kg (137 lb)   SpO2 97%   BMI  22.80 kg/m     Wt Readings from Last 1 Encounters:   01/14/20 62.1 kg (137 lb)     Constitutional: no distress, comfortable, pleasant   Eyes: anicteric, conjunctiva pink, glasses.   Ears, Nose and Throat: tympanic membranes clear,  throat clear.   Neck: supple with full range of motion, no thyromegaly.   Cardiovascular: regular rate and rhythm, normal S1 and S2, no murmurs, rubs or gallops, peripheral pulses full and symmetric   Respiratory: clear to auscultation, no wheezes or crackles, normal breath sounds   Gastrointestinal: positive bowel sounds, nontender, no hepatosplenomegaly, no masses.   Breasts: No masses.  Musculoskeletal: full range of motion, no edema   Skin: no concerning lesions, no jaundice, temp normal   Neurological:  normal speech, no tremor. A and O x 3, good historian.  Psychological: appropriate mood, good eye contact, normal affect   Lymph: no axillary, cervical,  supraclavicular, infraclavicular or inguinal nodes.     Blood work 1/3/2020  Component      Latest Ref Rng & Units 1/3/2020   WBC      4.0 - 11.0 10e9/L 4.1   RBC Count      3.8 - 5.2 10e12/L 4.53   Hemoglobin      11.7 - 15.7 g/dL 13.6   Hematocrit      35.0 - 47.0 % 43.4   MCV      78 - 100 fl 96   MCH      26.5 - 33.0 pg 30.0   MCHC      31.5 - 36.5 g/dL 31.3 (L)   RDW      10.0 - 15.0 % 13.4   Platelet Count      150 - 450 10e9/L 187   Diff Method       Automated Method   % Neutrophils      % 63.3   % Lymphocytes      % 18.5   % Monocytes      % 15.6   % Eosinophils      % 1.7   % Basophils      % 0.7   % Immature Granulocytes      % 0.2   Nucleated RBCs      0 /100 0   Absolute Neutrophil      1.6 - 8.3 10e9/L 2.6   Absolute Lymphocytes      0.8 - 5.3 10e9/L 0.8   Absolute Monocytes      0.0 - 1.3 10e9/L 0.6   Absolute Eosinophils      0.0 - 0.7 10e9/L 0.1   Absolute Basophils      0.0 - 0.2 10e9/L 0.0   Abs Immature Granulocytes      0 - 0.4 10e9/L 0.0   Absolute Nucleated RBC       0.0   Sodium      133 - 144 mmol/L 140    Potassium      3.4 - 5.3 mmol/L 4.1   Chloride      94 - 109 mmol/L 108   Carbon Dioxide      20 - 32 mmol/L 28   Anion Gap      3 - 14 mmol/L 4   Glucose      70 - 99 mg/dL 86   Urea Nitrogen      7 - 30 mg/dL 10   Creatinine      0.52 - 1.04 mg/dL 0.50 (L)   GFR Estimate      >60 mL/min/1.73:m2 >90   GFR Estimate If Black      >60 mL/min/1.73:m2 >90   Calcium      8.5 - 10.1 mg/dL 9.4   Bilirubin Total      0.2 - 1.3 mg/dL 0.8   Albumin      3.4 - 5.0 g/dL 4.2   Protein Total      6.8 - 8.8 g/dL 6.8   Alkaline Phosphatase      40 - 150 U/L 48   ALT      0 - 50 U/L 40   AST      0 - 45 U/L 36   Cholesterol      <200 mg/dL 216 (H)   Triglycerides      <150 mg/dL 91   HDL Cholesterol      >49 mg/dL 67   LDL Cholesterol Calculated      <100 mg/dL 131 (H)   Non HDL Cholesterol      <130 mg/dL 150 (H)   CRP Inflammation      0.0 - 8.0 mg/L <2.9   CK Total      30 - 225 U/L 121       Mammogram 12/16/2019  There is no mammographic evidence of malignancy.  NEGATIVE     Assessment and Plan:  Screening for colon cancer  Last colonoscopy with polyps removed was in 2011. It showed microscopic colititis. Order sent for colonoscopy.  - GASTROENTEROLOGY ADULT REF PROCEDURE ONLY    Osteopenia, unspecified location  Screening for osteoporosis  Asymptomatic menopausal state  Last bone density scan 2016, results showed low one density but no osteoporosis. Sent in order for DEXA scan.  - Dexa hip/pelvis/spine     Discuss Shingrix with rheumatologist.     Total time spent 40 minutes.  More than 50% of the time spent with Ms. Rashid on counseling / coordinating her care.    Gavi ISLAS CNP      Scribe Disclosure:  I, Michelle Nieves and Ezio Snider, am serving as a scribe to document services personally performed by ROSHAN Pavon CNP at this visit, based upon the provider's statements to me. All documentation has been reviewed by the aforementioned provider prior to being entered into the official medical  record.     Portions of this medical record were completed by a scribe. UPON MY REVIEW AND AUTHENTICATION BY ELECTRONIC SIGNATURE, this confirms (a) I performed the applicable clinical services, and (b) the record is accurate.

## 2020-01-14 NOTE — NURSING NOTE
Chief Complaint   Patient presents with     Physical     pt here for physical       Willie Mckeon CMA, EMT at 3:30 PM on 1/14/2020.

## 2020-01-14 NOTE — PATIENT INSTRUCTIONS
[facility] Medication Refill Request Information:  * Please contact your pharmacy regarding ANY request for medication refills.  ** [facility] Prescription Fax = [phone]  * Please allow 3 business days for routine medication refills.  * Please allow 5 business days for controlled substance medication refills.     [facility] Test Result notification information:  *You will be notified with in 7-10 days of your appointment day regarding the results of your test.  If you are on MyChart you will be notified as soon as the provider has reviewed the results and signed off on them.    [facility]: [phone]

## 2020-01-15 ENCOUNTER — DOCUMENTATION ONLY (OUTPATIENT)
Dept: OTHER | Facility: CLINIC | Age: 75
End: 2020-01-15

## 2020-01-15 ENCOUNTER — TELEPHONE (OUTPATIENT)
Dept: GASTROENTEROLOGY | Facility: CLINIC | Age: 75
End: 2020-01-15

## 2020-02-13 ENCOUNTER — ANCILLARY PROCEDURE (OUTPATIENT)
Dept: MRI IMAGING | Facility: CLINIC | Age: 75
End: 2020-02-13
Attending: INTERNAL MEDICINE
Payer: COMMERCIAL

## 2020-02-13 DIAGNOSIS — K86.2 PANCREAS CYST: ICD-10-CM

## 2020-02-13 RX ORDER — GADOBUTROL 604.72 MG/ML
7.5 INJECTION INTRAVENOUS ONCE
Status: COMPLETED | OUTPATIENT
Start: 2020-02-13 | End: 2020-02-13

## 2020-02-13 RX ADMIN — GADOBUTROL 6 ML: 604.72 INJECTION INTRAVENOUS at 16:00

## 2020-02-13 NOTE — DISCHARGE INSTRUCTIONS
MRI Contrast Discharge Instructions    The IV contrast you received today will pass out of your body in your  urine. This will happen in the next 24 hours. You will not feel this process.  Your urine will not change color.    Drink at least 4 extra glasses of water or juice today (unless your doctor  has restricted your fluids). This reduces the stress on your kidneys.  You may take your regular medicines.    If you are on dialysis: It is best to have dialysis today.    If you have a reaction: Most reactions happen right away. If you have  any new symptoms after leaving the hospital (such as hives or swelling),  call your hospital at the correct number below. Or call your family doctor.  If you have breathing distress or wheezing, call 911.    Special instructions: ***    I have read and understand the above information.    Signature:______________________________________ Date:___________    Staff:__________________________________________ Date:___________     Time:__________    [facility] Departments:    ___[place]: [phone]  ___[facility]: [phone]  ___[place]: [phone] ___[facility]: [phone]  ___[facility]: [phone]  ___[facility]: [phone]  ___[phone]  ___[facility]: [phone]  ___[phone]

## 2020-02-17 ENCOUNTER — OFFICE VISIT (OUTPATIENT)
Dept: GASTROENTEROLOGY | Facility: CLINIC | Age: 75
End: 2020-02-17
Attending: INTERNAL MEDICINE
Payer: COMMERCIAL

## 2020-02-17 ENCOUNTER — ANCILLARY PROCEDURE (OUTPATIENT)
Dept: BONE DENSITY | Facility: CLINIC | Age: 75
End: 2020-02-17
Attending: NURSE PRACTITIONER
Payer: COMMERCIAL

## 2020-02-17 VITALS
WEIGHT: 136.4 LBS | BODY MASS INDEX: 22.73 KG/M2 | DIASTOLIC BLOOD PRESSURE: 87 MMHG | TEMPERATURE: 98.2 F | RESPIRATION RATE: 14 BRPM | HEIGHT: 65 IN | SYSTOLIC BLOOD PRESSURE: 129 MMHG | HEART RATE: 80 BPM | OXYGEN SATURATION: 100 %

## 2020-02-17 DIAGNOSIS — Z13.820 SCREENING FOR OSTEOPOROSIS: ICD-10-CM

## 2020-02-17 DIAGNOSIS — Z78.0 ASYMPTOMATIC MENOPAUSAL STATE: ICD-10-CM

## 2020-02-17 DIAGNOSIS — K86.2 PANCREAS CYST: Primary | ICD-10-CM

## 2020-02-17 PROCEDURE — G0463 HOSPITAL OUTPT CLINIC VISIT: HCPCS | Mod: ZF

## 2020-02-17 ASSESSMENT — MIFFLIN-ST. JEOR: SCORE: 1129.59

## 2020-02-17 ASSESSMENT — PAIN SCALES - GENERAL: PAINLEVEL: NO PAIN (0)

## 2020-02-17 NOTE — PROGRESS NOTES
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    Last seen 10/17/2016 at which time I recommended 1 yr follow-up MRI. She did not follow-up for this and describes that had a lot of other issues on her mind at the time and did not want to have repeat MRI due to claustrophobia.    Recently recontacted and scheduled MRI with valium, which she states went well.    Cysts initially seen on CT 3/2012 during evaluation for dermatomyositis when largest was 14 x 11 mm. MRI 2014 with enlargement to 21 x 12 mm and thus recommended EUS, which was done 5/14/14 and without high risk or worrisome features. FNA not performed.  Had annual MRIs 2015 and 2016 the lost to follow-up as above.    States feels well. Reports 8-9 lb weight loss which she attributes to change in medications withi mycophenolate, which requires npo status and has altered her eating pattern. Occasional single loose stools but no persistent diarrhea. 0-2 BM per day. No jaundice or abdominal pain. No N/V.     /87 (BP Location: Right arm, Patient Position: Chair, Cuff Size: Adult Regular)   Pulse 80   Temp 98.2  F (36.8  C) (Oral)   Resp 14   Ht 1.651 m (5' 5\")   Wt 61.9 kg (136 lb 6.4 oz)   SpO2 100%   BMI 22.70 kg/m    Not examined.    MRI reviewed: IMPRESSION:   Numerous cystic lesions throughout the pancreas which likely represent  sidebranch IPMNs. A few of these cysts are slightly increased in size  since 2016, and a few contain internal septations. No concerning new  nodule or suspicious features associated with any of these cysts.       A/P: Branch duct IPMN. No worrisome or high-risk features. Discussed potential risk of malignant transformation and quoted approximaltely 1 in 400 risk per year of changes triggering consideration of resection.  Discussed recent guidelines including growth as concerning feature, but typically considered at > 2 mm per year, which she has not had develop.     Discussed that guidelines " suggest that can move to every 2-3 year MRIs if stable, however I would not consider her cysts stable. I do not see indication for EUS at this time, but recommended a repeat MRI in 1 yr. She is in agreement.    If MRI in 1 year stable, then plan for single repeat MRI in 3 yrs then stop if stable (although guidelines do not specifically state this, most centers stop at around age 75 as is analagous to recommendation for colon screening).    Instructed to contact for earlier evaluation if develops continued weight loss of more than 10 lb, chronic diarrhea, jaundice or pancreatitis.    Total visit time today was 15 minutes, of which the entirety was counseling and coordinating care.    CARLOS Rodriguez MD  Associate Professor of Medicine  Division of Gastroenterology, Hepatology and Nutrition  [facility]

## 2020-02-17 NOTE — LETTER
"2/17/2020       RE: Mely Rashid  [address]   [place] [address]     Dear Colleague,    Thank you for referring your patient, Mely Rashid, to the [facility] at [facility]. Please see a copy of my visit note below.    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    Last seen 10/17/2016 at which time I recommended 1 yr follow-up MRI. She did not follow-up for this and describes that had a lot of other issues on her mind at the time and did not want to have repeat MRI due to claustrophobia.    Recently recontacted and scheduled MRI with valium, which she states went well.    Cysts initially seen on CT 3/2012 during evaluation for dermatomyositis when largest was 14 x 11 mm. MRI 2014 with enlargement to 21 x 12 mm and thus recommended EUS, which was done 5/14/14 and without high risk or worrisome features. FNA not performed.  Had annual MRIs 2015 and 2016 the lost to follow-up as above.    States feels well. Reports 8-9 lb weight loss which she attributes to change in medications withi mycophenolate, which requires npo status and has altered her eating pattern. Occasional single loose stools but no persistent diarrhea. 0-2 BM per day. No jaundice or abdominal pain. No N/V.     /87 (BP Location: Right arm, Patient Position: Chair, Cuff Size: Adult Regular)   Pulse 80   Temp 98.2  F (36.8  C) (Oral)   Resp 14   Ht 1.651 m (5' 5\")   Wt 61.9 kg (136 lb 6.4 oz)   SpO2 100%   BMI 22.70 kg/m     Not examined.    MRI reviewed: IMPRESSION:   Numerous cystic lesions throughout the pancreas which likely represent  sidebranch IPMNs. A few of these cysts are slightly increased in size  since 2016, and a few contain internal septations. No concerning new  nodule or suspicious features associated with any of these cysts.       A/P: Branch duct IPMN. No worrisome or high-risk features. Discussed potential risk of " malignant transformation and quoted approximaltely 1 in 400 risk per year of changes triggering consideration of resection.  Discussed recent guidelines including growth as concerning feature, but typically considered at > 2 mm per year, which she has not had develop.     Discussed that guidelines suggest that can move to every 2-3 year MRIs if stable, however I would not consider her cysts stable. I do not see indication for EUS at this time, but recommended a repeat MRI in 1 yr. She is in agreement.    If MRI in 1 year stable, then plan for single repeat MRI in 3 yrs then stop if stable (although guidelines do not specifically state this, most centers stop at around age 75 as is analagous to recommendation for colon screening).    Instructed to contact for earlier evaluation if develops continued weight loss of more than 10 lb, chronic diarrhea, jaundice or pancreatitis.    Total visit time today was 15 minutes, of which the entirety was counseling and coordinating care.    CARLOS Rodriguez MD  Associate Professor of Medicine  Division of Gastroenterology, Hepatology and Nutrition  [facility]

## 2020-02-17 NOTE — NURSING NOTE
"Oncology Rooming Note    February 17, 2020 9:48 AM   Mely Rashid is a 74 year old female who presents for:    Chief Complaint   Patient presents with     Oncology Clinic Visit     Return; Multiple Pancreatic Cysts     Initial Vitals: /87 (BP Location: Right arm, Patient Position: Chair, Cuff Size: Adult Regular)   Pulse 80   Temp 98.2  F (36.8  C) (Oral)   Resp 14   Ht 1.651 m (5' 5\")   Wt 61.9 kg (136 lb 6.4 oz)   SpO2 100%   BMI 22.70 kg/m   Estimated body mass index is 22.7 kg/m  as calculated from the following:    Height as of this encounter: 1.651 m (5' 5\").    Weight as of this encounter: 61.9 kg (136 lb 6.4 oz). Body surface area is 1.68 meters squared.  No Pain (0) Comment: Data Unavailable   No LMP recorded. Patient has had a hysterectomy.  Allergies reviewed: Yes  Medications reviewed: Yes    Medications: Medication refills not needed today.  Pharmacy name entered into EPIC:    [facility] - [facility] [place] [place], [place] - [address] [place] [address] [id number] - [place], [place] - [address] AT [facility] & [place], [place] - [address] [place] [address]    Clinical concerns: No new concerns.        Emily Wang CMA              "

## 2020-02-17 NOTE — PATIENT INSTRUCTIONS
You will find a brief summary of your discussion and care plan from today's visit below.  Please review this information with your primary care provider.  ______________________________________________________________________    As discussed today by Dr. Rodriguez, we will plan the following:     Plan for MRI in one year.  Please call our office in about 10 months to schedule.  You can request sedation at that time for the MRI as needed     Please call our office if you develop:    1.  Jaundice    2.  Unexplained weight loss more than 15 pounds    3.  Diarrhea lasting more than two weeks    4.  Upper abdominal pain that is diagnosed as pancreatitis    _______________________________________________________________________    It was a pleasure seeing you in clinic today - please contact us if there are any further questions about upcoming appointments that arise following today's visit.  During business hours, you may reach the Clinic Coordinator at [phone].  For urgent/emergent questions after business hours, you may reach the on-call GI Fellow by contacting the [facility]  at [phone].    Any benign/non-urgent test results are usually communicated via letter or [employer] message within 1-2 weeks after completion.  Urgent results (those that require a change in the previously-discussed care plan) are usually communicated via a phone call once available from our clinic staff to discuss the results and the next steps in your evaluation.    I recommend signing up for [facility] access if you have not already done so and are comfortable with using a computer.  This allows for online access to your lab results and also helps you communicate efficiently with the clinic should any questions arise in your care.    My role as your RN care coordinator is to assist with any additional questions or concerns regarding your diagnosis and plan of care and to be your advocate.  I am happy to be part of your  care team at the [facility].      Sincerely,      Marleni SLATERN, HNBC, STAR-ALYSSA  RN Care Coordinator  Ph: [phone]  FAX: [phone]

## 2020-03-12 DIAGNOSIS — M33.13 DERMATOMYOSITIS (H): ICD-10-CM

## 2020-03-12 DIAGNOSIS — Z79.899 HIGH RISK MEDICATION USE: ICD-10-CM

## 2020-03-12 LAB
ALBUMIN SERPL-MCNC: 4 G/DL (ref 3.4–5)
BASOPHILS # BLD AUTO: 0 10E9/L (ref 0–0.2)
BASOPHILS NFR BLD AUTO: 0.7 %
CK SERPL-CCNC: 125 U/L (ref 30–225)
CRP SERPL-MCNC: <2.9 MG/L (ref 0–8)
DIFFERENTIAL METHOD BLD: ABNORMAL
EOSINOPHIL # BLD AUTO: 0.1 10E9/L (ref 0–0.7)
EOSINOPHIL NFR BLD AUTO: 2.2 %
ERYTHROCYTE [DISTWIDTH] IN BLOOD BY AUTOMATED COUNT: 13.2 % (ref 10–15)
HCT VFR BLD AUTO: 41.9 % (ref 35–47)
HGB BLD-MCNC: 13.5 G/DL (ref 11.7–15.7)
LYMPHOCYTES # BLD AUTO: 0.7 10E9/L (ref 0.8–5.3)
LYMPHOCYTES NFR BLD AUTO: 16.9 %
MCH RBC QN AUTO: 30.7 PG (ref 26.5–33)
MCHC RBC AUTO-ENTMCNC: 32.2 G/DL (ref 31.5–36.5)
MCV RBC AUTO: 95 FL (ref 78–100)
MONOCYTES # BLD AUTO: 0.7 10E9/L (ref 0–1.3)
MONOCYTES NFR BLD AUTO: 16.4 %
NEUTROPHILS # BLD AUTO: 2.6 10E9/L (ref 1.6–8.3)
NEUTROPHILS NFR BLD AUTO: 63.8 %
PLATELET # BLD AUTO: 177 10E9/L (ref 150–450)
RBC # BLD AUTO: 4.4 10E12/L (ref 3.8–5.2)
WBC # BLD AUTO: 4.1 10E9/L (ref 4–11)

## 2020-03-12 PROCEDURE — 36415 COLL VENOUS BLD VENIPUNCTURE: CPT | Performed by: INTERNAL MEDICINE

## 2020-03-12 PROCEDURE — 86140 C-REACTIVE PROTEIN: CPT | Performed by: INTERNAL MEDICINE

## 2020-03-12 PROCEDURE — 82550 ASSAY OF CK (CPK): CPT | Performed by: INTERNAL MEDICINE

## 2020-03-12 PROCEDURE — 85025 COMPLETE CBC W/AUTO DIFF WBC: CPT | Performed by: INTERNAL MEDICINE

## 2020-03-12 PROCEDURE — 82040 ASSAY OF SERUM ALBUMIN: CPT | Performed by: INTERNAL MEDICINE

## 2020-03-13 ENCOUNTER — MYC MEDICAL ADVICE (OUTPATIENT)
Dept: RHEUMATOLOGY | Facility: CLINIC | Age: 75
End: 2020-03-13

## 2020-03-24 NOTE — TELEPHONE ENCOUNTER
Contacted pt and offered a telephone visit on 4/2/2020 at 9:30 am which pt did accept. Pt would like us to call [phone] for this visit. Pt is aware that this appointment is to schedule her options in relation to the MyChart she had sent earlier this month. Pt is agreeable to this plan.    NOHEMY FontaineN RN  Rheumatology Care Coordinator  [facility]

## 2020-04-01 ENCOUNTER — TELEPHONE (OUTPATIENT)
Dept: DERMATOLOGY | Facility: CLINIC | Age: 75
End: 2020-04-01

## 2020-04-01 NOTE — TELEPHONE ENCOUNTER
[facility] Call [facility]    Phone Message    May a detailed message be left on voicemail: yes     Reason for Call: Other: Needs Return Pt Appt for Dermatomyositis with Dr Garcias - please review chart and call Pt to schedule - she cancelled April 9th online and didn't know if you want to push into future or do phone visit - Thanks     Action Taken: Message routed to:  [facility] ([facility]): Dermatology    Travel Screening: Not Applicable

## 2020-04-02 ENCOUNTER — VIRTUAL VISIT (OUTPATIENT)
Dept: PULMONOLOGY | Facility: CLINIC | Age: 75
End: 2020-04-02
Attending: INTERNAL MEDICINE
Payer: COMMERCIAL

## 2020-04-02 DIAGNOSIS — M33.13 DERMATOMYOSITIS (H): Primary | ICD-10-CM

## 2020-04-02 DIAGNOSIS — Z79.899 LONG-TERM USE OF PLAQUENIL: ICD-10-CM

## 2020-04-02 DIAGNOSIS — Z79.899 ENCOUNTER FOR LONG-TERM CURRENT USE OF MEDICATION: ICD-10-CM

## 2020-04-02 NOTE — PROGRESS NOTES
"Mely Rashid is a 74 year old female who is being evaluated via a billable telephone visit.      The patient has been notified of following:     \"This telephone visit will be conducted via a call between you and your physician/provider. We have found that certain health care needs can be provided without the need for a physical exam.  This service lets us provide the care you need with a short phone conversation.  If a prescription is necessary we can send it directly to your pharmacy.  If lab work is needed we can place an order for that and you can then stop by our lab to have the test done at a later time.    If during the course of the call the physician/provider feels a telephone visit is not appropriate, you will not be charged for this service.\"     Patient has given verbal consent for Telephone visit?  Yes    Mely Rashid is contacted today for a telephone visit in lieu of a office visit given the coronavirus pandemic.    Over the last few months she really does not feel like she has been doing as well as she was back in the fall.  At the time we last saw her she felt like she was really doing great with very little in the way of skin manifestations and virtually nothing in the way of muscle or joint manifestations.    Over the last few months however she is noticing increasing tightness in her hips and shoulders some significant arthralgias in her hands and some low-grade weakness diffusely.  She does do stretching exercise daily but does not do much in the way of strengthening.  She walks her dog daily.  She has a recumbent bicycle but has not been using it much and is not doing any shoulder strengthening exercises either.    She does deny any dyspnea on exertion or dry cough.    With respect to skin involvement she is having quite a bit of problem with that.  She has a photo of her 1 of her hands that she has attached to a my chart message I was able to review that shows a pretty active Gottron's " papules.  She also notes some rather extreme hair loss and we do not have a picture of that.  She is not sure if that could be secondary to meds or the disease process but she reports skin involvement over her hips and multiple areas.  Dr. Garcias, who saw her in January, documented pretty extensive skin involvement at that time already with a CD ASI score of 21.  Her skin is quite itchy over the involved areas as well consistent with some disease activity.    Despite the symptomatic worsenings, her recent labs show no evidence of any toxicity from the meds with only a slightly low lymphocyte count at 0.7, but also no evidence of inflammation or muscle enzyme leak.    I have reviewed and updated the patient's Past Medical History, Social History, Family History and Medication List.    ALLERGIES  Penicillins    Impression/plan:    Active dermatomyositis with dominant skin features but some musculoskeletal features as well though no evidence of systemic inflammation or muscle enzyme leak on very recent laboratory testing.    Based on her labs and description of her muscle and joint involvement I would not necessarily increase her medication, but she is having no apparent toxicity and her skin involvement seems pretty significant.    She is however having a phone visit with Dr. Garcias just next week and I will defer on whether to increase systemic therapy with a slightly higher dose of CellCept versus focusing on topical therapy until he has seen her.    I did discuss her possible consideration of involvement in the clinical trial we are doing but also informed her that enrollment is currently on hold.  I am not sure if she would qualify based on her global characteristics and I cannot do her skin exam in detail or her muscle exam today to give us further information on that.    I did discuss with her based on her muscle and joint symptoms that she should be doing both stretching and strengthening exercises because of  the risk of developing partially frozen shoulders with weaken muscles that are not being rehabilitated as well as just general loss of strength and balance that can occur over time that puts her at risk for falls and other associated complications.    I will defer to Dr. Garcias on best next therapy given the dominance of her skin involvement.  I would like to see her back in 6 months sooner as needed or with a interim phone or video visit if necessary.    Phone call duration: 23 minutes    ASHISH Colon MD, PhD    Rheumatology

## 2020-04-09 ENCOUNTER — VIRTUAL VISIT (OUTPATIENT)
Dept: DERMATOLOGY | Facility: CLINIC | Age: 75
End: 2020-04-09
Payer: COMMERCIAL

## 2020-04-09 DIAGNOSIS — M33.13 DERMATOMYOSITIS (H): Primary | ICD-10-CM

## 2020-04-09 RX ORDER — MYCOPHENOLATE MOFETIL 500 MG/1
1000 TABLET ORAL 2 TIMES DAILY
Qty: 120 TABLET | Refills: 3 | Status: SHIPPED | OUTPATIENT
Start: 2020-04-09 | End: 2020-07-31

## 2020-04-09 RX ORDER — TRIAMCINOLONE ACETONIDE 1 MG/G
CREAM TOPICAL 2 TIMES DAILY
Qty: 454 G | Refills: 3 | Status: SHIPPED | OUTPATIENT
Start: 2020-04-09 | End: 2022-10-31

## 2020-04-09 RX ORDER — FLUOCINOLONE ACETONIDE 0.11 MG/ML
OIL TOPICAL AT BEDTIME
Qty: 118.28 ML | Refills: 3 | Status: SHIPPED | OUTPATIENT
Start: 2020-04-09 | End: 2022-08-08

## 2020-04-09 RX ORDER — HYDROCORTISONE 2.5 %
CREAM (GRAM) TOPICAL 2 TIMES DAILY
Qty: 30 G | Refills: 3 | Status: SHIPPED | OUTPATIENT
Start: 2020-04-09 | End: 2024-04-23

## 2020-04-09 ASSESSMENT — PAIN SCALES - GENERAL: PAINLEVEL: MILD PAIN (2)

## 2020-04-09 NOTE — PATIENT INSTRUCTIONS
[facility] Teledermatology Visit    Thank you for allowing us to participate in your care.     Mycophenolate mofetil:  1000 mg (2 pills) in the morning    When should I call my doctor?    If you are worsening or not improving, please, contact us or seek urgent care as noted below.     Who should I call with questions?    [facility]: [phone]     [facility]: [phone]    If this is a medical emergency and you are unable to reach an ER, Call [phone]

## 2020-04-09 NOTE — PROGRESS NOTES
[facility] ((National Emergency Concerning the CORONAVIRUS (COVID 19) )    Image quality and interpretability: acceptable    In-person dermatology visit recommendation: no    Impression and Recommendations:  1. Dermatomyositis: worsening skin involvement on trunk, extremities, scalp. Given severity of involvement, will uptitrate systemic therapies. Will increase mycophenolate mofetil to 1000 mg QAM/500 mg QPM for 2 weeks, then up to 1000 mg BID thereafter. Will plan to check labs in 1 month (standing labs already ordered). Will also uptitrate topical regimen with hydrocortisone for face, triamcinolone for body, and fluocinolone for scalp. Can continue betamethasone for hands.  - Increase mycophenolate mofetil to 1000 mg twice daily over 4 weeks  - Continue hydroxychloroquine 200 mg daily  - Topicals - betamethasone, hydrocortisone, triamcinolone, fluocinolone based on body area  - Follow up labs in 1 month to assess response of lymphocyte count to higher dose mycophenolate mofetil (most recent 0.7 ALC)  2. Pancreatic cystic neoplasm (IPMN): following with GI; plan for repeat MRI in 2/2021.        Follow-up:   2-3 months      Thank you for the opportunity be involved in the care of this patient.       Staff:  Shekhar Garcias MD   of Dermatology  Department of Dermatology  [facility]    _____________________________________________________________________________    Dermatology Problem List:  1. Classic dermatomyositis: BRE 1:2560, myositis panel negative; skin (espec hands), muscles, joints, no pulmonary involvement (last PFTs 7/2019 normal)  - Pancreatic cystic neoplasm (IPMN) - following up with GI  - Current treatment: mycophenolate 1000 mg twice daily, hydroxychloroquine 200 mg daily, betamethasone cream to hands twice daily, clobetasol solution to scalp twice daily, triamcinolone, hydrocortisone.  2. History of IgA  deficiency  3. History of BCC on back s/p excision (2016)    Encounter Date: Apr 9, 2020    CC:   Chief Complaint   Patient presents with     Derm Problem     Dermatomyositis - Pat states she has not seen any improvement for awhile but have not gotten worse.        History of Present Illness:  I have reviewed the teledermatology  information and the nursing intake corresponding to this issue. Mely Rashid is a 74 year old female who presents as a teledermatology consult for the following information take directly from the nursing note (kept in this note for patient safety):     Skin remains quite active    On backs of hands, outer thighs, down to calves   - inside nose, along hairline    Itching all over constantly    Skin feels very dry    Scalp very itchy - hair loss worsening    Feels plateaued or perhaps slightly worse    Saw rheumatology last week - muscles and joints well-controlled    Continuing mycophenolate mofetil 500 mg twice daily, hydroxychloroquine 200 mg daily   - applying betamethasone ointment - does help, but frequent hand washing does exacerbate symptoms    Has been very consistent with social distancing, lives by self    Saw gastroenterology in 2/2020 - MRI showed a few changes from 2016 but nothing urgently requiring excision. Plan for repeat MRI in 1 year.    Review of systems:  General: no fevers, chills, or night sweats  Skin: as per HPI  MSK: no arthralgias or myalgias  HEENT: no oral ulcers    Physical Examination:  General: Alert, oriented, in a pleasant mood.  Skin: Focused examination of the right hand within the teledermatology photograph(s) was performed.   - Pryor phototype: II  - Erythematous scaly macules and papules over the extensor knuckles and digits. Mild cuticular dystrophy.    Past Medical History:   Patient Active Problem List   Diagnosis     Glaucoma, right     Dermatomyositis (H)     IgA deficiency (H)     Herpes zoster     Encounter for long-term current use of  medication     Encounter for long-term (current) use of steroids     Oral ulcer     Seborrheic dermatitis     Osteopenia     Neoplasm of uncertain behavior of skin     Routine general medical examination at a health care facility     Past Medical History:   Diagnosis Date     Basal cell carcinoma      Dermatomyositis (H)      Glaucoma      IgA deficiency (H)      Nonsenile cataract      Past Surgical History:   Procedure Laterality Date     BIOPSY OF SKIN LESION       HC UGI ENDOSCOPY W EUS N/A 2014    Procedure: COMBINED ENDOSCOPIC ULTRASOUND, ESOPHAGOSCOPY, GASTROSCOPY, DUODENOSCOPY (EGD);  Surgeon: Boston Rodriguez MD;  Location:  GI     HYSTERECTOMY      partial, prolapse uterus.       Social History:  Social History     Socioeconomic History     Marital status: Single     Spouse name: None     Number of children: None     Years of education: None     Highest education level: None   Occupational History     None   Social Needs     Financial resource strain: None     Food insecurity     Worry: None     Inability: None     Transportation needs     Medical: None     Non-medical: None   Tobacco Use     Smoking status: Former Smoker     Last attempt to quit: 4/10/1979     Years since quittin.0     Smokeless tobacco: Never Used     Tobacco comment: quit    Substance and Sexual Activity     Alcohol use: Yes     Alcohol/week: 0.0 standard drinks     Comment: rare     Drug use: No     Sexual activity: Not Currently   Lifestyle     Physical activity     Days per week: None     Minutes per session: None     Stress: None   Relationships     Social connections     Talks on phone: None     Gets together: None     Attends [employer] service: None     Active member of club or organization: None     Attends meetings of clubs or organizations: None     Relationship status: None     Intimate partner violence     Fear of current or ex partner: None     Emotionally abused: None     Physically abused: None      Forced sexual activity: None   Other Topics Concern      Service No     Blood Transfusions No     Caffeine Concern No     Occupational Exposure No     Hobby Hazards No     Sleep Concern No     Stress Concern No     Weight Concern No     Special Diet No     Back Care Not Asked     Exercise Yes     Comment: walk, horseback ride     Bike Helmet Not Asked     Seat Belt Yes     Self-Exams Not Asked     Parent/sibling w/ CABG, MI or angioplasty before 65F 55M? No   Social History Narrative           Family History:  Family History   Problem Relation Age of Onset     C.A.D. Father      Arthritis Father      Diabetes Brother      Glaucoma Brother      Breast Cancer Sister 35     Glaucoma Sister      Neurologic Disorder Sister         seizures     Cancer Sister         breast     Psoriasis Daughter      Rheumatologic Disease Daughter         Dermatomyositis     Alzheimer Disease Mother         onset in 60s     Hypertension No family hx of      Cerebrovascular Disease No family hx of      Thyroid Disease No family hx of      Skin Cancer No family hx of      Macular Degeneration No family hx of        Medications:  Current Outpatient Medications   Medication     augmented betamethasone dipropionate (DIPROLENE-AF) 0.05 % external cream     B Complex TABS     Calcium Carb-Cholecalciferol (CALCIUM 500 +D PO)     Cholecalciferol (VITAMIN D) 1000 UNIT capsule     clobetasol (TEMOVATE) 0.05 % external solution     desonide (DESOWEN) 0.05 % cream     glucosamine-chondroitinoitin (GLUCOSAMINE CHONDR COMPLEX) 500-400 MG CAPS     hydroxychloroquine (PLAQUENIL) 200 MG tablet     latanoprost (XALATAN) 0.005 % ophthalmic solution     loperamide (IMODIUM A-D) 2 MG tablet     mycophenolate (GENERIC EQUIVALENT) 500 MG tablet     omega-3 fatty acids (FISH OIL) 1200 MG capsule     vitamin E 400 UNIT capsule     vitamin C w/JD HIPS 500 MG tablet     No current facility-administered medications for this visit.            Allergies   Allergen Reactions     Penicillins Anaphylaxis       _____________________________________________________________________________    Teledermatology information:  - Location of patient: Home  - Patient presented as: return  - Location of teledermatologist: [facility] DERMATOLOGY )  - Reason teledermatology is appropriate: of National Emergency Regarding Coronavirus disease (COVID 19) Outbreak  - Method of transmission: Store and Forward ((National Emergency Concerning the CORONAVIRUS (COVID 19)   - Image quality and interpretability: acceptable  - Physician has received verbal consent for a Video/Photos Visit from the patient? Yes  - In-person dermatology visit recommendation: no  - Date of images: 4/1/2020  - Service start time: 11:00  - Service end time: 11:24  - Date of report: 04/09/20

## 2020-04-09 NOTE — PROGRESS NOTES
Dermatology Rooming Note    Mely Rashid's goals for this visit include:   Chief Complaint   Patient presents with     Derm Problem     Dermatomyositis - Pat states she has not seen any improvement for awhile but have not gotten worse.      Earlene Davidson, CMA

## 2020-06-30 DIAGNOSIS — Z79.899 HIGH RISK MEDICATION USE: ICD-10-CM

## 2020-06-30 DIAGNOSIS — M33.13 DERMATOMYOSITIS (H): ICD-10-CM

## 2020-06-30 LAB
ALBUMIN SERPL-MCNC: 4.3 G/DL (ref 3.4–5)
ALP SERPL-CCNC: 53 U/L (ref 40–150)
ALT SERPL W P-5'-P-CCNC: 32 U/L (ref 0–50)
ANION GAP SERPL CALCULATED.3IONS-SCNC: 4 MMOL/L (ref 3–14)
AST SERPL W P-5'-P-CCNC: 22 U/L (ref 0–45)
BASOPHILS # BLD AUTO: 0 10E9/L (ref 0–0.2)
BASOPHILS NFR BLD AUTO: 0.6 %
BILIRUB SERPL-MCNC: 0.3 MG/DL (ref 0.2–1.3)
BUN SERPL-MCNC: 19 MG/DL (ref 7–30)
CALCIUM SERPL-MCNC: 9.4 MG/DL (ref 8.5–10.1)
CHLORIDE SERPL-SCNC: 108 MMOL/L (ref 94–109)
CK SERPL-CCNC: 78 U/L (ref 30–225)
CO2 SERPL-SCNC: 29 MMOL/L (ref 20–32)
CREAT SERPL-MCNC: 0.69 MG/DL (ref 0.52–1.04)
CRP SERPL-MCNC: <2.9 MG/L (ref 0–8)
DIFFERENTIAL METHOD BLD: ABNORMAL
EOSINOPHIL # BLD AUTO: 0.1 10E9/L (ref 0–0.7)
EOSINOPHIL NFR BLD AUTO: 3 %
ERYTHROCYTE [DISTWIDTH] IN BLOOD BY AUTOMATED COUNT: 12.9 % (ref 10–15)
GFR SERPL CREATININE-BSD FRML MDRD: 86 ML/MIN/{1.73_M2}
GLUCOSE SERPL-MCNC: 95 MG/DL (ref 70–99)
HCT VFR BLD AUTO: 40.3 % (ref 35–47)
HGB BLD-MCNC: 12.9 G/DL (ref 11.7–15.7)
LYMPHOCYTES # BLD AUTO: 0.7 10E9/L (ref 0.8–5.3)
LYMPHOCYTES NFR BLD AUTO: 19.9 %
MCH RBC QN AUTO: 30.3 PG (ref 26.5–33)
MCHC RBC AUTO-ENTMCNC: 32 G/DL (ref 31.5–36.5)
MCV RBC AUTO: 95 FL (ref 78–100)
MONOCYTES # BLD AUTO: 0.5 10E9/L (ref 0–1.3)
MONOCYTES NFR BLD AUTO: 15.4 %
NEUTROPHILS # BLD AUTO: 2.1 10E9/L (ref 1.6–8.3)
NEUTROPHILS NFR BLD AUTO: 61.1 %
PLATELET # BLD AUTO: 183 10E9/L (ref 150–450)
POTASSIUM SERPL-SCNC: 4 MMOL/L (ref 3.4–5.3)
PROT SERPL-MCNC: 7 G/DL (ref 6.8–8.8)
RBC # BLD AUTO: 4.26 10E12/L (ref 3.8–5.2)
SODIUM SERPL-SCNC: 141 MMOL/L (ref 133–144)
WBC # BLD AUTO: 3.4 10E9/L (ref 4–11)

## 2020-06-30 PROCEDURE — 86140 C-REACTIVE PROTEIN: CPT | Performed by: DERMATOLOGY

## 2020-06-30 PROCEDURE — 36415 COLL VENOUS BLD VENIPUNCTURE: CPT | Performed by: DERMATOLOGY

## 2020-06-30 PROCEDURE — 82550 ASSAY OF CK (CPK): CPT | Performed by: DERMATOLOGY

## 2020-06-30 PROCEDURE — 80053 COMPREHEN METABOLIC PANEL: CPT | Performed by: DERMATOLOGY

## 2020-06-30 PROCEDURE — 85025 COMPLETE CBC W/AUTO DIFF WBC: CPT | Performed by: DERMATOLOGY

## 2020-07-11 DIAGNOSIS — Z79.899 ENCOUNTER FOR LONG-TERM CURRENT USE OF MEDICATION: ICD-10-CM

## 2020-07-11 DIAGNOSIS — Z79.899 LONG-TERM USE OF PLAQUENIL: ICD-10-CM

## 2020-07-11 DIAGNOSIS — M33.13 DERMATOMYOSITIS (H): ICD-10-CM

## 2020-07-13 RX ORDER — HYDROXYCHLOROQUINE SULFATE 200 MG/1
200 TABLET, FILM COATED ORAL DAILY
Qty: 90 TABLET | Refills: 1 | Status: SHIPPED | OUTPATIENT
Start: 2020-07-13 | End: 2021-01-11

## 2020-07-13 NOTE — TELEPHONE ENCOUNTER
HYDROXYCHLOROQUINE TABS 200MG   Plaquenil      Last Written Prescription Date:  12/20/2019  Last Fill Quantity: 90,   # refills: 1  Last Office Visit: 7/16/2019  Future Office visit: 7/28/2020  Last Eye Exam: 1/7/2020    Routing refill request to provider for review/approval because:  Ok to continue same dose for Pt care.  Pt will see Provider in clinic on 7/28/2020 for Pt care.   Refer to Provider for review and refills per Providers orders.    Kacy Muller RN  Central Triage Red Flags/Med Refills

## 2020-07-29 ENCOUNTER — ALLIED HEALTH/NURSE VISIT (OUTPATIENT)
Dept: OPHTHALMOLOGY | Facility: CLINIC | Age: 75
End: 2020-07-29
Attending: OPHTHALMOLOGY
Payer: COMMERCIAL

## 2020-07-29 DIAGNOSIS — Z79.899 ENCOUNTER FOR LONG-TERM (CURRENT) USE OF HIGH-RISK MEDICATION: ICD-10-CM

## 2020-07-29 DIAGNOSIS — M33.13 DERMATOMYOSITIS (H): ICD-10-CM

## 2020-07-29 PROCEDURE — 40000269 ZZH STATISTIC NO CHARGE FACILITY FEE: Mod: ZF

## 2020-07-29 PROCEDURE — 92274 MULTIFOCAL ERG W/I&R: CPT | Mod: ZF

## 2020-07-29 ASSESSMENT — REFRACTION_WEARINGRX
OS_CYLINDER: +0.50
OS_SPHERE: PLANO
OS_ADD: +2.25
OD_SPHERE: +0.25
SPECS_TYPE: PAL
OS_AXIS: 175
OD_ADD: +2.25
OD_CYLINDER: SPHERE

## 2020-07-29 ASSESSMENT — VISUAL ACUITY
CORRECTION_TYPE: GLASSES
OD_CC: 20/20
METHOD: SNELLEN - LINEAR
OS_CC: 20/20

## 2020-07-29 NOTE — PROGRESS NOTES
mfERG(9 min 60Hz ) discussed and performed with Veris system and 0.5% CBM   MD interpretation to follow.

## 2020-07-29 NOTE — NURSING NOTE
"Chief Complaints and History of Present Illnesses   Patient presents with     Procedure     Chief Complaint(s) and History of Present Illness(es)     Procedure               Comments     mfERG for plaquenil monitoring  Plaquenil use since 2011   400/day till 8/2016, then 200/day to present    height 5'6\", weight 135#  Alaniskristi Mccray COA 3:41 PM July 29, 2020                     "

## 2020-07-30 NOTE — PROGRESS NOTES
"Multifocal ERG Results    Diagnostic Indication:   Plaquenil use 400/day till 8/2016, then 200/day ongoing.  Height 5'6\", weight 145#, no renal disease.  The patient has not been able to perform well on the visual field test, and so an mfERG was obtained to further assess her retinas.    Diagnostic findings:  A 103 hexagon stimulus pattern was used to obtain a mfERG in both eyes.    For both eyes, the test was noted to be reliable by the technician.  The waveform tracings were normal with good SNR.  The amplitude plot showed a well formed foveal peak.  The amplitude values were within normal limits. The latencies did not show any abnormal increase.  The ring tracings were essentially normal, though the central tracings had high amplitudes.  The ring ratios were essentially within normal limits for ring amplitudes.         Diagnostic impression:    1.  Normal mfERG both eyes.    2.  No evidence of  plaquenil toxicity.    Clinical recommendations: clinical correlation recommended.  "

## 2020-07-31 RX ORDER — MYCOPHENOLATE MOFETIL 500 MG/1
1000 TABLET ORAL 2 TIMES DAILY
Qty: 120 TABLET | Refills: 3 | Status: SHIPPED | OUTPATIENT
Start: 2020-07-31 | End: 2020-12-21

## 2020-07-31 NOTE — TELEPHONE ENCOUNTER
Monitoring labs required every 8-12 weeks for medication refills.  mycophenolate (GENERIC EQUIVALENT) 500 MG tablet       Last Written Prescription Date:  4/9/20  Last Fill Quantity: 120,   # refills: 3  Last Virtual Visit: 4/9/20, recommended 2-3 month follow up  Future Office visit:  None scheduled    CBC RESULTS:   Recent Labs   Lab Test 06/30/20  1348   WBC 3.4*   RBC 4.26   HGB 12.9   HCT 40.3   MCV 95   MCH 30.3   MCHC 32.0   RDW 12.9          Creatinine   Date Value Ref Range Status   06/30/2020 0.69 0.52 - 1.04 mg/dL Final   ]    Liver Function Studies -   Recent Labs   Lab Test 06/30/20  1348   PROTTOTAL 7.0   ALBUMIN 4.3   BILITOTAL 0.3   ALKPHOS 53   AST 22   ALT 32       Routing refill request to provider for review/approval because:  Drug not on the [facility] or [facility] refill protocol

## 2020-08-11 ENCOUNTER — OFFICE VISIT (OUTPATIENT)
Dept: OPHTHALMOLOGY | Facility: CLINIC | Age: 75
End: 2020-08-11
Attending: OPHTHALMOLOGY
Payer: COMMERCIAL

## 2020-08-11 DIAGNOSIS — Z79.899 ENCOUNTER FOR LONG-TERM (CURRENT) USE OF HIGH-RISK MEDICATION: ICD-10-CM

## 2020-08-11 PROCEDURE — G0463 HOSPITAL OUTPT CLINIC VISIT: HCPCS | Mod: ZF

## 2020-08-11 PROCEDURE — 92015 DETERMINE REFRACTIVE STATE: CPT | Mod: ZF

## 2020-08-11 PROCEDURE — 92082 INTERMEDIATE VISUAL FIELD XM: CPT | Mod: ZF | Performed by: OPHTHALMOLOGY

## 2020-08-11 PROCEDURE — 92250 FUNDUS PHOTOGRAPHY W/I&R: CPT | Mod: ZF | Performed by: OPHTHALMOLOGY

## 2020-08-11 PROCEDURE — 92134 CPTRZ OPH DX IMG PST SGM RTA: CPT | Mod: ZF | Performed by: OPHTHALMOLOGY

## 2020-08-11 RX ORDER — TIMOLOL MALEATE 2.5 MG/ML
1 SOLUTION/ DROPS OPHTHALMIC EVERY MORNING
Qty: 5 ML | Refills: 11 | Status: SHIPPED | OUTPATIENT
Start: 2020-08-11 | End: 2021-10-04

## 2020-08-11 ASSESSMENT — REFRACTION_WEARINGRX
OS_CYLINDER: +0.75
OS_ADD: +2.25
OD_CYLINDER: SPHERE
OS_SPHERE: +0.25
SPECS_TYPE: PAL
OD_SPHERE: +0.50
OS_AXIS: 175
OD_ADD: +2.25

## 2020-08-11 ASSESSMENT — VISUAL ACUITY
OS_CC+: -3
CORRECTION_TYPE: GLASSES
METHOD: SNELLEN - LINEAR
OD_CC: 20/20
OS_CC: 20/25

## 2020-08-11 ASSESSMENT — EXTERNAL EXAM - RIGHT EYE: OD_EXAM: NORMAL

## 2020-08-11 ASSESSMENT — TONOMETRY
OS_IOP_MMHG: 16
OD_IOP_MMHG: 17
IOP_METHOD: TONOPEN

## 2020-08-11 ASSESSMENT — CONF VISUAL FIELD
OS_NORMAL: 1
METHOD: COUNTING FINGERS
OD_NORMAL: 1

## 2020-08-11 ASSESSMENT — REFRACTION_MANIFEST
OS_SPHERE: -0.25
OD_ADD: +2.50
OS_ADD: +2.50
OD_SPHERE: +0.50
OS_AXIS: 175
OS_CYLINDER: +1.25

## 2020-08-11 ASSESSMENT — EXTERNAL EXAM - LEFT EYE: OS_EXAM: NORMAL

## 2020-08-11 ASSESSMENT — CUP TO DISC RATIO
OS_RATIO: 0.4
OD_RATIO: 0.75

## 2020-08-11 ASSESSMENT — SLIT LAMP EXAM - LIDS
COMMENTS: NORMAL
COMMENTS: NORMAL

## 2020-08-11 NOTE — PROGRESS NOTES
"CC -   plaquenil    INTERVAL HISTORY -   VA stable, had mfERG recently was normal  Plaquenil 200/day    HPI -   Mely Rashid is a  75 year old year-old patient presenting for plaquenil.  400 mg/day since 2011-8/2016, then 200/day since (brief period of 300/day)  height 5'6\", weigth 145#., no renal disease  H/o dermatomyositis, on plaquenil and cellcept.  Sees Isaiah.    Retired surgery scheduler for ENT @ [employer]    PAST OCULAR HISTORY  No surgeries    RETINAL IMAGING  OCT   8-11-20  OD-  Mild difuse outer changes c/w AMD, ?PVD  OS -  Mild diffuse outer changes, c/w AMD ?PVD    AF  8-11-20  OU - mild irregular, stable    OVF 10-2   8-11-20  OD - reliable, several spots worse from 1/2017  OS - reliable, few spots, worse from 2017    mfERG 7/29/20  OU - normal      ASSESSMENT & PLAN    1.  Plaquenil use since 2011   - 400/day till 8/2016, then 200/day height 5'6\", weight 145#     - OVF 10-2 OD increasing abnormality   - OCT & FAF normal   - mfERG 7/2020 normal   - last mfERG 7/2020 normal     - suspect VFD not plaquenil   - prior OVF 24-2 do not look severe enough for it to be glaucoma   - will see glaucoma clinic soon   - IOP OK today   - recheck plaquenil 9 months as precaution   - plan to repeat mfERG in 1-2 years if unable to get good OVF 10-2     - will need to repeat mfERG every 1-2 years or so given difficulty interpreting other tests    2.   vitreous syneresis OU vs PVD   - advised S/Sx RD 8/2020    3.  Glaucoma OU   - sees Immanuel   - on xalatan   - has right APD likely d/t glaucoma     - add timolol qAM as precaution in case VFD is d/t glaucoma   - may need lower target pressure   - will have glaucoma eval 11/2020    4.  Tr NS OU      return to clinic: 9  months  repeat OCT/FAF/OVF 10-2       ATTESTATION     Attending Physician Attestation:      Complete documentation of historical and exam elements from today's encounter can be found in the full encounter summary report (not reduplicated in this progress " note).  I personally obtained the chief complaint(s) and history of present illness.  I confirmed and edited as necessary the review of systems, past medical/surgical history, family history, social history, and examination findings as documented by others; and I examined the patient myself.  I personally reviewed the relevant tests, images, and reports as documented above.  I formulated and edited as necessary the assessment and plan and discussed the findings and management plan with the patient and family    Shakira Chamberlain MD, PhD  , Vitreoretinal Surgery  Department of Ophthalmology  [facility]

## 2020-08-11 NOTE — NURSING NOTE
Chief Complaints and History of Present Illnesses   Patient presents with     Follow Up     Chief Complaint(s) and History of Present Illness(es)     Follow Up     Laterality: both eyes    Associated symptoms: dryness.  Negative for flashes and floaters    Treatments tried: artificial tears    Response to treatment: moderate improvement    Pain scale: 0/10              Comments     Follow up for retina exam and plaquenil medication use.  The patient is interested in transition eyeglasses.  The patient uses artificial tears as needed.  VICKY Orellana, COA 12:24 PM 08/11/2020

## 2020-10-29 ENCOUNTER — DOCUMENTATION ONLY (OUTPATIENT)
Dept: LAB | Facility: CLINIC | Age: 75
End: 2020-10-29

## 2020-10-29 DIAGNOSIS — Z79.899 HIGH RISK MEDICATION USE: ICD-10-CM

## 2020-10-29 DIAGNOSIS — M33.13 DERMATOMYOSITIS (H): Primary | ICD-10-CM

## 2020-10-29 NOTE — PROGRESS NOTES
Patient has a lab appointment 11.3.20. Orders  placed  20. Please review, change expiration date or place a new order for the patient. If no lab work is needed, please inform patient.       Thank you  eWn Hammond MLT  Lab

## 2020-10-29 NOTE — PROGRESS NOTES
Received the following staff message from Dr Colon:    Carlos Colon MD Anderson, Lee A, RN   Caller: Unspecified (Today,  1:46 PM)             Please confirm if she is still on MMF and needing monitoring or just needs CRP/CK....

## 2020-10-29 NOTE — PROGRESS NOTES
Called pt to inquire about the Mycophenolate. She is currently taking 2000 mg/day. Medication monitoring labs ordered per standing orders and provider preference.    MARIE Fontaine RN  Rheumatology Care Coordinator  [facility]

## 2020-11-03 DIAGNOSIS — M33.13 DERMATOMYOSITIS (H): ICD-10-CM

## 2020-11-03 DIAGNOSIS — Z79.899 HIGH RISK MEDICATION USE: ICD-10-CM

## 2020-11-03 LAB
ALBUMIN SERPL-MCNC: 4.1 G/DL (ref 3.4–5)
ALP SERPL-CCNC: 55 U/L (ref 40–150)
ALT SERPL W P-5'-P-CCNC: 24 U/L (ref 0–50)
ANION GAP SERPL CALCULATED.3IONS-SCNC: 5 MMOL/L (ref 3–14)
AST SERPL W P-5'-P-CCNC: 17 U/L (ref 0–45)
BASOPHILS # BLD AUTO: 0 10E9/L (ref 0–0.2)
BASOPHILS NFR BLD AUTO: 0.4 %
BILIRUB SERPL-MCNC: 0.3 MG/DL (ref 0.2–1.3)
BUN SERPL-MCNC: 22 MG/DL (ref 7–30)
CALCIUM SERPL-MCNC: 8.9 MG/DL (ref 8.5–10.1)
CHLORIDE SERPL-SCNC: 107 MMOL/L (ref 94–109)
CK SERPL-CCNC: 74 U/L (ref 30–225)
CO2 SERPL-SCNC: 28 MMOL/L (ref 20–32)
CREAT SERPL-MCNC: 0.58 MG/DL (ref 0.52–1.04)
CRP SERPL-MCNC: <2.9 MG/L (ref 0–8)
DIFFERENTIAL METHOD BLD: NORMAL
EOSINOPHIL # BLD AUTO: 0.1 10E9/L (ref 0–0.7)
EOSINOPHIL NFR BLD AUTO: 1.5 %
ERYTHROCYTE [DISTWIDTH] IN BLOOD BY AUTOMATED COUNT: 12.9 % (ref 10–15)
GFR SERPL CREATININE-BSD FRML MDRD: >90 ML/MIN/{1.73_M2}
GLUCOSE SERPL-MCNC: 82 MG/DL (ref 70–99)
HCT VFR BLD AUTO: 39.6 % (ref 35–47)
HGB BLD-MCNC: 12.6 G/DL (ref 11.7–15.7)
LYMPHOCYTES # BLD AUTO: 0.9 10E9/L (ref 0.8–5.3)
LYMPHOCYTES NFR BLD AUTO: 19.1 %
MCH RBC QN AUTO: 29.8 PG (ref 26.5–33)
MCHC RBC AUTO-ENTMCNC: 31.8 G/DL (ref 31.5–36.5)
MCV RBC AUTO: 94 FL (ref 78–100)
MONOCYTES # BLD AUTO: 0.6 10E9/L (ref 0–1.3)
MONOCYTES NFR BLD AUTO: 13.4 %
NEUTROPHILS # BLD AUTO: 3 10E9/L (ref 1.6–8.3)
NEUTROPHILS NFR BLD AUTO: 65.6 %
PLATELET # BLD AUTO: 187 10E9/L (ref 150–450)
POTASSIUM SERPL-SCNC: 4 MMOL/L (ref 3.4–5.3)
PROT SERPL-MCNC: 6.8 G/DL (ref 6.8–8.8)
RBC # BLD AUTO: 4.23 10E12/L (ref 3.8–5.2)
SODIUM SERPL-SCNC: 140 MMOL/L (ref 133–144)
WBC # BLD AUTO: 4.6 10E9/L (ref 4–11)

## 2020-11-03 PROCEDURE — 80053 COMPREHEN METABOLIC PANEL: CPT | Performed by: DERMATOLOGY

## 2020-11-03 PROCEDURE — 85025 COMPLETE CBC W/AUTO DIFF WBC: CPT | Performed by: DERMATOLOGY

## 2020-11-03 PROCEDURE — 82550 ASSAY OF CK (CPK): CPT | Performed by: DERMATOLOGY

## 2020-11-03 PROCEDURE — 36415 COLL VENOUS BLD VENIPUNCTURE: CPT | Performed by: DERMATOLOGY

## 2020-11-03 PROCEDURE — 86140 C-REACTIVE PROTEIN: CPT | Performed by: DERMATOLOGY

## 2020-11-06 NOTE — RESULT ENCOUNTER NOTE
Reviewed results showing stable CMP. Discussed these results with patient over MyChart on 11/6/20. Ok to continue with MMF. But requires follow up for condition    Kayrene, would you mind making sure she has a follow up please? Thank you!    CC'ing Dr. Garcias as FYI only

## 2020-11-09 DIAGNOSIS — H40.051 BORDERLINE GLAUCOMA OF RIGHT EYE WITH OCULAR HYPERTENSION: Primary | ICD-10-CM

## 2020-11-18 ENCOUNTER — OFFICE VISIT (OUTPATIENT)
Dept: OPHTHALMOLOGY | Facility: CLINIC | Age: 75
End: 2020-11-18
Attending: OPHTHALMOLOGY
Payer: COMMERCIAL

## 2020-11-18 DIAGNOSIS — H25.13 AGE-RELATED NUCLEAR CATARACT OF BOTH EYES: ICD-10-CM

## 2020-11-18 DIAGNOSIS — Z79.899 ENCOUNTER FOR LONG-TERM (CURRENT) USE OF HIGH-RISK MEDICATION: ICD-10-CM

## 2020-11-18 DIAGNOSIS — H40.051 BORDERLINE GLAUCOMA OF RIGHT EYE WITH OCULAR HYPERTENSION: Primary | ICD-10-CM

## 2020-11-18 PROCEDURE — 92133 CPTRZD OPH DX IMG PST SGM ON: CPT | Performed by: OPHTHALMOLOGY

## 2020-11-18 PROCEDURE — 92083 EXTENDED VISUAL FIELD XM: CPT | Performed by: OPHTHALMOLOGY

## 2020-11-18 PROCEDURE — G0463 HOSPITAL OUTPT CLINIC VISIT: HCPCS

## 2020-11-18 PROCEDURE — 99213 OFFICE O/P EST LOW 20 MIN: CPT | Mod: GC | Performed by: OPHTHALMOLOGY

## 2020-11-18 RX ORDER — LATANOPROST 50 UG/ML
1 SOLUTION/ DROPS OPHTHALMIC AT BEDTIME
Qty: 3 BOTTLE | Refills: 11 | Status: SHIPPED | OUTPATIENT
Start: 2020-11-18 | End: 2021-12-01

## 2020-11-18 ASSESSMENT — TONOMETRY
OS_IOP_MMHG: 13
IOP_METHOD: APPLANATION
OD_IOP_MMHG: 13
OS_IOP_MMHG: 14
OD_IOP_MMHG: 12

## 2020-11-18 ASSESSMENT — EXTERNAL EXAM - LEFT EYE: OS_EXAM: NORMAL

## 2020-11-18 ASSESSMENT — CONF VISUAL FIELD
OS_NORMAL: 1
OD_NORMAL: 1

## 2020-11-18 ASSESSMENT — REFRACTION_WEARINGRX
OS_ADD: +2.25
OS_CYLINDER: +0.75
OD_SPHERE: +0.50
OS_SPHERE: +0.25
OD_CYLINDER: SPHERE
OD_ADD: +2.25
SPECS_TYPE: PAL
OS_AXIS: 175

## 2020-11-18 ASSESSMENT — VISUAL ACUITY
CORRECTION_TYPE: GLASSES
OD_CC: 20/20
OS_CC: 20/20 SLOW
OD_CC+: -1
METHOD: SNELLEN - LINEAR

## 2020-11-18 ASSESSMENT — CUP TO DISC RATIO
OS_RATIO: 0.4
OD_RATIO: 0.75

## 2020-11-18 ASSESSMENT — SLIT LAMP EXAM - LIDS
COMMENTS: NORMAL
COMMENTS: NORMAL

## 2020-11-18 ASSESSMENT — EXTERNAL EXAM - RIGHT EYE: OD_EXAM: NORMAL

## 2020-11-18 NOTE — NURSING NOTE
Chief Complaint(s) and History of Present Illness(es)     Glaucoma Follow-Up     In both eyes.  Associated symptoms include tearing (BE tear when outside intermittently x years) and dryness (some intermittent dryness BE x years especially when it is cold outside. ).  Negative for eye pain and redness.  Pain was noted as 0/10.              Comments     1 year Glaucoma f/u. Overall, vision seems stable x the last few month. Pt did get a new glasses rx at her last visit and has not gotten it fill yet. Pt just mentioned a possible stye on the LLL x the last few days.     Ocular meds:  Timolol QAM BE   Latanoprost at bedtime BE (pt states since she started the Timolol the Latanoprost drops sting a bit more)  ATs PRN BE    Yessy Modi, KATHRYN 10:35 AM November 18, 2020

## 2020-11-18 NOTE — PROGRESS NOTES
Chief Complaint(s) and History of Present Illness(es)     Glaucoma Follow-Up     Laterality: both eyes    Associated symptoms: tearing (BE tear when outside intermittently x   years) and dryness (some intermittent dryness BE x years especially when   it is cold outside. ).  Negative for eye pain and redness    Pain scale: 0/10              Comments     1 year Glaucoma f/u. Overall, vision seems stable x the last few month.   Pt did get a new glasses rx at her last visit and has not gotten it fill   yet. Pt just mentioned a possible stye on the LLL x the last few days.     Ocular meds:  Timolol QAM BE   Latanoprost at bedtime BE (pt states since she started the Timolol the   Latanoprost drops sting a bit more)  ATs PRN BE    Yessy Modi, COMT 10:35 AM November 18, 2020       Currently using Timolol QAM and Latanoprost at bedtime in right eye only. Per Dr. 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 Last saw Dr. Gambino on 11/15/2019 and was noted to have asymmetrical C/D ratio        Review of systems for the eyes was negative other than the pertinent positives/negatives listed in the HPI.      Assessment & Plan      Mely Rashid is a 75 year old female with the following diagnoses:   1. 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 Encounter for long-term (current) use of high-risk medication    3. Age-related nuclear cataract of both eyes       IOP Today:13/14  Tmax: 22/20 (8/28/2017)  C/D: 0.75/0.4  Sulfa allergy: none  Current drops: Timolol and Latanoprost RE  VF: right eye: MD -2.6 from -2.9 (11/15/2019)   left eye: no deficits  RNFL OCT: right eye: inferior loss   Left eye: stable    Recently started on timolol per Dr. Hoffman.  Good intraocular pressure responst  Goal right eye should be 13 or lower    Continue Timolol and Latanoprost. Discussed SLT vs drops. Patient not interested in SLT at this time.  Follow up with Dr. Chamberlain for Plaquenil screenings  Cataract- not visually  significant at this time. Monitor    Patient disposition:   Return in about 6 months (around 5/18/2021) for DFE, 24-2 Dynamic VF, OCT NFL.    Alan Damon MD  Ophthalmology Resident, PGY-2         Attending Physician Attestation:  Complete documentation of historical and exam elements from today's encounter can be found in the full encounter summary report (not reduplicated in this progress note).  I personally obtained the chief complaint(s) and history of present illness.  I confirmed and edited as necessary the review of systems, past medical/surgical history, family history, social history, and examination findings as documented by others; and I examined the patient myself.  I personally reviewed the relevant tests, images, and reports as documented above.  I formulated and edited as necessary the assessment and plan and discussed the findings and management plan with the patient and family. .Attending Physician Image/Tesing Attestation: I personally reviewed the ophthalmic test(s) associated with this encounter, agree with the interpretation(s) as documented by the resident/fellow, and have edited the corresponding report(s) as necessary.   - Edwin Navarro MD

## 2020-12-08 ENCOUNTER — TELEPHONE (OUTPATIENT)
Dept: DERMATOLOGY | Facility: CLINIC | Age: 75
End: 2020-12-08

## 2020-12-08 DIAGNOSIS — M33.13 DERMATOMYOSITIS (H): ICD-10-CM

## 2020-12-08 RX ORDER — BETAMETHASONE DIPROPIONATE 0.5 MG/G
CREAM TOPICAL 2 TIMES DAILY
Qty: 150 G | Refills: 1 | Status: SHIPPED | OUTPATIENT
Start: 2020-12-08 | End: 2022-08-08

## 2020-12-08 NOTE — TELEPHONE ENCOUNTER
Received refill request for augmented betamethasone as the resident on call. Reviewed patient's chart and attached communication. Patient last seen 4/9/20 for dermatomyositis. RTC not yet scheduled. After reviewing the medication list and assessment and plan from last visit, the refill request was accepted.    Chloé Rose DO (PGY-2)  Dermatology Resident  [facility]

## 2020-12-08 NOTE — TELEPHONE ENCOUNTER
[facility] Call Center    Phone Message    May a detailed message be left on voicemail: yes     Reason for Call: Medication Refill Request    Has the patient contacted the pharmacy for the refill? Yes augmented betamethasone dipropionate (DIPROLENE-AF) 0.05 % external cream  Name of medication being requested:   Provider who prescribed the medication: Dr Garcias  Pharmacy: [facility] - [address]  Date medication is needed: ASAP         Action Taken: Message routed to:  [facility] derm    Travel Screening: Not Applicable

## 2020-12-09 ENCOUNTER — PATIENT OUTREACH (OUTPATIENT)
Dept: GASTROENTEROLOGY | Facility: CLINIC | Age: 75
End: 2020-12-09

## 2020-12-09 DIAGNOSIS — F40.240 CLAUSTROPHOBIA: Primary | ICD-10-CM

## 2020-12-09 RX ORDER — DIAZEPAM 5 MG
5 TABLET ORAL ONCE
Qty: 2 TABLET | Refills: 0 | Status: SHIPPED | OUTPATIENT
Start: 2020-12-09 | End: 2020-12-09

## 2020-12-09 NOTE — PROGRESS NOTES
"Care Coordination Telephone Call  [facility]     Called patient to inform her that she is due for MRI and that we can order Valium for claustrophobia.  Patient states that now she does not think she will need it as they \"put me in feet first, but I have a Valium left from last year so if needed I will take that.\"    I reminded patient that if needs MRI for 2022 she should discuss claustrophobia with Dr. Rodriguez in case needs new prescription.    I have asked the patient to call with any additional questions or concerns and have provided my contact information.    Marleni SLATERN, HNBC, STAR-T  RN Care Coordinator  [facility]  Ph: [phone]  FAX: [phone]          "

## 2020-12-20 ENCOUNTER — MYC MEDICAL ADVICE (OUTPATIENT)
Dept: DERMATOLOGY | Facility: CLINIC | Age: 75
End: 2020-12-20

## 2020-12-20 DIAGNOSIS — M33.13 DERMATOMYOSITIS (H): ICD-10-CM

## 2020-12-21 RX ORDER — MYCOPHENOLATE MOFETIL 500 MG/1
1000 TABLET ORAL 2 TIMES DAILY
Qty: 360 TABLET | Refills: 0 | Status: SHIPPED | OUTPATIENT
Start: 2020-12-21 | End: 2021-04-19

## 2020-12-21 NOTE — TELEPHONE ENCOUNTER
Received refill request as ROMAIN. Chart reviewed, refill appropriate. 3-month supply provided. Patient due for labs in Feb 2021 and also needs follow-up appt. Attending Dr. Garcias. CC'd as an FYI.      Beatriz Fair MD (PGY-3)  Dermatology Resident

## 2021-01-11 ENCOUNTER — MYC MEDICAL ADVICE (OUTPATIENT)
Dept: PULMONOLOGY | Facility: CLINIC | Age: 76
End: 2021-01-11

## 2021-01-11 DIAGNOSIS — M33.13 DERMATOMYOSITIS (H): ICD-10-CM

## 2021-01-11 DIAGNOSIS — Z79.899 LONG-TERM USE OF PLAQUENIL: ICD-10-CM

## 2021-01-11 DIAGNOSIS — Z79.899 ENCOUNTER FOR LONG-TERM CURRENT USE OF MEDICATION: ICD-10-CM

## 2021-01-11 RX ORDER — HYDROXYCHLOROQUINE SULFATE 200 MG/1
200 TABLET, FILM COATED ORAL DAILY
Qty: 90 TABLET | Refills: 1 | Status: SHIPPED | OUTPATIENT
Start: 2021-01-11 | End: 2021-07-27

## 2021-01-11 NOTE — TELEPHONE ENCOUNTER
Plaquenil      Last Written Prescription Date:  7-  Last Fill Quantity: 90,   # refills: 1  Last Office Visit: 4-2-2020  Future Office visit: none  Last Eye Exam:8-    Routing refill request to provider for review/approval because:  Overdue for 6 month follow up appointment.  Scheduling has been notified to contact the pt for appointment.    Kathleen M Doege RN

## 2021-01-15 ENCOUNTER — TELEPHONE (OUTPATIENT)
Dept: DERMATOLOGY | Facility: CLINIC | Age: 76
End: 2021-01-15

## 2021-01-15 NOTE — TELEPHONE ENCOUNTER
Called to discuss mycophenolate dosing. Overall doing well - still having redness in hands but symptoms overall much better than prior to mycophenolate mofetil dose increase. Using topicals intermittently.

## 2021-01-28 DIAGNOSIS — Z79.899 HIGH RISK MEDICATION USE: ICD-10-CM

## 2021-01-28 DIAGNOSIS — M33.13 DERMATOMYOSITIS (H): ICD-10-CM

## 2021-01-28 LAB
ALBUMIN SERPL-MCNC: 4.2 G/DL (ref 3.4–5)
ALP SERPL-CCNC: 60 U/L (ref 40–150)
ALT SERPL W P-5'-P-CCNC: 30 U/L (ref 0–50)
ANION GAP SERPL CALCULATED.3IONS-SCNC: 1 MMOL/L (ref 3–14)
AST SERPL W P-5'-P-CCNC: 21 U/L (ref 0–45)
BASOPHILS # BLD AUTO: 0 10E9/L (ref 0–0.2)
BASOPHILS NFR BLD AUTO: 0.8 %
BILIRUB SERPL-MCNC: 0.3 MG/DL (ref 0.2–1.3)
BUN SERPL-MCNC: 18 MG/DL (ref 7–30)
CALCIUM SERPL-MCNC: 9.1 MG/DL (ref 8.5–10.1)
CHLORIDE SERPL-SCNC: 110 MMOL/L (ref 94–109)
CK SERPL-CCNC: 70 U/L (ref 30–225)
CO2 SERPL-SCNC: 31 MMOL/L (ref 20–32)
CREAT SERPL-MCNC: 0.64 MG/DL (ref 0.52–1.04)
CRP SERPL-MCNC: <2.9 MG/L (ref 0–8)
DIFFERENTIAL METHOD BLD: ABNORMAL
EOSINOPHIL # BLD AUTO: 0.1 10E9/L (ref 0–0.7)
EOSINOPHIL NFR BLD AUTO: 2.2 %
ERYTHROCYTE [DISTWIDTH] IN BLOOD BY AUTOMATED COUNT: 13.1 % (ref 10–15)
GFR SERPL CREATININE-BSD FRML MDRD: 88 ML/MIN/{1.73_M2}
GLUCOSE SERPL-MCNC: 100 MG/DL (ref 70–99)
HCT VFR BLD AUTO: 39.3 % (ref 35–47)
HGB BLD-MCNC: 12.5 G/DL (ref 11.7–15.7)
LYMPHOCYTES # BLD AUTO: 0.6 10E9/L (ref 0.8–5.3)
LYMPHOCYTES NFR BLD AUTO: 15.5 %
MCH RBC QN AUTO: 29.8 PG (ref 26.5–33)
MCHC RBC AUTO-ENTMCNC: 31.8 G/DL (ref 31.5–36.5)
MCV RBC AUTO: 94 FL (ref 78–100)
MONOCYTES # BLD AUTO: 0.6 10E9/L (ref 0–1.3)
MONOCYTES NFR BLD AUTO: 16.8 %
NEUTROPHILS # BLD AUTO: 2.4 10E9/L (ref 1.6–8.3)
NEUTROPHILS NFR BLD AUTO: 64.7 %
PLATELET # BLD AUTO: 181 10E9/L (ref 150–450)
POTASSIUM SERPL-SCNC: 4.5 MMOL/L (ref 3.4–5.3)
PROT SERPL-MCNC: 6.8 G/DL (ref 6.8–8.8)
RBC # BLD AUTO: 4.19 10E12/L (ref 3.8–5.2)
SODIUM SERPL-SCNC: 142 MMOL/L (ref 133–144)
WBC # BLD AUTO: 3.7 10E9/L (ref 4–11)

## 2021-01-28 PROCEDURE — 36415 COLL VENOUS BLD VENIPUNCTURE: CPT | Performed by: DERMATOLOGY

## 2021-01-28 PROCEDURE — 86140 C-REACTIVE PROTEIN: CPT | Performed by: DERMATOLOGY

## 2021-01-28 PROCEDURE — 80053 COMPREHEN METABOLIC PANEL: CPT | Performed by: DERMATOLOGY

## 2021-01-28 PROCEDURE — 82550 ASSAY OF CK (CPK): CPT | Performed by: DERMATOLOGY

## 2021-01-28 PROCEDURE — 85025 COMPLETE CBC W/AUTO DIFF WBC: CPT | Performed by: DERMATOLOGY

## 2021-02-02 ENCOUNTER — TELEPHONE (OUTPATIENT)
Dept: DERMATOLOGY | Facility: CLINIC | Age: 76
End: 2021-02-02

## 2021-02-02 ENCOUNTER — VIRTUAL VISIT (OUTPATIENT)
Dept: RHEUMATOLOGY | Facility: CLINIC | Age: 76
End: 2021-02-02
Attending: INTERNAL MEDICINE
Payer: COMMERCIAL

## 2021-02-02 DIAGNOSIS — M33.13 DERMATOMYOSITIS (H): Primary | ICD-10-CM

## 2021-02-02 PROCEDURE — 99443 PR PHYSICIAN TELEPHONE EVALUATION 21-30 MIN: CPT | Mod: 95 | Performed by: INTERNAL MEDICINE

## 2021-02-02 ASSESSMENT — PAIN SCALES - GENERAL: PAINLEVEL: NO PAIN (0)

## 2021-02-02 NOTE — PROGRESS NOTES
Lisa is a 75 year old who is being evaluated via a billable telephone visit.      What phone number would you like to be contacted at? [phone]  How would you like to obtain your AVS? Jace     February 2, 2021 interval history:    Since we last seen the patient she overall feels definitely better.  She says she has been gradually feeling stronger.  She has been able to be pretty active despite the coronavirus pandemic and recent cold weather.  She walks a significant distance most days 1 where the other.    She does not believe she had any increase in shortness of breath or muscle weakness and she believes her skin is back to 90 to 95% of where it was previously.    Physical examination is not obtainable today because of the telephone visit visit status.    Impression:    Dermatomyositis, with historically skin features worse than muscle features.    Plan/recommendation:    She certainly sounds stable.  I think we can see her back with repeat pulmonary function tests in about 9 months at which time hopefully the coronavirus pandemic will be largely passed and she can safely do these procedures.  She is quite concerned about doing them at this time, and she sounds stable and if I think that this can be deferred.    Her labs are also reviewed and have been normal, or minimally abnormal, with 1/28 WBC of 3.7, Lymphocyte count of 0.6.     We did discuss the coronavirus vaccinations as well.  I have urged her to stop her mycophenolate for several days prior to each vaccination and 7 days following.  This is unlikely to allow her disease process to flare, but at least in theory could allow a significantly better response to the vaccine.    This telephone visit commenced at 4:04 PM, was completed at 4:26 PM, 22 minutes in duration.    ASHISH Colon MD, PhD    Rheumatology

## 2021-02-02 NOTE — TELEPHONE ENCOUNTER
Patient notified of results. Verbal understanding but patient is questioning if she should take mycophenolate for the COVID19 vaccine. Dr. Mejia has spoke to the patient about this but she would like Dr. Garcias's opinion.      Earlene Davidson, [facility]

## 2021-02-02 NOTE — LETTER
2/2/2021      RE: Mely HINOJOSA Gay  [address] [place] [address]       Lisa is a 75 year old who is being evaluated via a billable telephone visit.      What phone number would you like to be contacted at? [phone]  How would you like to obtain your AVS? Jace     February 2, 2021 interval history:    Since we last seen the patient she overall feels definitely better.  She says she has been gradually feeling stronger.  She has been able to be pretty active despite the coronavirus pandemic and recent cold weather.  She walks a significant distance most days 1 where the other.    She does not believe she had any increase in shortness of breath or muscle weakness and she believes her skin is back to 90 to 95% of where it was previously.    Physical examination is not obtainable today because of the telephone visit visit status.    Impression:    Dermatomyositis, with historically skin features worse than muscle features.    Plan/recommendation:    She certainly sounds stable.  I think we can see her back with repeat pulmonary function tests in about 9 months at which time hopefully the coronavirus pandemic will be largely passed and she can safely do these procedures.  She is quite concerned about doing them at this time, and she sounds stable and if I think that this can be deferred.    Her labs are also reviewed and have been normal, or minimally abnormal, with 1/28 WBC of 3.7, Lymphocyte count of 0.6.     We did discuss the coronavirus vaccinations as well.  I have urged her to stop her mycophenolate for several days prior to each vaccination and 7 days following.  This is unlikely to allow her disease process to flare, but at least in theory could allow a significantly better response to the vaccine.    This telephone visit commenced at 4:04 PM, was completed at 4:26 PM, 22 minutes in duration.    ASHISH Colon MD, PhD    Rheumatology

## 2021-02-02 NOTE — Clinical Note
2/2/2021       RE: Mely Rashid  [address]   [place] [address]     Dear Colleague,    Thank you for referring your patient, Mely Rashid, to the [facility]. Please see a copy of my visit note below.    Lisa is a 75 year old who is being evaluated via a billable telephone visit.      What phone number would you like to be contacted at? [phone]  How would you like to obtain your AVS? MyChart  Phone call duration: *** minutes      Again, thank you for allowing me to participate in the care of your patient.      Sincerely,    Carlos Colon MD

## 2021-02-16 ENCOUNTER — MYC MEDICAL ADVICE (OUTPATIENT)
Dept: RHEUMATOLOGY | Facility: CLINIC | Age: 76
End: 2021-02-16

## 2021-02-17 NOTE — CONFIDENTIAL NOTE
Reviewed and forwarded to Dr Colon for recommendations via [employer].    NOHEMY FontaineN, RN  Rheumatology Care Coordinator  [facility]

## 2021-02-19 ENCOUNTER — PATIENT OUTREACH (OUTPATIENT)
Dept: GASTROENTEROLOGY | Facility: CLINIC | Age: 76
End: 2021-02-19

## 2021-02-19 DIAGNOSIS — F40.240 CLAUSTROPHOBIA: Primary | ICD-10-CM

## 2021-02-19 RX ORDER — DIAZEPAM 5 MG
5 TABLET ORAL ONCE
Qty: 2 TABLET | Refills: 0 | Status: SHIPPED | OUTPATIENT
Start: 2021-02-19 | End: 2021-02-19

## 2021-02-22 ENCOUNTER — PATIENT OUTREACH (OUTPATIENT)
Dept: GASTROENTEROLOGY | Facility: CLINIC | Age: 76
End: 2021-02-22

## 2021-02-23 ENCOUNTER — TELEPHONE (OUTPATIENT)
Dept: GASTROENTEROLOGY | Facility: CLINIC | Age: 76
End: 2021-02-23

## 2021-02-23 ENCOUNTER — PATIENT OUTREACH (OUTPATIENT)
Dept: GASTROENTEROLOGY | Facility: CLINIC | Age: 76
End: 2021-02-23

## 2021-02-23 ENCOUNTER — DOCUMENTATION ONLY (OUTPATIENT)
Dept: ONCOLOGY | Facility: CLINIC | Age: 76
End: 2021-02-23

## 2021-02-23 ENCOUNTER — TELEPHONE (OUTPATIENT)
Dept: ONCOLOGY | Facility: CLINIC | Age: 76
End: 2021-02-23

## 2021-02-23 DIAGNOSIS — F40.240 CLAUSTROPHOBIA: Primary | ICD-10-CM

## 2021-02-23 DIAGNOSIS — R10.12 ABDOMINAL PAIN, LEFT UPPER QUADRANT: Primary | ICD-10-CM

## 2021-02-23 RX ORDER — DIAZEPAM 5 MG
5 TABLET ORAL ONCE
Qty: 2 TABLET | Refills: 0
Start: 2021-02-23 | End: 2021-02-23

## 2021-02-23 NOTE — TELEPHONE ENCOUNTER
"Received MyChart message from pt re abdominal pain, which I was previously unaware of. She was scheduled to have an MRI for surveillance of multiple pancreatic cysts, but would not expect this to cause pain.    Does not have clinic visit scheduled with me until August and I am currently on inpt service without clinic availability.    Called pt. She reports 1-2 weeks LUQ pain which initially thought was shingles but did not develop rash. Has been taking ibuprofen for this 400 mg bid with relief. Worsened with po intake and laying on L side. Some \"explosive\" stools but no visible fat. No fevers or bleeding.    Recommended:  1) Orders placed for labs including CBC, lipase and LFTs. She will schedule appt to have drawn in [place].  2) stop ibuprofen and try tylenol for pain.   3) Try omeprazole 20 mg po daily x 2 weeks.    Presuming labs without evidence of pancreatitis, recommended she seek evaluate through primary care.  Will have my office schedule visit with me next available. Will follow-up with MRI results.  "
Cold/Sinus

## 2021-02-23 NOTE — PROGRESS NOTES
Care Coordination Telephone Call  [facility]     Valium order called to [facility] for upcoming MRI.     Order for MRI will be faxed to [facility] in [place] at the patient's request.    Marleni SALAZAR, [facility], STAR-T  RN Care Coordinator  [facility]  Ph: [phone]  FAX: [phone]

## 2021-02-23 NOTE — TELEPHONE ENCOUNTER
Prior Authorization Retail Medication Request    Medication/Dose: Diazepam 5mg Tablet  ICD code (if different than what is on RX):    Previously Tried and Failed:    Rationale:  Sedation for MRI    Insurance Name:  [facility]  Insurance ID:  [id number]      Pharmacy Information (if different than what is on RX)  Name:  [facility]  Phone:  [phone]

## 2021-02-23 NOTE — PROGRESS NOTES
MRI orders printed and faxed to [facility], fax # [phone] per request of [facility].  Successful fax transmission was verified via rightfax.    Emily Wang CMA

## 2021-02-24 DIAGNOSIS — R10.12 ABDOMINAL PAIN, LEFT UPPER QUADRANT: ICD-10-CM

## 2021-02-24 LAB
ALBUMIN SERPL-MCNC: 4.3 G/DL (ref 3.4–5)
ALP SERPL-CCNC: 64 U/L (ref 40–150)
ALT SERPL W P-5'-P-CCNC: 25 U/L (ref 0–50)
AST SERPL W P-5'-P-CCNC: 19 U/L (ref 0–45)
BASOPHILS # BLD AUTO: 0 10E9/L (ref 0–0.2)
BASOPHILS NFR BLD AUTO: 0.4 %
BILIRUB DIRECT SERPL-MCNC: 0.1 MG/DL (ref 0–0.2)
BILIRUB SERPL-MCNC: 0.5 MG/DL (ref 0.2–1.3)
DIFFERENTIAL METHOD BLD: ABNORMAL
EOSINOPHIL # BLD AUTO: 0.1 10E9/L (ref 0–0.7)
EOSINOPHIL NFR BLD AUTO: 2.2 %
ERYTHROCYTE [DISTWIDTH] IN BLOOD BY AUTOMATED COUNT: 12.9 % (ref 10–15)
HCT VFR BLD AUTO: 42.1 % (ref 35–47)
HGB BLD-MCNC: 13.4 G/DL (ref 11.7–15.7)
LIPASE SERPL-CCNC: 126 U/L (ref 73–393)
LYMPHOCYTES # BLD AUTO: 0.5 10E9/L (ref 0.8–5.3)
LYMPHOCYTES NFR BLD AUTO: 18.3 %
MCH RBC QN AUTO: 29.3 PG (ref 26.5–33)
MCHC RBC AUTO-ENTMCNC: 31.8 G/DL (ref 31.5–36.5)
MCV RBC AUTO: 92 FL (ref 78–100)
MONOCYTES # BLD AUTO: 0.4 10E9/L (ref 0–1.3)
MONOCYTES NFR BLD AUTO: 14.3 %
NEUTROPHILS # BLD AUTO: 1.8 10E9/L (ref 1.6–8.3)
NEUTROPHILS NFR BLD AUTO: 64.8 %
PLATELET # BLD AUTO: 173 10E9/L (ref 150–450)
PROT SERPL-MCNC: 7.2 G/DL (ref 6.8–8.8)
RBC # BLD AUTO: 4.57 10E12/L (ref 3.8–5.2)
WBC # BLD AUTO: 2.8 10E9/L (ref 4–11)

## 2021-02-24 PROCEDURE — 80076 HEPATIC FUNCTION PANEL: CPT | Performed by: INTERNAL MEDICINE

## 2021-02-24 PROCEDURE — 36415 COLL VENOUS BLD VENIPUNCTURE: CPT | Performed by: INTERNAL MEDICINE

## 2021-02-24 PROCEDURE — 83690 ASSAY OF LIPASE: CPT | Performed by: INTERNAL MEDICINE

## 2021-02-24 PROCEDURE — 85025 COMPLETE CBC W/AUTO DIFF WBC: CPT | Performed by: INTERNAL MEDICINE

## 2021-02-24 NOTE — TELEPHONE ENCOUNTER
Central Prior Authorization Team   Phone: [phone]      PA Initiation    Medication: Diazepam 5mg Tablet-PA initiated  Insurance Company: [employer] - Phone [phone] Fax [phone]  Pharmacy Filling the Rx: [id number] [place] [id number]  [facility]  Filling Pharmacy Phone: [phone]  Filling Pharmacy Fax:    Start Date: 2/24/2021

## 2021-02-25 ENCOUNTER — DOCUMENTATION ONLY (OUTPATIENT)
Dept: CARE COORDINATION | Facility: CLINIC | Age: 76
End: 2021-02-25

## 2021-02-25 NOTE — TELEPHONE ENCOUNTER
Prior Authorization Approval    Authorization Effective Date: 1/25/2021  Authorization Expiration Date: 3/26/2021  Medication: Diazepam 5mg Tablet-PA approved  Approved Dose/Quantity:   Reference #: [id number]   Insurance Company: [employer] - Phone [phone] Fax [phone]  Expected CoPay:       CoPay Card Available:      Foundation Assistance Needed:    Which Pharmacy is filling the prescription (Not needed for infusion/clinic administered): [facility] #[id number] [place] [address] AT [facility]  Pharmacy Notified:  Yes-advised they need to refund patient.  Patient Notified:  Yes-patient has picked up. Paid $11.99 out of pocket. Advised she is due a refund       Referred by: Duy Hodge MD; Medical Diagnosis (from order):    Diagnosis Information      Diagnosis    721.3 (ICD-9-CM) - M47.817 (ICD-10-CM) - Lumbosacral spondylosis without myelopathy                Physical Therapy -  Daily Treatment Note -  Progress Note    Visit:  7     SUBJECTIVE                                                                                                             Patient reports feeling like she is doing better. Reports pain is overall decreasing, continues to have difficulty with some things. Reports pain is in the middle of back to the left side, denies pain radiating in lower extremity.   Current functional limitations: Walking, bed mobility, and bending.    Pain / Symptoms:  Pain rating (out of 10): Current: 4     OBJECTIVE                                                                                                                     Observation:   Comments / Details: Patient performs sit to supine on mat table with noted increased pain upon supine position for approximately 30 seconds. Able to perform rolling bilaterally without reported increased pain or difficulty noted. Educated patient on use of log roll technique for supine to sitting with improved symptoms with activity. Noted pain with achievement of sitting position, decreases after approximately 30 seconds.    Range of Motion (ROM)   (degrees unless noted; active unless noted; norms in ( ); negative=lacking to 0, positive=beyond 0)   Lumbar:    - Flexion(60-80):  100%     - Extension (25):  100%     - Rotation (30/45):        • Left:  100%         • Right:  100%     - Side Bend (25-35):        • Left:  100%         • Right:  100%   Details / Comments: No increase in pain noted.      Strength  (out of 5 unless noted, standard test position unless noted, lbs tested with hand held dynamometer)   Hip:    - Flexion:        • Left (L2-3): 4+        • Right (L2-3): 4+   Knee:    - Flexion:        • Left (L5-S1): 5        •  Right (L5-S1): 5    - Extension:        • Left (L3-4): 5        • Right (L3-4): 5  Ankle:    - Dorsiflexion:        • Left (L4-5): 4-        • Right (L4-5): 5   Comments / Details: Left dorsiflexion elicits pain in the toes due to wound.       Muscle Flexibility:   Comments / Details:  SLR        Comments / Details: Outcome Measures:   OSWESTRY Total Scored: 14  OSWESTRY Total Possible Score: 45  OSWESTRY Score Calculated: 31.11 %  (0-20% = minimal disability; 20-40% = moderate disability; 40-60% = severe disability; 60-80% = crippled; % = bed bound) see flowsheet for additional documentation    TREATMENT                                                                                                                  Therapeutic Exercise:  NuStep, workload 2.0, 5 minutes - had some cramping in the back of her legs.   -Reassessment  Hook lying single knee to chest stretch 2x30 seconds bilateral - use gait belt at thigh to assist. Patient notes improved tolerance to activity  Supine hip flexion x10 bilateral   Side lying open book stretch  Seated Hip Flexion x 10 bilateral   Ambulation around the gym 100 ft without increased pain.       Manual Therapy:  Supine hamstring stretch 3x30 seconds with PNF contract relax, bilateral     Therapeutic Activity:  Continued discussion on the need to let MD know that her water pill is keeping her up at night and how many times she is having to go to the bathroom due to her water pill. Patient reports she called and asked for the nurse to call her back and has not had a return call at this time. Encouraged patient to reach out to the MD office again. Patient verbalizes understanding.   Discussed HEP and continuing HEP independently.     Skilled input: verbal instruction/cues, tactile instruction/cues and as detailed above    Writer verbally educated and received verbal consent for hand placement, positioning of patient, and techniques to be performed today from patient for  therapist position for techniques and hand placement and palpation for techniques as described above and how they are pertinent to the patient's plan of care.    Home Exercise Program:   Added 10/13: Seated Long arc quad, hip Abduction, hip adduction, hip flexion with use of Red Theraband     Access Code: [email or web address]   Prepared by: Alla Yu     Exercises  Seated Hamstring Stretch - 3 reps - 1 sets - 20 hold - 3x daily - 7x weekly  Hooklying Single Knee to Chest - 5 reps - 2 sets - 3 hold - 2x daily - 7x weekly  Supine Gluteus Stretch - 3 reps - 1 sets - 30 hold - 2x daily - 7x weekly  Sidelying Open Book Thoracic Lumbar Rotation and Extension - 10 reps - 1 sets - 2x daily - 7x weekly     ASSESSMENT                                                                                                             Patient reports improved pain and symptoms since beginning therapy. Continues to report pain in the central and left sided low back. Patient demonstrates significant improvements in lumbar ROM with no reported increases in pain or symptoms. Demonstrates continued weakness in hip flexion, improved strength noted in knee and ankle. Left dorsiflexion weakness noted, likely attributed toe wounds. Reviewed HEP and provided patient with modifications to improve home compliance and tolerance. Patient demonstrates an improvement on the Oswestry low back disability index of 13.33 which shows statistically significant improvement in pain. Moving from the severe disability to the moderate disability category.  At this point patient has shown significant improvements in mobility, though continues to demonstrate pain with bed mobility, patient feels as though she can continue with HEP and monitor progress. Will place patient on 30 day hold to monitor symptoms.   Pain/symptoms after session: 4  To date the patient has made gains as expectedas reported. Patient continues to have impairments and functional deficits as noted.   Patient will continue to benefit from skilled care as outlined.  Patient Education:   Results of above outlined education: Verbalizes understanding and Demonstrates understanding      PLAN                                                                                                                           Updates to plan of care: continue current plan of care    Patient involved in and agreed to plan of care and goals.  Suggestions for next session as indicated: Progress per plan of care. Will place on 30 day hold as patient continues with independent HEP.       GOALS                                                                                                                       Long Term Goals: To be met by end of plan of care:      Home Exercise Program: Independent with progressed and modified home exercise program (HEP)      Status:  Met     Pain: Decrease pain/symptoms to 1 with bed mobility    Status:  Not met  Strength: Improve involved strength to  5    Status:  Partially met   Range of Motion: Improve involved range of motion (ROM) to   100% pain-free    Status:  Met   Status Details: Improved ability to perform bed mobility, pain level 4/10. Patient reported tolerable.     Ambulation Time: Ambulate for 15 minutes and with reported manageable/tolerable difficulty (Walking and moving (mobility))    Status:  Revised and met   Status Details: Goal revised from without difficulty to manageable difficulty.     Bed Mobility: Complete all aspects of bed mobility with reported manageable/tolerable difficulty (Changing and maintaining body position (mobility))    Status:  Partially met   Status Details: Pain/difficulty noted with supine to/from sitting, reported as tolerable.       Procedures and total treatment time documented Time Entry flowsheet.

## 2021-03-01 ENCOUNTER — VIRTUAL VISIT (OUTPATIENT)
Dept: INTERNAL MEDICINE | Facility: CLINIC | Age: 76
End: 2021-03-01
Payer: COMMERCIAL

## 2021-03-01 DIAGNOSIS — B02.29 NEURALGIA, POST-HERPETIC: ICD-10-CM

## 2021-03-01 DIAGNOSIS — R10.9 LEFT SIDED ABDOMINAL PAIN: Primary | ICD-10-CM

## 2021-03-01 DIAGNOSIS — R10.9 LEFT SIDED ABDOMINAL PAIN: ICD-10-CM

## 2021-03-01 LAB
BASOPHILS # BLD AUTO: 0 10E9/L (ref 0–0.2)
BASOPHILS NFR BLD AUTO: 0.6 %
CRP SERPL-MCNC: <2.9 MG/L (ref 0–8)
DIFFERENTIAL METHOD BLD: ABNORMAL
EOSINOPHIL # BLD AUTO: 0.1 10E9/L (ref 0–0.7)
EOSINOPHIL NFR BLD AUTO: 2.5 %
ERYTHROCYTE [DISTWIDTH] IN BLOOD BY AUTOMATED COUNT: 13.2 % (ref 10–15)
HCT VFR BLD AUTO: 39.4 % (ref 35–47)
HGB BLD-MCNC: 12.4 G/DL (ref 11.7–15.7)
LYMPHOCYTES # BLD AUTO: 0.6 10E9/L (ref 0.8–5.3)
LYMPHOCYTES NFR BLD AUTO: 19.9 %
MCH RBC QN AUTO: 29.5 PG (ref 26.5–33)
MCHC RBC AUTO-ENTMCNC: 31.5 G/DL (ref 31.5–36.5)
MCV RBC AUTO: 94 FL (ref 78–100)
MONOCYTES # BLD AUTO: 0.5 10E9/L (ref 0–1.3)
MONOCYTES NFR BLD AUTO: 15.8 %
NEUTROPHILS # BLD AUTO: 2 10E9/L (ref 1.6–8.3)
NEUTROPHILS NFR BLD AUTO: 61.2 %
PLATELET # BLD AUTO: 181 10E9/L (ref 150–450)
RBC # BLD AUTO: 4.21 10E12/L (ref 3.8–5.2)
WBC # BLD AUTO: 3.2 10E9/L (ref 4–11)

## 2021-03-01 PROCEDURE — 99214 OFFICE O/P EST MOD 30 MIN: CPT | Mod: TEL | Performed by: PEDIATRICS

## 2021-03-01 PROCEDURE — 86140 C-REACTIVE PROTEIN: CPT | Performed by: PEDIATRICS

## 2021-03-01 PROCEDURE — 36415 COLL VENOUS BLD VENIPUNCTURE: CPT | Performed by: PEDIATRICS

## 2021-03-01 RX ORDER — GABAPENTIN 300 MG/1
300 CAPSULE ORAL 3 TIMES DAILY
Qty: 120 CAPSULE | Refills: 1 | Status: SHIPPED | OUTPATIENT
Start: 2021-03-01 | End: 2021-05-19

## 2021-03-01 NOTE — PROGRESS NOTES
Dear patient. I use the medical record to document (to the best of my ability) my understanding of:   - What you told me,  - Relevant details from my exam, records review, and/or test results, and  - My assessment and plan  If you have questions, you are welcome to contact me; I will reply as soon as time allows.      Virtual Visit Details    Type of service:  Telephone Visit    Start Time: 10:41 AM    End Time:11:06 AM    Originating Location (pt. Location): Home    Distant Location (provider location):  [facility]     Platform used for Visit: Doximity    Status: established patient, PCP Gavi Stacy  Visit type: evaluation & management of one or more problems  Time note (e4, 30'): The total time (on the date of service) for this service was 30 minutes, including discussion/face-to-face, chart review, interpretation not otherwise reported, documentation, and updating of the computerized record.        Context    Mely Rashid is a 75 year old woman, with concerns including:  Chief Complaint   Patient presents with     Pain     pt would like to discuss abdominal and back pain for a few weeks, possible shingles, rash       History, update, and/or problems    She is pretty sure she has shingles.   Starting 2/12/21 she felt tenderness with left side of abdomen, from midline. Painfulness increased over time, it feels like it is on the inside. No rash initially, but then increased pain and a rash have showed up on her back. She has had shingles before, and this feels similarly.  She has always had diarrhea, and now is worse - the diarrhea is a long-standing problem that predates the pancreas, she says.   Pancreas follow-up. She has been in touch with her other providers. She missed the last MRI, that is scheduled for tomorrow.    She had some pain medication from Dr. Rodriguez.  I will re-order her gabapentin at 300 TID, she can increase to 600 TID after 4 days (that was dose  she ended up on in 2014).    She can go to the [facility] lab in [place] like she usually does.    She asked about COVID shot. Reiterated Dr. Colon's advice about mycophenolate timing around COVID shot.

## 2021-03-01 NOTE — NURSING NOTE
Chief Complaint   Patient presents with     Pain     pt would like to discuss abdominal and back pain for a few weeks, possible shingles, rash       iWllie Mckeon CMA, EMT at 9:50 AM on 3/1/2021.

## 2021-03-02 ENCOUNTER — TRANSFERRED RECORDS (OUTPATIENT)
Dept: HEALTH INFORMATION MANAGEMENT | Facility: CLINIC | Age: 76
End: 2021-03-02

## 2021-03-02 DIAGNOSIS — R10.9 LEFT SIDED ABDOMINAL PAIN: ICD-10-CM

## 2021-03-02 PROCEDURE — 83993 ASSAY FOR CALPROTECTIN FECAL: CPT | Performed by: PEDIATRICS

## 2021-03-03 LAB — CALPROTECTIN STL-MCNT: 67.9 MG/KG (ref 0–49.9)

## 2021-03-04 ENCOUNTER — OFFICE VISIT (OUTPATIENT)
Dept: INTERNAL MEDICINE | Facility: CLINIC | Age: 76
End: 2021-03-04
Payer: COMMERCIAL

## 2021-03-04 ENCOUNTER — PATIENT OUTREACH (OUTPATIENT)
Dept: GASTROENTEROLOGY | Facility: CLINIC | Age: 76
End: 2021-03-04

## 2021-03-04 VITALS
BODY MASS INDEX: 22.91 KG/M2 | DIASTOLIC BLOOD PRESSURE: 76 MMHG | OXYGEN SATURATION: 98 % | HEART RATE: 74 BPM | SYSTOLIC BLOOD PRESSURE: 123 MMHG | WEIGHT: 137.7 LBS

## 2021-03-04 DIAGNOSIS — B02.9 HERPES ZOSTER WITHOUT COMPLICATION: Primary | ICD-10-CM

## 2021-03-04 DIAGNOSIS — Z12.11 COLON CANCER SCREENING: ICD-10-CM

## 2021-03-04 PROCEDURE — 99215 OFFICE O/P EST HI 40 MIN: CPT | Performed by: NURSE PRACTITIONER

## 2021-03-04 RX ORDER — OMEPRAZOLE 10 MG/1
20 CAPSULE, DELAYED RELEASE ORAL DAILY
COMMUNITY
End: 2021-05-19

## 2021-03-04 RX ORDER — FAMCICLOVIR 500 MG/1
500 TABLET ORAL 3 TIMES DAILY
Qty: 21 TABLET | Refills: 0 | Status: SHIPPED | OUTPATIENT
Start: 2021-03-04 | End: 2021-05-19

## 2021-03-04 NOTE — NURSING NOTE
Chief Complaint   Patient presents with     Derm Problem     left sided back pain with associated rash, onset on 2/12, similar to previous Shingles     Abdominal Pain     left upper abdominal pain, unsure if radiating from back, some distension, had significant diarrhea initially for 2 days at onset but improved to baseline       ALIYAH Oro at 11:32 AM on 3/4/2021

## 2021-03-04 NOTE — PROGRESS NOTES
S:       Starting 21 she felt tenderness with left side of back and a fe days later developed red papules which did not cross the spine.  , She then developed left sided abdominal pain. Painfulness increased over time, it feels like it is on the inside. No rash initially, but then increased pain.  She has stopped her mycophenolate for the past few days in anticipation of the Covid vaccine which is scheduled for tomorrow.  SHe is fairly uncomfortable in the abdomen when she is sitting. She has a call into GI clinic.  SHe had an MRI done 2 days ago. She has not heard the result.  This was done at [facility] in [place].    Patient Active Problem List   Diagnosis     Glaucoma, right     Dermatomyositis (H)     IgA deficiency (H)     Herpes zoster     Encounter for long-term current use of medication     Encounter for long-term (current) use of steroids     Oral ulcer     Seborrheic dermatitis     Osteopenia     Neoplasm of uncertain behavior of skin     Routine general medical examination at a health care facility             Social History     Tobacco Use     Smoking status: Former Smoker     Quit date: 4/10/1979     Years since quittin.9     Smokeless tobacco: Never Used     Tobacco comment: quit    Substance Use Topics     Alcohol use: Yes     Alcohol/week: 0.0 standard drinks     Comment: rare            Allergies   Allergen Reactions     Penicillins Anaphylaxis            Current Outpatient Medications   Medication Sig Dispense Refill     augmented betamethasone dipropionate (DIPROLENE-AF) 0.05 % external cream Apply topically 2 times daily For hands only 150 g 1     B Complex TABS Take 1 tablet by mouth daily.       Calcium Carb-Cholecalciferol (CALCIUM 500 +D PO) Take 3 tablets by mouth daily.       Cholecalciferol (VITAMIN D) 1000 UNIT capsule Take 1 capsule by mouth daily.       clobetasol (TEMOVATE) 0.05 % external solution Apply topically 2 times daily For scalp 150 mL 3     famciclovir (FAMVIR) 500 MG  tablet Take 1 tablet (500 mg) by mouth 3 times daily for 7 days 21 tablet 0     fluocinolone acetonide (DERMA SMOOTHE/FS BODY) 0.01 % external oil Apply topically At Bedtime For scalp 118.28 mL 3     gabapentin (NEURONTIN) 300 MG capsule Take 1 capsule (300 mg) by mouth 3 times daily . If necessary after 4 days she may increase to 2 capsules  capsule 1     glucosamine-chondroitinoitin (GLUCOSAMINE CHONDR COMPLEX) 500-400 MG CAPS Take 2 capsules by mouth daily.       hydrocortisone 2.5 % cream Apply topically 2 times daily For face 30 g 3     hydroxychloroquine (PLAQUENIL) 200 MG tablet Take 1 tablet (200 mg) by mouth daily 90 tablet 1     latanoprost (XALATAN) 0.005 % ophthalmic solution Place 1 drop into the right eye At Bedtime 3 Bottle 11     loperamide (IMODIUM A-D) 2 MG tablet Take 2 mg by mouth every morning       mycophenolate (GENERIC EQUIVALENT) 500 MG tablet Take 2 tablets (1,000 mg) by mouth 2 times daily NEED FOLLOW UP APPT AND LABS FOR FURTHER REFILLS 360 tablet 0     omega-3 fatty acids (FISH OIL) 1200 MG capsule Take 1 capsule by mouth daily.       omeprazole (PRILOSEC) 10 MG DR capsule Take 20 mg by mouth daily       timolol maleate (TIMOPTIC) 0.25 % ophthalmic solution Place 1 drop into the right eye every morning 5 mL 11     triamcinolone (KENALOG) 0.1 % external cream Apply topically 2 times daily For body 454 g 3     vitamin C w/JD HIPS 500 MG tablet        vitamin E 400 UNIT capsule Take by mouth daily          REVIEW OF SYSTEMS:    See above. .    O:   /76 (BP Location: Right arm, Patient Position: Sitting, Cuff Size: Adult Regular)   Pulse 74   Wt 62.5 kg (137 lb 11.2 oz)   SpO2 98%   Breastfeeding No   BMI 22.91 kg/m    GENERAL APPEARANCE: healthy, alert and no acute distress  EYES: Grossly normal  RESP:no distress.  CV: regular rates and rhythm, normal S1 S2, no S3 or S4 and no murmur, click or rub   ABDOMEN:  soft, tender to palpation over bilateral lower abdomen, no HSM  or masses and bowel sounds normal  NEURO: normal  MUSK: normal  SKIN: she has scattered red to pink raised lesions, a few are vesicular on the back  And light ones approaching the naval but not crossing midline: T10 dermatome  LYMPH: neg  EXT: warm.  Edema NO   PSYCHE: normal.  Her outside scan was faxed to clinic and showed no significant changes in the pancreatic cysts and no other findings to explain her pain.     A/P:  Shingles in an immune compromised woman   I recommend canceling her covid vaccine for tomorrow.  She will be started on Famvcyclovir 500 mg tid x 7 days.  She should reschedule Covid vaccine at least 2 weeks (preferably 4 weeks) after resolution of her lesions and stop mycophenolate a few days before the vaccine and hold for 4 days after the vaccine.     Total time spent today with this patient including chart review, exam time with patient and documentation : 50 minutes.      Gavi ISLAS, CNP

## 2021-03-04 NOTE — PROGRESS NOTES
Care Coordination Telephone Call  [facility]     Called patient to assess how she is doing at the request of Dr. Rodriguez.  Asked for call back.    3/5/21 second call to patient to assess abdominal issues.  She has shingles now and they think that she may have internal also so Dr. Stacy believes that this is the cause of abdominal pain.     I have asked the patient to call with any additional questions or concerns and have provided my contact information.    Plan:  She will call us if any additional questions.    Marleni SLATERN, HNBC, STAR-T  RN Care Coordinator  [facility]  Ph: [phone]  FAX: [phone]

## 2021-03-06 ENCOUNTER — MYC MEDICAL ADVICE (OUTPATIENT)
Dept: INTERNAL MEDICINE | Facility: CLINIC | Age: 76
End: 2021-03-06

## 2021-03-08 ENCOUNTER — TELEPHONE (OUTPATIENT)
Dept: GASTROENTEROLOGY | Facility: CLINIC | Age: 76
End: 2021-03-08

## 2021-03-08 NOTE — TELEPHONE ENCOUNTER
Spoke with patient in regards to scheduling her colonoscopy. Patient stated that she will be scheduling through [facility] as it is closer to home and easier for her to get a .     Procedure: Lower Endoscopy    Lower Endoscopy Type: Colonoscopy    Purpose of Colonoscopy Procedure: Screening    Colonoscopy Sedation: Conscious/Moderate    Preferred Location: [facility]    Scheduling Instructions: If you have not heard from the scheduling office within 2 business days, please call [phone].      Dx: Colon cancer screening [Z12.11]

## 2021-03-11 ENCOUNTER — MYC MEDICAL ADVICE (OUTPATIENT)
Dept: INTERNAL MEDICINE | Facility: CLINIC | Age: 76
End: 2021-03-11

## 2021-03-19 ENCOUNTER — IMMUNIZATION (OUTPATIENT)
Dept: NURSING | Facility: CLINIC | Age: 76
End: 2021-03-19
Payer: COMMERCIAL

## 2021-03-19 PROCEDURE — 91300 PR COVID VAC PFIZER DIL RECON 30 MCG/0.3 ML IM: CPT

## 2021-03-19 PROCEDURE — 0001A PR COVID VAC PFIZER DIL RECON 30 MCG/0.3 ML IM: CPT

## 2021-03-22 ENCOUNTER — VIRTUAL VISIT (OUTPATIENT)
Dept: GASTROENTEROLOGY | Facility: CLINIC | Age: 76
End: 2021-03-22
Attending: INTERNAL MEDICINE
Payer: COMMERCIAL

## 2021-03-22 ENCOUNTER — TELEPHONE (OUTPATIENT)
Dept: GASTROENTEROLOGY | Facility: CLINIC | Age: 76
End: 2021-03-22

## 2021-03-22 DIAGNOSIS — B02.9 HERPES ZOSTER WITHOUT COMPLICATION: ICD-10-CM

## 2021-03-22 DIAGNOSIS — K86.2 PANCREAS CYST: Primary | ICD-10-CM

## 2021-03-22 DIAGNOSIS — R19.5 LOOSE STOOLS: ICD-10-CM

## 2021-03-22 PROCEDURE — 999N001193 HC VIDEO/TELEPHONE VISIT; NO CHARGE

## 2021-03-22 PROCEDURE — 99441 PR PHYSICIAN TELEPHONE EVALUATION 5-10 MIN: CPT | Mod: 95 | Performed by: INTERNAL MEDICINE

## 2021-03-22 NOTE — LETTER
"    3/22/2021         RE: Mely Rashid  [address]   [place] [address]        Dear Colleague,    Thank you for referring your patient, Mely Rashid, to the [facility]. Please see a copy of my visit note below.    Lisa is a 75 year old who is being evaluated via a billable telephone visit.      What phone number would you like to be contacted at? [phone]    How would you like to obtain your AVS? Mail a copy     Vitals - Patient Reported  Weight (Patient Reported): 62.1 kg (137 lb)  Height (Patient Reported): 167.6 cm (5' 6\")  BMI (Based on Pt Reported Ht/Wt): 22.11  Pain Score: No Pain (0)    Burton Baeza LPN    GI Clinic Note:    74 yo female with hx of dermatomyositis and IgA deficiency who I am following for pancreatic cysts. See previous note from 2/17/20 for details.    One month ago received [employer] message re abdominal pain. She was suspicious of shingles. At that time I recommended that we obtain labs including lipase, LFTs and CBC and also check MRI (as not ordered apparently until August) to assess for possible pancreatic etiologies, as well as recommended evaluation by primary care re concern for shingles.    The labs returned normal other than low WBC at 2.8 (consistent with mycophenolate). The MRI was performed at [facility] and read as without main pancreatic duct dilation. Several cysts were seen, including the largest in the body which was slightly small (1.9 x 1.3 cm changed to 1.7 x 1.4 cm). Multiple smaller cysts were seen, some of which were subtly larger. There were no enhancing components.    She subsequently saw primary care and was confirmed to have shingles. Was started on famciclovir.   She reports that now she is doing \"extremely well\". She is without pain.  She has chronically loose stools and is on mycophenolate. She did have labs checked including an elevated calprotectin and normal CRP. She has a colonoscopy ordered for further " evaluation which is being done through [facility] per her request.  She has, however, recently held her mycophenolate to allow a COVID vaccine and reports that the diarrhea resolved.    She denies abdominal pain, jaundice, or weight loss.    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 Discussed recommendation for repeat MRI/MRCP with contrast in 2 years. Then if stable will stop surveillance as I typically stop at age 77, which is analagous to recommendations for colon cancer screening.  2) Shingles - clinically resolved.  3) Loose stools. Consistent with mycophenolate. Did have elevated calprotectin an has colonoscopy arranged.    RTC 2 years to review MRI results.    Total time today 14 minutes, including 9 mm telephone visit and 5 minutes documentation.    CARLOS Rodriguez MD  Professor of Medicine  Division of Gastroenterology, Hepatology and Nutrition  [facility]

## 2021-03-22 NOTE — PROGRESS NOTES
"Lisa is a 75 year old who is being evaluated via a billable telephone visit.      What phone number would you like to be contacted at? [phone]    How would you like to obtain your AVS? Mail a copy     Vitals - Patient Reported  Weight (Patient Reported): 62.1 kg (137 lb)  Height (Patient Reported): 167.6 cm (5' 6\")  BMI (Based on Pt Reported Ht/Wt): 22.11  Pain Score: No Pain (0)    Burton Baeza N    GI Clinic Note:    74 yo female with hx of dermatomyositis and IgA deficiency who I am following for pancreatic cysts. See previous note from 2/17/20 for details.    One month ago received [employer] message re abdominal pain. She was suspicious of shingles. At that time I recommended that we obtain labs including lipase, LFTs and CBC and also check MRI (as not ordered apparently until August) to assess for possible pancreatic etiologies, as well as recommended evaluation by primary care re concern for shingles.    The labs returned normal other than low WBC at 2.8 (consistent with mycophenolate). The MRI was performed at [facility] and read as without main pancreatic duct dilation. Several cysts were seen, including the largest in the body which was slightly small (1.9 x 1.3 cm changed to 1.7 x 1.4 cm). Multiple smaller cysts were seen, some of which were subtly larger. There were no enhancing components.    She subsequently saw primary care and was confirmed to have shingles. Was started on famciclovir.   She reports that now she is doing \"extremely well\". She is without pain.  She has chronically loose stools and is on mycophenolate. She did have labs checked including an elevated calprotectin and normal CRP. She has a colonoscopy ordered for further evaluation which is being done through [facility] per her request.  She has, however, recently held her mycophenolate to allow a COVID vaccine and reports that the diarrhea resolved.    She denies abdominal pain, jaundice, or weight loss.    A/P:   1) Multiple pancreatic " cysts, presumed branch-duct variant IPMN -  Essentially stable and no high-risk or worrisome features. Discussed recommendation for repeat MRI/MRCP with contrast in 2 years. Then if stable will stop surveillance as I typically stop at age 77, which is analagous to recommendations for colon cancer screening.  2) Shingles - clinically resolved.  3) Loose stools. Consistent with mycophenolate. Did have elevated calprotectin an has colonoscopy arranged.    RTC 2 years to review MRI results.    Total time today 14 minutes, including 9 mm telephone visit and 5 minutes documentation.    CARLOS Rodriguez MD  Professor of Medicine  Division of Gastroenterology, Hepatology and Nutrition  [facility]

## 2021-03-22 NOTE — TELEPHONE ENCOUNTER
See visit from today.    Please arrange for repeat MRI/MRCP with contrast in 2 years. Ind - follow-up IPMN.    Clinic visit with me same day after scan to review.    CARLOS Rodriguez MD  Professor of Medicine  Division of Gastroenterology, Hepatology and Nutrition  [facility]

## 2021-03-27 ENCOUNTER — HEALTH MAINTENANCE LETTER (OUTPATIENT)
Age: 76
End: 2021-03-27

## 2021-04-09 ENCOUNTER — IMMUNIZATION (OUTPATIENT)
Dept: NURSING | Facility: CLINIC | Age: 76
End: 2021-04-09
Attending: FAMILY MEDICINE
Payer: COMMERCIAL

## 2021-04-09 PROCEDURE — 0002A PR COVID VAC PFIZER DIL RECON 30 MCG/0.3 ML IM: CPT

## 2021-04-09 PROCEDURE — 91300 PR COVID VAC PFIZER DIL RECON 30 MCG/0.3 ML IM: CPT

## 2021-04-19 DIAGNOSIS — M33.13 DERMATOMYOSITIS (H): ICD-10-CM

## 2021-04-19 RX ORDER — MYCOPHENOLATE MOFETIL 500 MG/1
1000 TABLET ORAL 2 TIMES DAILY
Qty: 360 TABLET | Refills: 0 | Status: SHIPPED | OUTPATIENT
Start: 2021-04-19 | End: 2021-07-27

## 2021-04-19 NOTE — TELEPHONE ENCOUNTER
[facility] Call Center    Phone Message    May a detailed message be left on voicemail: yes     Reason for Call: Medication Refill Request    Has the patient contacted the pharmacy for the refill? Yes     Name of medication being requested: mycophenolate (GENERIC EQUIVALENT) 500 MG tablet    Provider who prescribed the medication: Dr. Garcias    Pharmacy: [facility]    Date medication is needed: Soon    Pharm called and would like for you to send the fill to them by today if possible. You can call and give a verbal order too at [phone]. Thanks      Action Taken: Message routed to:  [facility] DERM    Travel Screening: Not Applicable

## 2021-04-27 ENCOUNTER — TRANSFERRED RECORDS (OUTPATIENT)
Dept: HEALTH INFORMATION MANAGEMENT | Facility: CLINIC | Age: 76
End: 2021-04-27

## 2021-04-27 LAB — NEGATIVE: NORMAL

## 2021-05-12 DIAGNOSIS — Z79.899 ENCOUNTER FOR LONG-TERM (CURRENT) USE OF HIGH-RISK MEDICATION: Primary | ICD-10-CM

## 2021-05-17 DIAGNOSIS — H40.051 BORDERLINE GLAUCOMA OF RIGHT EYE WITH OCULAR HYPERTENSION: Primary | ICD-10-CM

## 2021-05-18 ENCOUNTER — OFFICE VISIT (OUTPATIENT)
Dept: OPHTHALMOLOGY | Facility: CLINIC | Age: 76
End: 2021-05-18
Attending: OPHTHALMOLOGY
Payer: COMMERCIAL

## 2021-05-18 DIAGNOSIS — Z79.899 ENCOUNTER FOR LONG-TERM (CURRENT) USE OF HIGH-RISK MEDICATION: ICD-10-CM

## 2021-05-18 PROCEDURE — 92250 FUNDUS PHOTOGRAPHY W/I&R: CPT | Performed by: OPHTHALMOLOGY

## 2021-05-18 PROCEDURE — 99207 FUNDUS AUTOFLUORESCENCE IMAGE (FAF) OU (BOTH EYES): CPT | Performed by: OPHTHALMOLOGY

## 2021-05-18 PROCEDURE — 99214 OFFICE O/P EST MOD 30 MIN: CPT | Performed by: OPHTHALMOLOGY

## 2021-05-18 PROCEDURE — G0463 HOSPITAL OUTPT CLINIC VISIT: HCPCS

## 2021-05-18 PROCEDURE — 92134 CPTRZ OPH DX IMG PST SGM RTA: CPT | Performed by: OPHTHALMOLOGY

## 2021-05-18 PROCEDURE — 92082 INTERMEDIATE VISUAL FIELD XM: CPT | Performed by: OPHTHALMOLOGY

## 2021-05-18 ASSESSMENT — VISUAL ACUITY
OD_CC: 20/20
CORRECTION_TYPE: GLASSES
OS_CC+: -2
METHOD: SNELLEN - LINEAR
OS_CC: 20/20
OD_CC+: -2

## 2021-05-18 ASSESSMENT — REFRACTION_WEARINGRX
OS_AXIS: 175
OD_SPHERE: +0.50
OD_ADD: +2.25
SPECS_TYPE: PAL
OS_SPHERE: +0.25
OS_ADD: +2.25
OS_CYLINDER: +0.75
OD_CYLINDER: SPHERE

## 2021-05-18 ASSESSMENT — CONF VISUAL FIELD
OS_NORMAL: 1
OD_NORMAL: 1

## 2021-05-18 ASSESSMENT — TONOMETRY
IOP_METHOD: TONOPEN
OS_IOP_MMHG: 14
OD_IOP_MMHG: 11

## 2021-05-18 ASSESSMENT — SLIT LAMP EXAM - LIDS
COMMENTS: NORMAL
COMMENTS: NORMAL

## 2021-05-18 ASSESSMENT — CUP TO DISC RATIO
OS_RATIO: 0.4
OD_RATIO: 0.75

## 2021-05-18 ASSESSMENT — EXTERNAL EXAM - RIGHT EYE: OD_EXAM: NORMAL

## 2021-05-18 ASSESSMENT — EXTERNAL EXAM - LEFT EYE: OS_EXAM: NORMAL

## 2021-05-18 NOTE — NURSING NOTE
Chief Complaints and History of Present Illnesses   Patient presents with     Plaquenil     Chief Complaint(s) and History of Present Illness(es)     Plaquenil               Comments     Pt. States that she is doing well. No change in VA BE. No pain or dryness BE.   Lucie Brandon COT 1:26 PM May 18, 2021

## 2021-05-18 NOTE — PROGRESS NOTES
"CC -   plaquenil    INTERVAL HISTORY -   VA stable. She thinks she did not do well on the visual field test today.,  on  plaquenil 200/day    PMH-   Mely Rashid is a  75 year old year-old patient presenting for plaquenil.    400 mg/day since 2011-8/2016, then 200/day since (brief period of 300/day)  height 5'6\", weigth 145#., no renal disease, no tamoxifen  H/o dermatomyositis, on plaquenil and cellcept.  Sees Isaiah.    Retired surgery scheduler for ENT @ [employer]    PAST OCULAR HISTORY  No surgeries    RETINAL IMAGING  OCT   5-18-21  OD-  Mild diffuse outer changes c/w AMD, ?PVD  OS -  Mild diffuse outer changes, c/w AMD ?PVD    AF  5-18-21  OU - mild irregular, stable    OVF 10-2   5-18-21  OD - reliable, several spots, better from 8/2020 worse than 1/2020  OS - not reliable (high FP, high FN), few spots, worse from 8/2020 & worse than 1/2020    mfERG 7/29/20  OU - normal      ASSESSMENT & PLAN    #.  Plaquenil use since 2011   - 400/day 2011 to 8/2016, then 200/day height 5'6\", weight 145#     - OVF 10-2 OU unreliable, variable defects   - OCT & FAF normal   - last mfERG 7/2020 normal     - suspect VFD not plaquenil   - prior OVF 24-2 do not look severe enough for it to be glaucoma   - will see glaucoma clinic tomorrow     - recheck plaquenil 9 months as precaution   - plan to repeat mfERG every 2-3  years if unable to get good OVF 10-2      #   vitreous syneresis OU vs PVD   - advised S/Sx RD 5/2021    #.  Glaucoma OU   - on xalatan and timolol   - has right APD likely d/t glaucoma   - sees Melanie      #  Tr NS OU      # Mild dry AMD OU   - no smoking   - no AREDS at present      return to clinic: in 9 months, OCT + FAF + OVF 10-2     Ruth Davis MD  Ophthalmology PGY-3    ATTESTATION     Attending Physician Attestation:      Complete documentation of historical and exam elements from today's encounter can be found in the full encounter summary report (not reduplicated in this progress note).  I personally " obtained the chief complaint(s) and history of present illness.  I confirmed and edited as necessary the review of systems, past medical/surgical history, family history, social history, and examination findings as documented by others; and I examined the patient myself.  I personally reviewed the relevant tests, images, and reports as documented above.  I personally reviewed the ophthalmic test(s) associated with this encounter, agree with the interpretation(s) as documented by the resident/fellow, and have edited the corresponding report(s) as necessary.   I formulated and edited as necessary the assessment and plan and discussed the findings and management plan with the patient and family    Shakira Chamberlain MD, PhD  , Vitreoretinal Surgery  Department of Ophthalmology  [facility]

## 2021-05-18 NOTE — PATIENT INSTRUCTIONS
INSTRUCTIONS FOR MACULAR DEGENERATION AND AREDS/AREDS 2 VITAMINS    You have macular degeneration and are at risk for vision loss from the progression of dry macular degeneration or the development of exudative changes, the wet form of the disease.    Taking nutritional supplements according to the Age Related Eye Disease Study (AREDS or AREDS 2) will help decrease your risk of vision loss from both the dry and the wet forms of macular degeneration.    AREDS vitamins contain the following ingredients:  Vitamin A - 15 mg of beta-carotene (equivalent to 25,000 IU of vitamin A)  Vitamin C - 500 mg  Vitamin E - 400 IU  Zinc - 80 mg of zinc as zinc oxide  Copper - 2 mg of copper as cupric oxide    AREDS 2 vitamins contain the following ingredients:  Lutein - 10 mg  Zeaxanthin - 2 mg  Vitamin C - 500 mg  Vitamin E - 400 IU  Zinc - 80 mg of zinc as zinc oxide  Copper - 2 mg of copper as cupric oxide      Examples of AREDS vitamins: Preservision, Ocuvite, I-Caps, Visivite, and others. I would suggest finding the most convenient and economical brand which follows this ingredient list.    If you are a smoker or were a smoker in the past 10-15  years, you should take AREDS 2 vitamins, and SHOULD NOT take AREDS vitamins with beta-carotene (vitamin A).  AREDS 2 vitamins do not contain beta-carotene (vitamin A), which can increase the risk of lung cancer in individuals who are actively smoking or who smoked in the past 10-15 years.    Maintain a healthy diet  Do not smoke  Monitor your vision in each eye using the [employer] grid and call the office with any changes in either eye.      [phone]

## 2021-05-19 ENCOUNTER — MYC MEDICAL ADVICE (OUTPATIENT)
Dept: INTERNAL MEDICINE | Facility: CLINIC | Age: 76
End: 2021-05-19

## 2021-05-19 ENCOUNTER — OFFICE VISIT (OUTPATIENT)
Dept: OPHTHALMOLOGY | Facility: CLINIC | Age: 76
End: 2021-05-19
Attending: OPHTHALMOLOGY
Payer: COMMERCIAL

## 2021-05-19 DIAGNOSIS — H25.13 AGE-RELATED NUCLEAR CATARACT OF BOTH EYES: Primary | ICD-10-CM

## 2021-05-19 DIAGNOSIS — Z79.899 ENCOUNTER FOR LONG-TERM (CURRENT) USE OF HIGH-RISK MEDICATION: ICD-10-CM

## 2021-05-19 DIAGNOSIS — M33.13 DERMATOMYOSITIS (H): ICD-10-CM

## 2021-05-19 DIAGNOSIS — D80.2 IGA DEFICIENCY (H): Primary | ICD-10-CM

## 2021-05-19 DIAGNOSIS — H40.051 BORDERLINE GLAUCOMA OF RIGHT EYE WITH OCULAR HYPERTENSION: ICD-10-CM

## 2021-05-19 PROCEDURE — 99214 OFFICE O/P EST MOD 30 MIN: CPT | Performed by: OPHTHALMOLOGY

## 2021-05-19 PROCEDURE — 92133 CPTRZD OPH DX IMG PST SGM ON: CPT | Performed by: OPHTHALMOLOGY

## 2021-05-19 PROCEDURE — G0463 HOSPITAL OUTPT CLINIC VISIT: HCPCS

## 2021-05-19 PROCEDURE — 92083 EXTENDED VISUAL FIELD XM: CPT | Performed by: OPHTHALMOLOGY

## 2021-05-19 ASSESSMENT — VISUAL ACUITY
METHOD: SNELLEN - LINEAR
OS_CC+: -3
OD_CC: 20/25
CORRECTION_TYPE: GLASSES
OS_CC: 20/20

## 2021-05-19 ASSESSMENT — EXTERNAL EXAM - RIGHT EYE: OD_EXAM: NORMAL

## 2021-05-19 ASSESSMENT — SLIT LAMP EXAM - LIDS
COMMENTS: NORMAL
COMMENTS: NORMAL

## 2021-05-19 ASSESSMENT — REFRACTION_WEARINGRX
OD_SPHERE: +0.50
OS_AXIS: 175
OD_ADD: +2.25
OS_SPHERE: +0.25
SPECS_TYPE: PAL
OS_CYLINDER: +0.75
OD_CYLINDER: SPHERE
OS_ADD: +2.25

## 2021-05-19 ASSESSMENT — EXTERNAL EXAM - LEFT EYE: OS_EXAM: NORMAL

## 2021-05-19 ASSESSMENT — TONOMETRY
OD_IOP_MMHG: 12
OS_IOP_MMHG: 14

## 2021-05-19 ASSESSMENT — CUP TO DISC RATIO
OS_RATIO: 0.4
OD_RATIO: 0.75

## 2021-05-19 ASSESSMENT — CONF VISUAL FIELD
METHOD: COUNTING FINGERS
OD_NORMAL: 1
OS_NORMAL: 1

## 2021-05-19 NOTE — PROGRESS NOTES
Chief Complaint(s) and History of Present Illness(es)     Glaucoma Follow Up     Laterality: both eyes    Associated symptoms: dryness, redness and tearing (in wind).  Negative   for eye pain    Pain scale: 0/10              Comments     She states that her vision has seemed stable in both eyes since her last   eye exam.  Both eyes seem intermittently dry and occasionally tear.  Using   artificial tears does resolve the discomfort.     Ocular meds:   Timolol QAM right eye   Latanoprost at bedtime right eye   ATs PRN each eye     AKILA Varma 9:54 AM  May 19, 2021                  Review of systems for the eyes was negative other than the pertinent positives/negatives listed in the HPI.      Assessment & Plan      Mely Rashid is a 75 year old female with the following diagnoses:   1. Age-related nuclear cataract of both eyes    2. 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 Encounter for long-term (current) use of high-risk medication    4. Dermatomyositis (H)         IOP Today:12/14  Tmax: 22/20 (8/28/2017)  C/D: 0.75/0.4  Sulfa allergy: none  Current drops: Timolol and Latanoprost right eye only    VF: right eye: Stable with dense superior changes; left eye: no deficits  RNFL OCT: right eye: diffuse thinning, inferior loss stable;  Left eye: within normal limits, stable    Cataract- not visually significant at this time. Monitor  Goal right eye 13 mm Hg or lower  At goal with current drops  Continue Timolol and Latanoprost.     Planning electroretinogram (ERG) with Dr. Chamberlain for Plaquenil screening in ~9 mo  Recheck intraocular pressure in 6        Patient disposition:   Return in about 6 months (around 11/19/2021) for VT only, OCT NFL.           Attending Physician Attestation:  Complete documentation of historical and exam elements from today's encounter can be found in the full encounter summary report (not reduplicated in this progress note).  I personally obtained the chief  complaint(s) and history of present illness.  I confirmed and edited as necessary the review of systems, past medical/surgical history, family history, social history, and examination findings as documented by others; and I examined the patient myself.  I personally reviewed the relevant tests, images, and reports as documented above.  I formulated and edited as necessary the assessment and plan and discussed the findings and management plan with the patient and family. . - Edwin Navarro MD

## 2021-05-19 NOTE — NURSING NOTE
Chief Complaints and History of Present Illnesses   Patient presents with     Glaucoma Follow Up     Chief Complaint(s) and History of Present Illness(es)     Glaucoma Follow Up     Laterality: both eyes    Associated symptoms: dryness, redness and tearing (in wind).  Negative for eye pain    Pain scale: 0/10              Comments     She states that her vision has seemed stable in both eyes since her last eye exam.  Both eyes seem intermittently dry and occasionally tear.  Using artificial tears does resolve the discomfort.     Ocular meds:   Timolol QAM right eye   Latanoprost at bedtime right eye   ATs PRN each eye     AKILA Varma 9:54 AM  May 19, 2021

## 2021-05-22 DIAGNOSIS — D89.89 SUPPRESSION OF IMMUNE SYSTEM SUBTHERAPEUTIC (H): Primary | ICD-10-CM

## 2021-06-02 DIAGNOSIS — M33.13 DERMATOMYOSITIS (H): ICD-10-CM

## 2021-06-02 PROCEDURE — 36415 COLL VENOUS BLD VENIPUNCTURE: CPT | Performed by: INTERNAL MEDICINE

## 2021-06-02 PROCEDURE — 86769 SARS-COV-2 COVID-19 ANTIBODY: CPT | Mod: 59 | Performed by: INTERNAL MEDICINE

## 2021-06-02 PROCEDURE — 86769 SARS-COV-2 COVID-19 ANTIBODY: CPT | Performed by: INTERNAL MEDICINE

## 2021-06-03 LAB
SARS-COV-2 AB PNL SERPL IA: POSITIVE
SARS-COV-2 IGG SERPL IA-ACNC: NORMAL

## 2021-06-09 ENCOUNTER — TELEPHONE (OUTPATIENT)
Dept: DERMATOLOGY | Facility: CLINIC | Age: 76
End: 2021-06-09

## 2021-06-09 DIAGNOSIS — M33.13 DERMATOMYOSITIS (H): Primary | ICD-10-CM

## 2021-06-09 NOTE — TELEPHONE ENCOUNTER
Prior Authorization Not Needed per Insurance    Medication: triamcinolone (KENALOG) 0.1 % external cream--NO PA NEEDED  Insurance Company: [employer] - Phone [phone] Fax [phone]  Expected CoPay:      Pharmacy Filling the Rx: [facility] - [place], [place] - [address] [place] [address]  Pharmacy Notified: Yes  Patient Notified: Yes **Instructed pharmacy to notify patient when script is ready to /ship.**    NOT A PA REQUEST.  SCRIPT HAS  IN April. PLEASE SEND PHARMACY A NEW SCRIPT

## 2021-06-09 NOTE — TELEPHONE ENCOUNTER
Prior Authorization Retail Medication Request    Medication/Dose: triamcinolone (KENALOG) 0.1 % external cream  ICD code (if different than what is on RX):  M33.90  Previously Tried and Failed:  See provider notes  Rationale:  Apply topically 2 times daily for body    Insurance Name:  [employer]  Insurance ID:  [id number]

## 2021-06-10 RX ORDER — TRIAMCINOLONE ACETONIDE 1 MG/G
CREAM TOPICAL 2 TIMES DAILY
Qty: 454 G | Refills: 11 | Status: SHIPPED | OUTPATIENT
Start: 2021-06-10 | End: 2021-09-09

## 2021-06-30 ENCOUNTER — PATIENT OUTREACH (OUTPATIENT)
Dept: GASTROENTEROLOGY | Facility: CLINIC | Age: 76
End: 2021-06-30

## 2021-06-30 ENCOUNTER — MYC MEDICAL ADVICE (OUTPATIENT)
Dept: INTERNAL MEDICINE | Facility: CLINIC | Age: 76
End: 2021-06-30

## 2021-06-30 DIAGNOSIS — R19.7 DIARRHEA, UNSPECIFIED TYPE: Primary | ICD-10-CM

## 2021-06-30 NOTE — PROGRESS NOTES
Called patient to discuss cancellation of clinic on 8/16, was scheduled prior to most recent visit in March. Pt in agreement with this plan    F/up in 2 years, reminder sent for Jan 2023    Kacy Osullivan RN, BSN,   [facility]  Care coordinator   [phone]  Fax [phone]

## 2021-07-01 RX ORDER — DIPHENOXYLATE HCL/ATROPINE 2.5-.025MG
1 TABLET ORAL 4 TIMES DAILY PRN
Qty: 24 TABLET | Refills: 0 | Status: SHIPPED | OUTPATIENT
Start: 2021-07-01 | End: 2021-09-15

## 2021-07-09 DIAGNOSIS — Z79.899 HIGH RISK MEDICATION USE: ICD-10-CM

## 2021-07-09 DIAGNOSIS — M33.13 DERMATOMYOSITIS (H): ICD-10-CM

## 2021-07-09 LAB
ALBUMIN SERPL-MCNC: 4 G/DL (ref 3.4–5)
ALP SERPL-CCNC: 62 U/L (ref 40–150)
ALT SERPL W P-5'-P-CCNC: 26 U/L (ref 0–50)
ANION GAP SERPL CALCULATED.3IONS-SCNC: 3 MMOL/L (ref 3–14)
AST SERPL W P-5'-P-CCNC: 17 U/L (ref 0–45)
BASOPHILS # BLD AUTO: 0 10E9/L (ref 0–0.2)
BASOPHILS NFR BLD AUTO: 0.6 %
BILIRUB SERPL-MCNC: 0.5 MG/DL (ref 0.2–1.3)
BUN SERPL-MCNC: 14 MG/DL (ref 7–30)
CALCIUM SERPL-MCNC: 9.2 MG/DL (ref 8.5–10.1)
CHLORIDE SERPL-SCNC: 110 MMOL/L (ref 94–109)
CK SERPL-CCNC: 73 U/L (ref 30–225)
CO2 SERPL-SCNC: 31 MMOL/L (ref 20–32)
CREAT SERPL-MCNC: 0.58 MG/DL (ref 0.52–1.04)
CRP SERPL-MCNC: <2.9 MG/L (ref 0–8)
DIFFERENTIAL METHOD BLD: ABNORMAL
EOSINOPHIL # BLD AUTO: 0.1 10E9/L (ref 0–0.7)
EOSINOPHIL NFR BLD AUTO: 1.9 %
ERYTHROCYTE [DISTWIDTH] IN BLOOD BY AUTOMATED COUNT: 13.5 % (ref 10–15)
GFR SERPL CREATININE-BSD FRML MDRD: >90 ML/MIN/{1.73_M2}
GLUCOSE SERPL-MCNC: 78 MG/DL (ref 70–99)
HCT VFR BLD AUTO: 41.2 % (ref 35–47)
HGB BLD-MCNC: 13.2 G/DL (ref 11.7–15.7)
LYMPHOCYTES # BLD AUTO: 0.9 10E9/L (ref 0.8–5.3)
LYMPHOCYTES NFR BLD AUTO: 28.7 %
MCH RBC QN AUTO: 30 PG (ref 26.5–33)
MCHC RBC AUTO-ENTMCNC: 32 G/DL (ref 31.5–36.5)
MCV RBC AUTO: 94 FL (ref 78–100)
MONOCYTES # BLD AUTO: 0.5 10E9/L (ref 0–1.3)
MONOCYTES NFR BLD AUTO: 15.3 %
NEUTROPHILS # BLD AUTO: 1.7 10E9/L (ref 1.6–8.3)
NEUTROPHILS NFR BLD AUTO: 53.5 %
PLATELET # BLD AUTO: 197 10E9/L (ref 150–450)
POTASSIUM SERPL-SCNC: 4.2 MMOL/L (ref 3.4–5.3)
PROT SERPL-MCNC: 6.9 G/DL (ref 6.8–8.8)
RBC # BLD AUTO: 4.4 10E12/L (ref 3.8–5.2)
SODIUM SERPL-SCNC: 144 MMOL/L (ref 133–144)
WBC # BLD AUTO: 3.2 10E9/L (ref 4–11)

## 2021-07-09 PROCEDURE — 86140 C-REACTIVE PROTEIN: CPT | Performed by: DERMATOLOGY

## 2021-07-09 PROCEDURE — 80053 COMPREHEN METABOLIC PANEL: CPT | Performed by: DERMATOLOGY

## 2021-07-09 PROCEDURE — 82550 ASSAY OF CK (CPK): CPT | Performed by: DERMATOLOGY

## 2021-07-09 PROCEDURE — 36415 COLL VENOUS BLD VENIPUNCTURE: CPT | Performed by: DERMATOLOGY

## 2021-07-09 PROCEDURE — 85025 COMPLETE CBC W/AUTO DIFF WBC: CPT | Performed by: DERMATOLOGY

## 2021-07-23 DIAGNOSIS — Z79.899 ENCOUNTER FOR LONG-TERM CURRENT USE OF MEDICATION: ICD-10-CM

## 2021-07-23 DIAGNOSIS — M33.13 DERMATOMYOSITIS (H): ICD-10-CM

## 2021-07-23 DIAGNOSIS — Z79.899 LONG-TERM USE OF PLAQUENIL: ICD-10-CM

## 2021-07-27 RX ORDER — HYDROXYCHLOROQUINE SULFATE 200 MG/1
200 TABLET, FILM COATED ORAL DAILY
Qty: 90 TABLET | Refills: 1 | Status: SHIPPED | OUTPATIENT
Start: 2021-07-27 | End: 2022-01-17

## 2021-07-27 RX ORDER — MYCOPHENOLATE MOFETIL 500 MG/1
1000 TABLET ORAL 2 TIMES DAILY
Qty: 360 TABLET | Refills: 0 | Status: SHIPPED | OUTPATIENT
Start: 2021-07-27 | End: 2021-11-15

## 2021-07-27 NOTE — TELEPHONE ENCOUNTER
Monitoring labs required every 8-12 weeks for medication refills.  MYCOPHENOLATE MOFETIL TABS 500MG      Last Written Prescription Date:  4/19/21  Last Fill Quantity: 360,   # refills: 0  Last Office Visit: 2/2/21 recommended 9 month follow up  Future Office visit:  None scheduled    CBC RESULTS: Recent Labs   Lab Test 07/09/21  1046   WBC 3.2*   RBC 4.40   HGB 13.2   HCT 41.2   MCV 94   MCH 30.0   MCHC 32.0   RDW 13.5          Creatinine   Date Value Ref Range Status   07/09/2021 0.58 0.52 - 1.04 mg/dL Final   ]    Liver Function Studies -   Recent Labs   Lab Test 07/09/21  1046   PROTTOTAL 6.9   ALBUMIN 4.0   BILITOTAL 0.5   ALKPHOS 62   AST 17   ALT 26       Routing refill request to provider for review/approval because:  Drug not on rheumatology refill protocol        Plaquenil      Last Written Prescription Date:  1/11/21  Last Fill Quantity: 90,   # refills: 1  Last Office Visit: 2/2/21 recommended 9 month follow up  Future Office visit: none scheduled  Last Eye Exam:5/19/21    Routing refill request to provider for review/approval because:  > 6 months since last visit

## 2021-08-05 ENCOUNTER — MYC MEDICAL ADVICE (OUTPATIENT)
Dept: RHEUMATOLOGY | Facility: CLINIC | Age: 76
End: 2021-08-05

## 2021-08-06 NOTE — TELEPHONE ENCOUNTER
Called pt. We talked about the fact they are both immunized, and we recommend that they follow the CDC recommendations about masking, social distancing, frequent hand washing, etc.  She should do what she is comfortable with. Pt said that she thought we were going to say that.    Pat doesn't have any additional questions at this time.    MARIE Fontaine, RN  Rheumatology Care Coordinator  [facility]

## 2021-08-13 ENCOUNTER — MYC MEDICAL ADVICE (OUTPATIENT)
Dept: RHEUMATOLOGY | Facility: CLINIC | Age: 76
End: 2021-08-13

## 2021-08-17 NOTE — TELEPHONE ENCOUNTER
Forwarded Dr Colon's response to pt via [employer].    ONHEMY FontaineN, RN  Rheumatology Care Coordinator  [facility]

## 2021-08-20 ENCOUNTER — VIRTUAL VISIT (OUTPATIENT)
Dept: INTERNAL MEDICINE | Facility: CLINIC | Age: 76
End: 2021-08-20
Payer: COMMERCIAL

## 2021-08-20 DIAGNOSIS — Z12.11 SCREENING FOR COLON CANCER: ICD-10-CM

## 2021-08-20 DIAGNOSIS — R19.7 DIARRHEA, UNSPECIFIED TYPE: Primary | ICD-10-CM

## 2021-08-20 PROCEDURE — 99443 PR PHYSICIAN TELEPHONE EVALUATION 21-30 MIN: CPT | Mod: 95 | Performed by: NURSE PRACTITIONER

## 2021-08-20 RX ORDER — DIPHENOXYLATE HCL/ATROPINE 2.5-.025MG
1 TABLET ORAL 4 TIMES DAILY PRN
Qty: 30 TABLET | Refills: 0 | Status: SHIPPED | OUTPATIENT
Start: 2021-08-20 | End: 2021-09-09

## 2021-08-20 NOTE — PROGRESS NOTES
Mely Rashid is a 76 year old female who is being evaluated via a billable telephone visit.      What phone number would you like to be contacted at? [phone].  How would you like to obtain your AVS? MyChart      Subjective   Mely Rashid is a 76 year old female who presents for the following health issues : voice hoarseness for 2 weeks.She noted her right posterior pharynx was red which has resolved.     HPI   Onset of sore throat 2 weeks ago which has resolved. Now she has noticed hoarseness.  She doesn't talk much living alone and did notice 30 minutes into a haircut appt. Her voice was a little stronger. Denies any sick contact.   She takes an OTC allergy tablet daily.     She has also been having diarrhea every day.  She usually takes imodium if she is going anywhere during the day. She is requesting a few tablets of Lomotil for days when it is more symptomatic. She has noticed increased fiber makes it worse. She denies pelvic pain    Patient Active Problem List   Diagnosis     Glaucoma, right     Dermatomyositis (H)     IgA deficiency (H)     Herpes zoster     Encounter for long-term current use of medication     Encounter for long-term (current) use of steroids     Oral ulcer     Seborrheic dermatitis     Osteopenia     Neoplasm of uncertain behavior of skin     Routine general medical examination at a health care facility     Review of Systems   See above      Objective     Vitals:No vitals were obtained today due to virtual visit.    Physical Exam   healthy, alert and no distress  PSYCH: Alert and oriented times 3; coherent speech, normal   rate and volume, able to articulate logical thoughts, able   to abstract reason, no tangential thoughts, no hallucinations   or delusions  Affect is normal  RESP: No cough, no audible wheezing, able to talk in full sentences  Remainder of exam unable to be completed due to telephone visits    Assessment  Mely was seen today for pharyngitis.    Diagnoses  and all orders for this visit:    Diarrhea, unspecified type  -     diphenoxylate-atropine (LOMOTIL) 2.5-0.025 MG tablet; Take 1 tablet by mouth 4 times daily as needed for diarrhea  -     Tissue transglutaminase karlee IgA and IgG; Future    Screening for colon cancer  -     Adult Gastro Ref - Procedure Only: colonoscopy.        Phone call duration: 21 minutes    Gavi ISLAS, CNP

## 2021-08-23 ENCOUNTER — MYC MEDICAL ADVICE (OUTPATIENT)
Dept: INTERNAL MEDICINE | Facility: CLINIC | Age: 76
End: 2021-08-23

## 2021-08-25 ENCOUNTER — MYC MEDICAL ADVICE (OUTPATIENT)
Dept: INTERNAL MEDICINE | Facility: CLINIC | Age: 76
End: 2021-08-25

## 2021-08-30 ENCOUNTER — IMMUNIZATION (OUTPATIENT)
Dept: NURSING | Facility: CLINIC | Age: 76
End: 2021-08-30
Payer: COMMERCIAL

## 2021-08-30 PROCEDURE — 0003A PR COVID VAC PFIZER DIL RECON 30 MCG/0.3 ML IM: CPT

## 2021-08-30 PROCEDURE — 0002A PR COVID VAC PFIZER DIL RECON 30 MCG/0.3 ML IM: CPT

## 2021-08-30 PROCEDURE — 91300 PR COVID VAC PFIZER DIL RECON 30 MCG/0.3 ML IM: CPT

## 2021-09-05 ENCOUNTER — HEALTH MAINTENANCE LETTER (OUTPATIENT)
Age: 76
End: 2021-09-05

## 2021-09-07 ENCOUNTER — LAB (OUTPATIENT)
Dept: LAB | Facility: CLINIC | Age: 76
End: 2021-09-07
Payer: COMMERCIAL

## 2021-09-07 DIAGNOSIS — Z79.899 HIGH RISK MEDICATION USE: ICD-10-CM

## 2021-09-07 DIAGNOSIS — R19.7 DIARRHEA, UNSPECIFIED TYPE: ICD-10-CM

## 2021-09-07 DIAGNOSIS — M33.13 DERMATOMYOSITIS (H): ICD-10-CM

## 2021-09-07 LAB
ALBUMIN SERPL-MCNC: 4 G/DL (ref 3.4–5)
ALP SERPL-CCNC: 64 U/L (ref 40–150)
ALT SERPL W P-5'-P-CCNC: 29 U/L (ref 0–50)
ANION GAP SERPL CALCULATED.3IONS-SCNC: 3 MMOL/L (ref 3–14)
AST SERPL W P-5'-P-CCNC: 21 U/L (ref 0–45)
BASOPHILS # BLD AUTO: 0 10E3/UL (ref 0–0.2)
BASOPHILS NFR BLD AUTO: 1 %
BILIRUB SERPL-MCNC: 0.4 MG/DL (ref 0.2–1.3)
BUN SERPL-MCNC: 14 MG/DL (ref 7–30)
CALCIUM SERPL-MCNC: 9 MG/DL (ref 8.5–10.1)
CHLORIDE BLD-SCNC: 112 MMOL/L (ref 94–109)
CK SERPL-CCNC: 79 U/L (ref 30–225)
CO2 SERPL-SCNC: 28 MMOL/L (ref 20–32)
CREAT SERPL-MCNC: 0.63 MG/DL (ref 0.52–1.04)
CRP SERPL-MCNC: <2.9 MG/L (ref 0–8)
EOSINOPHIL # BLD AUTO: 0.1 10E3/UL (ref 0–0.7)
EOSINOPHIL NFR BLD AUTO: 3 %
ERYTHROCYTE [DISTWIDTH] IN BLOOD BY AUTOMATED COUNT: 13 % (ref 10–15)
GFR SERPL CREATININE-BSD FRML MDRD: 89 ML/MIN/1.73M2
GLUCOSE BLD-MCNC: 91 MG/DL (ref 70–99)
HCT VFR BLD AUTO: 39.7 % (ref 35–47)
HGB BLD-MCNC: 12.8 G/DL (ref 11.7–15.7)
LYMPHOCYTES # BLD AUTO: 0.9 10E3/UL (ref 0.8–5.3)
LYMPHOCYTES NFR BLD AUTO: 24 %
MCH RBC QN AUTO: 30 PG (ref 26.5–33)
MCHC RBC AUTO-ENTMCNC: 32.2 G/DL (ref 31.5–36.5)
MCV RBC AUTO: 93 FL (ref 78–100)
MONOCYTES # BLD AUTO: 0.4 10E3/UL (ref 0–1.3)
MONOCYTES NFR BLD AUTO: 12 %
NEUTROPHILS # BLD AUTO: 2.3 10E3/UL (ref 1.6–8.3)
NEUTROPHILS NFR BLD AUTO: 61 %
PLATELET # BLD AUTO: 192 10E3/UL (ref 150–450)
POTASSIUM BLD-SCNC: 3.8 MMOL/L (ref 3.4–5.3)
PROT SERPL-MCNC: 6.6 G/DL (ref 6.8–8.8)
RBC # BLD AUTO: 4.26 10E6/UL (ref 3.8–5.2)
SODIUM SERPL-SCNC: 143 MMOL/L (ref 133–144)
WBC # BLD AUTO: 3.7 10E3/UL (ref 4–11)

## 2021-09-07 PROCEDURE — 85025 COMPLETE CBC W/AUTO DIFF WBC: CPT

## 2021-09-07 PROCEDURE — 80053 COMPREHEN METABOLIC PANEL: CPT

## 2021-09-07 PROCEDURE — 36415 COLL VENOUS BLD VENIPUNCTURE: CPT

## 2021-09-07 PROCEDURE — 82550 ASSAY OF CK (CPK): CPT

## 2021-09-07 PROCEDURE — 86140 C-REACTIVE PROTEIN: CPT

## 2021-09-07 PROCEDURE — 83516 IMMUNOASSAY NONANTIBODY: CPT | Mod: 59

## 2021-09-08 LAB
TTG IGA SER-ACNC: <0.2 U/ML
TTG IGG SER-ACNC: <0.6 U/ML

## 2021-09-09 ENCOUNTER — OFFICE VISIT (OUTPATIENT)
Dept: FAMILY MEDICINE | Facility: CLINIC | Age: 76
End: 2021-09-09
Payer: COMMERCIAL

## 2021-09-09 VITALS
DIASTOLIC BLOOD PRESSURE: 69 MMHG | OXYGEN SATURATION: 98 % | TEMPERATURE: 97.9 F | WEIGHT: 144 LBS | RESPIRATION RATE: 20 BRPM | BODY MASS INDEX: 23.96 KG/M2 | SYSTOLIC BLOOD PRESSURE: 118 MMHG | HEART RATE: 70 BPM

## 2021-09-09 DIAGNOSIS — R49.0 HOARSENESS: Primary | ICD-10-CM

## 2021-09-09 DIAGNOSIS — D80.2 IGA DEFICIENCY (H): ICD-10-CM

## 2021-09-09 PROCEDURE — 99213 OFFICE O/P EST LOW 20 MIN: CPT | Performed by: PHYSICIAN ASSISTANT
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
## 2021-09-10 ENCOUNTER — MYC MEDICAL ADVICE (OUTPATIENT)
Dept: INTERNAL MEDICINE | Facility: CLINIC | Age: 76
End: 2021-09-10

## 2021-09-15 DIAGNOSIS — R19.7 DIARRHEA, UNSPECIFIED TYPE: ICD-10-CM

## 2021-09-15 RX ORDER — DIPHENOXYLATE HCL/ATROPINE 2.5-.025MG
1 TABLET ORAL 4 TIMES DAILY PRN
Qty: 30 TABLET | Refills: 1 | Status: SHIPPED | OUTPATIENT
Start: 2021-09-15 | End: 2022-12-06

## 2021-09-15 NOTE — TELEPHONE ENCOUNTER
I will renew Lomoril through the next 2 months.     Let's see what the colonoscopy shows.     Gavi ISLAS, CNP

## 2021-09-23 ENCOUNTER — OFFICE VISIT (OUTPATIENT)
Dept: OTOLARYNGOLOGY | Facility: CLINIC | Age: 76
End: 2021-09-23
Payer: COMMERCIAL

## 2021-09-23 ENCOUNTER — MYC MEDICAL ADVICE (OUTPATIENT)
Dept: RHEUMATOLOGY | Facility: CLINIC | Age: 76
End: 2021-09-23

## 2021-09-23 VITALS
DIASTOLIC BLOOD PRESSURE: 83 MMHG | HEART RATE: 81 BPM | SYSTOLIC BLOOD PRESSURE: 136 MMHG | RESPIRATION RATE: 16 BRPM | OXYGEN SATURATION: 97 %

## 2021-09-23 DIAGNOSIS — R49.0 HOARSENESS: ICD-10-CM

## 2021-09-23 DIAGNOSIS — J38.00 VOCAL CORD WEAKNESS: Primary | ICD-10-CM

## 2021-09-23 PROCEDURE — 31575 DIAGNOSTIC LARYNGOSCOPY: CPT | Performed by: OTOLARYNGOLOGY

## 2021-09-23 PROCEDURE — 99203 OFFICE O/P NEW LOW 30 MIN: CPT | Mod: 25 | Performed by: OTOLARYNGOLOGY
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
## 2021-09-24 ENCOUNTER — ANCILLARY PROCEDURE (OUTPATIENT)
Dept: CT IMAGING | Facility: CLINIC | Age: 76
End: 2021-09-24
Attending: OTOLARYNGOLOGY
Payer: COMMERCIAL

## 2021-09-24 DIAGNOSIS — J38.00 VOCAL CORD WEAKNESS: ICD-10-CM

## 2021-09-24 DIAGNOSIS — R49.0 HOARSENESS: ICD-10-CM

## 2021-09-24 PROCEDURE — 70491 CT SOFT TISSUE NECK W/DYE: CPT | Mod: TC | Performed by: RADIOLOGY

## 2021-09-24 RX ORDER — IOPAMIDOL 755 MG/ML
80 INJECTION, SOLUTION INTRAVASCULAR ONCE
Status: COMPLETED | OUTPATIENT
Start: 2021-09-24 | End: 2021-09-24

## 2021-09-24 RX ADMIN — IOPAMIDOL 80 ML: 755 INJECTION, SOLUTION INTRAVASCULAR at 11:47

## 2021-09-27 ENCOUNTER — TELEPHONE (OUTPATIENT)
Dept: OTOLARYNGOLOGY | Facility: CLINIC | Age: 76
End: 2021-09-27

## 2021-09-27 DIAGNOSIS — R49.0 HOARSENESS: ICD-10-CM

## 2021-09-27 DIAGNOSIS — J38.00 VOCAL CORD WEAKNESS: Primary | ICD-10-CM

## 2021-09-27 NOTE — TELEPHONE ENCOUNTER
CT neck shows no obvious cause of vocal cord weakness. She feels voice is better. She sounds good over the phone. She has a 1.1 cm right thyroid nodule. I recommend thyroid ultrasound in 6 months. Sooner if things worsen.

## 2021-09-29 ENCOUNTER — TELEPHONE (OUTPATIENT)
Dept: GASTROENTEROLOGY | Facility: CLINIC | Age: 76
End: 2021-09-29

## 2021-10-03 DIAGNOSIS — Z79.899 ENCOUNTER FOR LONG-TERM (CURRENT) USE OF HIGH-RISK MEDICATION: ICD-10-CM

## 2021-10-04 RX ORDER — TIMOLOL MALEATE 2.5 MG/ML
1 SOLUTION/ DROPS OPHTHALMIC EVERY MORNING
Qty: 5 ML | Refills: 2 | Status: SHIPPED | OUTPATIENT
Start: 2021-10-04 | End: 2021-12-01

## 2021-10-04 NOTE — TELEPHONE ENCOUNTER
LCV: 5/19/2021  [facility]  Last clinic note:Continue Timolol

## 2021-10-08 ENCOUNTER — MYC MEDICAL ADVICE (OUTPATIENT)
Dept: INTERNAL MEDICINE | Facility: CLINIC | Age: 76
End: 2021-10-08

## 2021-10-08 NOTE — TELEPHONE ENCOUNTER
Unlikely.     I would expect the vocal cord changes to be symmetric, and accompanied by other problems like difficulty initiating swallowing , choking on liquids, more global weakness, and worsening skin features.     I agree with SAL plans for Ct scan

## 2021-10-08 NOTE — TELEPHONE ENCOUNTER
Relayed Dr Colon's response to pt via "[employer]hart.    NOHEMY FontaineN, RN  Rheumatology Care Coordinator  [facility]

## 2021-10-12 NOTE — TELEPHONE ENCOUNTER
Dear Lisa,  I don't know the answer to your question.  Who ordered it for you? You could ask them.    Gavi ISLAS, CNP

## 2021-10-28 ENCOUNTER — MYC MEDICAL ADVICE (OUTPATIENT)
Dept: INTERNAL MEDICINE | Facility: CLINIC | Age: 76
End: 2021-10-28

## 2021-10-29 ENCOUNTER — HOSPITAL ENCOUNTER (OUTPATIENT)
Dept: SPEECH THERAPY | Facility: CLINIC | Age: 76
Setting detail: THERAPIES SERIES
End: 2021-10-29
Attending: OTOLARYNGOLOGY
Payer: COMMERCIAL

## 2021-10-29 DIAGNOSIS — R49.0 HOARSENESS: ICD-10-CM

## 2021-10-29 DIAGNOSIS — J38.00 VOCAL CORD WEAKNESS: ICD-10-CM

## 2021-10-29 PROCEDURE — 92524 BEHAVRAL QUALIT ANALYS VOICE: CPT | Mod: GN | Performed by: STUDENT IN AN ORGANIZED HEALTH CARE EDUCATION/TRAINING PROGRAM

## 2021-11-10 ENCOUNTER — MYC MEDICAL ADVICE (OUTPATIENT)
Dept: RHEUMATOLOGY | Facility: CLINIC | Age: 76
End: 2021-11-10
Payer: COMMERCIAL

## 2021-11-10 DIAGNOSIS — M33.13 DERMATOMYOSITIS (H): ICD-10-CM

## 2021-11-15 RX ORDER — MYCOPHENOLATE MOFETIL 500 MG/1
1000 TABLET ORAL 2 TIMES DAILY
Qty: 360 TABLET | Refills: 1 | Status: SHIPPED | OUTPATIENT
Start: 2021-11-15 | End: 2022-05-02

## 2021-11-15 NOTE — TELEPHONE ENCOUNTER
mycophenolate (GENERIC EQUIVALENT) 500 MG tablet  Last Written Prescription Date:  7/27/2021  Last Fill Quantity: 360,   # refills: 0  Last Office Visit:  2/2/2021  Future Office visit:  None    CBC RESULTS: Recent Labs   Lab Test 09/07/21  1512   WBC 3.7*   RBC 4.26   HGB 12.8   HCT 39.7   MCV 93   MCH 30.0   MCHC 32.2   RDW 13.0          Creatinine   Date Value Ref Range Status   09/07/2021 0.63 0.52 - 1.04 mg/dL Final   07/09/2021 0.58 0.52 - 1.04 mg/dL Final   ]    Liver Function Studies -   Recent Labs   Lab Test 09/07/21  1512   PROTTOTAL 6.6*   ALBUMIN 4.0   BILITOTAL 0.4   ALKPHOS 64   AST 21   ALT 29       Routing refill request to provider for review/approval because:  Drug not on the [facility] or [facility] refill protocol or controlled substance        Kacy Muller RN  Central Triage Red Flags/Med Refills
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
## 2021-11-29 DIAGNOSIS — H40.051 BORDERLINE GLAUCOMA OF RIGHT EYE WITH OCULAR HYPERTENSION: Primary | ICD-10-CM

## 2021-12-01 ENCOUNTER — OFFICE VISIT (OUTPATIENT)
Dept: OPHTHALMOLOGY | Facility: CLINIC | Age: 76
End: 2021-12-01
Attending: OPHTHALMOLOGY
Payer: COMMERCIAL

## 2021-12-01 DIAGNOSIS — M33.13 DERMATOMYOSITIS (H): ICD-10-CM

## 2021-12-01 DIAGNOSIS — Z79.899 ENCOUNTER FOR LONG-TERM (CURRENT) USE OF HIGH-RISK MEDICATION: ICD-10-CM

## 2021-12-01 DIAGNOSIS — H25.13 AGE-RELATED NUCLEAR CATARACT OF BOTH EYES: ICD-10-CM

## 2021-12-01 DIAGNOSIS — H40.051 BORDERLINE GLAUCOMA OF RIGHT EYE WITH OCULAR HYPERTENSION: Primary | ICD-10-CM

## 2021-12-01 PROCEDURE — G0463 HOSPITAL OUTPT CLINIC VISIT: HCPCS

## 2021-12-01 PROCEDURE — 99214 OFFICE O/P EST MOD 30 MIN: CPT | Performed by: OPHTHALMOLOGY

## 2021-12-01 PROCEDURE — 92133 CPTRZD OPH DX IMG PST SGM ON: CPT | Performed by: OPHTHALMOLOGY

## 2021-12-01 RX ORDER — TIMOLOL MALEATE 2.5 MG/ML
1 SOLUTION/ DROPS OPHTHALMIC EVERY MORNING
Qty: 5 ML | Refills: 3 | Status: SHIPPED | OUTPATIENT
Start: 2021-12-01 | End: 2022-06-29

## 2021-12-01 RX ORDER — LATANOPROST 50 UG/ML
1 SOLUTION/ DROPS OPHTHALMIC AT BEDTIME
Qty: 7.5 ML | Refills: 3 | Status: SHIPPED | OUTPATIENT
Start: 2021-12-01 | End: 2022-06-29

## 2021-12-01 ASSESSMENT — VISUAL ACUITY
OD_CC: 20/25
OS_CC: 20/20
OD_CC+: -2
METHOD: SNELLEN - LINEAR
OS_CC+: -3
CORRECTION_TYPE: GLASSES

## 2021-12-01 ASSESSMENT — REFRACTION_WEARINGRX
OS_SPHERE: +0.25
OS_AXIS: 175
OD_CYLINDER: SPHERE
SPECS_TYPE: PAL
OD_SPHERE: +0.50
OD_ADD: +2.25
OS_ADD: +2.25
OS_CYLINDER: +0.75

## 2021-12-01 ASSESSMENT — CUP TO DISC RATIO
OS_RATIO: 0.4
OD_RATIO: 0.75

## 2021-12-01 ASSESSMENT — EXTERNAL EXAM - LEFT EYE: OS_EXAM: NORMAL

## 2021-12-01 ASSESSMENT — SLIT LAMP EXAM - LIDS
COMMENTS: NORMAL
COMMENTS: NORMAL

## 2021-12-01 ASSESSMENT — CONF VISUAL FIELD
OS_NORMAL: 1
METHOD: COUNTING FINGERS
OD_NORMAL: 1

## 2021-12-01 ASSESSMENT — TONOMETRY
OS_IOP_MMHG: 13
IOP_METHOD: APPLANATION
OD_IOP_MMHG: 13

## 2021-12-01 ASSESSMENT — EXTERNAL EXAM - RIGHT EYE: OD_EXAM: NORMAL

## 2021-12-01 NOTE — NURSING NOTE
Chief Complaints and History of Present Illnesses   Patient presents with     Glaucoma Follow-Up     Chief Complaint(s) and History of Present Illness(es)     Glaucoma Follow-Up     Associated symptoms: Negative for flashes and floaters    Compliance with Treatment: always    Pain scale: 0/10              Comments     Glaucoma follow up.    The patient uses the Xalatan at bedtime and Timolol in the morning in the right eye.  VICKY Orellana, VICKY 12:36 PM 12/01/2021

## 2021-12-01 NOTE — PROGRESS NOTES
Chief Complaint(s) and History of Present Illness(es)     Glaucoma Follow-Up     Associated symptoms: Negative for flashes and floaters    Compliance with Treatment: always    Pain scale: 0/10              Comments     Glaucoma follow up.    The patient uses the Xalatan at bedtime and Timolol in the morning in the   right eye.  Nguyen Scott, COA, COA 12:36 PM 12/01/2021             Review of systems for the eyes was negative other than the pertinent positives/negatives listed in the HPI.      Assessment & Plan      Mely Rashid is a 76 year old female with the following diagnoses:   1. 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 Encounter for long-term (current) use of high-risk medication    3. Age-related nuclear cataract of both eyes    4. Dermatomyositis (H)         Here for cataract and glaucoma recheck  Mild blur right eye   Progressing cataract, early visual significance   IOP Today:13 both eyes   Tmax: 22/20 (8/28/2017)  C/D: 0.75/0.4  Current drops: Timolol and Latanoprost right eye only    RNFL OCT: right eye: diffuse thinning, inferior loss stable;  Left eye: within normal limits, stable    Goal right eye 13 mm Hg or lower  At goal with current drops  Continue Timolol and Latanoprost.       Patient disposition:   Return in about 6 months (around 6/1/2022) for VT only, OCT NFL.  [place] for plaquenil recheck in Feb         Attending Physician Attestation:  Complete documentation of historical and exam elements from today's encounter can be found in the full encounter summary report (not reduplicated in this progress note).  I personally obtained the chief complaint(s) and history of present illness.  I confirmed and edited as necessary the review of systems, past medical/surgical history, family history, social history, and examination findings as documented by others; and I examined the patient myself.  I personally reviewed the relevant tests, images, and reports as documented  above.  I formulated and edited as necessary the assessment and plan and discussed the findings and management plan with the patient and family. . - Edwin Navarro MD

## 2022-01-15 ENCOUNTER — MYC MEDICAL ADVICE (OUTPATIENT)
Dept: RHEUMATOLOGY | Facility: CLINIC | Age: 77
End: 2022-01-15
Payer: COMMERCIAL

## 2022-01-15 DIAGNOSIS — M33.13 DERMATOMYOSITIS (H): ICD-10-CM

## 2022-01-15 DIAGNOSIS — Z79.899 ENCOUNTER FOR LONG-TERM CURRENT USE OF MEDICATION: ICD-10-CM

## 2022-01-15 DIAGNOSIS — Z79.899 LONG-TERM USE OF PLAQUENIL: ICD-10-CM

## 2022-01-17 ENCOUNTER — MYC MEDICAL ADVICE (OUTPATIENT)
Dept: RHEUMATOLOGY | Facility: CLINIC | Age: 77
End: 2022-01-17

## 2022-01-17 ENCOUNTER — APPOINTMENT (OUTPATIENT)
Dept: LAB | Facility: CLINIC | Age: 77
End: 2022-01-17
Payer: COMMERCIAL

## 2022-01-17 DIAGNOSIS — M33.13 DERMATOMYOSITIS (H): Primary | ICD-10-CM

## 2022-01-17 DIAGNOSIS — Z79.899 ENCOUNTER FOR LONG-TERM CURRENT USE OF MEDICATION: ICD-10-CM

## 2022-01-17 RX ORDER — HYDROXYCHLOROQUINE SULFATE 200 MG/1
200 TABLET, FILM COATED ORAL DAILY
Qty: 90 TABLET | Refills: 0 | Status: SHIPPED | OUTPATIENT
Start: 2022-01-17 | End: 2022-01-19

## 2022-01-17 NOTE — TELEPHONE ENCOUNTER
Plaquenil      Last Written Prescription Date:  7/27/21  Last Fill Quantity: 90,   # refills: 1  Last Office Visit: 2/2/21 recommended 9 month follow up  Future Office visit: none scheduled  Last Eye Exam:12/1/21

## 2022-01-18 NOTE — TELEPHONE ENCOUNTER
Updated lab orders set up and pt notified.    NOHEMY FontaineN, RN  Rheumatology Care Coordinator  [facility]

## 2022-01-19 RX ORDER — HYDROXYCHLOROQUINE SULFATE 200 MG/1
200 TABLET, FILM COATED ORAL DAILY
Qty: 90 TABLET | Refills: 1 | Status: SHIPPED | OUTPATIENT
Start: 2022-01-19 | End: 2022-04-13

## 2022-01-19 NOTE — TELEPHONE ENCOUNTER
Plaquenil      Last Written Prescription Date:  1/17/22  Last Fill Quantity: 90,   # refills: 0   Last Office Visit: 2/2/21 recommended 9 month follow up  Future Office visit: 6/2/22 Isaiah  Last Eye Exam 12/1/21   Cr =0.63 done 9/7/21

## 2022-01-26 ENCOUNTER — LAB (OUTPATIENT)
Dept: LAB | Facility: CLINIC | Age: 77
End: 2022-01-26
Payer: COMMERCIAL

## 2022-01-26 DIAGNOSIS — Z79.899 ENCOUNTER FOR LONG-TERM CURRENT USE OF MEDICATION: ICD-10-CM

## 2022-01-26 DIAGNOSIS — M33.13 DERMATOMYOSITIS (H): ICD-10-CM

## 2022-01-26 LAB
ALBUMIN SERPL-MCNC: 4.2 G/DL (ref 3.4–5)
ALT SERPL W P-5'-P-CCNC: 32 U/L (ref 0–50)
AST SERPL W P-5'-P-CCNC: 21 U/L (ref 0–45)
BASOPHILS # BLD AUTO: 0 10E3/UL (ref 0–0.2)
BASOPHILS NFR BLD AUTO: 1 %
CK SERPL-CCNC: 64 U/L (ref 30–225)
CRP SERPL-MCNC: <2.9 MG/L (ref 0–8)
EOSINOPHIL # BLD AUTO: 0.1 10E3/UL (ref 0–0.7)
EOSINOPHIL NFR BLD AUTO: 2 %
ERYTHROCYTE [DISTWIDTH] IN BLOOD BY AUTOMATED COUNT: 13.3 % (ref 10–15)
HCT VFR BLD AUTO: 41.2 % (ref 35–47)
HGB BLD-MCNC: 13.2 G/DL (ref 11.7–15.7)
LYMPHOCYTES # BLD AUTO: 1 10E3/UL (ref 0.8–5.3)
LYMPHOCYTES NFR BLD AUTO: 24 %
MCH RBC QN AUTO: 29.9 PG (ref 26.5–33)
MCHC RBC AUTO-ENTMCNC: 32 G/DL (ref 31.5–36.5)
MCV RBC AUTO: 93 FL (ref 78–100)
MONOCYTES # BLD AUTO: 0.4 10E3/UL (ref 0–1.3)
MONOCYTES NFR BLD AUTO: 10 %
NEUTROPHILS # BLD AUTO: 2.7 10E3/UL (ref 1.6–8.3)
NEUTROPHILS NFR BLD AUTO: 64 %
PLATELET # BLD AUTO: 206 10E3/UL (ref 150–450)
RBC # BLD AUTO: 4.42 10E6/UL (ref 3.8–5.2)
WBC # BLD AUTO: 4.2 10E3/UL (ref 4–11)

## 2022-01-26 PROCEDURE — 36415 COLL VENOUS BLD VENIPUNCTURE: CPT

## 2022-01-26 PROCEDURE — 82550 ASSAY OF CK (CPK): CPT

## 2022-01-26 PROCEDURE — 84460 ALANINE AMINO (ALT) (SGPT): CPT

## 2022-01-26 PROCEDURE — 84450 TRANSFERASE (AST) (SGOT): CPT

## 2022-01-26 PROCEDURE — 82040 ASSAY OF SERUM ALBUMIN: CPT

## 2022-01-26 PROCEDURE — 85025 COMPLETE CBC W/AUTO DIFF WBC: CPT

## 2022-01-26 PROCEDURE — 86140 C-REACTIVE PROTEIN: CPT

## 2022-02-09 DIAGNOSIS — Z79.899 ENCOUNTER FOR LONG-TERM (CURRENT) USE OF HIGH-RISK MEDICATION: ICD-10-CM

## 2022-02-09 DIAGNOSIS — H40.051 BORDERLINE GLAUCOMA OF RIGHT EYE WITH OCULAR HYPERTENSION: Primary | ICD-10-CM

## 2022-02-21 ENCOUNTER — MYC MEDICAL ADVICE (OUTPATIENT)
Dept: INTERNAL MEDICINE | Facility: CLINIC | Age: 77
End: 2022-02-21
Payer: COMMERCIAL

## 2022-02-22 ENCOUNTER — TELEPHONE (OUTPATIENT)
Dept: GASTROENTEROLOGY | Facility: CLINIC | Age: 77
End: 2022-02-22
Payer: COMMERCIAL

## 2022-02-22 DIAGNOSIS — Z11.59 ENCOUNTER FOR SCREENING FOR OTHER VIRAL DISEASES: Primary | ICD-10-CM

## 2022-02-22 NOTE — TELEPHONE ENCOUNTER
Caller: NANCY    Procedure: COLON    Date, Location, and Surgeon of Procedure Cancelled: 3/14/22, [facility], [facility]    Ordering Provider:CEDRIC    Reason for cancel (please be detailed, any staff messages or encounters to note?): PATIENT REQUEST        Rescheduled: YES     If rescheduled:    Date: 3/22/22   Location: [facility]   Note any change or update to original order/sedation:

## 2022-02-22 NOTE — TELEPHONE ENCOUNTER
Screening Questions  Blue=prep questions Red=location Green=sedation   1. Are you active on mychart? Y    2. What insurance is in the chart? [employer]     3.  Ordering/Referring Provider: Gavi Stacy    4. BMI 22.5 , If greater than 40 review exclusion criteria also will need EXTENDED PREP    5.  Respiratory Screening (If yes to any of the following HOSPITAL setting only):     Do you use daily home oxygen? N  Do you have mod to severe Obstructive Sleep Apnea? N (can be seen at [facility] or hospital setting)    Do you have Pulmonary Hypertension? N   Do you have UNCONTROLLED asthma? N    6. Have you had a heart or lung transplant? N  (If yes, please review exclusion criteria)    7. Are you currently on dialysis?N  (If yes, schedule in HOSPITAL setting only)(If yes, please send Golytely prep)    8. Do you have chronic kidney disease? N (If yes, please send Golytely prep)    9. Have you had a stroke or Transient ischemic attack (TIA) within 6 months? N (If yes, do not schedule at [facility])    10. In the past 6 months, have you had any heart related issues including cardiomyopathy or heart attack? N (If yes, please review exclusion criteria)           If yes, did it require cardiac stenting or other implantable device?N  (If yes, please review exclusion criteria)      11. Do you have any implantable devices in your body (pacemaker, defib, LVAD)? N (If yes, schedule at [facility])    12. Do you take nitroglycerin? If yes, how often? N (if yes, schedule at HOSPITAL setting)    13. Are you currently taking any blood thinners?N (If yes- inform patient to follow up with PCP or provider for follow up instructions)     14. Are you a diabetic? N (If yes, please send Golytely prep)    15. (Females) Are you currently pregnant? N  If yes, how many weeks?      16. Are you taking any prescription pain medications on a routine schedule? N If yes, MAC sedation and patient will need EXTENDED PREP.    17. Do you have any chemical dependencies such as  alcohol, street drugs, or methadone? N If yes, MAC sedation     18. Do you have any history of post-traumatic stress syndrome, severe anxiety or history of psychosis? N  If yes, MAC sedation.     19. Do you transfer independently? Y    20.  Do you have any issues with constipation? N   If yes, pt will need EXTENDED PREP     21. Preferred Pharmacy for Pre Prescription     Scheduling Details    Which Colonoscopy Prep was Sent?: M Prep  Type of Procedure Scheduled: Colon  Surgeon: Madoff  Date of Procedure: 3-14  Location: [facility]  Caller (Please ask for phone number if not scheduled by patient): Pat      Sedation Type: MAC  Conscious Sedation- Needs  for 6 hours after the procedure  MAC/General-Needs  for 24 hours after procedure    Pre-op Required at [facility], [place], [place] and OR for MAC sedation:   (if yes advise patient they will need a pre-op prior to procedure)      Informed patient they will need an adult  Y  Cannot take any type of public or medical transportation alone    Pre-Procedure Covid test to be completed at [facility] or Externally: Y BE lab 3-10    Confirmed Nurse will call to complete assessment Y    Additional comments:  ([facility] PATIENTS NEED EXTENDED PREP)

## 2022-03-14 ENCOUNTER — TELEPHONE (OUTPATIENT)
Dept: GASTROENTEROLOGY | Facility: CLINIC | Age: 77
End: 2022-03-14
Payer: COMMERCIAL

## 2022-03-14 NOTE — TELEPHONE ENCOUNTER
Pre assessment questions completed for upcoming colonoscopy procedure scheduled on 3.22.2022    COVID test scheduled 3.18.2022    Reviewed procedural arrival time 1000 and facility location [facility].    Designated  policy reviewed. Instructed to have someone stay 24 hours post procedure.     Anticoagulation/blood thinners? no    Electronic implanted devices? No    Indication for procedure: screening colonoscopy    Referring provider: Gavi Stacy APRN CNP    Reviewed Miralax/Magnesium citrate/Dulcolax prep instructions with patient. No fiber/iron supplements or foods that contain nuts/seeds prior to procedure.     Patient verbalized understanding and had no questions or concerns at this time.    Georgia Moseley RN

## 2022-03-18 ENCOUNTER — LAB (OUTPATIENT)
Dept: LAB | Facility: CLINIC | Age: 77
End: 2022-03-18
Payer: COMMERCIAL

## 2022-03-18 DIAGNOSIS — Z11.59 ENCOUNTER FOR SCREENING FOR OTHER VIRAL DISEASES: ICD-10-CM

## 2022-03-18 PROCEDURE — U0003 INFECTIOUS AGENT DETECTION BY NUCLEIC ACID (DNA OR RNA); SEVERE ACUTE RESPIRATORY SYNDROME CORONAVIRUS 2 (SARS-COV-2) (CORONAVIRUS DISEASE [COVID-19]), AMPLIFIED PROBE TECHNIQUE, MAKING USE OF HIGH THROUGHPUT TECHNOLOGIES AS DESCRIBED BY CMS-2020-01-R: HCPCS

## 2022-03-18 PROCEDURE — U0005 INFEC AGEN DETEC AMPLI PROBE: HCPCS

## 2022-03-19 LAB — SARS-COV-2 RNA RESP QL NAA+PROBE: NEGATIVE

## 2022-03-21 NOTE — TELEPHONE ENCOUNTER
Patient calling stating that they have been having diarrhea today and wondering if they need to adjust their bowel prep.     Informed patient to stay hydrated and that modification to bowel prep isn't needed. Laxatives are still needed in order to flush out the stool. Advised patient to keep hydrated with water if not able to drink the propel.     Patient verbalized understanding and had no further questions or concerns at this time.     Giselle Colon RN

## 2022-03-22 ENCOUNTER — TRANSFERRED RECORDS (OUTPATIENT)
Dept: HEALTH INFORMATION MANAGEMENT | Facility: CLINIC | Age: 77
End: 2022-03-22
Payer: COMMERCIAL

## 2022-03-22 ENCOUNTER — DOCUMENTATION ONLY (OUTPATIENT)
Dept: GASTROENTEROLOGY | Facility: OUTPATIENT CENTER | Age: 77
End: 2022-03-22
Payer: COMMERCIAL

## 2022-03-22 PROCEDURE — 88305 TISSUE EXAM BY PATHOLOGIST: CPT | Mod: 26 | Performed by: PATHOLOGY

## 2022-03-22 PROCEDURE — 88305 TISSUE EXAM BY PATHOLOGIST: CPT | Mod: TC,ORL | Performed by: INTERNAL MEDICINE

## 2022-03-23 ENCOUNTER — LAB REQUISITION (OUTPATIENT)
Dept: LAB | Facility: CLINIC | Age: 77
End: 2022-03-23
Payer: COMMERCIAL

## 2022-03-24 LAB
PATH REPORT.COMMENTS IMP SPEC: NORMAL
PATH REPORT.COMMENTS IMP SPEC: NORMAL
PATH REPORT.FINAL DX SPEC: NORMAL
PATH REPORT.GROSS SPEC: NORMAL
PATH REPORT.MICROSCOPIC SPEC OTHER STN: NORMAL
PATH REPORT.RELEVANT HX SPEC: NORMAL
PHOTO IMAGE: NORMAL

## 2022-03-28 ENCOUNTER — MYC MEDICAL ADVICE (OUTPATIENT)
Dept: INTERNAL MEDICINE | Facility: CLINIC | Age: 77
End: 2022-03-28
Payer: COMMERCIAL

## 2022-03-31 ENCOUNTER — OFFICE VISIT (OUTPATIENT)
Dept: INTERNAL MEDICINE | Facility: CLINIC | Age: 77
End: 2022-03-31
Payer: COMMERCIAL

## 2022-03-31 VITALS
BODY MASS INDEX: 24.32 KG/M2 | WEIGHT: 146 LBS | SYSTOLIC BLOOD PRESSURE: 124 MMHG | OXYGEN SATURATION: 97 % | HEIGHT: 65 IN | RESPIRATION RATE: 16 BRPM | DIASTOLIC BLOOD PRESSURE: 69 MMHG | HEART RATE: 76 BPM

## 2022-03-31 DIAGNOSIS — E03.8 TSH (THYROID-STIMULATING HORMONE DEFICIENCY): ICD-10-CM

## 2022-03-31 DIAGNOSIS — M33.13 DERMATOMYOSITIS (H): Primary | ICD-10-CM

## 2022-03-31 DIAGNOSIS — Z13.29 SCREENING FOR THYROID DISORDER: Primary | ICD-10-CM

## 2022-03-31 DIAGNOSIS — Z13.1 SCREENING FOR DIABETES MELLITUS: ICD-10-CM

## 2022-03-31 DIAGNOSIS — R14.0 ABDOMINAL DISTENTION: ICD-10-CM

## 2022-03-31 DIAGNOSIS — R19.7 DIARRHEA, UNSPECIFIED TYPE: ICD-10-CM

## 2022-03-31 PROCEDURE — 94375 RESPIRATORY FLOW VOLUME LOOP: CPT | Performed by: INTERNAL MEDICINE

## 2022-03-31 PROCEDURE — 99397 PER PM REEVAL EST PAT 65+ YR: CPT | Performed by: NURSE PRACTITIONER

## 2022-03-31 PROCEDURE — 94729 DIFFUSING CAPACITY: CPT | Performed by: INTERNAL MEDICINE

## 2022-03-31 PROCEDURE — 94726 PLETHYSMOGRAPHY LUNG VOLUMES: CPT | Performed by: INTERNAL MEDICINE

## 2022-03-31 RX ORDER — LOPERAMIDE HYDROCHLORIDE 2 MG/1
2 TABLET ORAL EVERY MORNING
Qty: 30 TABLET | Refills: 1 | Status: SHIPPED | OUTPATIENT
Start: 2022-03-31 | End: 2022-04-30

## 2022-03-31 NOTE — PROGRESS NOTES
S: Mely Rashid is a 76 year old female her for annual exam.  She has had some improvement in her diarrhea by avoiding lactose product and by using immodium.  She founf fiber increased her diarrhea.  Chewable Gas-ex does help decrease her abdominal bloating to some degree. Her colonoscopy biopsies were normal. She takes Immodium on the days she is planning on going outside her house.     Eye exams UTD. She has right eye glaucoma  Dental UTD.  She walks when the weather is conducive to walk outdoors.          Patient Active Problem List   Diagnosis     Glaucoma, right     Dermatomyositis (H)     IgA deficiency (H)     Herpes zoster     Encounter for long-term current use of medication     Encounter for long-term (current) use of steroids     Oral ulcer     Seborrheic dermatitis     Osteopenia     Neoplasm of uncertain behavior of skin     Routine general medical examination at a health care facility            Past Medical History:   Diagnosis Date     Arthritis ??    probably     Basal cell carcinoma      Dermatomyositis (H)      Glaucoma      IgA deficiency (H)      Nonsenile cataract             Past Surgical History:   Procedure Laterality Date     BIOPSY OF SKIN LESION       COLONOSCOPY       [facility] UGI ENDOSCOPY W EUS N/A 2014    Procedure: COMBINED ENDOSCOPIC ULTRASOUND, ESOPHAGOSCOPY, GASTROSCOPY, DUODENOSCOPY (EGD);  Surgeon: Boston Rodriguez MD;  Location: [facility]     HYSTERECTOMY      partial, prolapse uterus.            Social History     Tobacco Use     Smoking status: Former Smoker     Packs/day: 1.00     Years: 10.00     Pack years: 10.00     Types: Cigarettes     Quit date: 4/10/1979     Years since quittin.0     Smokeless tobacco: Never Used     Tobacco comment: quit    Substance Use Topics     Alcohol use: Not Currently     Alcohol/week: 0.0 standard drinks     Comment: rare            Family History   Problem Relation Age of Onset     C.A.D. Father      Arthritis Father       Diabetes Brother      Glaucoma Brother      Breast Cancer Sister 35     Glaucoma Sister      Neurologic Disorder Sister         seizures     Cancer Sister         breast     Psoriasis Daughter      Rheumatologic Disease Daughter         Dermatomyositis     Alzheimer Disease Mother         onset in 60s     Hypertension No family hx of      Cerebrovascular Disease No family hx of         ,     Thyroid Disease No family hx of      Skin Cancer No family hx of      Macular Degeneration No family hx of                Allergies   Allergen Reactions     Penicillins Anaphylaxis            Current Outpatient Medications   Medication Sig Dispense Refill     augmented betamethasone dipropionate (DIPROLENE-AF) 0.05 % external cream Apply topically 2 times daily For hands only 150 g 1     B Complex TABS Take 1 tablet by mouth daily.       Calcium Carb-Cholecalciferol (CALCIUM 500 +D PO) Take 3 tablets by mouth daily.       Cholecalciferol (VITAMIN D) 1000 UNIT capsule Take 1 capsule by mouth daily.       clobetasol (TEMOVATE) 0.05 % external solution Apply topically 2 times daily For scalp 150 mL 3     diphenoxylate-atropine (LOMOTIL) 2.5-0.025 MG tablet Take 1 tablet by mouth 4 times daily as needed for diarrhea 30 tablet 1     fluocinolone acetonide (DERMA SMOOTHE/FS BODY) 0.01 % external oil Apply topically At Bedtime For scalp 118.28 mL 3     glucosamine-chondroitinoitin (GLUCOSAMINE CHONDR COMPLEX) 500-400 MG CAPS Take 2 capsules by mouth daily.       hydrocortisone 2.5 % cream Apply topically 2 times daily For face 30 g 3     hydroxychloroquine (PLAQUENIL) 200 MG tablet Take 1 tablet (200 mg) by mouth daily 90 tablet 1     latanoprost (XALATAN) 0.005 % ophthalmic solution Place 1 drop into the right eye At Bedtime 7.5 mL 3     loperamide (IMODIUM A-D) 2 MG tablet Take 2 mg by mouth every morning       Multiple Vitamins-Minerals (MULTIPLE VITAMINS/WOMENS PO)        mycophenolate (GENERIC EQUIVALENT) 500 MG tablet Take 2  tablets (1,000 mg) by mouth 2 times daily 360 tablet 1     omega-3 fatty acids (FISH OIL) 1200 MG capsule Take 1 capsule by mouth daily.       timolol maleate (TIMOPTIC) 0.25 % ophthalmic solution Place 1 drop into the right eye every morning 5 mL 3     triamcinolone (KENALOG) 0.1 % external cream Apply topically 2 times daily For body 454 g 3     vitamin C w/JD HIPS 500 MG tablet        vitamin E 400 UNIT capsule Take by mouth daily         REVIEW OF SYSTEMS:  See above.    O:   There were no vitals taken for this visit.  GENERAL APPEARANCE: healthy, alert and no distress  HENT: ear canals and TM's normal  and mouth without ulcers or lesions  RESP: lungs clear to auscultation - no rales, rhonchi or wheezes  CV: regular rates and rhythm, normal S1 S2, no S3 or S4 and no murmur, click or rub   ABDOMEN:  soft, nontender, no HSM or masses and bowel sounds normal  BREASTS: no masses  NEURO: Grossly normal  MUSK:independently ambulating  SKIN: normal.   LYMPH:neg axillary, cervical, supra and infraclavicular nodes.  EXT: warm.  Edema:none  PSYCHE: normal.    A/P:  Mely was seen today for physical and annual visit.    Diagnoses and all orders for this visit:    Screening for thyroid disorder  -     TSH; Future    Screening for diabetes mellitus  -     Basic metabolic panel; Future    Abdominal distention  -     AMB HYDROGEN BREATH TEST    TSH (thyroid-stimulating hormone deficiency)    Diarrhea, unspecified type  -     loperamide (IMODIUM A-D) 2 MG tablet; Take 1 tablet (2 mg) by mouth every morning for 30 doses      The patient voiced understanding of the information discussed and all questions were answered.     Total time spent today with this patient including chart review, exam time with patient and documentation : 50 minutes      Gavi ISLAS, CNP

## 2022-03-31 NOTE — NURSING NOTE
"Mely Rashid is a 76 year old female patient that presents today in clinic for the following:    Chief Complaint   Patient presents with     Physical     Annual Visit     The patient's allergies and medications were reviewed as noted. A set of vitals were recorded as noted without incident: /69 (BP Location: Right arm, Patient Position: Sitting, Cuff Size: Adult Regular)   Pulse 76   Resp 16   Ht 1.651 m (5' 5\")   Wt 66.2 kg (146 lb)   SpO2 97%   Breastfeeding No   BMI 24.30 kg/m  . The patient does not have any other questions for the provider.    Eleuterio Salinas, EMT at 12:19 PM on 3/31/2022  "

## 2022-04-01 LAB
DLCOUNC-%PRED-PRE: 95 %
DLCOUNC-PRE: 18.96 ML/MIN/MMHG
DLCOUNC-PRED: 19.84 ML/MIN/MMHG
ERV-%PRED-PRE: 130 %
ERV-PRE: 0.96 L
ERV-PRED: 0.74 L
EXPTIME-PRE: 7.76 SEC
FEF2575-%PRED-PRE: 87 %
FEF2575-PRE: 1.47 L/SEC
FEF2575-PRED: 1.68 L/SEC
FEFMAX-%PRED-PRE: 117 %
FEFMAX-PRE: 6.43 L/SEC
FEFMAX-PRED: 5.48 L/SEC
FEV1-%PRED-PRE: 116 %
FEV1-PRE: 2.36 L
FEV1FEV6-PRE: 72 %
FEV1FEV6-PRED: 79 %
FEV1FVC-PRE: 71 %
FEV1FVC-PRED: 78 %
FEV1SVC-PRE: 73 %
FEV1SVC-PRED: 65 %
FIFMAX-PRE: 5.57 L/SEC
FRCPLETH-%PRED-PRE: 127 %
FRCPLETH-PRE: 3.54 L
FRCPLETH-PRED: 2.77 L
FVC-%PRED-PRE: 127 %
FVC-PRE: 3.31 L
FVC-PRED: 2.59 L
IC-%PRED-PRE: 96 %
IC-PRE: 2.28 L
IC-PRED: 2.37 L
RVPLETH-%PRED-PRE: 118 %
RVPLETH-PRE: 2.58 L
RVPLETH-PRED: 2.17 L
TLCPLETH-%PRED-PRE: 113 %
TLCPLETH-PRE: 5.81 L
TLCPLETH-PRED: 5.11 L
VA-%PRED-PRE: 96 %
VA-PRE: 4.71 L
VC-%PRED-PRE: 104 %
VC-PRE: 3.24 L
VC-PRED: 3.1 L

## 2022-04-08 ENCOUNTER — NURSE TRIAGE (OUTPATIENT)
Dept: CALL CENTER | Age: 77
End: 2022-04-08
Payer: COMMERCIAL

## 2022-04-08 DIAGNOSIS — M54.9 BACK PAIN: Primary | ICD-10-CM

## 2022-04-08 RX ORDER — CYCLOBENZAPRINE HCL 5 MG
5 TABLET ORAL 3 TIMES DAILY PRN
Qty: 20 TABLET | Refills: 0 | Status: SHIPPED | OUTPATIENT
Start: 2022-04-08 | End: 2022-12-06

## 2022-04-08 NOTE — TELEPHONE ENCOUNTER
Nurse Triage SBAR    Is this a 2nd Level Triage? YES, LICENSED PRACTITIONER REVIEW IS REQUIRED    Situation:Back pain, started 3 days ago, after removing wall paper, pain gradually increasing. Localized- left lower back, rates pain 9-10 with activity, 2-3 at rest.  Offered appt or Urgent/ER care - pt refused, due to the drive, unable to tolerate drive due to pain.  ? Rx for pain, muscle relaxant    Background:  Glaucoma, right      Dermatomyositis (H)     IgA deficiency (H)     Herpes zoster     Encounter for long-term current use of medication     Encounter for long-term (current) use of steroids     Oral ulcer     Seborrheic dermatitis     Osteopenia     Neoplasm of uncertain behavior of skin     Routine general medical examination at a health care facility         Assessment: Back pain -started after doing house project/removing wall paper.    Protocol Recommended Disposition:   Call Back By PCP Within 1 Hour, See Today In Office    Recommendation: ? Rx or appt     Pt # [phone]    Pharmacy [id number] [address]  AT [facility]  Reason for Disposition    SEVERE back pain (e.g., excruciating, unable to do any normal activities) and not improved after pain medicine and CARE ADVICE    Additional Information    Negative: Passed out (i.e., fainted, collapsed and was not responding)    Negative: Shock suspected (e.g., cold/pale/clammy skin, too weak to stand, low BP, rapid pulse)    Negative: Sounds like a life-threatening emergency to the triager    Negative: Major injury to the back (e.g., MVA, fall > 10 feet or 3 meters, penetrating injury, etc.)    Negative: Pain in the upper back over the ribs (rib cage) that radiates (travels) into the chest    Negative: Pain in the upper back over the ribs (rib cage) and worsened by coughing (or clearly increases with breathing)    Negative: SEVERE back pain of sudden onset and age > 60    Negative: SEVERE  "abdominal pain (e.g., excruciating)    Negative: Abdominal pain and age > 60    Negative: Unable to urinate (or only a few drops) and bladder feels very full    Negative: Loss of bladder or bowel control (urine or bowel incontinence; wetting self, leaking stool) of new onset    Negative: Numbness (loss of sensation) in groin or rectal area    Negative: Fever > 100.4 F (38.0 C) and flank pain    Negative: Pain or burning with passing urine (urination)    Negative: Pain radiates into groin, scrotum    Negative: Blood in urine (red, pink, or tea-colored)    Negative: Vomiting and pain over lower ribs of back (i.e., flank - kidney area)    Negative: Weakness of a leg or foot (e.g., unable to bear weight, dragging foot)    Negative: Patient sounds very sick or weak to the triager    Answer Assessment - Initial Assessment Questions  1. ONSET: \"When did the pain begin?\"       3 days ago, gradually increasing  2. LOCATION: \"Where does it hurt?\" (upper, mid or lower back)      Left side below the waist- sharp pain, increases with raising foot or movement  Does not radiate  3. SEVERITY: \"How bad is the pain?\"  (e.g., Scale 1-10; mild, moderate, or severe)    - MILD (1-3): doesn't interfere with normal activities     - MODERATE (4-7): interferes with normal activities or awakens from sleep     - SEVERE (8-10): excruciating pain, unable to do any normal activities      9-10 with activity     Slept through the night  At rest 2-3  4. PATTERN: \"Is the pain constant?\" (e.g., yes, no; constant, intermittent)      Constant, varies with activity  5. RADIATION: \"Does the pain shoot into your legs or elsewhere?\"      none  6. CAUSE:  \"What do you think is causing the back pain?\"       Few days ago :removed wall paper  7. BACK OVERUSE:  \"Any recent lifting of heavy objects, strenuous work or exercise?\"      Removing wall paper  8. MEDICATIONS: \"What have you taken so far for the pain?\" (e.g., nothing, acetaminophen, NSAIDS)  Has tried " "OTC tylenol and icy hot- minimal relief.        9. NEUROLOGIC SYMPTOMS: \"Do you have any weakness, numbness, or problems with bowel/bladder control?\"     Denies any N/T  No change w/ bowel /bladder  10. OTHER SYMPTOMS: \"Do you have any other symptoms?\" (e.g., fever, abdominal pain, burning with urination, blood in urine)        none  11. PREGNANCY: \"Is there any chance you are pregnant?\" (e.g., yes, no; LMP)        NA    Protocols used: BACK PAIN-A-OH      "

## 2022-04-08 NOTE — CONFIDENTIAL NOTE
Back pain, started 3 days ago, after removing wall paper, pain gradually increasing. Localized- left lower back, rates pain 9-10 with activity, 2-3 at rest.  Offered appt or Urgent/ER care - pt refused, due to the drive, unable to tolerate drive due to pain. Rx for pain, muscle relaxant      SEVERE back pain (e.g., excruciating, unable to do any normal activities) and not improved after pain medicine and CARE ADVICE    9-10 with activity     Slept through the night  At rest 2-3    Has tried OTC tylenol and icy hot- minimal relief.    She stated that she's never had back pain before but this is very painful. She states that it doesn't hurt with a pillow behind her back, but while I was on the phone with her she attempted to readjust and was in quite a bit of pain.   We made a plan that if she is unable to receive pain medication today that she should go to urgent care tomorrow. She agreed with the plan and scheduled a follow up with Ruth Ann on Tuesday.  Pau Powers RN

## 2022-04-09 ENCOUNTER — HOSPITAL ENCOUNTER (EMERGENCY)
Facility: CLINIC | Age: 77
Discharge: HOME | End: 2022-04-09
Attending: FAMILY MEDICINE | Admitting: FAMILY MEDICINE
Payer: COMMERCIAL

## 2022-04-09 ENCOUNTER — APPOINTMENT (OUTPATIENT)
Dept: CT IMAGING | Facility: CLINIC | Age: 77
End: 2022-04-09
Attending: FAMILY MEDICINE
Payer: COMMERCIAL

## 2022-04-09 VITALS
SYSTOLIC BLOOD PRESSURE: 123 MMHG | TEMPERATURE: 98 F | DIASTOLIC BLOOD PRESSURE: 80 MMHG | HEART RATE: 71 BPM | WEIGHT: 146 LBS | RESPIRATION RATE: 18 BRPM | BODY MASS INDEX: 24.32 KG/M2 | OXYGEN SATURATION: 98 % | HEIGHT: 65 IN

## 2022-04-09 DIAGNOSIS — M47.816 SPONDYLOSIS OF LUMBAR REGION WITHOUT MYELOPATHY OR RADICULOPATHY: ICD-10-CM

## 2022-04-09 DIAGNOSIS — M62.830 BACK MUSCLE SPASM: ICD-10-CM

## 2022-04-09 DIAGNOSIS — S39.012A LOW BACK STRAIN, INITIAL ENCOUNTER: ICD-10-CM

## 2022-04-09 LAB
ALBUMIN SERPL-MCNC: 4.1 G/DL (ref 3.4–5)
ALBUMIN UR-MCNC: NEGATIVE MG/DL
ALP SERPL-CCNC: 65 U/L (ref 40–150)
ALT SERPL W P-5'-P-CCNC: 29 U/L (ref 0–50)
ANION GAP SERPL CALCULATED.3IONS-SCNC: 6 MMOL/L (ref 3–14)
APPEARANCE UR: CLEAR
APTT PPP: 27 SECONDS (ref 22–38)
AST SERPL W P-5'-P-CCNC: 32 U/L (ref 0–45)
BASOPHILS # BLD AUTO: 0 10E3/UL (ref 0–0.2)
BASOPHILS NFR BLD AUTO: 0 %
BILIRUB SERPL-MCNC: 0.7 MG/DL (ref 0.2–1.3)
BILIRUB UR QL STRIP: NEGATIVE
BUN SERPL-MCNC: 17 MG/DL (ref 7–30)
CALCIUM SERPL-MCNC: 9.4 MG/DL (ref 8.5–10.1)
CHLORIDE BLD-SCNC: 110 MMOL/L (ref 94–109)
CO2 SERPL-SCNC: 27 MMOL/L (ref 20–32)
COLOR UR AUTO: ABNORMAL
CREAT SERPL-MCNC: 0.55 MG/DL (ref 0.52–1.04)
EOSINOPHIL # BLD AUTO: 0 10E3/UL (ref 0–0.7)
EOSINOPHIL NFR BLD AUTO: 0 %
ERYTHROCYTE [DISTWIDTH] IN BLOOD BY AUTOMATED COUNT: 12.8 % (ref 10–15)
GFR SERPL CREATININE-BSD FRML MDRD: >90 ML/MIN/1.73M2
GLUCOSE BLD-MCNC: 95 MG/DL (ref 70–99)
GLUCOSE UR STRIP-MCNC: NEGATIVE MG/DL
HCT VFR BLD AUTO: 40 % (ref 35–47)
HGB BLD-MCNC: 12.9 G/DL (ref 11.7–15.7)
HGB UR QL STRIP: NEGATIVE
HYALINE CASTS: 1 /LPF
IMM GRANULOCYTES # BLD: 0 10E3/UL
IMM GRANULOCYTES NFR BLD: 1 %
INR PPP: 0.94 (ref 0.85–1.15)
KETONES UR STRIP-MCNC: NEGATIVE MG/DL
LEUKOCYTE ESTERASE UR QL STRIP: NEGATIVE
LYMPHOCYTES # BLD AUTO: 0.6 10E3/UL (ref 0.8–5.3)
LYMPHOCYTES NFR BLD AUTO: 11 %
MCH RBC QN AUTO: 29.8 PG (ref 26.5–33)
MCHC RBC AUTO-ENTMCNC: 32.3 G/DL (ref 31.5–36.5)
MCV RBC AUTO: 92 FL (ref 78–100)
MONOCYTES # BLD AUTO: 0.3 10E3/UL (ref 0–1.3)
MONOCYTES NFR BLD AUTO: 5 %
MUCOUS THREADS #/AREA URNS LPF: PRESENT /LPF
NEUTROPHILS # BLD AUTO: 4.1 10E3/UL (ref 1.6–8.3)
NEUTROPHILS NFR BLD AUTO: 83 %
NITRATE UR QL: NEGATIVE
NRBC # BLD AUTO: 0 10E3/UL
NRBC BLD AUTO-RTO: 0 /100
PH UR STRIP: 7.5 [PH] (ref 5–7)
PLATELET # BLD AUTO: 179 10E3/UL (ref 150–450)
POTASSIUM BLD-SCNC: 4 MMOL/L (ref 3.4–5.3)
PROT SERPL-MCNC: 7 G/DL (ref 6.8–8.8)
RBC # BLD AUTO: 4.33 10E6/UL (ref 3.8–5.2)
RBC URINE: 1 /HPF
SODIUM SERPL-SCNC: 143 MMOL/L (ref 133–144)
SP GR UR STRIP: 1.01 (ref 1–1.03)
UROBILINOGEN UR STRIP-MCNC: NORMAL MG/DL
WBC # BLD AUTO: 5 10E3/UL (ref 4–11)
WBC URINE: <1 /HPF

## 2022-04-09 PROCEDURE — 72131 CT LUMBAR SPINE W/O DYE: CPT | Mod: 26 | Performed by: RADIOLOGY

## 2022-04-09 PROCEDURE — 36415 COLL VENOUS BLD VENIPUNCTURE: CPT | Performed by: FAMILY MEDICINE

## 2022-04-09 PROCEDURE — 99285 EMERGENCY DEPT VISIT HI MDM: CPT | Mod: 25 | Performed by: FAMILY MEDICINE

## 2022-04-09 PROCEDURE — 250N000013 HC RX MED GY IP 250 OP 250 PS 637: Performed by: FAMILY MEDICINE

## 2022-04-09 PROCEDURE — 85025 COMPLETE CBC W/AUTO DIFF WBC: CPT | Performed by: FAMILY MEDICINE

## 2022-04-09 PROCEDURE — 96376 TX/PRO/DX INJ SAME DRUG ADON: CPT | Performed by: FAMILY MEDICINE

## 2022-04-09 PROCEDURE — 96361 HYDRATE IV INFUSION ADD-ON: CPT | Performed by: FAMILY MEDICINE

## 2022-04-09 PROCEDURE — 74176 CT ABD & PELVIS W/O CONTRAST: CPT | Mod: 26 | Performed by: RADIOLOGY

## 2022-04-09 PROCEDURE — 85610 PROTHROMBIN TIME: CPT | Mod: GZ | Performed by: FAMILY MEDICINE

## 2022-04-09 PROCEDURE — 96375 TX/PRO/DX INJ NEW DRUG ADDON: CPT | Performed by: FAMILY MEDICINE

## 2022-04-09 PROCEDURE — 250N000011 HC RX IP 250 OP 636: Performed by: FAMILY MEDICINE

## 2022-04-09 PROCEDURE — 85730 THROMBOPLASTIN TIME PARTIAL: CPT | Mod: GZ | Performed by: FAMILY MEDICINE

## 2022-04-09 PROCEDURE — 80053 COMPREHEN METABOLIC PANEL: CPT | Performed by: FAMILY MEDICINE

## 2022-04-09 PROCEDURE — 74176 CT ABD & PELVIS W/O CONTRAST: CPT

## 2022-04-09 PROCEDURE — 258N000003 HC RX IP 258 OP 636: Performed by: FAMILY MEDICINE

## 2022-04-09 PROCEDURE — 96374 THER/PROPH/DIAG INJ IV PUSH: CPT | Performed by: FAMILY MEDICINE

## 2022-04-09 PROCEDURE — 99285 EMERGENCY DEPT VISIT HI MDM: CPT | Performed by: FAMILY MEDICINE

## 2022-04-09 PROCEDURE — 72131 CT LUMBAR SPINE W/O DYE: CPT

## 2022-04-09 PROCEDURE — 81001 URINALYSIS AUTO W/SCOPE: CPT | Performed by: FAMILY MEDICINE

## 2022-04-09 RX ORDER — HYDROMORPHONE HCL IN WATER/PF 6 MG/30 ML
0.2 PATIENT CONTROLLED ANALGESIA SYRINGE INTRAVENOUS
Status: COMPLETED | OUTPATIENT
Start: 2022-04-09 | End: 2022-04-09

## 2022-04-09 RX ORDER — LIDOCAINE 40 MG/G
CREAM TOPICAL
Status: DISCONTINUED | OUTPATIENT
Start: 2022-04-09 | End: 2022-04-09 | Stop reason: HOSPADM

## 2022-04-09 RX ORDER — DIAZEPAM 10 MG/2ML
2.5 INJECTION, SOLUTION INTRAMUSCULAR; INTRAVENOUS ONCE
Status: COMPLETED | OUTPATIENT
Start: 2022-04-09 | End: 2022-04-09

## 2022-04-09 RX ORDER — OXYCODONE HYDROCHLORIDE 5 MG/1
5 TABLET ORAL EVERY 6 HOURS PRN
Qty: 12 TABLET | Refills: 0 | Status: SHIPPED | OUTPATIENT
Start: 2022-04-09 | End: 2022-04-12

## 2022-04-09 RX ORDER — DIAZEPAM 5 MG
2.5-5 TABLET ORAL EVERY 8 HOURS PRN
Qty: 10 TABLET | Refills: 0 | Status: SHIPPED | OUTPATIENT
Start: 2022-04-09 | End: 2022-08-08

## 2022-04-09 RX ORDER — LIDOCAINE 4 G/G
4 PATCH TOPICAL ONCE
Status: DISCONTINUED | OUTPATIENT
Start: 2022-04-09 | End: 2022-04-09 | Stop reason: HOSPADM

## 2022-04-09 RX ORDER — HYDROMORPHONE HCL IN WATER/PF 6 MG/30 ML
0.2 PATIENT CONTROLLED ANALGESIA SYRINGE INTRAVENOUS ONCE
Status: COMPLETED | OUTPATIENT
Start: 2022-04-09 | End: 2022-04-09

## 2022-04-09 RX ADMIN — HYDROMORPHONE HYDROCHLORIDE 0.2 MG: 0.2 INJECTION, SOLUTION INTRAMUSCULAR; INTRAVENOUS; SUBCUTANEOUS at 12:22

## 2022-04-09 RX ADMIN — HYDROMORPHONE HYDROCHLORIDE 0.2 MG: 0.2 INJECTION, SOLUTION INTRAMUSCULAR; INTRAVENOUS; SUBCUTANEOUS at 11:55

## 2022-04-09 RX ADMIN — HYDROMORPHONE HYDROCHLORIDE 0.2 MG: 0.2 INJECTION, SOLUTION INTRAMUSCULAR; INTRAVENOUS; SUBCUTANEOUS at 12:59

## 2022-04-09 RX ADMIN — LIDOCAINE 4 PATCH: 560 PATCH PERCUTANEOUS; TOPICAL; TRANSDERMAL at 15:30

## 2022-04-09 RX ADMIN — HYDROMORPHONE HYDROCHLORIDE 0.2 MG: 0.2 INJECTION, SOLUTION INTRAMUSCULAR; INTRAVENOUS; SUBCUTANEOUS at 10:47

## 2022-04-09 RX ADMIN — DIAZEPAM 2.5 MG: 5 INJECTION, SOLUTION INTRAMUSCULAR; INTRAVENOUS at 10:47

## 2022-04-09 RX ADMIN — SODIUM CHLORIDE 1000 ML: 900 INJECTION, SOLUTION INTRAVENOUS at 10:50

## 2022-04-09 ASSESSMENT — ENCOUNTER SYMPTOMS
DECREASED CONCENTRATION: 0
VOMITING: 0
SHORTNESS OF BREATH: 0
DIFFICULTY URINATING: 0
HEMATURIA: 0
ABDOMINAL PAIN: 0
NAUSEA: 1
NUMBNESS: 0
TROUBLE SWALLOWING: 0
CONFUSION: 0
AGITATION: 0
CHEST TIGHTNESS: 0
DYSPHORIC MOOD: 0
NECK STIFFNESS: 0
LIGHT-HEADEDNESS: 0
SLEEP DISTURBANCE: 1
WOUND: 0
MYALGIAS: 1
FLANK PAIN: 1
WEAKNESS: 0
BRUISES/BLEEDS EASILY: 0
NERVOUS/ANXIOUS: 1
ARTHRALGIAS: 0
BACK PAIN: 1
VOICE CHANGE: 0
DIAPHORESIS: 1
ACTIVITY CHANGE: 1
DYSURIA: 0
FEVER: 0
BLOOD IN STOOL: 0

## 2022-04-09 NOTE — ED TRIAGE NOTES
Coming in with back pain. Was prescribed Flexeril yesterday. This morning was scrapping wallpaper and started having back spasms.

## 2022-04-09 NOTE — DISCHARGE INSTRUCTIONS
Home.  Your labs are stable.  Your CT did not show any kidney stone.(it did show know pancreas cysts, but also small cyst like structure near splenic artery which radiology recommended out patient follow up CT. Check with Dr. Stacy regarding further CT reviewing current CT.  Wear the lidoderm patch on for 12 hours, off for 12 hours and reapply if helps pain.  Ice to back for pain.  Rest for a few days, then stretching gentle and exercise.  Take tylenol and ibuprofen for pain also.  Valium for spasm instead of flexeril if needed.  Oxycodone for severe pain.  Return if fever, chills, bowel or bladder concerns or other concerns.  Follow up with Dr. Stacy this week if not improving.      Results for orders placed or performed during the hospital encounter of 04/09/22   Abd/pelvis CT no contrast - Stone Protocol     Status: None (Preliminary result)    Impression    RESIDENT PRELIMINARY INTERPRETATION  IMPRESSION:   1. Nonobstructive right renal calculi. No left renal calculi. No  hydronephrosis bilaterally.  2. The bladder is very distended.  3. Peripherally calcified lesion adjacent to the splenic artery which  may represent splenic artery aneurysm measuring up to 1.3 mm.  Recommend nonemergent characterization with CTA of the abdomen.  4. Multiple pancreatic cysts, which are better evaluated on MRI dated  4/3/2012.   Lumbar spine CT w/o contrast     Status: None    Narrative    CT LUMBAR SPINE W/O CONTRAST 4/9/2022 2:27 PM    History: left flank and back pain.    Comparison: CT same day, 3/22/2012.    Technique: Using multidetector thin collimation helical acquisition  technique, axial, coronal and sagittal CT images through the lumbar  spine were obtained without intravenous contrast.     Findings: There are 5 lumbar type vertebrae. No fracture or  subluxation of the lumbar spine. Regarding alignment, the lumbar spine  alignment appears preserved. There is no significant disc space  narrowing at any level.  Partially calcified intervertebral discs  notably at L1-L2. Osteoporosis. Schmorl's node degenerative endplate  changes. No definite lesions are noted within the vertebra. Findings  on a level by level basis are as follows:    L1-L2: Posterior disc bulge. No spinal canal or neuroforaminal  stenosis.    L2-L3: Posterior disc bulge and bilateral facet arthropathy. Bilateral  mild neural foraminal narrowing. Mild significant spinal canal  narrowing    L3-L4: Posterior disc bulge, bilateral facet arthropathy, and  ligamentum flavum hypertrophy. Bilateral mild neuroforaminal  narrowing. No significant spinal canal narrowing.    L4-L5: Posterior disc bulge, bilateral facet arthropathy, and  ligamentum flavum hypertrophy. Mild to moderate bilateral neural  foraminal narrowing. Mild spinal canal narrowing.    L5-S1: Mild posterior disc bulge, facet arthropathy bilaterally, and  ligamentum flavum hypertrophy. No significant spinal canal or neural  foraminal narrowing.    The visualized adjacent paraspinous tissues are grossly within normal  limits.      Impression    Impression:  1. No fracture or subluxation of the lumbar spine.  2. Multilevel lumbar spondylosis most severe at the level of L4-5 with  mild/moderate bilateral neural foraminal narrowing and mild spinal  canal narrowing.     I have personally reviewed the examination and initial interpretation  and I agree with the findings.    FREDO WALTERS MD         SYSTEM ID:  [id number]   INR     Status: Normal   Result Value Ref Range    INR 0.94 0.85 - 1.15   Partial thromboplastin time     Status: Normal   Result Value Ref Range    aPTT 27 22 - 38 Seconds   Comprehensive metabolic panel     Status: Abnormal   Result Value Ref Range    Sodium 143 133 - 144 mmol/L    Potassium 4.0 3.4 - 5.3 mmol/L    Chloride 110 (H) 94 - 109 mmol/L    Carbon Dioxide (CO2) 27 20 - 32 mmol/L    Anion Gap 6 3 - 14 mmol/L    Urea Nitrogen 17 7 - 30 mg/dL    Creatinine 0.55 0.52 - 1.04 mg/dL     Calcium 9.4 8.5 - 10.1 mg/dL    Glucose 95 70 - 99 mg/dL    Alkaline Phosphatase 65 40 - 150 U/L    AST 32 0 - 45 U/L    ALT 29 0 - 50 U/L    Protein Total 7.0 6.8 - 8.8 g/dL    Albumin 4.1 3.4 - 5.0 g/dL    Bilirubin Total 0.7 0.2 - 1.3 mg/dL    GFR Estimate >90 >60 mL/min/1.73m2   UA with Microscopic reflex to Culture     Status: Abnormal    Specimen: Urine, Midstream   Result Value Ref Range    Color Urine Straw Colorless, Straw, Light Yellow, Yellow    Appearance Urine Clear Clear    Glucose Urine Negative Negative mg/dL    Bilirubin Urine Negative Negative    Ketones Urine Negative Negative mg/dL    Specific Gravity Urine 1.011 1.003 - 1.035    Blood Urine Negative Negative    pH Urine 7.5 (H) 5.0 - 7.0    Protein Albumin Urine Negative Negative mg/dL    Urobilinogen Urine Normal Normal, 2.0 mg/dL    Nitrite Urine Negative Negative    Leukocyte Esterase Urine Negative Negative    Mucus Urine Present (A) None Seen /LPF    RBC Urine 1 <=2 /HPF    WBC Urine <1 <=5 /HPF    Hyaline Casts Urine 1 <=2 /LPF    Narrative    Urine Culture not indicated   CBC with platelets and differential     Status: Abnormal   Result Value Ref Range    WBC Count 5.0 4.0 - 11.0 10e3/uL    RBC Count 4.33 3.80 - 5.20 10e6/uL    Hemoglobin 12.9 11.7 - 15.7 g/dL    Hematocrit 40.0 35.0 - 47.0 %    MCV 92 78 - 100 fL    MCH 29.8 26.5 - 33.0 pg    MCHC 32.3 31.5 - 36.5 g/dL    RDW 12.8 10.0 - 15.0 %    Platelet Count 179 150 - 450 10e3/uL    % Neutrophils 83 %    % Lymphocytes 11 %    % Monocytes 5 %    % Eosinophils 0 %    % Basophils 0 %    % Immature Granulocytes 1 %    NRBCs per 100 WBC 0 <1 /100    Absolute Neutrophils 4.1 1.6 - 8.3 10e3/uL    Absolute Lymphocytes 0.6 (L) 0.8 - 5.3 10e3/uL    Absolute Monocytes 0.3 0.0 - 1.3 10e3/uL    Absolute Eosinophils 0.0 0.0 - 0.7 10e3/uL    Absolute Basophils 0.0 0.0 - 0.2 10e3/uL    Absolute Immature Granulocytes 0.0 <=0.4 10e3/uL    Absolute NRBCs 0.0 10e3/uL   CBC with platelets differential      Status: Abnormal    Narrative    The following orders were created for panel order CBC with platelets differential.  Procedure                               Abnormality         Status                     ---------                               -----------         ------                     CBC with platelets and d...[[id number]]  Abnormal            Final result                 Please view results for these tests on the individual orders.

## 2022-04-09 NOTE — ED PROVIDER NOTES
[facility]ERG[facility]Y DE[facility]MENT ([facility])  4/09/22  History     Chief Complaint   Patient presents with     Back Pain     HPI  Mely Rashid is a 76 year old female who has a significant medical history of dermatomyositis cystitis, IgA deficiency, basal cell carcinoma, and arthritis who presents to the Emergency Department with c/o left-sided lower back pain.  Patient states that the pain began 1 week ago while removing wallpaper.  She states the pain at first was a strain, she thought the pain would resolve on its own, she managed the pain with stretching and Tylenol.  Pain is increased in severity over the last week, peaking yesterday.  She called nurse triage seeking to schedule an appointment close by, no appointments were available.  She was prescribed oral Flexeril, her first dose was taken yesterday at 5PM, along with an additional dose before going to bed.  Flexeril provided no relief.  This morning, she reports having excruciating pain that limits her movements, pain radiates to her left leg when ambulating.  She decided to call 911 instead of presenting to an urgent care. Before calling 911, the patient reports having severe back pain, along with cold sweats, nausea, and feeling faint.  Here in the ED, she denies pain radiating to groin, denies abdominal pain.  She denies urinary symptoms, denies history of nephrolithiasis.  She reports medication allergy to penicillin.    Past Medical History  Past Medical History:   Diagnosis Date     Arthritis ??    probably     Basal cell carcinoma      Dermatomyositis (H)      Glaucoma      IgA deficiency (H)      Nonsenile cataract      Past Surgical History:   Procedure Laterality Date     BIOPSY OF SKIN LESION       COLONOSCOPY       [facility] UGI ENDOSCOPY W EUS N/A 5/14/2014    Procedure: COMBINED ENDOSCOPIC ULTRASOUND, ESOPHAGOSCOPY, GASTROSCOPY, DUODENOSCOPY (EGD);  Surgeon: Boston Rodriguez MD;  Location: [facility]     HYSTERECTOMY      partial,  prolapse uterus.     diazepam (VALIUM) 5 MG tablet  oxyCODONE (ROXICODONE) 5 MG tablet  augmented betamethasone dipropionate (DIPROLENE-AF) 0.05 % external cream  B Complex TABS  Calcium Carb-Cholecalciferol (CALCIUM 500 +D PO)  Cholecalciferol (VITAMIN D) 1000 UNIT capsule  clobetasol (TEMOVATE) 0.05 % external solution  cyclobenzaprine (FLEXERIL) 5 MG tablet  diphenoxylate-atropine (LOMOTIL) 2.5-0.025 MG tablet  fluocinolone acetonide (DERMA SMOOTHE/FS BODY) 0.01 % external oil  glucosamine-chondroitinoitin (GLUCOSAMINE CHONDR COMPLEX) 500-400 MG CAPS  hydrocortisone 2.5 % cream  hydroxychloroquine (PLAQUENIL) 200 MG tablet  latanoprost (XALATAN) 0.005 % ophthalmic solution  loperamide (IMODIUM A-D) 2 MG tablet  Multiple Vitamins-Minerals (MULTIPLE VITAMINS/WOMENS PO)  mycophenolate (GENERIC EQUIVALENT) 500 MG tablet  omega-3 fatty acids (FISH OIL) 1200 MG capsule  timolol maleate (TIMOPTIC) 0.25 % ophthalmic solution  triamcinolone (KENALOG) 0.1 % external cream  vitamin C w/JD HIPS 500 MG tablet  vitamin E 400 UNIT capsule      Allergies   Allergen Reactions     Penicillins Anaphylaxis     Family History  Family History   Problem Relation Age of Onset     C.A.D. Father      Arthritis Father      Diabetes Brother      Glaucoma Brother      Breast Cancer Sister 35     Glaucoma Sister      Neurologic Disorder Sister         seizures     Cancer Sister         breast     Psoriasis Daughter      Rheumatologic Disease Daughter         Dermatomyositis     Alzheimer Disease Mother         onset in 60s     Hypertension No family hx of      Cerebrovascular Disease No family hx of         ,     Thyroid Disease No family hx of      Skin Cancer No family hx of      Macular Degeneration No family hx of      Social History   Social History     Tobacco Use     Smoking status: Former Smoker     Packs/day: 1.00     Years: 10.00     Pack years: 10.00     Types: Cigarettes     Quit date: 4/10/1979     Years since quittin.0      Smokeless tobacco: Never Used     Tobacco comment: quit 1985   Vaping Use     Vaping Use: Never used   Substance Use Topics     Alcohol use: Not Currently     Alcohol/week: 0.0 standard drinks     Comment: rare     Drug use: No      Past medical history, past surgical history, medications, allergies, family history, and social history were reviewed with the patient. No additional pertinent items.     I have reviewed the Medications, Allergies, Past Medical and Surgical History, and Social History in the Epic system.    Review of Systems   Constitutional: Positive for activity change and diaphoresis. Negative for fever.        Patient noted left flank tenderness worsened with movement better at rest.  No abdominal pain   HENT: Negative for trouble swallowing and voice change.    Eyes: Negative for visual disturbance.   Respiratory: Negative for chest tightness and shortness of breath.    Cardiovascular: Negative for chest pain and leg swelling.   Gastrointestinal: Positive for nausea. Negative for abdominal pain, blood in stool and vomiting.   Genitourinary: Positive for flank pain (left). Negative for decreased urine volume, difficulty urinating, dysuria, hematuria, pelvic pain and urgency.   Musculoskeletal: Positive for back pain and myalgias. Negative for arthralgias, gait problem and neck stiffness.   Skin: Negative for rash and wound.   Allergic/Immunologic: Negative for immunocompromised state.   Neurological: Negative for weakness, light-headedness and numbness.   Hematological: Does not bruise/bleed easily.   Psychiatric/Behavioral: Positive for sleep disturbance. Negative for agitation, confusion, decreased concentration and dysphoric mood. The patient is nervous/anxious (due to pain with movement).    All other systems reviewed and are negative.    A complete review of systems was performed with pertinent positives and negatives noted in the HPI, and all other systems negative.    Physical Exam   BP:  "118/85  Pulse: 75  Temp: 97.9  F (36.6  C)  Resp: 16  Height: 165.1 cm (5' 5\")  Weight: 66.2 kg (146 lb)  SpO2: 99 %      Physical Exam  Vitals and nursing note reviewed.   Constitutional:       General: She is in acute distress.      Appearance: She is well-developed. She is not toxic-appearing or diaphoretic.      Comments: Patient nontoxic here though guarded movement precipitates pain localized to the left flank area lower paralumbar region   HENT:      Head: Normocephalic and atraumatic.      Mouth/Throat:      Mouth: Mucous membranes are moist.   Eyes:      General: No scleral icterus.     Extraocular Movements: Extraocular movements intact.      Conjunctiva/sclera: Conjunctivae normal.      Pupils: Pupils are equal, round, and reactive to light.   Cardiovascular:      Rate and Rhythm: Normal rate and regular rhythm.   Pulmonary:      Effort: No respiratory distress.      Breath sounds: No stridor.   Abdominal:      General: Abdomen is flat. There is no distension.      Palpations: There is no mass.      Tenderness: There is no abdominal tenderness. There is left CVA tenderness. There is no right CVA tenderness, guarding or rebound.   Musculoskeletal:         General: Tenderness present. No deformity or signs of injury.      Cervical back: Normal range of motion and neck supple. No rigidity.   Skin:     General: Skin is warm and dry.      Capillary Refill: Capillary refill takes less than 2 seconds.      Coloration: Skin is not pale.      Findings: No erythema or rash.   Neurological:      General: No focal deficit present.      Mental Status: She is alert and oriented to person, place, and time. Mental status is at baseline.   Psychiatric:      Comments: Mild uncomfortable because of pain primarily.         ED Course     At 10:00 AM the patient was seen and examined by Sebastián Goodson MD in Room ED22.   Patient IV established IV fluids given.  Labs drawn reviewed.  Sodium 143 potassium 4.0.  Bicarb 27 gap " is 6 creatinine 0.55.  Glucose is 95.  Liver function test normal limits.  Urinalysis negative white count 5 hemoglobin 12.9.  Platelets are 179.    Here in the ER patient did receive Dilaudid 0.2 mg titrated for pain control along with this had received Valium 2.5 mg IV x1.    CT scan of the abdomen along with lumbar spine.  Findings reveal no kidney stone no acute changes seen with some chronic spondylitic changes of the lower back noted also.  Degenerative changes also noted.  There is a small cystic lesion 1.3 mm near the splenic artery recommend nonemergent follow-up CT scan also cyst in the pancreas known to patient also.  Discussed and reviewed all these patient is aware at this point patient feeling better at this point more likely symptoms are that of musculoskeletal back strain with muscle spasm.  Lidoderm patch was placed patient given 3 to go home with on for 12 hours off for 12 hours patient also given limited supply of oxycodone along with this Valium as needed for spasm instead of the Flexeril ibuprofen Tylenol ice heat follow-up with MD for recheck and return if any concerns at all patient agrees with plan comfortable discharge.  Daughter present also will stay with her tonight.       Procedures                   Results for orders placed or performed during the hospital encounter of 04/09/22 (from the past 24 hour(s))   CBC with platelets differential    Narrative    The following orders were created for panel order CBC with platelets differential.  Procedure                               Abnormality         Status                     ---------                               -----------         ------                     CBC with platelets and d...[[id number]]  Abnormal            Final result                 Please view results for these tests on the individual orders.   INR   Result Value Ref Range    INR 0.94 0.85 - 1.15   Partial thromboplastin time   Result Value Ref Range    aPTT 27 22 - 38  Seconds   Comprehensive metabolic panel   Result Value Ref Range    Sodium 143 133 - 144 mmol/L    Potassium 4.0 3.4 - 5.3 mmol/L    Chloride 110 (H) 94 - 109 mmol/L    Carbon Dioxide (CO2) 27 20 - 32 mmol/L    Anion Gap 6 3 - 14 mmol/L    Urea Nitrogen 17 7 - 30 mg/dL    Creatinine 0.55 0.52 - 1.04 mg/dL    Calcium 9.4 8.5 - 10.1 mg/dL    Glucose 95 70 - 99 mg/dL    Alkaline Phosphatase 65 40 - 150 U/L    AST 32 0 - 45 U/L    ALT 29 0 - 50 U/L    Protein Total 7.0 6.8 - 8.8 g/dL    Albumin 4.1 3.4 - 5.0 g/dL    Bilirubin Total 0.7 0.2 - 1.3 mg/dL    GFR Estimate >90 >60 mL/min/1.73m2   CBC with platelets and differential   Result Value Ref Range    WBC Count 5.0 4.0 - 11.0 10e3/uL    RBC Count 4.33 3.80 - 5.20 10e6/uL    Hemoglobin 12.9 11.7 - 15.7 g/dL    Hematocrit 40.0 35.0 - 47.0 %    MCV 92 78 - 100 fL    MCH 29.8 26.5 - 33.0 pg    MCHC 32.3 31.5 - 36.5 g/dL    RDW 12.8 10.0 - 15.0 %    Platelet Count 179 150 - 450 10e3/uL    % Neutrophils 83 %    % Lymphocytes 11 %    % Monocytes 5 %    % Eosinophils 0 %    % Basophils 0 %    % Immature Granulocytes 1 %    NRBCs per 100 WBC 0 <1 /100    Absolute Neutrophils 4.1 1.6 - 8.3 10e3/uL    Absolute Lymphocytes 0.6 (L) 0.8 - 5.3 10e3/uL    Absolute Monocytes 0.3 0.0 - 1.3 10e3/uL    Absolute Eosinophils 0.0 0.0 - 0.7 10e3/uL    Absolute Basophils 0.0 0.0 - 0.2 10e3/uL    Absolute Immature Granulocytes 0.0 <=0.4 10e3/uL    Absolute NRBCs 0.0 10e3/uL   UA with Microscopic reflex to Culture    Specimen: Urine, Midstream   Result Value Ref Range    Color Urine Straw Colorless, Straw, Light Yellow, Yellow    Appearance Urine Clear Clear    Glucose Urine Negative Negative mg/dL    Bilirubin Urine Negative Negative    Ketones Urine Negative Negative mg/dL    Specific Gravity Urine 1.011 1.003 - 1.035    Blood Urine Negative Negative    pH Urine 7.5 (H) 5.0 - 7.0    Protein Albumin Urine Negative Negative mg/dL    Urobilinogen Urine Normal Normal, 2.0 mg/dL    Nitrite Urine  Negative Negative    Leukocyte Esterase Urine Negative Negative    Mucus Urine Present (A) None Seen /LPF    RBC Urine 1 <=2 /HPF    WBC Urine <1 <=5 /HPF    Hyaline Casts Urine 1 <=2 /LPF    Narrative    Urine Culture not indicated   Lumbar spine CT w/o contrast    Narrative    CT LUMBAR SPINE W/O CONTRAST 4/9/2022 2:27 PM    History: left flank and back pain.    Comparison: CT same day, 3/22/2012.    Technique: Using multidetector thin collimation helical acquisition  technique, axial, coronal and sagittal CT images through the lumbar  spine were obtained without intravenous contrast.     Findings: There are 5 lumbar type vertebrae. No fracture or  subluxation of the lumbar spine. Regarding alignment, the lumbar spine  alignment appears preserved. There is no significant disc space  narrowing at any level. Partially calcified intervertebral discs  notably at L1-L2. Osteoporosis. Schmorl's node degenerative endplate  changes. No definite lesions are noted within the vertebra. Findings  on a level by level basis are as follows:    L1-L2: Posterior disc bulge. No spinal canal or neuroforaminal  stenosis.    L2-L3: Posterior disc bulge and bilateral facet arthropathy. Bilateral  mild neural foraminal narrowing. Mild significant spinal canal  narrowing    L3-L4: Posterior disc bulge, bilateral facet arthropathy, and  ligamentum flavum hypertrophy. Bilateral mild neuroforaminal  narrowing. No significant spinal canal narrowing.    L4-L5: Posterior disc bulge, bilateral facet arthropathy, and  ligamentum flavum hypertrophy. Mild to moderate bilateral neural  foraminal narrowing. Mild spinal canal narrowing.    L5-S1: Mild posterior disc bulge, facet arthropathy bilaterally, and  ligamentum flavum hypertrophy. No significant spinal canal or neural  foraminal narrowing.    The visualized adjacent paraspinous tissues are grossly within normal  limits.      Impression    Impression:  1. No fracture or subluxation of the  lumbar spine.  2. Multilevel lumbar spondylosis most severe at the level of L4-5 with  mild/moderate bilateral neural foraminal narrowing and mild spinal  canal narrowing.     I have personally reviewed the examination and initial interpretation  and I agree with the findings.    FREDO WALTERS MD         SYSTEM ID:  [id number]   Abd/pelvis CT no contrast - Stone Protocol    Narrative    EXAMINATION: CT ABDOMEN PELVIS W/O CONTRAST, 4/9/2022 2:28 PM    INDICATION: Left flank pain    COMPARISON STUDY: MRI of the abdomen dated 4/3/2012    TECHNIQUE: CT scan of the abdomen and pelvis was performed on  multidetector CT scanner using volumetric acquisition technique and  images were reconstructed in multiple planes with variable thickness  and reviewed on dedicated workstations.     CONTRAST: No contrast    CT scan radiation dose is optimized to minimum requisite dose using  automated dose modulation techniques.    FINDINGS:    Lower thorax: Subsegmental atelectasis. No pleural effusions.   Moderate hiatal hernia.    Liver: No mass. No intrahepatic biliary ductal dilation.    Biliary System: Normal gallbladder. No extrahepatic biliary ductal  dilation.    Pancreas: Multiple cysts of the pancreas, the largest measuring up to  14 mm. No pancreatic duct dilatation.    Adrenal glands: No mass or nodules    Spleen: There is a peripherally calcified lesion adjacent to the  splenic artery which may represent a splenic artery aneurysm measuring  1.3 x 1.3 x 1.2 cm. Otherwise normal appearance of the spleen.    Kidneys: Multiple right renal calculi, the largest measuring 2 mm. No  left renal calculi. No hydronephrosis bilaterally.    Gastrointestinal tract :Normal appendix. Normal caliber small bowel.   Diverticulosis without diverticulitis.    Mesentery/peritoneum/retroperitoneum: No mass. No free fluid or air.    Lymph nodes: No significant lymphadenopathy.    Vasculature: Scattered atherosclerotic calcification of  abdominal  aorta with no aneurysmal dilation.    Pelvis: Urinary bladder is very distended. Uterus is surgically  absent, no adnexal mass    Osseous structures: No aggressive or acute osseous lesion.      Soft tissues: Within normal limits.      Impression    IMPRESSION:   1. Nonobstructive right renal calculi. No left renal calculi. No  hydronephrosis bilaterally.  2. The bladder is very distended.  3. Peripherally calcified lesion adjacent to the splenic artery which  may represent splenic artery aneurysm measuring up to 1.3 mm.  Recommend nonemergent characterization with CTA of the abdomen.  4. Multiple pancreatic cysts, which are better evaluated on MRI dated  4/3/2012.    I have personally reviewed the examination and initial interpretation  and I agree with the findings.    KEAGAN COVINGTON MD         SYSTEM ID:  [id number]     Medications   0.9% sodium chloride BOLUS (0 mLs Intravenous Stopped 4/9/22 1224)   HYDROmorphone (DILAUDID) injection 0.2 mg (0.2 mg Intravenous Given 4/9/22 1222)   diazepam (VALIUM) injection 2.5 mg (2.5 mg Intravenous Given 4/9/22 1047)   HYDROmorphone (DILAUDID) injection 0.2 mg (0.2 mg Intravenous Given 4/9/22 1259)             Assessments & Plan (with Medical Decision Making)  76-year-old female who is been try to remove wallpaper of this last week increasing pain in the left paralumbar SI joint with spasm now.  Had some radicular symptoms no bowel or bladder problems otherwise.  Patient evaluated here in the ER pain is precipitated with any movement otherwise stable abdomen benign otherwise.  Here in the ER labs drawn IV fluids given patient treated with low-dose Valium along with multiple low-dose of Dilaudid feeling better.  Lidoderm patch was also placed CT scan showed some chronic changes as noted along with spondylitic changes degenerative change lumbar spine along with this some cyst noted in the pancreas to patient there is a small cystic 1.3 mm area near the splenic  artery recommend outpatient follow-up nonemergent per radiology.  Discussed with [facility] patient etc. daughter is here also patient comfortable going home daughter will stay with her will treat for more likely muscle spasm with lower lumbar strain SI joint etc.  Patient discharged with Valium oxycodone along with Lidoderm patches Tylenol ibuprofen ice rest activity as tolerated after a few days and then follow-up with MD.patient agree with plan.       I have reviewed the nursing notes.    I have reviewed the findings, diagnosis, plan and need for follow up with the patient.    Discharge Medication List as of 4/9/2022  4:13 PM      START taking these medications    Details   diazepam (VALIUM) 5 MG tablet Take 0.5-1 tablets (2.5-5 mg) by mouth every 8 hours as needed for muscle spasms (MUSCLE SPASM), Disp-10 tablet, R-0, Local Print      oxyCODONE (ROXICODONE) 5 MG tablet Take 1 tablet (5 mg) by mouth every 6 hours as needed for moderate to severe pain, Disp-12 tablet, R-0, Local Print             Final diagnoses:   Low back strain, initial encounter   Back muscle spasm   Spondylosis of lumbar region without myelopathy or radiculopathy       I, Dannie Li am serving as a trained medical scribe to document services personally performed by Sebastián Goodson, based on the provider's statements to me.      I, Sebastián Goodson, was physically present and have reviewed and verified the accuracy of this note documented by Dannie Li.     Sebastián Goodson MD  4/9/2022   [facility] EMERGENCY DEPARTMENT    This note was created at least in part by the use of dragon voice dictation system. Inadvertent typographical errors may still exist.  Sebastián Goodson MD.    Patient evaluated in the emergency department during the COVID-19 pandemic period. Careful attention to patients safety was addressed throughout the evaluation. Evaluation and treatment management was initiated with disposition made efficiently  and appropriate as possible to minimize any risk of potential exposure to patient during this evaluation.       Sebastián Goodson MD  04/09/22 [id number]

## 2022-04-11 DIAGNOSIS — Z79.899 ENCOUNTER FOR LONG-TERM CURRENT USE OF MEDICATION: ICD-10-CM

## 2022-04-11 DIAGNOSIS — Z79.899 LONG-TERM USE OF PLAQUENIL: ICD-10-CM

## 2022-04-11 DIAGNOSIS — M33.13 DERMATOMYOSITIS (H): ICD-10-CM

## 2022-04-13 NOTE — TELEPHONE ENCOUNTER
hydroxychloroquine (PLAQUENIL) 200 MG tablet   Take 1 tablet (200 mg) by mouth daily      Last Written Prescription Date:  1/19/22  Last Fill Quantity: 90,   # refills: 1  Last Office Visit : 2/2/21 Rheum/LS  Future Office visit:  9/8/22    Last eye exam: 12/1/21   Routing refill request to provider for review/approval because:  Overdue visit. Has been scheduled for 9/8/22.   Last eye exam: 12/1/21 no mention of Plaquenil. Pended.

## 2022-04-14 ENCOUNTER — LAB (OUTPATIENT)
Dept: LAB | Facility: CLINIC | Age: 77
End: 2022-04-14
Payer: COMMERCIAL

## 2022-04-14 ENCOUNTER — TELEPHONE (OUTPATIENT)
Dept: INTERNAL MEDICINE | Facility: CLINIC | Age: 77
End: 2022-04-14

## 2022-04-14 ENCOUNTER — TELEPHONE (OUTPATIENT)
Dept: GASTROENTEROLOGY | Facility: CLINIC | Age: 77
End: 2022-04-14

## 2022-04-14 DIAGNOSIS — Z13.1 SCREENING FOR DIABETES MELLITUS: ICD-10-CM

## 2022-04-14 DIAGNOSIS — M33.13 DERMATOMYOSITIS (H): ICD-10-CM

## 2022-04-14 DIAGNOSIS — Z79.899 ENCOUNTER FOR LONG-TERM CURRENT USE OF MEDICATION: ICD-10-CM

## 2022-04-14 DIAGNOSIS — R19.7 DIARRHEA, UNSPECIFIED TYPE: Primary | ICD-10-CM

## 2022-04-14 DIAGNOSIS — Z13.29 SCREENING FOR THYROID DISORDER: ICD-10-CM

## 2022-04-14 LAB
ALBUMIN SERPL-MCNC: 4.1 G/DL (ref 3.4–5)
ALT SERPL W P-5'-P-CCNC: 32 U/L (ref 0–50)
ANION GAP SERPL CALCULATED.3IONS-SCNC: 6 MMOL/L (ref 3–14)
AST SERPL W P-5'-P-CCNC: 20 U/L (ref 0–45)
BASOPHILS # BLD AUTO: 0 10E3/UL (ref 0–0.2)
BASOPHILS NFR BLD AUTO: 1 %
BUN SERPL-MCNC: 18 MG/DL (ref 7–30)
CALCIUM SERPL-MCNC: 10.1 MG/DL (ref 8.5–10.1)
CHLORIDE BLD-SCNC: 107 MMOL/L (ref 94–109)
CK SERPL-CCNC: 70 U/L (ref 30–225)
CO2 SERPL-SCNC: 30 MMOL/L (ref 20–32)
CREAT SERPL-MCNC: 0.7 MG/DL (ref 0.52–1.04)
CRP SERPL-MCNC: <2.9 MG/L (ref 0–8)
EOSINOPHIL # BLD AUTO: 0.1 10E3/UL (ref 0–0.7)
EOSINOPHIL NFR BLD AUTO: 2 %
ERYTHROCYTE [DISTWIDTH] IN BLOOD BY AUTOMATED COUNT: 13.2 % (ref 10–15)
GFR SERPL CREATININE-BSD FRML MDRD: 90 ML/MIN/1.73M2
GLUCOSE BLD-MCNC: 65 MG/DL (ref 70–99)
HCT VFR BLD AUTO: 38.8 % (ref 35–47)
HGB BLD-MCNC: 11.7 G/DL (ref 11.7–15.7)
LYMPHOCYTES # BLD AUTO: 0.8 10E3/UL (ref 0.8–5.3)
LYMPHOCYTES NFR BLD AUTO: 26 %
MCH RBC QN AUTO: 28.6 PG (ref 26.5–33)
MCHC RBC AUTO-ENTMCNC: 30.2 G/DL (ref 31.5–36.5)
MCV RBC AUTO: 95 FL (ref 78–100)
MONOCYTES # BLD AUTO: 0.5 10E3/UL (ref 0–1.3)
MONOCYTES NFR BLD AUTO: 15 %
NEUTROPHILS # BLD AUTO: 1.9 10E3/UL (ref 1.6–8.3)
NEUTROPHILS NFR BLD AUTO: 57 %
PLATELET # BLD AUTO: 192 10E3/UL (ref 150–450)
POTASSIUM BLD-SCNC: 3.8 MMOL/L (ref 3.4–5.3)
RBC # BLD AUTO: 4.09 10E6/UL (ref 3.8–5.2)
SODIUM SERPL-SCNC: 143 MMOL/L (ref 133–144)
TSH SERPL DL<=0.005 MIU/L-ACNC: 2.13 MU/L (ref 0.4–4)
WBC # BLD AUTO: 3.3 10E3/UL (ref 4–11)

## 2022-04-14 PROCEDURE — 84460 ALANINE AMINO (ALT) (SGPT): CPT

## 2022-04-14 PROCEDURE — 80048 BASIC METABOLIC PNL TOTAL CA: CPT

## 2022-04-14 PROCEDURE — 85025 COMPLETE CBC W/AUTO DIFF WBC: CPT

## 2022-04-14 PROCEDURE — 36415 COLL VENOUS BLD VENIPUNCTURE: CPT

## 2022-04-14 PROCEDURE — 82040 ASSAY OF SERUM ALBUMIN: CPT

## 2022-04-14 PROCEDURE — 84450 TRANSFERASE (AST) (SGOT): CPT

## 2022-04-14 PROCEDURE — 84443 ASSAY THYROID STIM HORMONE: CPT

## 2022-04-14 PROCEDURE — 86140 C-REACTIVE PROTEIN: CPT

## 2022-04-14 PROCEDURE — 82550 ASSAY OF CK (CPK): CPT

## 2022-04-14 RX ORDER — HYDROXYCHLOROQUINE SULFATE 200 MG/1
200 TABLET, FILM COATED ORAL DAILY
Qty: 90 TABLET | Refills: 0 | Status: SHIPPED | OUTPATIENT
Start: 2022-04-14 | End: 2022-07-06

## 2022-04-14 NOTE — TELEPHONE ENCOUNTER
Screening Questions  BlueKIND OF PREP RedLOCATION [review exclusion criteria] GreenSEDATION TYPE  1. Have you had a positive covid test in the last 90 days? n     2. Do you have a legal guardian or medical Power of ?  Are you able to give consent for your medical care?Y (Sedation review/consideration needed)    3. Are you active on mychart? Y    4. What insurance is in the chart? ARE MEDICARE     3.   Ordering/Referring Provider: Gavi Stacy APRN CNP    4. BMI 24.3 [BMI OVER 40-EXTENDED PREP]  If greater than 40 review exclusion criteria [PAC APPT IF @ [facility]]        5.  Respiratory Screening :  [If yes to any of the following HOSPITAL setting only]     Do you use daily home oxygen? N    Do you have mod to severe Obstructive Sleep Apnea? N  [OKAY @ [facility] SH PH RI]   Do you have Pulmonary Hypertension? N     Do you have UNCONTROLLED asthma? N        6.   Have you had a heart or lung transplant? N      7.   Are you currently on dialysis? N [ If yes, G-PREP & HOSPITAL setting only]     8.   Do you have chronic kidney disease? N [ If yes, G-PREP ]    9.   Have you had a stroke or Transient ischemic attack (TIA - aka  mini stroke ) within 6 months?  N (If yes, please review exclusion criteria)    10.   In the past 6 months, have you had any heart related issues including cardiomyopathy or heart attack? N           If yes, did it require cardiac stenting or other implantable device? N      11.   Do you have any implantable devices in your body (pacemaker, defib, LVAD)? N (If yes, please review exclusion criteria)    12.   Do you take nitroglycerin? N           If yes, how often? N  (if yes, HOSPITAL setting ONLY)    13.   Are you currently taking any blood thinners? N           [IF YES, INFORM PATIENT TO FOLLOW UP W/ ORDERING PROVIDER FOR BRIDGING INSTRUCTIONS]     14.   Do you have a diagnosis of diabetes? N   [ If yes, G-PREP ]    15.   [FEMALES] Are you currently pregnant? N    If yes, how many  weeks? N    16.   Are you taking any prescription pain medications on a routine schedule?  N  [ If yes, EXTENDED PREP.] [If yes, MAC]    17.   Do you have any chemical dependencies such as alcohol, street drugs, or methadone?  N [If yes, MAC]    18.   Do you have any history of post-traumatic stress syndrome, severe anxiety or history of psychosis?  N  [If yes, MAC]    19.   Do you transfer independently?  Y    20.  On a regular basis do you go 3-5 days between bowel movements? NA   [ If yes, EXTENDED PREP.]    21.   Preferred LOCAL Pharmacy for Pre Prescription      [facility] - [facility], [place] - [address] [id number] - [place], [place] - [address]  AT [facility] & [place], [place] - [address] [place] [address]      Scheduling Details      Caller : EZEQUIEL   (Please ask for phone number if not scheduled by patient)    Type of Procedure Scheduled: EGD  Which Colonoscopy Prep was Sent?: NA  [facility] CF PATIENTS & GRORUSTAM'S PATIENTS NEEDS EXTENDED PREP  Surgeon:   Date of Procedure:   Location: [facility]      Sedation Type: CS  Conscious Sedation- Needs  for 6 hours after the procedure  MAC/General-Needs  for 24 hours after procedure    Pre-op Required at [facility] [place], [place] and OR for MAC sedation: N  (advise patient they will need a pre-op prior to procedure -)      Informed patient they will need an adult  Y  Cannot take any type of public or medical transportation alone    [facility]Pro[facility] or Externally: Y    Confirmed Nurse will call to complete assessment Y    Additional comments: WANTED TO SCHEDULE BREATH TEST FIRST , DUE TO SHE WANTS TO TALK TO PRIMARY REGARDING UPPER ENDO.

## 2022-04-14 NOTE — TELEPHONE ENCOUNTER
[facility] Call Center    Phone Message    May a detailed message be left on voicemail: yes     Reason for Call: Other: Patient tried to call and schedule her hydrogren breath test but was told the order was not in the system. Please call and discuss.      Action Taken: Message routed to:  [facility] PCC    Travel Screening: Not Applicable

## 2022-04-14 NOTE — TELEPHONE ENCOUNTER
Non- Invasive Endoscopy  Scheduling Details      Caller : Mely Rashid    Procedure scheduled: Hydrogen Breath Test  Ordering Provider: MYRON STEELE  Provider/Surgeon: RN  Date of Scheduled Procedure: 6/10/2022  [place] [facility] [place]   [UNLESS IT IS VCE @ [facility] ARRIVAL 60MINS BEFORE]    Sedation Type: NO SEDATION  Informed patient they will need an adult  No  NEEDED  Cannot take any type of public or medical transportation alone    Where is COVID appointment Scheduled at?  6/6/2022 @ [facility]       Additional comments/Staff message:

## 2022-04-21 ENCOUNTER — OFFICE VISIT (OUTPATIENT)
Dept: INTERNAL MEDICINE | Facility: CLINIC | Age: 77
End: 2022-04-21
Payer: COMMERCIAL

## 2022-04-21 VITALS
DIASTOLIC BLOOD PRESSURE: 73 MMHG | HEIGHT: 65 IN | BODY MASS INDEX: 24.04 KG/M2 | HEART RATE: 89 BPM | SYSTOLIC BLOOD PRESSURE: 120 MMHG | OXYGEN SATURATION: 96 % | WEIGHT: 144.3 LBS | RESPIRATION RATE: 16 BRPM

## 2022-04-21 DIAGNOSIS — R93.5 ABNORMAL ABDOMINAL CT SCAN: Primary | ICD-10-CM

## 2022-04-21 PROCEDURE — 99213 OFFICE O/P EST LOW 20 MIN: CPT | Performed by: NURSE PRACTITIONER

## 2022-04-21 NOTE — NURSING NOTE
"Mely Rashid is a 76 year old female patient that presents today in clinic for the following:    Chief Complaint   Patient presents with     RECHECK     Follow-up     The patient's allergies and medications were reviewed as noted. A set of vitals were recorded as noted without incident: /73 (BP Location: Right arm, Patient Position: Sitting, Cuff Size: Adult Regular)   Pulse 89   Resp 16   Ht 1.651 m (5' 5\")   Wt 65.5 kg (144 lb 4.8 oz)   SpO2 96%   Breastfeeding No   BMI 24.01 kg/m  . The patient does not have any other questions for the provider.    Eleuterio Salinas, EMT at 12:58 PM on 4/21/2022  "

## 2022-04-22 DIAGNOSIS — M33.13 DERMATOMYOSITIS (H): ICD-10-CM

## 2022-04-24 NOTE — PROGRESS NOTES
Mely Rashid is a 76 year old female seen in the E.R. 2022 for sever musculoskeletal back pain after scraping wallpaper. She was medicated and states the pain is slowly improving.     She had a CT scan which showed:    IMPRESSION:   1. Nonobstructive right renal calculi. No left renal calculi. No  hydronephrosis bilaterally.  2. The bladder is very distended.  3. Peripherally calcified lesion adjacent to the splenic artery which  may represent splenic artery aneurysm measuring up to 1.3 mm.  Recommend nonemergent characterization with CTA of the abdomen.  4. Multiple pancreatic cysts, which are better evaluated on MRI dated 4/3/2012.       Patient Active Problem List   Diagnosis     Glaucoma, right     Dermatomyositis (H)     IgA deficiency (H)     Herpes zoster     Encounter for long-term current use of medication     Encounter for long-term (current) use of steroids     Oral ulcer     Seborrheic dermatitis     Osteopenia     Neoplasm of uncertain behavior of skin     Routine general medical examination at a health care facility            Past Medical History:   Diagnosis Date     Arthritis ??    probably     Basal cell carcinoma      Dermatomyositis (H)      Glaucoma      IgA deficiency (H)      Nonsenile cataract             Past Surgical History:   Procedure Laterality Date     BIOPSY OF SKIN LESION       COLONOSCOPY       [facility] UGI ENDOSCOPY W EUS N/A 2014    Procedure: COMBINED ENDOSCOPIC ULTRASOUND, ESOPHAGOSCOPY, GASTROSCOPY, DUODENOSCOPY (EGD);  Surgeon: Boston Rodriguez MD;  Location: [facility]     HYSTERECTOMY      partial, prolapse uterus.            Social History     Tobacco Use     Smoking status: Former Smoker     Packs/day: 1.00     Years: 10.00     Pack years: 10.00     Types: Cigarettes     Quit date: 4/10/1979     Years since quittin.0     Smokeless tobacco: Never Used     Tobacco comment: quit    Substance Use Topics     Alcohol use: Not Currently     Alcohol/week: 0.0  standard drinks     Comment: rare            Family History   Problem Relation Age of Onset     C.A.D. Father      Arthritis Father      Diabetes Brother      Glaucoma Brother      Breast Cancer Sister 35     Glaucoma Sister      Neurologic Disorder Sister         seizures     Cancer Sister         breast     Psoriasis Daughter      Rheumatologic Disease Daughter         Dermatomyositis     Alzheimer Disease Mother         onset in 60s     Hypertension No family hx of      Cerebrovascular Disease No family hx of         ,     Thyroid Disease No family hx of      Skin Cancer No family hx of      Macular Degeneration No family hx of                Allergies   Allergen Reactions     Penicillins Anaphylaxis            Current Outpatient Medications   Medication Sig Dispense Refill     augmented betamethasone dipropionate (DIPROLENE-AF) 0.05 % external cream Apply topically 2 times daily For hands only 150 g 1     B Complex TABS Take 1 tablet by mouth daily.       Calcium Carb-Cholecalciferol (CALCIUM 500 +D PO) Take 3 tablets by mouth daily.       Cholecalciferol (VITAMIN D) 1000 UNIT capsule Take 1 capsule by mouth daily.       clobetasol (TEMOVATE) 0.05 % external solution Apply topically 2 times daily For scalp 150 mL 3     cyclobenzaprine (FLEXERIL) 5 MG tablet Take 1 tablet (5 mg) by mouth 3 times daily as needed for muscle spasms 20 tablet 0     diazepam (VALIUM) 5 MG tablet Take 0.5-1 tablets (2.5-5 mg) by mouth every 8 hours as needed for muscle spasms (MUSCLE SPASM) 10 tablet 0     diphenoxylate-atropine (LOMOTIL) 2.5-0.025 MG tablet Take 1 tablet by mouth 4 times daily as needed for diarrhea 30 tablet 1     fluocinolone acetonide (DERMA SMOOTHE/FS BODY) 0.01 % external oil Apply topically At Bedtime For scalp 118.28 mL 3     glucosamine-chondroitin 500-400 MG CAPS per capsule Take 2 capsules by mouth daily.       hydrocortisone 2.5 % cream Apply topically 2 times daily For face 30 g 3     hydroxychloroquine  "(PLAQUENIL) 200 MG tablet Take 1 tablet (200 mg) by mouth daily 90 tablet 0     latanoprost (XALATAN) 0.005 % ophthalmic solution Place 1 drop into the right eye At Bedtime 7.5 mL 3     loperamide (IMODIUM A-D) 2 MG tablet Take 1 tablet (2 mg) by mouth every morning for 30 doses 30 tablet 1     Multiple Vitamins-Minerals (MULTIPLE VITAMINS/WOMENS PO)        mycophenolate (GENERIC EQUIVALENT) 500 MG tablet Take 2 tablets (1,000 mg) by mouth 2 times daily 360 tablet 1     omega-3 fatty acids (FISH OIL) 1200 MG capsule Take 1 capsule by mouth daily.       timolol maleate (TIMOPTIC) 0.25 % ophthalmic solution Place 1 drop into the right eye every morning 5 mL 3     triamcinolone (KENALOG) 0.1 % external cream Apply topically 2 times daily For body 454 g 3     vitamin C w/JD HIPS 500 MG tablet        vitamin E 400 UNIT capsule Take by mouth daily         REVIEW OF SYSTEMS:  See above.    O:   /73 (BP Location: Right arm, Patient Position: Sitting, Cuff Size: Adult Regular)   Pulse 89   Resp 16   Ht 1.651 m (5' 5\")   Wt 65.5 kg (144 lb 4.8 oz)   SpO2 96%   Breastfeeding No   BMI 24.01 kg/m    GENERAL APPEARANCE: healthy, alert and no distress  RESP: lungs clear to auscultation - no rales, rhonchi or wheezes  CV:see vs  NEURO: normal.  MUSK: She is ambulatory and moving well.  EXT: warm.  Edema: none   PSYCHE: normal.    A/P:  Mely was seen today for recheck.    Diagnoses and all orders for this visit:    Abnormal abdominal CT scan  -     CT Abdomen/Pelvis w contrast; Future to f/u on  Peripherally calcified lesion adjacent to the splenic artery which  may represent splenic artery aneurysm measuring up to 1.3 mm.  -     MRI Abdomen MRCP w/o & w contrast; Future in 3/2023 to follow-up on pancreatic cysts.    She is also scheduled for the breath test.    The patient voiced understanding of the information discussed and all questions were answered.     Total time spent today with this patient including " chart review, exam time with patient and documentation :       Gavi Stacy APRN, CNP

## 2022-04-26 ENCOUNTER — OFFICE VISIT (OUTPATIENT)
Dept: OPHTHALMOLOGY | Facility: CLINIC | Age: 77
End: 2022-04-26
Attending: OPHTHALMOLOGY
Payer: COMMERCIAL

## 2022-04-26 DIAGNOSIS — Z79.899 ENCOUNTER FOR LONG-TERM (CURRENT) USE OF HIGH-RISK MEDICATION: Primary | ICD-10-CM

## 2022-04-26 PROCEDURE — 92134 CPTRZ OPH DX IMG PST SGM RTA: CPT | Performed by: OPHTHALMOLOGY

## 2022-04-26 PROCEDURE — 92082 INTERMEDIATE VISUAL FIELD XM: CPT | Performed by: OPHTHALMOLOGY

## 2022-04-26 PROCEDURE — 92250 FUNDUS PHOTOGRAPHY W/I&R: CPT | Performed by: OPHTHALMOLOGY

## 2022-04-26 PROCEDURE — 99213 OFFICE O/P EST LOW 20 MIN: CPT | Performed by: OPHTHALMOLOGY

## 2022-04-26 PROCEDURE — G0463 HOSPITAL OUTPT CLINIC VISIT: HCPCS | Mod: 25

## 2022-04-26 RX ORDER — BETAMETHASONE DIPROPIONATE 0.5 MG/G
CREAM TOPICAL 2 TIMES DAILY
Qty: 150 G | Refills: 1 | OUTPATIENT
Start: 2022-04-26

## 2022-04-26 ASSESSMENT — SLIT LAMP EXAM - LIDS
COMMENTS: NORMAL
COMMENTS: NORMAL

## 2022-04-26 ASSESSMENT — TONOMETRY
OD_IOP_MMHG: 14
IOP_METHOD: TONOPEN
OS_IOP_MMHG: 13

## 2022-04-26 ASSESSMENT — REFRACTION_WEARINGRX
OS_CYLINDER: +0.75
OD_ADD: +2.25
OD_SPHERE: +0.50
OD_CYLINDER: SPHERE
OS_ADD: +2.25
SPECS_TYPE: PAL
OS_SPHERE: +0.25
OS_AXIS: 175

## 2022-04-26 ASSESSMENT — CUP TO DISC RATIO
OS_RATIO: 0.4
OD_RATIO: 0.85

## 2022-04-26 ASSESSMENT — CONF VISUAL FIELD
OD_NORMAL: 1
OS_NORMAL: 1

## 2022-04-26 ASSESSMENT — VISUAL ACUITY
OD_CC: 20/20
OS_CC: 20/20
METHOD: SNELLEN - LINEAR
OS_CC+: -2
OD_CC+: -3
CORRECTION_TYPE: GLASSES

## 2022-04-26 ASSESSMENT — EXTERNAL EXAM - RIGHT EYE: OD_EXAM: NORMAL

## 2022-04-26 ASSESSMENT — EXTERNAL EXAM - LEFT EYE: OS_EXAM: NORMAL

## 2022-04-26 NOTE — TELEPHONE ENCOUNTER
LAST SEEN 4/9/20  NV: NONE  RTC 3-4 MOS  Scheduling has been notified to contact the pt for appointment.

## 2022-04-26 NOTE — NURSING NOTE
Chief Complaints and History of Present Illnesses   Patient presents with     Follow Up     Pt here for follow up on high risk medication use of Plaquenil . Pt has hx of Glaucoma each eye and has right APD likely d/t glaucoma- seepatricia Navarro     Chief Complaint(s) and History of Present Illness(es)     Follow Up     Laterality: both eyes    Associated symptoms: Negative for eye pain, headache, flashes and floaters    Comments: Pt here for follow up on high risk medication use of Plaquenil . Pt has hx of Glaucoma each eye and has right APD likely d/t glaucoma- seepatricia Navarro              Comments     Pt got glasses about 1.5 years ago but she does not like them. Pt feels right eye> left eye is is declining in vision since last exam.  Pt using:  xalatan and timolol right eye only  VICKY DE LOS SANTOS 1:17 PM April 26, 2022

## 2022-04-26 NOTE — PROGRESS NOTES
"CC -   plaquenil    INTERVAL HISTORY -   Last visit 5/18/2021. No new concerns, no changes in health or vision. Same dose of plaquenil 200/day.     [facility]-   Mely Rashid is a 76 year old  patient presenting for plaquenil.    400 mg/day since 2011-8/2016, then 200/day since (brief period of 300/day)  height 5'6\", weigth 145#., no renal disease, no tamoxifen  H/o dermatomyositis, on plaquenil and cellcept.  Sees Isaiah.    Retired surgery scheduler for ENT @ [employer]    PAST OCULAR HISTORY  No surgeries    RETINAL IMAGING  OCT   4-26-22  OD-  Mild diffuse outer changes c/w AMD, ?PVD appears stable c/t 5/2021  OS -  Mild diffuse outer changes, c/w AMD ?PVD appears stable c/t 5/2021    AF  4-26-22  OU - mild irregular, stable    OVF 10-2   4-26-22  OD - reliable, several spots, better than 8/2020 worse than 1/2020 and 5/2021  OS - reliable, few spots, variable worse from 8/2020 & 1/2020, improved c/t 5/2021    mfERG 7/29/20  OU - normal      ASSESSMENT & PLAN    #.  Plaquenil use since 2011   - 400/day 2011 to 8/2016, then 200/day height 5'6\", weight 145#     - OVF 10-2 OU unreliable, variable defects OS, OD defect from glaucoma   - OCT & FAF normal   - last mfERG 7/2020 normal     - suspect VFD not plaquenil     - recheck plaquenil 9 months as precaution   - plan to repeat mfERG every 2-3  years if unable to get good OVF 10-2      #   vitreous syneresis OU vs PVD   - advised S/Sx RD 4/2022    #.  Glaucoma OU   - on xalatan and timolol   - has right APD likely d/t glaucoma   - sees Dr. Navarro Q6mo      #  Tr NS OU      # Mild dry AMD OU   - no smoking   - no AREDS at present      return to clinic: in 9 months, OCT + FAF + OVF 10-2        ATTESTATION     Attending Physician Attestation:      Complete documentation of historical and exam elements from today's encounter can be found in the full encounter summary report (not reduplicated in this progress note).  I personally obtained the chief complaint(s) and history of " present illness.  I confirmed and edited as necessary the review of systems, past medical/surgical history, family history, social history, and examination findings as documented by others; and I examined the patient myself.  I personally reviewed the relevant tests, images, and reports as documented above.  I personally reviewed the ophthalmic test(s) associated with this encounter, agree with the interpretation(s) as documented by the resident/fellow, and have edited the corresponding report(s) as necessary.   I formulated and edited as necessary the assessment and plan and discussed the findings and management plan with the patient and family    Shakira Chamberlain MD, PhD  , Vitreoretinal Surgery  Department of Ophthalmology  [facility]

## 2022-04-28 ENCOUNTER — TELEPHONE (OUTPATIENT)
Dept: DERMATOLOGY | Facility: CLINIC | Age: 77
End: 2022-04-28

## 2022-04-28 ENCOUNTER — ANCILLARY PROCEDURE (OUTPATIENT)
Dept: CT IMAGING | Facility: CLINIC | Age: 77
End: 2022-04-28
Attending: NURSE PRACTITIONER
Payer: COMMERCIAL

## 2022-04-28 DIAGNOSIS — M33.13 DERMATOMYOSITIS (H): ICD-10-CM

## 2022-04-28 DIAGNOSIS — R93.5 ABNORMAL ABDOMINAL CT SCAN: ICD-10-CM

## 2022-04-28 DIAGNOSIS — M33.13 DERMATOMYOSITIS (H): Primary | ICD-10-CM

## 2022-04-28 PROCEDURE — 74177 CT ABD & PELVIS W/CONTRAST: CPT | Mod: TC | Performed by: RADIOLOGY

## 2022-04-28 RX ORDER — IOPAMIDOL 755 MG/ML
109 INJECTION, SOLUTION INTRAVASCULAR ONCE
Status: COMPLETED | OUTPATIENT
Start: 2022-04-28 | End: 2022-04-28

## 2022-04-28 RX ADMIN — IOPAMIDOL 109 ML: 755 INJECTION, SOLUTION INTRAVASCULAR at 10:56

## 2022-04-28 NOTE — TELEPHONE ENCOUNTER
[facility] Call Center    Phone Message    May a detailed message be left on voicemail: yes     Reason for Call: Other: Pt is requesting a wenceslao refill of the augmented betamethasone dipropionate (DIPROLENE-AF) 0.05 % external cream until her next visit on 8/9/22.     Please call Pt back to discuss. Thank you.     Action Taken: Message routed to:  [facility] Derm    Travel Screening: Not Applicable

## 2022-04-29 RX ORDER — BETAMETHASONE DIPROPIONATE 0.5 MG/G
CREAM TOPICAL
Qty: 50 G | Refills: 1 | Status: SHIPPED | OUTPATIENT
Start: 2022-04-29 | End: 2022-11-11

## 2022-04-29 NOTE — TELEPHONE ENCOUNTER
Patient has not been seen since 2020. Courtesy refill provided given has dermatomyositis until follow-up appt. Attending Dr. Garcias CC'd as KALEB.     Beatriz Fair MD (PGY-4)  Dermatology Resident on Call

## 2022-05-02 RX ORDER — MYCOPHENOLATE MOFETIL 500 MG/1
TABLET ORAL
Qty: 360 TABLET | Refills: 3 | Status: SHIPPED | OUTPATIENT
Start: 2022-05-02 | End: 2023-08-15

## 2022-05-02 NOTE — TELEPHONE ENCOUNTER
mycophenolate (GENERIC EQUIVALENT) 500 MG tablet      Last Written Prescription Date:  11-  Last Fill Quantity: 360,   # refills: 1  Last Office Visit: 2-2-2021  Future Office visit:  9-8-2022    CBC RESULTS: Recent Labs   Lab Test 04/14/22  1333   WBC 3.3*   RBC 4.09   HGB 11.7   HCT 38.8   MCV 95   MCH 28.6   MCHC 30.2*   RDW 13.2          Creatinine   Date Value Ref Range Status   04/14/2022 0.70 0.52 - 1.04 mg/dL Final   07/09/2021 0.58 0.52 - 1.04 mg/dL Final   ]    Liver Function Studies -   Recent Labs   Lab Test 04/14/22  1333 04/09/22  1046   PROTTOTAL  --  7.0   ALBUMIN 4.1 4.1   BILITOTAL  --  0.7   ALKPHOS  --  65   AST 20 32   ALT 32 29       Routing refill request to provider for review/approval because:  Not on protocol.  Not seen in over a year but is scheduled in September.      Kathleen M Doege RN

## 2022-05-24 ENCOUNTER — TELEPHONE (OUTPATIENT)
Dept: GASTROENTEROLOGY | Facility: CLINIC | Age: 77
End: 2022-05-24
Payer: COMMERCIAL

## 2022-05-24 NOTE — TELEPHONE ENCOUNTER
Caller: Mely Rashid    Procedure: HBT    Date, Location, and Surgeon of Procedure Cancelled: 6/10,Non invasive,[facility]    Ordering Provider:Norris    Reason for cancel (please be detailed, any staff messages or encounters to note?): Out of town        Rescheduled: y     If rescheduled:    Date: 7/29   Location:    Prep Resent: y(changes to prep?)   Covid Test Rescheduled: y   Note any change or update to original order/sedation:

## 2022-05-26 DIAGNOSIS — H40.051 BORDERLINE GLAUCOMA OF RIGHT EYE WITH OCULAR HYPERTENSION: Primary | ICD-10-CM

## 2022-06-17 ENCOUNTER — ANCILLARY PROCEDURE (OUTPATIENT)
Dept: MAMMOGRAPHY | Facility: CLINIC | Age: 77
End: 2022-06-17
Attending: NURSE PRACTITIONER
Payer: COMMERCIAL

## 2022-06-17 DIAGNOSIS — Z12.31 SCREENING MAMMOGRAM, ENCOUNTER FOR: ICD-10-CM

## 2022-06-17 PROCEDURE — 77067 SCR MAMMO BI INCL CAD: CPT

## 2022-06-29 ENCOUNTER — OFFICE VISIT (OUTPATIENT)
Dept: OPHTHALMOLOGY | Facility: CLINIC | Age: 77
End: 2022-06-29
Attending: OPHTHALMOLOGY
Payer: COMMERCIAL

## 2022-06-29 DIAGNOSIS — H40.051 BORDERLINE GLAUCOMA OF RIGHT EYE WITH OCULAR HYPERTENSION: Primary | ICD-10-CM

## 2022-06-29 DIAGNOSIS — H25.13 AGE-RELATED NUCLEAR CATARACT OF BOTH EYES: ICD-10-CM

## 2022-06-29 PROCEDURE — 99214 OFFICE O/P EST MOD 30 MIN: CPT | Performed by: OPHTHALMOLOGY

## 2022-06-29 PROCEDURE — G0463 HOSPITAL OUTPT CLINIC VISIT: HCPCS

## 2022-06-29 PROCEDURE — 92133 CPTRZD OPH DX IMG PST SGM ON: CPT | Performed by: OPHTHALMOLOGY

## 2022-06-29 RX ORDER — DORZOLAMIDE HYDROCHLORIDE AND TIMOLOL MALEATE 20; 5 MG/ML; MG/ML
1 SOLUTION/ DROPS OPHTHALMIC 2 TIMES DAILY
Qty: 10 ML | Refills: 11 | Status: SHIPPED | OUTPATIENT
Start: 2022-06-29 | End: 2022-07-06

## 2022-06-29 RX ORDER — LATANOPROST 50 UG/ML
1 SOLUTION/ DROPS OPHTHALMIC AT BEDTIME
Qty: 7.5 ML | Refills: 3 | Status: SHIPPED | OUTPATIENT
Start: 2022-06-29 | End: 2023-07-31

## 2022-06-29 RX ORDER — DORZOLAMIDE HYDROCHLORIDE AND TIMOLOL MALEATE 20; 5 MG/ML; MG/ML
1 SOLUTION/ DROPS OPHTHALMIC 2 TIMES DAILY
Qty: 10 ML | Refills: 11 | Status: SHIPPED | OUTPATIENT
Start: 2022-06-29 | End: 2022-06-29

## 2022-06-29 ASSESSMENT — CUP TO DISC RATIO
OD_RATIO: 0.85
OS_RATIO: 0.4

## 2022-06-29 ASSESSMENT — VISUAL ACUITY
OS_CC+: -1
OS_CC: 20/25
CORRECTION_TYPE: GLASSES
OD_CC: 20/20
METHOD: SNELLEN - LINEAR

## 2022-06-29 ASSESSMENT — CONF VISUAL FIELD
METHOD: COUNTING FINGERS
OD_NORMAL: 1
OS_NORMAL: 1

## 2022-06-29 ASSESSMENT — REFRACTION_WEARINGRX
OD_ADD: +2.25
OS_AXIS: 175
OS_SPHERE: +0.25
OS_CYLINDER: +0.75
OD_CYLINDER: SPHERE
OD_SPHERE: +0.50
SPECS_TYPE: PAL
OS_ADD: +2.25

## 2022-06-29 ASSESSMENT — TONOMETRY
OS_IOP_MMHG: 15
OD_IOP_MMHG: 14
IOP_METHOD: TONOPEN

## 2022-06-29 ASSESSMENT — EXTERNAL EXAM - LEFT EYE: OS_EXAM: NORMAL

## 2022-06-29 ASSESSMENT — SLIT LAMP EXAM - LIDS
COMMENTS: NORMAL
COMMENTS: NORMAL

## 2022-06-29 ASSESSMENT — EXTERNAL EXAM - RIGHT EYE: OD_EXAM: NORMAL

## 2022-06-29 NOTE — NURSING NOTE
Chief Complaints and History of Present Illnesses   Patient presents with     Ocular Hypertension Follow Up     Chief Complaint(s) and History of Present Illness(es)     Ocular Hypertension Follow Up     Laterality: both eyes    Associated symptoms: Negative for eye pain, headache, flashes and floaters              Comments     Here for borderline glaucoma with ocular hypertension both eyes. Vision is stable since last visit. Compliant with timolol and latanoprost in right eye. No ocular pain.    Ronald WILBURN 12:58 PM June 29, 2022

## 2022-06-29 NOTE — PROGRESS NOTES
Chief Complaint(s) and History of Present Illness(es)     Ocular Hypertension Follow Up     Laterality: both eyes    Associated symptoms: Negative for eye pain, headache, flashes and   floaters              Comments     Here for borderline glaucoma with ocular hypertension both eyes. Vision   is stable since last visit. Compliant with timolol and latanoprost in   right eye. No ocular pain.    Ronald Messina COT 12:58 PM June 29, 2022               Review of systems for the eyes was negative other than the pertinent positives/negatives listed in the HPI.      Assessment & Plan      Mely Rashid is a 77 year old female with the following diagnoses:   1. 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 Age-related nuclear cataract of both eyes         intraocular pressure a little high today right eye.  Similar to last month with [place]  Overall stable exam and OCT Nerve fiber layer     Tmax: 22/20 (8/28/2017)  C/D: 0.75/0.4  Current drops: Timolol and Latanoprost right eye only    RNFL OCT: right eye: diffuse thinning, inferior loss stable;  Left eye: within normal limits, stable    Goal right eye 13 mm Hg or lower  Start latanoprost at bedtime both eyes   Start Cosopt twice a day right eye   Stop timolol      Patient disposition:   Return in about 6 weeks (around 8/10/2022) for VT only.           Attending Physician Attestation:  Complete documentation of historical and exam elements from today's encounter can be found in the full encounter summary report (not reduplicated in this progress note).  I personally obtained the chief complaint(s) and history of present illness.  I confirmed and edited as necessary the review of systems, past medical/surgical history, family history, social history, and examination findings as documented by others; and I examined the patient myself.  I personally reviewed the relevant tests, images, and reports as documented above.  I formulated and edited as necessary the  assessment and plan and discussed the findings and management plan with the patient and family. . - Edwin Navarro MD

## 2022-07-01 DIAGNOSIS — Z79.899 ENCOUNTER FOR LONG-TERM CURRENT USE OF MEDICATION: ICD-10-CM

## 2022-07-01 DIAGNOSIS — Z79.899 LONG-TERM USE OF PLAQUENIL: ICD-10-CM

## 2022-07-01 DIAGNOSIS — M33.13 DERMATOMYOSITIS (H): ICD-10-CM

## 2022-07-06 ENCOUNTER — TELEPHONE (OUTPATIENT)
Dept: OPHTHALMOLOGY | Facility: CLINIC | Age: 77
End: 2022-07-06

## 2022-07-06 DIAGNOSIS — H40.051 BORDERLINE GLAUCOMA OF RIGHT EYE WITH OCULAR HYPERTENSION: ICD-10-CM

## 2022-07-06 RX ORDER — HYDROXYCHLOROQUINE SULFATE 200 MG/1
200 TABLET, FILM COATED ORAL DAILY
Qty: 90 TABLET | Refills: 1 | Status: SHIPPED | OUTPATIENT
Start: 2022-07-06 | End: 2023-01-20

## 2022-07-06 RX ORDER — DORZOLAMIDE HYDROCHLORIDE AND TIMOLOL MALEATE 20; 5 MG/ML; MG/ML
1 SOLUTION/ DROPS OPHTHALMIC 2 TIMES DAILY
Qty: 10 ML | Refills: 11 | Status: SHIPPED | OUTPATIENT
Start: 2022-07-06 | End: 2023-08-09

## 2022-07-06 NOTE — TELEPHONE ENCOUNTER
HYDROXYCHLOROQUINE TABS 200MG      Last Written Prescription Date:  22  Last Fill Quantity: 90,   # refills: 0  Last Office Visit : 21  Future Office visit:  22    Last Eye exam/plaquenil screenin22  Last eye note:  Bryant- recheck plaquenil 9 months as precaution  Next eye appt: 23    Creatinine   Date Value Ref Range Status   2022 0.70 0.52 - 1.04 mg/dL Final   2021 0.58 0.52 - 1.04 mg/dL Final       Routing refill request to provider for review/approval because:  Last clinic appt > 12 months

## 2022-07-06 NOTE — TELEPHONE ENCOUNTER
Rx sent with comment updated Rx to confirm direction-- one drop in RE 2/day    Fermín Hudson RN 4:21 PM 07/06/22

## 2022-08-08 ENCOUNTER — VIRTUAL VISIT (OUTPATIENT)
Dept: PHARMACY | Facility: CLINIC | Age: 77
End: 2022-08-08
Payer: COMMERCIAL

## 2022-08-08 DIAGNOSIS — M33.13 DERMATOMYOSITIS (H): Primary | ICD-10-CM

## 2022-08-08 DIAGNOSIS — H40.001 GLAUCOMA SUSPECT, RIGHT: ICD-10-CM

## 2022-08-08 DIAGNOSIS — Z71.85 VACCINE COUNSELING: ICD-10-CM

## 2022-08-08 DIAGNOSIS — Z78.9 TAKES DIETARY SUPPLEMENTS: ICD-10-CM

## 2022-08-08 DIAGNOSIS — M54.9 BACK PAIN, UNSPECIFIED BACK LOCATION, UNSPECIFIED BACK PAIN LATERALITY, UNSPECIFIED CHRONICITY: ICD-10-CM

## 2022-08-08 PROCEDURE — 99607 MTMS BY PHARM ADDL 15 MIN: CPT

## 2022-08-08 PROCEDURE — 99605 MTMS BY PHARM NP 15 MIN: CPT

## 2022-08-08 RX ORDER — IBUPROFEN 200 MG
200 TABLET ORAL EVERY 8 HOURS PRN
COMMUNITY

## 2022-08-08 RX ORDER — VIT C/B6/B5/MAGNESIUM/HERB 173 50-5-6-5MG
1 CAPSULE ORAL 2 TIMES DAILY
COMMUNITY
End: 2022-12-06

## 2022-08-08 RX ORDER — COLLAGEN, HYDROLYSATE (BOVINE) 100 %
POWDER (GRAM) MISCELLANEOUS DAILY
COMMUNITY

## 2022-08-08 NOTE — PATIENT INSTRUCTIONS
"Recommendations from today's MTM visit:                                                    MTM (medication therapy management) is a service provided by a clinical pharmacist designed to help you get the most of out of your medicines.   Today we reviewed what your medicines are for, how to know if they are working, that your medicines are safe and how to make your medicine regimen as easy as possible.      1. You can start collagen powder daily and tumeric 500 mg twice daily. Give them 1-2 months of regular use before you decide if they are working or not.     2. Switch to 1000 units of vitamin D daily when your 5000 unit dose runs out to avoid taking too much vitamin D.    3. Stop taking Vitamin C and Vitamin E.    4. You can get your Shingrix (shingles vaccine) and tetanus vaccines at your local pharmacy. Dr. Colon recommends you hold the mycophenolate 3 days before and 7 days after vaccination. You can get the Shingrix and tetanus vaccines at the same time to reduce the number of times you need to hold the mycophenolate.    Follow-up: Return in about 6 months (around 2/8/2023) for MTM Pharmacist Visit.    It was great speaking with you today.  I value your experience and would be very thankful for your time in providing feedback in our clinic survey. In the next few days, you may receive an email or text message from [employer] with a link to a survey related to your  clinical pharmacist.\"     To schedule another MTM appointment, please call the clinic directly or you may call the MTM scheduling line at [phone] or toll-free at [phone].     My Clinical Pharmacist's contact information:                                                      Please feel free to contact me with any questions or concerns you have.      Reny Galo, PharmD  Medication Therapy Management Pharmacist  [facility]  Phone: [phone]     "

## 2022-08-08 NOTE — LETTER
August 8, 2022  Mely Rashid  [address]   [place] [facility] [place] [address]    Dear Ms. Rashid, [facility]        Thank you for talking with me on Aug 8, 2022 about your health and medications. As a follow-up to our conversation, I have included two documents:      1. Your Recommended To-Do List has steps you should take to get the best results from your medications.  2. Your Medication List will help you keep track of your medications and how to take them.    If you want to talk about these documents, please call ANNE MARIE BAIRD RPH at phone: [phone], Monday-Friday 8-4:30pm.    I look forward to working with you and your doctors to make sure your medications work well for you.    Sincerely,    ANNE MARIE BAIRD RPH  [facility] Pharmacist, [facility]

## 2022-08-08 NOTE — LETTER
"Recommended To-Do List      Prepared on: 8/8/2022     You can get the best results from your medications by completing the items on this \"To-Do List.\"      Bring your To-Do List when you go to your doctor. And, share it with your family or caregivers.    My To-Do List:  What we talked about: What I should do:   Your medication dosage being too high    Decrease your dosage of Vitamin D3 (CHOLECALCIFEROL) to 1000 units (25 mcg) daily          What we talked about: What I should do:   A new medication that may be of benefit to you    Start taking Tumeric and Collagen          What we talked about: What I should do:   A medication that you may no longer need    Stop taking vitamin C and vitamin E          What we talked about: What I should do:   A new medication that may be of benefit to you    Consider getting your Shingrix and Tetanus vaccines          What we talked about: What I should do:                       "

## 2022-08-08 NOTE — LETTER
_  Medication List        Prepared on: 8/8/2022     Bring your Medication List when you go to the doctor, hospital, or   emergency room. And, share it with your family or caregivers.     Note any changes to how you take your medications.  Cross out medications when you no longer use them.    Medication How I take it Why I use it Prescriber   augmented betamethasone dipropionate (DIPROLENE-AF) 0.05 % external cream Apply to areas of dermatomyositis rash twice daily as needed. Do not use on face, underarms, groin. Dermatomyositis (H) Shekhar Garcias MD   B Complex TABS Take 1 tablet by mouth daily. Myalgias; Rash Patient Reported   Calcium-Magnesium-Vitamin D 600- MG-MG-UNIT TB24 Take 1 tablet by mouth 2 times daily [facility] Patient Reported   Collagen Hydrolysate POWD Take by mouth daily [facility] Patient Reported   cyclobenzaprine (FLEXERIL) 5 MG tablet Take 1 tablet (5 mg) by mouth 3 times daily as needed for muscle spasms Back Pain ROSHAN Barragan CNP   diphenoxylate-atropine (LOMOTIL) 2.5-0.025 MG tablet Take 1 tablet by mouth 4 times daily as needed for diarrhea Diarrhea, unspecified type ROSHAN Galloway CNP   dorzolamide-timolol (COSOPT) 2-0.5 % ophthalmic solution Place 1 drop into the right eye 2 times daily Borderline glaucoma of right eye with ocular hypertension Edwin Navarro MD   glucosamine-chondroitin 500-400 MG CAPS per capsule Take 2 capsules by mouth daily. Myalgias Patient Reported   hydrocortisone 2.5 % cream Apply topically 2 times daily For face Dermatomyositis (H) Shekhar Garcias MD   hydroxychloroquine (PLAQUENIL) 200 MG tablet Take 1 tablet (200 mg) by mouth daily Please keep 9-8-22 clinic appt for refills. Dermatomyositis (H); Encounter for long-term current use of medication; Long-term use of Plaquenil Carlos Colon MD   ibuprofen (ADVIL/MOTRIN) 200 MG tablet Take 200 mg by mouth every 8 hours as needed for inflammatory pain Mild Pain Patient  Reported   latanoprost (XALATAN) 0.005 % ophthalmic solution Place 1 drop into both eyes At Bedtime Borderline glaucoma of right eye with ocular hypertension Edwin Navarro MD   Multiple Vitamins-Minerals (MULTIPLE VITAMINS/WOMENS PO) Take 1 tablet daily [facility] Patient Reported   Multiple Vitamins-Minerals (PRESERVISION AREDS 2+MULTI VIT PO) Take 1 tablet by mouth daily [facility] Patient Reported   mycophenolate (GENERIC EQUIVALENT) 500 MG tablet TAKE 2 TABLETS TWICE A DAY Dermatomyositis (H) Carlos Colon MD   omega-3 fatty acids (FISH OIL) 1200 MG capsule Take 1 capsule by mouth daily. [facility] Patient Reported   Propylene Glycol (SYSTANE COMPLETE OP) Apply 1 drop to eye as needed Dry Eyes Patient Reported   triamcinolone (KENALOG) 0.1 % external cream Apply topically 2 times daily For body Dermatomyositis (H) Shekhar Garcias MD   Turmeric 500 MG CAPS Take 1 capsule by mouth 2 times daily [facility] Patient Reported   Vitamin D3 (CHOLECALCIFEROL) 125 MCG (5000 UT) tablet Take 1 tablet by mouth daily [facility] Patient Reported         Add new medications, over-the-counter drugs, herbals, vitamins, or  minerals in the blank rows below.    Medication How I take it Why I use it Prescriber                          Allergies:      penicillins        Side effects I have had:               Other Information:              My notes and questions:

## 2022-08-08 NOTE — PROGRESS NOTES
Medication Therapy Management (MTM) Encounter    ASSESSMENT:                            Medication Adherence/Access: No issues identified    Dermatomyositis: Stable.    Back Pain: Reviewed possible complications of NSAID use including GI bleeding, renal impairment, and elevated blood pressure. Since patient is only using a low dose sparingly would benefit from continuing to use as needed and considering acetaminophen use instead if use becomes regular.      Glaucoma: Would benefit from continued follow up with ophthalmologist.    Supplements: Collagen and tumeric do not interact with current medications, however LFTs should be monitored regularly when tumeric and mycophenolate are taken together. Patient is taking over 5000 units of vitamin D daily but has no history of vitamin D deficiency. Would benefit from decreasing dose to 1000 units daily due to several other supplements also containing vitamin D.     Vaccines: Due for Shingrix and Tdap vaccines. Per package instructions and provider recommendation, mycophenolate should be held 3 days prior to and 7 days post vaccination. To minimize number of holds needed, would benefit from receiving both Shingrix and Tdap at the same time.    PLAN:                            1. You can start collagen powder daily and tumeric 500 mg twice daily. Give them 1-2 months of regular use before you decide if they are working or not.     2. Switch to 1000 units of vitamin D daily when your 5000 unit dose runs out to avoid taking too much vitamin D.    3. Stop taking Vitamin C and Vitamin E.    4. You can get your Shingrix (shingles vaccine) and tetanus vaccines at your local pharmacy. Dr. Colon recommends you hold the mycophenolate 3 days before and 7 days after vaccination. You can get the Shingrix and tetanus vaccines at the same time to reduce the number of times you need to hold the mycophenolate.    Follow-up: Return in about 6 months (around 2/8/2023) for MTM Pharmacist  Visit.    SUBJECTIVE/OBJECTIVE:                          Mely Rashid is a 77 year old female called for an initial visit. She was referred to me from Dr. Colon.      Reason for visit: Would like to start/continue collagen and tumeric but would like to make sure they will not interact with other medications    Allergies/ADRs: Reviewed in chart  Past Medical History: Reviewed in chart  Tobacco: She reports that she quit smoking about 43 years ago. Her smoking use included cigarettes. She has a 10.00 pack-year smoking history. She has never used smokeless tobacco.  Alcohol: not currently using    Medication Adherence/Access: no issues reported    Dermatomyositis: Currently taking hydroxychloroquine 200 mg daily, mycophenolate 1000 mg twice daily. Feels they are working well to control symptoms. Has regular eye exams. No side effects or concerns.    Topicals:  Betamethasone dipropionate 0.05% cream twice daily as needed for hands  Hydrocortisone 2.5% cream twice daily for face  Triamcinolone 0.1% cream twice daily for legs    CBC RESULTS: Recent Labs   Lab Test 04/14/22  1333   WBC 3.3*   RBC 4.09   HGB 11.7   HCT 38.8   MCV 95   MCH 28.6   MCHC 30.2*   RDW 13.2        Liver Function Studies - Recent Labs   Lab Test 04/14/22  1333 04/09/22  1046   PROTTOTAL  --  7.0   ALBUMIN 4.1 4.1   BILITOTAL  --  0.7   ALKPHOS  --  65   AST 20 32   ALT 32 29     Back Pain: Currently taking cyclobenzaprine 5 mg three times daily as needed. Rarely needs this - the last time she used it was when she was in the ER with a back spasm in April. Does use over the counter ibuprofen 200 mg as needed for aches and pains. Uses 1-2 times weekly. No side effected reported.    Creatinine   Date Value Ref Range Status   04/14/2022 0.70 0.52 - 1.04 mg/dL Final   07/09/2021 0.58 0.52 - 1.04 mg/dL Final     Glaucoma: Currently taking dorzolamide/timolol 2/0.5% 1 drop in the right eye twice daily (just started this), latanoprost 0.005% 1  drop both eyes at bedtime. Also uses Systane eye drops as needed throughout the day for dry eyes. Finds it difficult to remember to take Systane drops but remembers prescription drops. Slight burning sensation when administering drops but nothing bothersome.     Specialist: Dr. Edwin Navarro.  Last visit on 6/29/22, the following was recommended switch from timolol to cosopt - follow up in 6 weeks.    Supplements: Recently started collagen and has noticed improvement in bumps on upper arms. Also started Preservision per her eye doctor's recommendation. Wondering if she can start tumeric.    Currently taking:  B Complex daily  Calcium carbonate 500 mg + Vit D 3 tabs daily - Citracal 650 mg/vitamin D twice daily   Vitamin D 5000 units  Glucosamine/Chondroitin 1500/275 mg 2 caps daily  Multivitamin daily  Omega 3 1200 mg daily  Collagen  Preservision  Tumeric    Vaccines: Due for Tdap, Shingrix.  Immunization History   Administered Date(s) Administered     COVID-19,PF,Pfizer (12+ Yrs) 03/19/2021, 04/09/2021, 08/30/2021     COVID-19,PF,Pfizer 12+ Yrs (2022 and After) 05/19/2022     Flu 65+ Years 09/28/2018, 10/04/2019     Influenza (H1N1) 11/13/2009     Influenza (High Dose) 3 valent vaccine 09/24/2015, 10/23/2017     Influenza (IIV3) PF 09/28/2009, 10/01/2011, 10/23/2014     Influenza, Quad, High Dose, Pf, 65yr+ (Fluzone HD) 09/28/2020, 10/12/2021     Pneumo Conj 13-V (2010&after) 12/31/2015     Pneumococcal 23 valent 06/10/2014     Tdap (Adacel,Boostrix) 03/20/2009, 03/12/2012     Today's Vitals: There were no vitals taken for this visit.  ----------------    I spent 41 minutes with this patient today. All changes were made via collaborative practice agreement with Dr. Carlos Colon. A copy of the visit note was provided to the patient's provider(s).    The patient was sent via [employer] a summary of these recommendations.     Reny Galo, PharmD  Medication Therapy Management Pharmacist  [facility]  Rheumatology Clinic  Phone: [phone]    Telemedicine Visit Details  Type of service:  Telephone visit  Start Time: 9:42 AM  End Time: 10:23 AM  Originating Location (patient location): Home  Distant Location (provider location):  [facility] RHEUMATOLOGY [facility]     Medication Therapy Recommendations  Takes dietary supplements    Current Medication: Vitamin D3 (CHOLECALCIFEROL) 125 MCG (5000 UT) tablet   Rationale: Dose too high - Dosage too high - Safety   Recommendation: Decrease Dose - Vitamin D3 25 mcg (1000 units) tablet   Status: Accepted - no CPA Needed          Current Medication: omega-3 fatty acids (FISH OIL) 1200 MG capsule   Rationale: Synergistic therapy - Needs additional medication therapy - Indication   Recommendation: Start Medication - QC Tumeric Complex 500 MG Caps   Status: Accepted - no CPA Needed          Current Medication: vitamin C w/JD HIPS 500 MG tablet (Discontinued)   Rationale: No medical indication at this time - Unnecessary medication therapy - Indication   Recommendation: Discontinue Medication   Status: Accepted - no CPA Needed         Vaccine counseling    Rationale: Preventive therapy - Needs additional medication therapy - Indication   Recommendation: Start Medication - Shingrix 50 MCG/0.5ML Susr   Status: Accepted - no CPA Needed

## 2022-08-11 ENCOUNTER — OFFICE VISIT (OUTPATIENT)
Dept: DERMATOLOGY | Facility: CLINIC | Age: 77
End: 2022-08-11
Payer: COMMERCIAL

## 2022-08-11 DIAGNOSIS — D84.9 IMMUNOSUPPRESSION (H): ICD-10-CM

## 2022-08-11 DIAGNOSIS — M33.13 DERMATOMYOSITIS (H): Primary | ICD-10-CM

## 2022-08-11 PROCEDURE — 99214 OFFICE O/P EST MOD 30 MIN: CPT | Performed by: DERMATOLOGY

## 2022-08-11 ASSESSMENT — PAIN SCALES - GENERAL: PAINLEVEL: NO PAIN (0)

## 2022-08-11 NOTE — LETTER
8/11/2022       RE: Mely Rashid  [address]   [place] [address]     Dear Colleague,    Thank you for referring your patient, Mely Rashid, to the [facility] DERMATOLOGY CLINIC [place] [facility]. Please see a copy of my visit note below.    [facility] Dermatology Note      Dermatology Problem List:  1. 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  2. History of IgA deficiency  3. History of BCC on back s/p excision (2016)    Encounter Date: Aug 11, 2022    CC:   Chief Complaint   Patient presents with     Derm Problem     Patient is here today for a medication follow up.        History of Present Illness:  Ms. Mely Rashid is a 77 year old female who presents today for follow-up of her classic dermatomyositis. Last virtual visit on 4/9/2020.     Overall, feeling well. Summer is usually the worst time of the year for her DM but she has not flared much. Continues on mycophenolate 1000 mg BID and hydroxychloroquine 200 mg daily. This year seems to be good for her skin. Doing a lot of sun protection and staying out of the sun. Taking collagen powder and feels that her skin is improved. Has not noticed any muscles aches more than her normal day to day aches. Denies pruritus or pain. Denies SOB.     Uses hydrocortisone <1x/month on red spots on the face and the spots resolve within a couple of days. Uses betamethasone cream on hands nightly. Does not use clobetasol  solution for scalp anymore. Scalp is asymptomatic. Tolerating medication well. Has regular follow up with Ophthalmology and is currently being treated for glaucoma.     Overdue for standing labs with Dr. Colon. Recent labs on 4/14/2022 for CBC, CMP are unremarkable.     No other concerns today.      Past Medical History:   Patient Active Problem List   Diagnosis     Glaucoma suspect, right     Dermatomyositis (H)     IgA deficiency (H)     Herpes zoster     Encounter for long-term current use of medication     Encounter for long-term (current) use of steroids     Oral ulcer     Seborrheic dermatitis     Osteopenia     Neoplasm of uncertain behavior of skin     Routine general medical examination at a health care facility     Past Medical History:   Diagnosis Date     Arthritis ??    probably     Basal cell carcinoma      Dermatomyositis (H)      Glaucoma      IgA deficiency (H)      Nonsenile cataract      Past Surgical History:   Procedure Laterality Date     BIOPSY OF SKIN LESION       COLONOSCOPY       [facility] UGI ENDOSCOPY W EUS N/A 5/14/2014    Procedure: COMBINED ENDOSCOPIC ULTRASOUND, ESOPHAGOSCOPY, GASTROSCOPY, DUODENOSCOPY (EGD);  Surgeon: Boston Rdoriguez MD;  Location: [facility]     HYSTERECTOMY      partial, prolapse uterus.       Social History:  Patient reports that she quit smoking about 43 years ago. Her smoking use included cigarettes. She has a 10.00 pack-year smoking history. She has never used smokeless tobacco. She reports previous alcohol use. She reports that she does not use drugs.    Family History:  Family History   Problem Relation Age of Onset     C.A.D. Father      Arthritis Father      Diabetes Brother      Glaucoma Brother      Breast Cancer Sister 35     Glaucoma Sister      Neurologic Disorder Sister         seizures     Cancer Sister         breast     Psoriasis Daughter      Rheumatologic Disease Daughter         Dermatomyositis     Alzheimer Disease Mother         onset in 60s      Hypertension No family hx of      Cerebrovascular Disease No family hx of         ,     Thyroid Disease No family hx of      Skin Cancer No family hx of      Macular Degeneration No family hx of        Medications:  Current Outpatient Medications   Medication Sig Dispense Refill     augmented betamethasone dipropionate (DIPROLENE-AF) 0.05 % external cream Apply to areas of dermatomyositis rash twice daily as needed. Do not use on face, underarms, groin. 50 g 1     B Complex TABS Take 1 tablet by mouth daily.       Calcium-Magnesium-Vitamin D 600- MG-MG-UNIT TB24 Take 1 tablet by mouth 2 times daily       Collagen Hydrolysate POWD Take by mouth daily       cyclobenzaprine (FLEXERIL) 5 MG tablet Take 1 tablet (5 mg) by mouth 3 times daily as needed for muscle spasms 20 tablet 0     diphenoxylate-atropine (LOMOTIL) 2.5-0.025 MG tablet Take 1 tablet by mouth 4 times daily as needed for diarrhea 30 tablet 1     dorzolamide-timolol (COSOPT) 2-0.5 % ophthalmic solution Place 1 drop into the right eye 2 times daily 10 mL 11     glucosamine-chondroitin 500-400 MG CAPS per capsule Take 2 capsules by mouth daily.       hydrocortisone 2.5 % cream Apply topically 2 times daily For face 30 g 3     hydroxychloroquine (PLAQUENIL) 200 MG tablet Take 1 tablet (200 mg) by mouth daily Please keep 9-8-22 clinic appt for refills. 90 tablet 1     ibuprofen (ADVIL/MOTRIN) 200 MG tablet Take 200 mg by mouth every 8 hours as needed for inflammatory pain       latanoprost (XALATAN) 0.005 % ophthalmic solution Place 1 drop into both eyes At Bedtime 7.5 mL 3     Multiple Vitamins-Minerals (MULTIPLE VITAMINS/WOMENS PO)        Multiple Vitamins-Minerals (PRESERVISION AREDS 2+MULTI VIT PO) Take 1 tablet by mouth daily       mycophenolate (GENERIC EQUIVALENT) 500 MG tablet TAKE 2 TABLETS TWICE A  tablet 3     omega-3 fatty acids (FISH OIL) 1200 MG capsule Take 1 capsule by mouth daily.       Propylene Glycol (SYSTANE COMPLETE OP) Apply  1 drop to eye as needed       triamcinolone (KENALOG) 0.1 % external cream Apply topically 2 times daily For body 454 g 3     Turmeric 500 MG CAPS Take 1 capsule by mouth 2 times daily       Vitamin D3 (CHOLECALCIFEROL) 125 MCG (5000 UT) tablet Take 1 tablet by mouth daily          Allergies   Allergen Reactions     Penicillins Anaphylaxis       Review of Systems:  -As per HPI  -Constitutional: Otherwise feeling well today, in usual state of health.      Physical exam:  Vitals: There were no vitals taken for this visit.  GEN: This is a well developed, well-nourished female in no acute distress, in a pleasant mood.    SKIN: Total skin excluding the undergarment areas was performed. The exam included the head/face, neck, both arms, chest, back, abdomen, both legs, digits and/or nails.   -Pryor skin type: II  - Scalp clear  - Periorbital areas and right upper cheek with subtle pink to purple patches   - Some waxy flat tan macules on the arms   - arms and legs are clear of rash  - no other lesions of concern on areas examined.     Impression/Plan:  1. Dermatomyositis: improved and stable on mycophenolate and hydroxychloroquine. Patient is not endorsing muscle aches or many flares of her disease. Exam is very reassuring today. Will want to check lab work CMP, CBC but discussed decreasing dose of mycophenolate. Patient is hesitant to decrease dose as previously she flared when her dose was lowered.   - Continue mycophenolate 1000 mg twice daily, can consider decreasing dose to 500 mg BID.   - Continue hydroxychloroquine 200 mg daily  - Continue augmented betamethasone cream to hands twice daily as needed  - Continue hydrocortisone to face as needed  - Standing labs for CBC, CMP      Follow-up: 1 year     Staffed with my attending, Dr. Garcias.     Roxana Kevin, MS4    Staff Physician:  I was present with the medical student who participated in the service and in the documentation of the note. I have verified the  history and personally performed the physical exam and medical decision making. I edited the assessment and plan of care as documented in the note.     Shekhar Garcias MD   of Dermatology  Department of Dermatology  [facility] [employer] [facility]

## 2022-08-11 NOTE — PROGRESS NOTES
[facility] Dermatology Note      Dermatology Problem List:  1. 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  2. History of IgA deficiency  3. History of BCC on back s/p excision (2016)    Encounter Date: Aug 11, 2022    CC:   Chief Complaint   Patient presents with     Derm Problem     Patient is here today for a medication follow up.        History of Present Illness:  Ms. Mely Rashid is a 77 year old female who presents today for follow-up of her classic dermatomyositis. Last virtual visit on 4/9/2020.     Overall, feeling well. Summer is usually the worst time of the year for her DM but she has not flared much. Continues on mycophenolate 1000 mg BID and hydroxychloroquine 200 mg daily. This year seems to be good for her skin. Doing a lot of sun protection and staying out of the sun. Taking collagen powder and feels that her skin is improved. Has not noticed any muscles aches more than her normal day to day aches. Denies pruritus or pain. Denies SOB.     Uses hydrocortisone <1x/month on red spots on the face and the spots resolve within a couple of days. Uses betamethasone cream on hands nightly. Does not use clobetasol solution for scalp anymore. Scalp is asymptomatic. Tolerating medication well. Has regular follow up with Ophthalmology and is currently being treated for glaucoma.     Overdue for standing labs with Dr. Colon. Recent labs on 4/14/2022 for CBC, CMP are unremarkable.     No other concerns today.      Past Medical History:    Patient Active Problem List   Diagnosis     Glaucoma suspect, right     Dermatomyositis (H)     IgA deficiency (H)     Herpes zoster     Encounter for long-term current use of medication     Encounter for long-term (current) use of steroids     Oral ulcer     Seborrheic dermatitis     Osteopenia     Neoplasm of uncertain behavior of skin     Routine general medical examination at a health care facility     Past Medical History:   Diagnosis Date     Arthritis ??    probably     Basal cell carcinoma      Dermatomyositis (H)      Glaucoma      IgA deficiency (H)      Nonsenile cataract      Past Surgical History:   Procedure Laterality Date     BIOPSY OF SKIN LESION       COLONOSCOPY       [facility] UGI ENDOSCOPY W EUS N/A 5/14/2014    Procedure: COMBINED ENDOSCOPIC ULTRASOUND, ESOPHAGOSCOPY, GASTROSCOPY, DUODENOSCOPY (EGD);  Surgeon: Boston Rodriguez MD;  Location: [facility]     HYSTERECTOMY      partial, prolapse uterus.       Social History:  Patient reports that she quit smoking about 43 years ago. Her smoking use included cigarettes. She has a 10.00 pack-year smoking history. She has never used smokeless tobacco. She reports previous alcohol use. She reports that she does not use drugs.    Family History:  Family History   Problem Relation Age of Onset     C.A.D. Father      Arthritis Father      Diabetes Brother      Glaucoma Brother      Breast Cancer Sister 35     Glaucoma Sister      Neurologic Disorder Sister         seizures     Cancer Sister         breast     Psoriasis Daughter      Rheumatologic Disease Daughter         Dermatomyositis     Alzheimer Disease Mother         onset in 60s     Hypertension No family hx of      Cerebrovascular Disease No family hx of         ,     Thyroid Disease No family hx of      Skin Cancer No family hx of      Macular Degeneration No family hx of        Medications:  Current Outpatient Medications   Medication Sig Dispense Refill     augmented betamethasone dipropionate  (DIPROLENE-AF) 0.05 % external cream Apply to areas of dermatomyositis rash twice daily as needed. Do not use on face, underarms, groin. 50 g 1     B Complex TABS Take 1 tablet by mouth daily.       Calcium-Magnesium-Vitamin D 600- MG-MG-UNIT TB24 Take 1 tablet by mouth 2 times daily       Collagen Hydrolysate POWD Take by mouth daily       cyclobenzaprine (FLEXERIL) 5 MG tablet Take 1 tablet (5 mg) by mouth 3 times daily as needed for muscle spasms 20 tablet 0     diphenoxylate-atropine (LOMOTIL) 2.5-0.025 MG tablet Take 1 tablet by mouth 4 times daily as needed for diarrhea 30 tablet 1     dorzolamide-timolol (COSOPT) 2-0.5 % ophthalmic solution Place 1 drop into the right eye 2 times daily 10 mL 11     glucosamine-chondroitin 500-400 MG CAPS per capsule Take 2 capsules by mouth daily.       hydrocortisone 2.5 % cream Apply topically 2 times daily For face 30 g 3     hydroxychloroquine (PLAQUENIL) 200 MG tablet Take 1 tablet (200 mg) by mouth daily Please keep 9-8-22 clinic appt for refills. 90 tablet 1     ibuprofen (ADVIL/MOTRIN) 200 MG tablet Take 200 mg by mouth every 8 hours as needed for inflammatory pain       latanoprost (XALATAN) 0.005 % ophthalmic solution Place 1 drop into both eyes At Bedtime 7.5 mL 3     Multiple Vitamins-Minerals (MULTIPLE VITAMINS/WOMENS PO)        Multiple Vitamins-Minerals (PRESERVISION AREDS 2+MULTI VIT PO) Take 1 tablet by mouth daily       mycophenolate (GENERIC EQUIVALENT) 500 MG tablet TAKE 2 TABLETS TWICE A  tablet 3     omega-3 fatty acids (FISH OIL) 1200 MG capsule Take 1 capsule by mouth daily.       Propylene Glycol (SYSTANE COMPLETE OP) Apply 1 drop to eye as needed       triamcinolone (KENALOG) 0.1 % external cream Apply topically 2 times daily For body 454 g 3     Turmeric 500 MG CAPS Take 1 capsule by mouth 2 times daily       Vitamin D3 (CHOLECALCIFEROL) 125 MCG (5000 UT) tablet Take 1 tablet by mouth daily          Allergies   Allergen Reactions      Penicillins Anaphylaxis       Review of Systems:  -As per HPI  -Constitutional: Otherwise feeling well today, in usual state of health.      Physical exam:  Vitals: There were no vitals taken for this visit.  GEN: This is a well developed, well-nourished female in no acute distress, in a pleasant mood.    SKIN: Total skin excluding the undergarment areas was performed. The exam included the head/face, neck, both arms, chest, back, abdomen, both legs, digits and/or nails.   -Pryor skin type: II  - Scalp clear  - Periorbital areas and right upper cheek with subtle pink to purple patches   - Some waxy flat tan macules on the arms   - arms and legs are clear of rash  - no other lesions of concern on areas examined.     Impression/Plan:  1. Dermatomyositis: improved and stable on mycophenolate and hydroxychloroquine. Patient is not endorsing muscle aches or many flares of her disease. Exam is very reassuring today. Will want to check lab work CMP, CBC but discussed decreasing dose of mycophenolate. Patient is hesitant to decrease dose as previously she flared when her dose was lowered.   - Continue mycophenolate 1000 mg twice daily, can consider decreasing dose to 500 mg BID.   - Continue hydroxychloroquine 200 mg daily  - Continue augmented betamethasone cream to hands twice daily as needed  - Continue hydrocortisone to face as needed  - Standing labs for CBC, CMP      Follow-up: 1 year     Staffed with my attending, Dr. Garcias.     Roxana Kevin, MS4    Staff Physician:  I was present with the medical student who participated in the service and in the documentation of the note. I have verified the history and personally performed the physical exam and medical decision making. I edited the assessment and plan of care as documented in the note.     Shekhar Garcias MD   of Dermatology  Department of Dermatology  [facility] [employer]

## 2022-08-11 NOTE — PATIENT INSTRUCTIONS
Plan:   - Continue mycophenolate   - Continue hydroxychloroquine   - Continue hydrocortisone to the face as needed  - Continue augmented betamethasone to the hands as needed

## 2022-08-22 ENCOUNTER — ALLIED HEALTH/NURSE VISIT (OUTPATIENT)
Dept: FAMILY MEDICINE | Facility: CLINIC | Age: 77
End: 2022-08-22
Payer: COMMERCIAL

## 2022-08-22 ENCOUNTER — LAB (OUTPATIENT)
Dept: LAB | Facility: CLINIC | Age: 77
End: 2022-08-22
Payer: COMMERCIAL

## 2022-08-22 DIAGNOSIS — Z23 NEED FOR VACCINATION: Primary | ICD-10-CM

## 2022-08-22 DIAGNOSIS — M33.13 DERMATOMYOSITIS (H): ICD-10-CM

## 2022-08-22 DIAGNOSIS — Z79.899 ENCOUNTER FOR LONG-TERM CURRENT USE OF MEDICATION: ICD-10-CM

## 2022-08-22 LAB
ALBUMIN SERPL-MCNC: 4 G/DL (ref 3.4–5)
ALT SERPL W P-5'-P-CCNC: 23 U/L (ref 0–50)
AST SERPL W P-5'-P-CCNC: 19 U/L (ref 0–45)
BASOPHILS # BLD AUTO: 0 10E3/UL (ref 0–0.2)
BASOPHILS NFR BLD AUTO: 0 %
CK SERPL-CCNC: 78 U/L (ref 30–225)
CRP SERPL-MCNC: <2.9 MG/L (ref 0–8)
EOSINOPHIL # BLD AUTO: 0.1 10E3/UL (ref 0–0.7)
EOSINOPHIL NFR BLD AUTO: 1 %
ERYTHROCYTE [DISTWIDTH] IN BLOOD BY AUTOMATED COUNT: 13.1 % (ref 10–15)
HCT VFR BLD AUTO: 39.9 % (ref 35–47)
HGB BLD-MCNC: 12.8 G/DL (ref 11.7–15.7)
LYMPHOCYTES # BLD AUTO: 1 10E3/UL (ref 0.8–5.3)
LYMPHOCYTES NFR BLD AUTO: 22 %
MCH RBC QN AUTO: 30 PG (ref 26.5–33)
MCHC RBC AUTO-ENTMCNC: 32.1 G/DL (ref 31.5–36.5)
MCV RBC AUTO: 94 FL (ref 78–100)
MONOCYTES # BLD AUTO: 0.4 10E3/UL (ref 0–1.3)
MONOCYTES NFR BLD AUTO: 9 %
NEUTROPHILS # BLD AUTO: 3.1 10E3/UL (ref 1.6–8.3)
NEUTROPHILS NFR BLD AUTO: 67 %
PLATELET # BLD AUTO: 191 10E3/UL (ref 150–450)
RBC # BLD AUTO: 4.26 10E6/UL (ref 3.8–5.2)
WBC # BLD AUTO: 4.6 10E3/UL (ref 4–11)

## 2022-08-22 PROCEDURE — 82040 ASSAY OF SERUM ALBUMIN: CPT

## 2022-08-22 PROCEDURE — 84460 ALANINE AMINO (ALT) (SGPT): CPT

## 2022-08-22 PROCEDURE — 86140 C-REACTIVE PROTEIN: CPT

## 2022-08-22 PROCEDURE — 82550 ASSAY OF CK (CPK): CPT

## 2022-08-22 PROCEDURE — 90471 IMMUNIZATION ADMIN: CPT

## 2022-08-22 PROCEDURE — 36415 COLL VENOUS BLD VENIPUNCTURE: CPT

## 2022-08-22 PROCEDURE — 90750 HZV VACC RECOMBINANT IM: CPT

## 2022-08-22 PROCEDURE — 99207 PR NO CHARGE NURSE ONLY: CPT

## 2022-08-22 PROCEDURE — 85025 COMPLETE CBC W/AUTO DIFF WBC: CPT

## 2022-08-22 PROCEDURE — 84450 TRANSFERASE (AST) (SGOT): CPT

## 2022-08-24 ENCOUNTER — OFFICE VISIT (OUTPATIENT)
Dept: OPHTHALMOLOGY | Facility: CLINIC | Age: 77
End: 2022-08-24
Attending: OPHTHALMOLOGY
Payer: COMMERCIAL

## 2022-08-24 DIAGNOSIS — H40.051 BORDERLINE GLAUCOMA OF RIGHT EYE WITH OCULAR HYPERTENSION: Primary | ICD-10-CM

## 2022-08-24 PROCEDURE — 99213 OFFICE O/P EST LOW 20 MIN: CPT | Performed by: OPHTHALMOLOGY

## 2022-08-24 PROCEDURE — G0463 HOSPITAL OUTPT CLINIC VISIT: HCPCS

## 2022-08-24 ASSESSMENT — CONF VISUAL FIELD
METHOD: COUNTING FINGERS
OS_NORMAL: 1
OD_NORMAL: 1

## 2022-08-24 ASSESSMENT — SLIT LAMP EXAM - LIDS
COMMENTS: NORMAL
COMMENTS: NORMAL

## 2022-08-24 ASSESSMENT — REFRACTION_WEARINGRX
OD_ADD: +2.25
SPECS_TYPE: PAL
OS_AXIS: 175
OS_CYLINDER: +0.75
OS_SPHERE: +0.25
OS_ADD: +2.25
OD_SPHERE: +0.50
OD_CYLINDER: SPHERE

## 2022-08-24 ASSESSMENT — VISUAL ACUITY
OS_CC: 20/20
OD_CC: 20/20
CORRECTION_TYPE: GLASSES
METHOD: SNELLEN - LINEAR
OS_CC+: -2

## 2022-08-24 ASSESSMENT — TONOMETRY
OD_IOP_MMHG: 12
OD_IOP_MMHG: 12
IOP_METHOD: TONOPEN
OS_IOP_MMHG: 13
OS_IOP_MMHG: 14

## 2022-08-24 ASSESSMENT — EXTERNAL EXAM - LEFT EYE: OS_EXAM: NORMAL

## 2022-08-24 ASSESSMENT — EXTERNAL EXAM - RIGHT EYE: OD_EXAM: NORMAL

## 2022-08-24 NOTE — PROGRESS NOTES
Chief Complaint(s) and History of Present Illness(es)     Ocular Hypertension Follow Up     Laterality: both eyes    Associated symptoms: Negative for eye pain, tearing, headache, flashes   and floaters    Treatment side effects: none    Pain scale: 0/10              Comments     Pt here today for OHTN follow up.  States things are going well.  Pt reports following new drops regimen as directed.  No new issues or concerns.    Lisa PERRY, GARY 1:10 PM 08/24/2022                        Review of systems for the eyes was negative other than the pertinent positives/negatives listed in the HPI.      Assessment & Plan      Mely Rashid is a 77 year old female with the following diagnoses:   1. 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  Goal 13 or less right eye   At goal today    Continue Cosopt twice a day right eye   Continue latanoprost at bedtime both eyes       Patient disposition:   Return in about 4 months (around 12/24/2022) for VT only, OCT NFL, 24-2 Dynamic VF.           Attending Physician Attestation:  Complete documentation of historical and exam elements from today's encounter can be found in the full encounter summary report (not reduplicated in this progress note).  I personally obtained the chief complaint(s) and history of present illness.  I confirmed and edited as necessary the review of systems, past medical/surgical history, family history, social history, and examination findings as documented by others; and I examined the patient myself.  I personally reviewed the relevant tests, images, and reports as documented above.  I formulated and edited as necessary the assessment and plan and discussed the findings and management plan with the patient and family. . - Edwin Navarro MD

## 2022-08-24 NOTE — NURSING NOTE
Chief Complaints and History of Present Illnesses   Patient presents with     Ocular Hypertension Follow Up     Chief Complaint(s) and History of Present Illness(es)     Ocular Hypertension Follow Up     Laterality: both eyes    Associated symptoms: Negative for eye pain, tearing, headache, flashes and floaters    Treatment side effects: none    Pain scale: 0/10              Comments     Pt here today for OHTN follow up.  States things are going well.  Pt reports following new drops regimen as directed.  No new issues or concerns.    Lisa Castellanos COA, GARY 1:10 PM 08/24/2022

## 2022-09-08 ENCOUNTER — OFFICE VISIT (OUTPATIENT)
Dept: PULMONOLOGY | Facility: CLINIC | Age: 77
End: 2022-09-08
Attending: INTERNAL MEDICINE
Payer: COMMERCIAL

## 2022-09-08 VITALS — HEART RATE: 77 BPM | SYSTOLIC BLOOD PRESSURE: 119 MMHG | DIASTOLIC BLOOD PRESSURE: 75 MMHG | OXYGEN SATURATION: 97 %

## 2022-09-08 DIAGNOSIS — Z79.899 ENCOUNTER FOR LONG-TERM CURRENT USE OF MEDICATION: ICD-10-CM

## 2022-09-08 DIAGNOSIS — Z79.624 ENCOUNTER FOR LONG TERM USE OF MYCOPHENOLATE MOFETIL: ICD-10-CM

## 2022-09-08 DIAGNOSIS — M33.13 DERMATOMYOSITIS (H): Primary | ICD-10-CM

## 2022-09-08 DIAGNOSIS — Z79.899 LONG-TERM USE OF PLAQUENIL: ICD-10-CM

## 2022-09-08 PROCEDURE — 99214 OFFICE O/P EST MOD 30 MIN: CPT | Performed by: INTERNAL MEDICINE

## 2022-09-08 NOTE — LETTER
2022         RE: Mely Rashid  [address]   [place] [address]        Dear Colleague,    Thank you for referring your patient, Mely Rashid, to the [facility] [place]. Please see a copy of my visit note below.    [facility]  Rheumatology Clinic  Carlos Colon MD  2022     Name: Mely Rashid  MRN: [id number]  Age: 74 year old  : 1945  Referring provider: Referred Self     Problem list:   1. Dermatomyositis with skin findings, minimal weakness on exam but some myalgias, and a daughter with a similar presentation. Steroid responsive  2. IgA deficiency per the outside records with high-titer BRE of 1:2560, other antibodies thus far negative.   3. 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     Assessment and Plan:    She would appear to be in remission or near remission from her dermatomyositis.  However, this is the best she has felt in a long time and she would like to continue to feel this well and she does not seem to be having any toxicity from medication.    Accordingly we will make no changes.  She will continue to get her laboratory monitoring every 3-month her Plaquenil eye examination once a year, and we will see her back in 1 year, sooner as needed.    I did urge her to get her vaccinations this fall perhaps starting with the flu vaccination since she just had COVID boosting back in May.  I urged her to take her self off of the MMF 2 days prior to and 5 days following each of the vaccinations to maximize efficacy.     Follow-up: 1 year.     ASHISH Colon MD, PhD    Rheumatology    HPI:   Mely Rashid is a 74 year old female with a history of dermatomyositis who presents for follow-up. I last evaluated her on 18 at which time she reported mild shortness of breath on exertion, but was  "otherwise stable and had no skin, muscle, or joint symptoms. Plan was to redo HRCT prior to further reducing MMF.     Today, she reports that she is doing okay overall. She feels that some of her symptoms have worsened this summer, noting worsening symptoms of muscle aches. These symptoms are mostly limited to her upper body, and she denies deficits in strength. She also feels that her skin symptoms have worsened, noting that her hands feel \"raw.\" She has been treating her skin symptoms with a topical cream, but she is unsure what kind of cream. He fatigue has also worsened in the summer, which she attributes to sleeping less. She also notes that her scalp has been itching, causing her to lose hair. She is continued on 1 tablet of plaquenil (200mg) per day. Her last ophthalmologic exam in 11/2018 was unremarkable for plaquenil toxicity. She is also continued on 500mg CellCept daily.     September 8, 2022 interval history:    We last saw the patient in person a little over 3 years ago but have seen her virtually since during the pandemic.    She continues however to do beautifully.  She remains on MMF at 1 g twice daily.  On that dose she has had no significant laboratory toxicity and no significant infections anytime in the last 3 years, although she notes that she hardly goes anywhere since the beginning of COVID.    She had some residual skin involvement 3 years ago only saw her but she started taking oral collagen and thinks that that has really helped that and largely resolved.    She has had longstanding problems with loose stools but stopped dairy and found that that helped a lot.  She had loose stools prior to the initiation of MMF and had no worsening with the initiation of MMF so does not think that is related.    She does have some questions about vaccinations.  She recently got the Shingrix shot.  Her insurance is actually denying payment for that, which is incomprehensible given that she is 77 years old " and therefore FDA approved for it, and furthermore is on MMF which puts her at increased risk.    She is otherwise doing stably.  She had PFTs done earlier this year that were completely stable.  Her strength likewise feels stable and normal to her.       Review of Systems:   Pertinent items are noted in HPI or as below, remainder of complete ROS is negative.      No recent problems with hearing or vision. No swallowing problems.   No breathing difficulty, shortness of breath, coughing, or wheezing  No chest pain or palpitations  No heart burn, indigestion, abdominal pain, nausea, vomiting, diarrhea  No urination problems, no bloody, cloudy urine, no dysuria  No numbing, tingling, weakness  No headaches or confusion  No rashes. No easy bleeding or bruising.     Active Medications:   Current Outpatient Medications:      B Complex TABS, Take 1 tablet by mouth daily., Disp: , Rfl:      Calcium Carb-Cholecalciferol (CALCIUM 500 +D PO), Take 3 tablets by mouth daily., Disp: , Rfl:      Cholecalciferol (VITAMIN D) 1000 UNIT capsule, Take 1 capsule by mouth daily., Disp: , Rfl:      desonide (DESOWEN) 0.05 % cream, Apply topically 2 times daily as needed (rash), Disp: 30 g, Rfl: 2     desoximetasone (TOPICORT) 0.25 % cream, Apply topically 2 times daily, Disp: 120 g, Rfl: 3     diazepam (VALIUM) 5 MG tablet, Take 1 tablet (5 mg) by mouth once as needed for anxiety or sleep (Take one on arrival for MRI. May repeat in 15 min if inadequate relief.), Disp: 2 tablet, Rfl: 0     glucosamine-chondroitinoitin (GLUCOSAMINE CHONDR COMPLEX) 500-400 MG CAPS, Take 2 capsules by mouth daily., Disp: , Rfl:      hydroxychloroquine (PLAQUENIL) 200 MG tablet, Take 1 tablet (200 mg) by mouth daily (*annual eye exam/plaquenil screening- due ), Disp: 90 tablet, Rfl: 1     latanoprost (XALATAN) 0.005 % ophthalmic solution, Place 1 drop into the right eye At Bedtime, Disp: 3 Bottle, Rfl: 11     loperamide (IMODIUM A-D) 2 MG tablet, Take 2  mg by mouth every morning, Disp: , Rfl:      mycophenolate (GENERIC EQUIVALENT) 500 MG tablet, Take 1 tablet (500 mg) by mouth daily, Disp: 1 tablet, Rfl: 0     omega-3 fatty acids (FISH OIL) 1200 MG capsule, Take 1 capsule by mouth daily., Disp: , Rfl:      vitamin E 400 UNIT capsule, Take by mouth daily, Disp: , Rfl:       Allergies:   Penicillins      Past Medical History:  Basal cell carcinoma   Dermatomyositis   Glaucoma   IgA deficiency   Non senile cataract   Herpes zoster   Oral ulcer   Seborrheic dermatitis   Osteopenia   Skin neoplasm      Past Surgical History:  Skin lesion biopsy   Esophagogastroduodenoscopy 2014   Hysterectomy partial, prolapse uterus     Family History:   Father: coronary artery disease, arthritis   Brother: diabetes mellitus, glaucoma  Sister: breast cancer, glaucoma, seizures  Daughter: psoriasis, dermatomyositis   Mother: alzheimer's       Social History:   Former smoker, quit on 04/10/1079.   Rare alcohol use.   Lives independently      Physical Exam:   /75   Pulse 77   SpO2 97%    Wt Readings from Last 4 Encounters:   04/21/22 65.5 kg (144 lb 4.8 oz)   04/09/22 66.2 kg (146 lb)   03/31/22 66.2 kg (146 lb)   09/09/21 65.3 kg (144 lb)     Constitutional: Well-developed, appearing stated age; cooperative  Eyes: Normal EOM, PERRLA, vision, conjunctiva, sclera  ENT: Normal external ears, nose, hearing, lips, teeth, gums, throat. No mucous membrane lesions, normal saliva pool  Neck: No mass or thyroid enlargement  Resp: Lungs clear to auscultation, nl to palpation  CV: RRR, no murmurs, rubs or gallops, no edema  GI: No ABD mass or tenderness, no HSM  : Not tested  Lymph: No cervical, supraclavicular, inguinal or epitrochlear nodes  MS: The TMJ, neck, shoulder, elbow, wrist, spine, hip, knee, ankle, and foot MTP/IP joints were examined and found normal. No active synovitis or altered joint anatomy. Full joint ROM. Normal  strength. No dactylitis,  tenosynovitis,  enthesopathy.  Skin: No nail pitting, alopecia, rash, or lesions.  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 This is tender.  Resolved erythema over MCP joints and elbows.   Neuro: Normal cranial nerves, strength, sensation, DTRs.   Psych: Normal judgement, orientation, memory, affect.     Laboratory:   RHEUM RESULTS Latest Ref Rng & Units 3/22/2012 4/12/2012 5/4/2012   ALBUMIN 3.4 - 5.0 g/dL - 4.0 -   ALT 0 - 50 U/L 43 50 -   AST 0 - 45 U/L 54(H) 64(H) -   COMPLEMENT C3 76 - 169 mg/dL 102 - -   COMPLEMENT C4 15 - 50 mg/dL 15 - -   CK TOTAL 30 - 225 U/L 183 206 226(H)   CREATININE 0.52 - 1.04 mg/dL 0.63 - -   CRP 0.0 - 8.0 mg/L - <5.0 <5.0   GFR ESTIMATE, IF BLACK >60 mL/min/[1.73:m2] >90 - -   GFR ESTIMATE >60 mL/min/1.73m2 >90 - -   HEMATOCRIT 35.0 - 47.0 % - 39.6 -   HEMOGLOBIN 11.7 - 15.7 g/dL - 13.0 -   HCVAB NR - - -   WBC 4.0 - 11.0 10e3/uL - 4.2 -   RBC 3.80 - 5.20 10e6/uL - 4.15 -   RDW 10.0 - 15.0 % - 13.9 -   MCHC 31.5 - 36.5 g/dL - 32.8 -   MCV 78 - 100 fL - 95 -   PLATELET COUNT 150 - 450 10e3/uL - 217 -   ESR 0 - 30 mm/h - 8 8         Ribonucleic Protein IgG Antibody   Date Value Ref Range Status   03/22/2012 0  Final     Comment:     Reference range: 0 to 40  Unit: AU/mL  (Note)  INTERPRETIVE INFORMATION: Ribonucleic Protein (LIGIA), IgG   29 AU/mL or Less ............. Negative   30 - 40 AU/mL ................ Equivocal   41 AU/mL or Greater .......... Positive  RNP antibody is seen in % of mixed connective tissue  disease and is considered specific for this syndrome if  other antibodies are negative. RNP is also present in  20-30% of systemic lupus erythematosus (SLE) and 15-25% of  progressive systemic sclerosis (PSS).     Smith Antibody IgG   Date Value Ref Range Status   03/22/2012 0  Final     Comment:     Reference range: 0 to 40  Unit: AU/mL  (Note)  INTERPRETIVE INFORMATION: BISHOP (LIGIA) Ab, IgG   29  AU/mL or Less ............. Negative   30 - 40 AU/mL ................ Equivocal   41 AU/mL or Greater .......... Positive  Fitzgerald antibody is very specific for systemic lupus  erythematosus (SLE) but only occurs in 30-35% of SLE cases.  The presence of antibodies to Fitzgerald is often associated  with renal disease.     SSA (RO) Antibody IgG   Date Value Ref Range Status   03/22/2012 0  Final     Comment:     Reference range: 0 to 40  Unit: AU/mL  (Note)  INTERPRETIVE INFORMATION: SSA (Ro) (LIGIA) Ab, IgG   29 AU/mL or Less ............. Negative   30 - 40 AU/mL ................ Equivocal   41 AU/mL or Greater .......... Positive  SSA (Ro) antibody is seen in 70-75% of Sjogren syndrome  cases, 30-40% of systemic lupus erythematosus (SLE) and  5-10% of progressive systemic sclerosis (PSS).     SSB (LA) Antibody IgG   Date Value Ref Range Status   03/22/2012 0  Final     Comment:     Reference range: 0 to 40  Unit: AU/mL  (Note)  INTERPRETIVE INFORMATION: SSB (La) (LIGIA) Ab, IgG   29 AU/mL or Less ............. Negative   30 - 40 AU/mL ................ Equivocal   41 AU/mL or Greater .......... Positive  SSB (La) antibody is seen in 50-60% of Sjogren syndrome  cases and is specific if it is the only LIGIA antibody  present. 15-25% of patients with systemic lupus  erythematosus (SLE) and 5-10% of patients with progressive  systemic sclerosis (PSS) also have this antibody.     Scleroderma Antibody IgG   Date Value Ref Range Status   03/22/2012 0  Final     Comment:     Reference range: 0 to 40  Unit: AU/mL  (Note)  INTERPRETIVE INFORMATION: Scleroderma (Scl-70) (LIGIA) Ab, IgG   29 AU/mL or Less ............. Negative   30 - 40 AU/mL ................ Equivocal   41 AU/mL or Greater .......... Positive  Scleroderma (Scl-70) antibody is seen in 20-60% of patients  with scleroderma and is considered diagnostic and specific  for scleroderma if it is the only LIGIA antibody present.  Scl-70 is also seen in approximately 25% of  progressive  systemic sclerosis (PSS).  Performed by [facility],  [address] [place] [address] [phone]  www.[employer], Dayana Kraft MD, Lab. Director     Centromere Trisha IgG   Date Value Ref Range Status   03/22/2012 0  Final     Comment:     Reference range: 0 to 40  Unit: AU/mL  (Note)  INTERPRETIVE INFORMATION: Centromere Ab, IgG   29 AU/mL or Less ............. Negative   30 - 40 AU/mL ................ Equivocal   41 AU/mL or Greater .......... Positive  Centromere antibodies are present in 80-90% of individuals  with CREST variant scleroderma.  This antibody is also seen  in 30% of Raynaud patients, 12% of patients with mixed  connective-tissue disease, diffuse scleroderma,  interstitial pulmonary fibrosis, primary biliary cirrhosis,  and in a smaller percent of patients with systemic lupus  erythematosus (SLE) and RA.  Performed by [facility],  [address] [place] [address] [phone]  www.[employer], Dayana Kraft MD, Lab. Director             Again, thank you for allowing me to participate in the care of your patient.        Sincerely,        Carlos Colon MD

## 2022-09-08 NOTE — PROGRESS NOTES
[facility]  Rheumatology Clinic  Carlos Colon MD  2022     Name: Mely Rashid  MRN: [id number]  Age: 74 year old  : 1945  Referring provider: Referred Self     Problem list:   1. Dermatomyositis with skin findings, minimal weakness on exam but some myalgias, and a daughter with a similar presentation. Steroid responsive  2. IgA deficiency per the outside records with high-titer BRE of 1:2560, other antibodies thus far negative.   3. 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     Assessment and Plan:    She would appear to be in remission or near remission from her dermatomyositis.  However, this is the best she has felt in a long time and she would like to continue to feel this well and she does not seem to be having any toxicity from medication.    Accordingly we will make no changes.  She will continue to get her laboratory monitoring every 3-month her Plaquenil eye examination once a year, and we will see her back in 1 year, sooner as needed.    I did urge her to get her vaccinations this fall perhaps starting with the flu vaccination since she just had COVID boosting back in May.  I urged her to take her self off of the MMF 2 days prior to and 5 days following each of the vaccinations to maximize efficacy.     Follow-up: 1 year.     ASHISH Colon MD, PhD    Rheumatology    HPI:   Mely Rashid is a 74 year old female with a history of dermatomyositis who presents for follow-up. I last evaluated her on 18 at which time she reported mild shortness of breath on exertion, but was otherwise stable and had no skin, muscle, or joint symptoms. Plan was to redo HRCT prior to further reducing MMF.     Today, she reports that she is doing okay overall. She feels that some of her symptoms have worsened this summer, noting worsening symptoms of muscle aches. These symptoms are mostly  "limited to her upper body, and she denies deficits in strength. She also feels that her skin symptoms have worsened, noting that her hands feel \"raw.\" She has been treating her skin symptoms with a topical cream, but she is unsure what kind of cream. He fatigue has also worsened in the summer, which she attributes to sleeping less. She also notes that her scalp has been itching, causing her to lose hair. She is continued on 1 tablet of plaquenil (200mg) per day. Her last ophthalmologic exam in 11/2018 was unremarkable for plaquenil toxicity. She is also continued on 500mg CellCept daily.     September 8, 2022 interval history:    We last saw the patient in person a little over 3 years ago but have seen her virtually since during the pandemic.    She continues however to do beautifully.  She remains on MMF at 1 g twice daily.  On that dose she has had no significant laboratory toxicity and no significant infections anytime in the last 3 years, although she notes that she hardly goes anywhere since the beginning of COVID.    She had some residual skin involvement 3 years ago only saw her but she started taking oral collagen and thinks that that has really helped that and largely resolved.    She has had longstanding problems with loose stools but stopped dairy and found that that helped a lot.  She had loose stools prior to the initiation of MMF and had no worsening with the initiation of MMF so does not think that is related.    She does have some questions about vaccinations.  She recently got the Shingrix shot.  Her insurance is actually denying payment for that, which is incomprehensible given that she is 77 years old and therefore FDA approved for it, and furthermore is on MMF which puts her at increased risk.    She is otherwise doing stably.  She had PFTs done earlier this year that were completely stable.  Her strength likewise feels stable and normal to her.       Review of Systems:   Pertinent items are " noted in HPI or as below, remainder of complete ROS is negative.      No recent problems with hearing or vision. No swallowing problems.   No breathing difficulty, shortness of breath, coughing, or wheezing  No chest pain or palpitations  No heart burn, indigestion, abdominal pain, nausea, vomiting, diarrhea  No urination problems, no bloody, cloudy urine, no dysuria  No numbing, tingling, weakness  No headaches or confusion  No rashes. No easy bleeding or bruising.     Active Medications:   Current Outpatient Medications:      B Complex TABS, Take 1 tablet by mouth daily., Disp: , Rfl:      Calcium Carb-Cholecalciferol (CALCIUM 500 +D PO), Take 3 tablets by mouth daily., Disp: , Rfl:      Cholecalciferol (VITAMIN D) 1000 UNIT capsule, Take 1 capsule by mouth daily., Disp: , Rfl:      desonide (DESOWEN) 0.05 % cream, Apply topically 2 times daily as needed (rash), Disp: 30 g, Rfl: 2     desoximetasone (TOPICORT) 0.25 % cream, Apply topically 2 times daily, Disp: 120 g, Rfl: 3     diazepam (VALIUM) 5 MG tablet, Take 1 tablet (5 mg) by mouth once as needed for anxiety or sleep (Take one on arrival for MRI. May repeat in 15 min if inadequate relief.), Disp: 2 tablet, Rfl: 0     glucosamine-chondroitinoitin (GLUCOSAMINE CHONDR COMPLEX) 500-400 MG CAPS, Take 2 capsules by mouth daily., Disp: , Rfl:      hydroxychloroquine (PLAQUENIL) 200 MG tablet, Take 1 tablet (200 mg) by mouth daily (*annual eye exam/plaquenil screening- due ), Disp: 90 tablet, Rfl: 1     latanoprost (XALATAN) 0.005 % ophthalmic solution, Place 1 drop into the right eye At Bedtime, Disp: 3 Bottle, Rfl: 11     loperamide (IMODIUM A-D) 2 MG tablet, Take 2 mg by mouth every morning, Disp: , Rfl:      mycophenolate (GENERIC EQUIVALENT) 500 MG tablet, Take 1 tablet (500 mg) by mouth daily, Disp: 1 tablet, Rfl: 0     omega-3 fatty acids (FISH OIL) 1200 MG capsule, Take 1 capsule by mouth daily., Disp: , Rfl:      vitamin E 400 UNIT capsule, Take by  mouth daily, Disp: , Rfl:       Allergies:   Penicillins      Past Medical History:  Basal cell carcinoma   Dermatomyositis   Glaucoma   IgA deficiency   Non senile cataract   Herpes zoster   Oral ulcer   Seborrheic dermatitis   Osteopenia   Skin neoplasm      Past Surgical History:  Skin lesion biopsy   Esophagogastroduodenoscopy 2014   Hysterectomy partial, prolapse uterus     Family History:   Father: coronary artery disease, arthritis   Brother: diabetes mellitus, glaucoma  Sister: breast cancer, glaucoma, seizures  Daughter: psoriasis, dermatomyositis   Mother: alzheimer's       Social History:   Former smoker, quit on 04/10/1079.   Rare alcohol use.   Lives independently      Physical Exam:   /75   Pulse 77   SpO2 97%    Wt Readings from Last 4 Encounters:   04/21/22 65.5 kg (144 lb 4.8 oz)   04/09/22 66.2 kg (146 lb)   03/31/22 66.2 kg (146 lb)   09/09/21 65.3 kg (144 lb)     Constitutional: Well-developed, appearing stated age; cooperative  Eyes: Normal EOM, PERRLA, vision, conjunctiva, sclera  ENT: Normal external ears, nose, hearing, lips, teeth, gums, throat. No mucous membrane lesions, normal saliva pool  Neck: No mass or thyroid enlargement  Resp: Lungs clear to auscultation, nl to palpation  CV: RRR, no murmurs, rubs or gallops, no edema  GI: No ABD mass or tenderness, no HSM  : Not tested  Lymph: No cervical, supraclavicular, inguinal or epitrochlear nodes  MS: The TMJ, neck, shoulder, elbow, wrist, spine, hip, knee, ankle, and foot MTP/IP joints were examined and found normal. No active synovitis or altered joint anatomy. Full joint ROM. Normal  strength. No dactylitis,  tenosynovitis, enthesopathy.  Skin: No nail pitting, alopecia, rash, or lesions.  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 This is tender.  Resolved erythema over MCP joints and elbows.   Neuro: Normal cranial nerves, strength,  sensation, DTRs.   Psych: Normal judgement, orientation, memory, affect.     Laboratory:   RHEUM RESULTS Latest Ref Rng & Units 3/22/2012 4/12/2012 5/4/2012   ALBUMIN 3.4 - 5.0 g/dL - 4.0 -   ALT 0 - 50 U/L 43 50 -   AST 0 - 45 U/L 54(H) 64(H) -   COMPLEMENT C3 76 - 169 mg/dL 102 - -   COMPLEMENT C4 15 - 50 mg/dL 15 - -   CK TOTAL 30 - 225 U/L 183 206 226(H)   CREATININE 0.52 - 1.04 mg/dL 0.63 - -   CRP 0.0 - 8.0 mg/L - <5.0 <5.0   GFR ESTIMATE, IF BLACK >60 mL/min/[1.73:m2] >90 - -   GFR ESTIMATE >60 mL/min/1.73m2 >90 - -   HEMATOCRIT 35.0 - 47.0 % - 39.6 -   HEMOGLOBIN 11.7 - 15.7 g/dL - 13.0 -   HCVAB NR - - -   WBC 4.0 - 11.0 10e3/uL - 4.2 -   RBC 3.80 - 5.20 10e6/uL - 4.15 -   RDW 10.0 - 15.0 % - 13.9 -   MCHC 31.5 - 36.5 g/dL - 32.8 -   MCV 78 - 100 fL - 95 -   PLATELET COUNT 150 - 450 10e3/uL - 217 -   ESR 0 - 30 mm/h - 8 8         Ribonucleic Protein IgG Antibody   Date Value Ref Range Status   03/22/2012 0  Final     Comment:     Reference range: 0 to 40  Unit: AU/mL  (Note)  INTERPRETIVE INFORMATION: Ribonucleic Protein (LIGIA), IgG   29 AU/mL or Less ............. Negative   30 - 40 AU/mL ................ Equivocal   41 AU/mL or Greater .......... Positive  RNP antibody is seen in % of mixed connective tissue  disease and is considered specific for this syndrome if  other antibodies are negative. RNP is also present in  20-30% of systemic lupus erythematosus (SLE) and 15-25% of  progressive systemic sclerosis (PSS).     Smith Antibody IgG   Date Value Ref Range Status   03/22/2012 0  Final     Comment:     Reference range: 0 to 40  Unit: AU/mL  (Note)  INTERPRETIVE INFORMATION: FITZGERALD (LIGIA) Ab, IgG   29 AU/mL or Less ............. Negative   30 - 40 AU/mL ................ Equivocal   41 AU/mL or Greater .......... Positive  Fitzgerald antibody is very specific for systemic lupus  erythematosus (SLE) but only occurs in 30-35% of SLE cases.  The presence of antibodies to Fitzgerald is often associated  with renal  disease.     SSA (RO) Antibody IgG   Date Value Ref Range Status   03/22/2012 0  Final     Comment:     Reference range: 0 to 40  Unit: AU/mL  (Note)  INTERPRETIVE INFORMATION: SSA (Ro) (LIGIA) Ab, IgG   29 AU/mL or Less ............. Negative   30 - 40 AU/mL ................ Equivocal   41 AU/mL or Greater .......... Positive  SSA (Ro) antibody is seen in 70-75% of Sjogren syndrome  cases, 30-40% of systemic lupus erythematosus (SLE) and  5-10% of progressive systemic sclerosis (PSS).     SSB (LA) Antibody IgG   Date Value Ref Range Status   03/22/2012 0  Final     Comment:     Reference range: 0 to 40  Unit: AU/mL  (Note)  INTERPRETIVE INFORMATION: SSB (La) (LIGIA) Ab, IgG   29 AU/mL or Less ............. Negative   30 - 40 AU/mL ................ Equivocal   41 AU/mL or Greater .......... Positive  SSB (La) antibody is seen in 50-60% of Sjogren syndrome  cases and is specific if it is the only LIGIA antibody  present. 15-25% of patients with systemic lupus  erythematosus (SLE) and 5-10% of patients with progressive  systemic sclerosis (PSS) also have this antibody.     Scleroderma Antibody IgG   Date Value Ref Range Status   03/22/2012 0  Final     Comment:     Reference range: 0 to 40  Unit: AU/mL  (Note)  INTERPRETIVE INFORMATION: Scleroderma (Scl-70) (LIGIA) Ab, IgG   29 AU/mL or Less ............. Negative   30 - 40 AU/mL ................ Equivocal   41 AU/mL or Greater .......... Positive  Scleroderma (Scl-70) antibody is seen in 20-60% of patients  with scleroderma and is considered diagnostic and specific  for scleroderma if it is the only LIGIA antibody present.  Scl-70 is also seen in approximately 25% of progressive  systemic sclerosis (PSS).  Performed by [employer],  [address] [place] [address] [phone]  [email or web address], Dayana Kraft MD, Lab. Director     Centromere Trisha IgG   Date Value Ref Range Status   03/22/2012 0  Final     Comment:     Reference range: 0 to 40  Unit:  AU/mL  (Note)  INTERPRETIVE INFORMATION: Centromere Ab, IgG   29 AU/mL or Less ............. Negative   30 - 40 AU/mL ................ Equivocal   41 AU/mL or Greater .......... Positive  Centromere antibodies are present in 80-90% of individuals  with CREST variant scleroderma.  This antibody is also seen  in 30% of Raynaud patients, 12% of patients with mixed  connective-tissue disease, diffuse scleroderma,  interstitial pulmonary fibrosis, primary biliary cirrhosis,  and in a smaller percent of patients with systemic lupus  erythematosus (SLE) and RA.  Performed by [facility],  [address] [place] [id number] [phone]  [email or web address], Dayana Kraft MD, Lab. Director

## 2022-10-23 ENCOUNTER — HEALTH MAINTENANCE LETTER (OUTPATIENT)
Age: 77
End: 2022-10-23

## 2022-10-27 DIAGNOSIS — M33.13 DERMATOMYOSITIS (H): ICD-10-CM

## 2022-10-31 RX ORDER — TRIAMCINOLONE ACETONIDE 1 MG/G
CREAM TOPICAL 2 TIMES DAILY
Qty: 454 G | Refills: 3 | Status: SHIPPED | OUTPATIENT
Start: 2022-10-31 | End: 2024-04-23

## 2022-10-31 NOTE — TELEPHONE ENCOUNTER
Last Clinic Visit: 8/11/2022  [facility] [place]  Recommended 1 year follow up  Refill per derm protocol, process #4

## 2022-11-10 ENCOUNTER — MYC MEDICAL ADVICE (OUTPATIENT)
Dept: DERMATOLOGY | Facility: CLINIC | Age: 77
End: 2022-11-10

## 2022-11-10 DIAGNOSIS — M33.13 DERMATOMYOSITIS (H): ICD-10-CM

## 2022-11-11 RX ORDER — MYCOPHENOLATE MOFETIL 500 MG/1
1000 TABLET ORAL 2 TIMES DAILY
Qty: 360 TABLET | Refills: 3 | Status: CANCELLED | OUTPATIENT
Start: 2022-11-11

## 2022-11-11 RX ORDER — BETAMETHASONE DIPROPIONATE 0.5 MG/G
CREAM TOPICAL
Qty: 50 G | Refills: 1 | Status: SHIPPED | OUTPATIENT
Start: 2022-11-11 | End: 2024-08-22

## 2022-11-11 NOTE — TELEPHONE ENCOUNTER
augmented betamethasone dipropionate (DIPROLENE-AF) 0.05 % external cream      Last Written Prescription Date:  4-29-22  Last Fill Quantity: 50g,   # refills: 1  Last Office Visit : 8-11-22  Future Office visit:  none  Derm process 1

## 2022-11-24 ENCOUNTER — MYC MEDICAL ADVICE (OUTPATIENT)
Dept: INTERNAL MEDICINE | Facility: CLINIC | Age: 77
End: 2022-11-24

## 2022-12-02 DIAGNOSIS — H40.051 BORDERLINE GLAUCOMA OF RIGHT EYE WITH OCULAR HYPERTENSION: Primary | ICD-10-CM

## 2022-12-06 ENCOUNTER — OFFICE VISIT (OUTPATIENT)
Dept: INTERNAL MEDICINE | Facility: CLINIC | Age: 77
End: 2022-12-06
Payer: COMMERCIAL

## 2022-12-06 VITALS
BODY MASS INDEX: 23.35 KG/M2 | SYSTOLIC BLOOD PRESSURE: 122 MMHG | DIASTOLIC BLOOD PRESSURE: 79 MMHG | HEART RATE: 66 BPM | OXYGEN SATURATION: 97 % | WEIGHT: 140.3 LBS

## 2022-12-06 DIAGNOSIS — F32.A ANXIETY AND DEPRESSION: Primary | ICD-10-CM

## 2022-12-06 DIAGNOSIS — F41.9 ANXIETY AND DEPRESSION: Primary | ICD-10-CM

## 2022-12-06 PROCEDURE — 99213 OFFICE O/P EST LOW 20 MIN: CPT | Performed by: NURSE PRACTITIONER

## 2022-12-06 RX ORDER — SERTRALINE HYDROCHLORIDE 25 MG/1
TABLET, FILM COATED ORAL
Qty: 90 TABLET | Refills: 3 | Status: SHIPPED | OUTPATIENT
Start: 2022-12-06 | End: 2022-12-20

## 2022-12-06 NOTE — PROGRESS NOTES
S: Mely Rashid is a 77 year old female  My Chart Message:   Gavi- you and I have had this conversation before and I have declined medication or tried it and stopped.  I have symptoms all the time but the holidays, and dark days, exacerbate them.  I feel like I don't know the difference between being depressed and feeling sorry for myself.  I seem to be stuck.   Making decisions is pretty easy but keeping to them is difficult.  I keep volunteering for things hoping something will click for me and make me feel better. Instead I end up with jobs that I have no real desire to do.  I'm feeling under water.    I have many things I am interested in but am afraid to try. I might fail, but also then that activity is lost to me as something to look forward to.  I am not young and I feel that my life is passing me by.       She is interested un starting medication and talk therapy.        Patient Active Problem List   Diagnosis     Glaucoma suspect, right     Dermatomyositis (H)     IgA deficiency (H)     Herpes zoster     Encounter for long-term current use of medication     Encounter for long-term (current) use of steroids     Oral ulcer     Seborrheic dermatitis     Osteopenia     Neoplasm of uncertain behavior of skin     Routine general medical examination at a health care facility            Past Medical History:   Diagnosis Date     Arthritis ??    probably     Basal cell carcinoma      Dermatomyositis (H)      Glaucoma      IgA deficiency (H)      Nonsenile cataract             Past Surgical History:   Procedure Laterality Date     BIOPSY OF SKIN LESION       COLONOSCOPY       HC UGI ENDOSCOPY W EUS N/A 5/14/2014    Procedure: COMBINED ENDOSCOPIC ULTRASOUND, ESOPHAGOSCOPY, GASTROSCOPY, DUODENOSCOPY (EGD);  Surgeon: Boston Rodriguez MD;  Location:  GI     HYSTERECTOMY      partial, prolapse uterus.            Social History     Tobacco Use     Smoking status: Former     Packs/day: 1.00     Years: 10.00      Pack years: 10.00     Types: Cigarettes     Quit date: 4/10/1979     Years since quittin.6     Smokeless tobacco: Never     Tobacco comments:     quit    Substance Use Topics     Alcohol use: Not Currently     Alcohol/week: 0.0 standard drinks     Comment: rare            Family History   Problem Relation Age of Onset     C.A.D. Father      Arthritis Father      Diabetes Brother      Glaucoma Brother      Breast Cancer Sister 35     Glaucoma Sister      Neurologic Disorder Sister         seizures     Cancer Sister         breast     Psoriasis Daughter      Rheumatologic Disease Daughter         Dermatomyositis     Alzheimer Disease Mother         onset in 60s     Hypertension No family hx of      Cerebrovascular Disease No family hx of         ,     Thyroid Disease No family hx of      Skin Cancer No family hx of      Macular Degeneration No family hx of                Allergies   Allergen Reactions     Penicillins Anaphylaxis            Current Outpatient Medications   Medication Sig Dispense Refill     augmented betamethasone dipropionate (DIPROLENE AF) 0.05 % external cream Apply to areas of dermatomyositis rash twice daily as needed. Do not use on face, underarms, groin. 50 g 1     B Complex TABS Take 1 tablet by mouth daily.       Calcium-Magnesium-Vitamin D 600- MG-MG-UNIT TB24 Take 1 tablet by mouth 2 times daily       Collagen Hydrolysate POWD Take by mouth daily       cyclobenzaprine (FLEXERIL) 5 MG tablet Take 1 tablet (5 mg) by mouth 3 times daily as needed for muscle spasms 20 tablet 0     diphenoxylate-atropine (LOMOTIL) 2.5-0.025 MG tablet Take 1 tablet by mouth 4 times daily as needed for diarrhea 30 tablet 1     dorzolamide-timolol (COSOPT) 2-0.5 % ophthalmic solution Place 1 drop into the right eye 2 times daily 10 mL 11     glucosamine-chondroitin 500-400 MG CAPS per capsule Take 2 capsules by mouth daily.       hydrocortisone 2.5 % cream Apply topically 2 times daily For face 30 g 3      hydroxychloroquine (PLAQUENIL) 200 MG tablet Take 1 tablet (200 mg) by mouth daily Please keep 9-8-22 clinic appt for refills. 90 tablet 1     ibuprofen (ADVIL/MOTRIN) 200 MG tablet Take 200 mg by mouth every 8 hours as needed for inflammatory pain       latanoprost (XALATAN) 0.005 % ophthalmic solution Place 1 drop into both eyes At Bedtime 7.5 mL 3     Multiple Vitamins-Minerals (MULTIPLE VITAMINS/WOMENS PO)        Multiple Vitamins-Minerals (PRESERVISION AREDS 2+MULTI VIT PO) Take 1 tablet by mouth daily       mycophenolate (GENERIC EQUIVALENT) 500 MG tablet TAKE 2 TABLETS TWICE A  tablet 3     omega-3 fatty acids (FISH OIL) 1200 MG capsule Take 1 capsule by mouth daily.       Propylene Glycol (SYSTANE COMPLETE OP) Apply 1 drop to eye as needed       triamcinolone (KENALOG) 0.1 % external cream Apply topically 2 times daily For body 454 g 3     Turmeric 500 MG CAPS Take 1 capsule by mouth 2 times daily       Vitamin D3 (CHOLECALCIFEROL) 125 MCG (5000 UT) tablet Take 1 tablet by mouth daily         REVIEW OF SYSTEMS:  See above.    O: /79 (BP Location: Right arm, Patient Position: Sitting, Cuff Size: Adult Regular)   Pulse 66   Wt 63.6 kg (140 lb 4.8 oz)   SpO2 97%   BMI 23.35 kg/m    RESP:no distress  CV:see VS   NEURO: alert and oriented.  MUSK: Grossly normal  PSYCHE: pleasant.    A/P:  Mely was seen today for recheck medication.    Diagnoses and all orders for this visit:    Anxiety and depression  -     sertraline (ZOLOFT) 25 MG tablet; Start with 0.5 tab at HS for 2 weeks, then increase to one tab daily  -     Adult Mental Health  Referral; Future      The patient voiced understanding of the information discussed and all questions were answered.     I spent a total of 25 minutes in the care of this pt during today's office visit. This time includes reviewing the patient's chart and prior history, obtaining a history, performing an examination and evaluation and counseling  the patient. This time also includes ordering medications or tests necessary in addition to communication to other member's of the patient's health care team. Time spent in documentation and care coordination is included.     Gavi ISLAS, CNP

## 2022-12-06 NOTE — NURSING NOTE
Mely Rashid is a 77 year old female that presents in clinic today for the following:     Chief Complaint   Patient presents with     Recheck Medication     Pt here to discuss depression medication       The patient's allergies and medications were reviewed. The patient's vitals were obtained without incident. The patient does not have any other questions for the provider.     Rio Patten, EMT at 10:11 AM on 12/6/2022.  Primary Care Clinic: [phone]

## 2022-12-08 ENCOUNTER — TELEPHONE (OUTPATIENT)
Dept: INTERNAL MEDICINE | Facility: CLINIC | Age: 77
End: 2022-12-08

## 2022-12-08 NOTE — TELEPHONE ENCOUNTER
Called to schedule 2wk follow up but there was no video visit openings - pt will plan to send pcp mychart message about medication update instead of scheduling appt

## 2022-12-20 ENCOUNTER — MYC MEDICAL ADVICE (OUTPATIENT)
Dept: INTERNAL MEDICINE | Facility: CLINIC | Age: 77
End: 2022-12-20

## 2022-12-20 DIAGNOSIS — F32.A ANXIETY AND DEPRESSION: ICD-10-CM

## 2022-12-20 DIAGNOSIS — F41.9 ANXIETY AND DEPRESSION: ICD-10-CM

## 2022-12-20 RX ORDER — SERTRALINE HYDROCHLORIDE 25 MG/1
TABLET, FILM COATED ORAL
Qty: 90 TABLET | Refills: 3
Start: 2022-12-20 | End: 2023-01-10

## 2022-12-23 ENCOUNTER — DOCUMENTATION ONLY (OUTPATIENT)
Dept: LAB | Facility: CLINIC | Age: 77
End: 2022-12-23

## 2022-12-23 ENCOUNTER — LAB (OUTPATIENT)
Dept: LAB | Facility: CLINIC | Age: 77
End: 2022-12-23
Payer: COMMERCIAL

## 2022-12-23 ENCOUNTER — TELEPHONE (OUTPATIENT)
Dept: INTERNAL MEDICINE | Facility: CLINIC | Age: 77
End: 2022-12-23

## 2022-12-23 ENCOUNTER — MYC MEDICAL ADVICE (OUTPATIENT)
Dept: INTERNAL MEDICINE | Facility: CLINIC | Age: 77
End: 2022-12-23

## 2022-12-23 DIAGNOSIS — R30.0 DYSURIA: ICD-10-CM

## 2022-12-23 DIAGNOSIS — R30.0 DYSURIA: Primary | ICD-10-CM

## 2022-12-23 LAB
ALBUMIN UR-MCNC: 30 MG/DL
APPEARANCE UR: CLEAR
BACTERIA #/AREA URNS HPF: ABNORMAL /HPF
BILIRUB UR QL STRIP: NEGATIVE
CAOX CRY #/AREA URNS HPF: ABNORMAL /HPF
COLOR UR AUTO: YELLOW
GLUCOSE UR STRIP-MCNC: NEGATIVE MG/DL
HGB UR QL STRIP: ABNORMAL
KETONES UR STRIP-MCNC: 15 MG/DL
LEUKOCYTE ESTERASE UR QL STRIP: ABNORMAL
NITRATE UR QL: NEGATIVE
PH UR STRIP: 5 [PH] (ref 5–7)
RBC #/AREA URNS AUTO: ABNORMAL /HPF
SP GR UR STRIP: >=1.03 (ref 1–1.03)
UROBILINOGEN UR STRIP-ACNC: 0.2 E.U./DL
WBC #/AREA URNS AUTO: ABNORMAL /HPF

## 2022-12-23 PROCEDURE — 81001 URINALYSIS AUTO W/SCOPE: CPT

## 2022-12-23 NOTE — TELEPHONE ENCOUNTER
[facility] Call Center    Phone Message    May a detailed message be left on voicemail: yes     Reason for Call: Other: Patient called clinic with complains of burning and pressure, with blood in urine. She was triaged and advised to make an appointment for a UA within a clinic. Patient was scheduled prior to obtaining an order from her primary Dr. Gavi Stacy for a UA. Please send an order for this over so the patient can have this done at her scheduled appointment. Thank you.     Action Taken: Message routed to:  [facility] ([facility]    Travel Screening: Not Applicable

## 2022-12-23 NOTE — PROGRESS NOTES
Patient is coming in today at 3:15pm for a UA.  There is no orders placed.  Pls place orders if applicable. Kamala Lee MA/lab

## 2022-12-26 ENCOUNTER — MYC MEDICAL ADVICE (OUTPATIENT)
Dept: INTERNAL MEDICINE | Facility: CLINIC | Age: 77
End: 2022-12-26

## 2022-12-27 DIAGNOSIS — R31.9 HEMATURIA, UNSPECIFIED TYPE: Primary | ICD-10-CM

## 2022-12-27 NOTE — TELEPHONE ENCOUNTER
Gavi,    I saw your result note and I will let the pt know.  Do you want to give her antibiotics based in the result and sxs? Please see [facility] message.    Soon-Mi

## 2022-12-29 NOTE — TELEPHONE ENCOUNTER
Provider clarified that patient should have renal US not CT. US and urology appointments are already scheduled.    Mary Mead RN ([place])

## 2022-12-30 ENCOUNTER — ANCILLARY PROCEDURE (OUTPATIENT)
Dept: ULTRASOUND IMAGING | Facility: CLINIC | Age: 77
End: 2022-12-30
Attending: NURSE PRACTITIONER
Payer: COMMERCIAL

## 2022-12-30 DIAGNOSIS — R31.9 HEMATURIA, UNSPECIFIED TYPE: ICD-10-CM

## 2022-12-30 PROCEDURE — 76770 US EXAM ABDO BACK WALL COMP: CPT | Mod: TC | Performed by: RADIOLOGY

## 2022-12-31 ENCOUNTER — MYC MEDICAL ADVICE (OUTPATIENT)
Dept: INTERNAL MEDICINE | Facility: CLINIC | Age: 77
End: 2022-12-31

## 2022-12-31 ENCOUNTER — APPOINTMENT (OUTPATIENT)
Dept: GENERAL RADIOLOGY | Facility: CLINIC | Age: 77
End: 2022-12-31
Attending: EMERGENCY MEDICINE
Payer: COMMERCIAL

## 2022-12-31 ENCOUNTER — HOSPITAL ENCOUNTER (EMERGENCY)
Facility: CLINIC | Age: 77
Discharge: HOME | End: 2022-12-31
Attending: EMERGENCY MEDICINE | Admitting: EMERGENCY MEDICINE
Payer: COMMERCIAL

## 2022-12-31 VITALS
OXYGEN SATURATION: 99 % | RESPIRATION RATE: 16 BRPM | HEIGHT: 65 IN | DIASTOLIC BLOOD PRESSURE: 78 MMHG | SYSTOLIC BLOOD PRESSURE: 119 MMHG | TEMPERATURE: 97.5 F | WEIGHT: 135 LBS | BODY MASS INDEX: 22.49 KG/M2 | HEART RATE: 98 BPM

## 2022-12-31 DIAGNOSIS — R07.9 CHEST PAIN, UNSPECIFIED TYPE: ICD-10-CM

## 2022-12-31 DIAGNOSIS — N39.0 URINARY TRACT INFECTION WITHOUT HEMATURIA, SITE UNSPECIFIED: ICD-10-CM

## 2022-12-31 LAB
ALBUMIN SERPL BCG-MCNC: 4.5 G/DL (ref 3.5–5.2)
ALBUMIN UR-MCNC: 10 MG/DL
ALP SERPL-CCNC: 82 U/L (ref 35–104)
ALT SERPL W P-5'-P-CCNC: 31 U/L (ref 10–35)
ANION GAP SERPL CALCULATED.3IONS-SCNC: 13 MMOL/L (ref 7–15)
APPEARANCE UR: ABNORMAL
AST SERPL W P-5'-P-CCNC: 28 U/L (ref 10–35)
BACTERIA #/AREA URNS HPF: ABNORMAL /HPF
BASOPHILS # BLD AUTO: 0 10E3/UL (ref 0–0.2)
BASOPHILS NFR BLD AUTO: 0 %
BILIRUB SERPL-MCNC: 0.4 MG/DL
BILIRUB UR QL STRIP: NEGATIVE
BUN SERPL-MCNC: 16.7 MG/DL (ref 8–23)
CALCIUM SERPL-MCNC: 10.2 MG/DL (ref 8.8–10.2)
CHLORIDE SERPL-SCNC: 105 MMOL/L (ref 98–107)
COLOR UR AUTO: ABNORMAL
CREAT SERPL-MCNC: 0.64 MG/DL (ref 0.51–0.95)
DEPRECATED HCO3 PLAS-SCNC: 25 MMOL/L (ref 22–29)
EOSINOPHIL # BLD AUTO: 0 10E3/UL (ref 0–0.7)
EOSINOPHIL NFR BLD AUTO: 0 %
ERYTHROCYTE [DISTWIDTH] IN BLOOD BY AUTOMATED COUNT: 13.3 % (ref 10–15)
GFR SERPL CREATININE-BSD FRML MDRD: >90 ML/MIN/1.73M2
GLUCOSE SERPL-MCNC: 103 MG/DL (ref 70–99)
GLUCOSE UR STRIP-MCNC: NEGATIVE MG/DL
HCT VFR BLD AUTO: 42.1 % (ref 35–47)
HGB BLD-MCNC: 13.3 G/DL (ref 11.7–15.7)
HGB UR QL STRIP: ABNORMAL
HOLD SPECIMEN: NORMAL
HOLD SPECIMEN: NORMAL
IMM GRANULOCYTES # BLD: 0.1 10E3/UL
IMM GRANULOCYTES NFR BLD: 1 %
KETONES UR STRIP-MCNC: NEGATIVE MG/DL
LEUKOCYTE ESTERASE UR QL STRIP: ABNORMAL
LIPASE SERPL-CCNC: 46 U/L (ref 13–60)
LYMPHOCYTES # BLD AUTO: 0.7 10E3/UL (ref 0.8–5.3)
LYMPHOCYTES NFR BLD AUTO: 9 %
MCH RBC QN AUTO: 29.6 PG (ref 26.5–33)
MCHC RBC AUTO-ENTMCNC: 31.6 G/DL (ref 31.5–36.5)
MCV RBC AUTO: 94 FL (ref 78–100)
MONOCYTES # BLD AUTO: 0.5 10E3/UL (ref 0–1.3)
MONOCYTES NFR BLD AUTO: 6 %
MUCOUS THREADS #/AREA URNS LPF: PRESENT /LPF
NEUTROPHILS # BLD AUTO: 7 10E3/UL (ref 1.6–8.3)
NEUTROPHILS NFR BLD AUTO: 84 %
NITRATE UR QL: NEGATIVE
NRBC # BLD AUTO: 0 10E3/UL
NRBC BLD AUTO-RTO: 0 /100
PH UR STRIP: 7.5 [PH] (ref 5–7)
PLATELET # BLD AUTO: 204 10E3/UL (ref 150–450)
POTASSIUM SERPL-SCNC: 3.7 MMOL/L (ref 3.4–5.3)
PROT SERPL-MCNC: 6.6 G/DL (ref 6.4–8.3)
RBC # BLD AUTO: 4.49 10E6/UL (ref 3.8–5.2)
RBC URINE: 14 /HPF
SODIUM SERPL-SCNC: 143 MMOL/L (ref 136–145)
SP GR UR STRIP: 1.01 (ref 1–1.03)
SQUAMOUS EPITHELIAL: <1 /HPF
TRANSITIONAL EPI: <1 /HPF
TROPONIN T SERPL HS-MCNC: 11 NG/L
TROPONIN T SERPL HS-MCNC: 12 NG/L
UROBILINOGEN UR STRIP-MCNC: NORMAL MG/DL
WBC # BLD AUTO: 8.4 10E3/UL (ref 4–11)
WBC URINE: >182 /HPF

## 2022-12-31 PROCEDURE — 96361 HYDRATE IV INFUSION ADD-ON: CPT | Performed by: EMERGENCY MEDICINE

## 2022-12-31 PROCEDURE — 81001 URINALYSIS AUTO W/SCOPE: CPT | Performed by: EMERGENCY MEDICINE

## 2022-12-31 PROCEDURE — 93005 ELECTROCARDIOGRAM TRACING: CPT | Performed by: EMERGENCY MEDICINE

## 2022-12-31 PROCEDURE — 87186 SC STD MICRODIL/AGAR DIL: CPT | Performed by: EMERGENCY MEDICINE

## 2022-12-31 PROCEDURE — 85025 COMPLETE CBC W/AUTO DIFF WBC: CPT | Performed by: EMERGENCY MEDICINE

## 2022-12-31 PROCEDURE — 71046 X-RAY EXAM CHEST 2 VIEWS: CPT

## 2022-12-31 PROCEDURE — 84484 ASSAY OF TROPONIN QUANT: CPT | Performed by: EMERGENCY MEDICINE

## 2022-12-31 PROCEDURE — 71046 X-RAY EXAM CHEST 2 VIEWS: CPT | Mod: 26 | Performed by: STUDENT IN AN ORGANIZED HEALTH CARE EDUCATION/TRAINING PROGRAM

## 2022-12-31 PROCEDURE — 258N000003 HC RX IP 258 OP 636: Performed by: EMERGENCY MEDICINE

## 2022-12-31 PROCEDURE — 36415 COLL VENOUS BLD VENIPUNCTURE: CPT | Performed by: EMERGENCY MEDICINE

## 2022-12-31 PROCEDURE — 250N000013 HC RX MED GY IP 250 OP 250 PS 637: Performed by: EMERGENCY MEDICINE

## 2022-12-31 PROCEDURE — 83690 ASSAY OF LIPASE: CPT | Performed by: EMERGENCY MEDICINE

## 2022-12-31 PROCEDURE — 99285 EMERGENCY DEPT VISIT HI MDM: CPT | Mod: 25 | Performed by: EMERGENCY MEDICINE

## 2022-12-31 PROCEDURE — 93010 ELECTROCARDIOGRAM REPORT: CPT | Performed by: EMERGENCY MEDICINE

## 2022-12-31 PROCEDURE — 80053 COMPREHEN METABOLIC PANEL: CPT | Performed by: EMERGENCY MEDICINE

## 2022-12-31 PROCEDURE — 96360 HYDRATION IV INFUSION INIT: CPT | Performed by: EMERGENCY MEDICINE

## 2022-12-31 RX ORDER — NITROFURANTOIN 25; 75 MG/1; MG/1
100 CAPSULE ORAL 2 TIMES DAILY
Qty: 10 CAPSULE | Refills: 0 | Status: SHIPPED | OUTPATIENT
Start: 2022-12-31 | End: 2023-01-05

## 2022-12-31 RX ORDER — SODIUM CHLORIDE 9 MG/ML
INJECTION, SOLUTION INTRAVENOUS CONTINUOUS
Status: DISCONTINUED | OUTPATIENT
Start: 2022-12-31 | End: 2022-12-31 | Stop reason: HOSPADM

## 2022-12-31 RX ORDER — NITROFURANTOIN 25; 75 MG/1; MG/1
100 CAPSULE ORAL EVERY 12 HOURS SCHEDULED
Status: DISCONTINUED | OUTPATIENT
Start: 2022-12-31 | End: 2022-12-31 | Stop reason: HOSPADM

## 2022-12-31 RX ADMIN — NITROFURANTOIN (MONOHYDRATE/MACROCRYSTALS) 100 MG: 75; 25 CAPSULE ORAL at 11:44

## 2022-12-31 RX ADMIN — SODIUM CHLORIDE 1000 ML: 9 INJECTION, SOLUTION INTRAVENOUS at 08:46

## 2022-12-31 ASSESSMENT — ACTIVITIES OF DAILY LIVING (ADL)
ADLS_ACUITY_SCORE: 35

## 2022-12-31 ASSESSMENT — ENCOUNTER SYMPTOMS
DIAPHORESIS: 1
HEMATURIA: 0
DYSURIA: 0
VOMITING: 0
NAUSEA: 1
SHORTNESS OF BREATH: 0

## 2022-12-31 NOTE — ED PROVIDER NOTES
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 It was associated with diaphoresis and nausea. No vomiting or shortness of breath. The symptoms lasted for approximately 15 minutes. She is chest pain and symptom-free at time of arrival. She has no history of Mis, Pes, or DVTs and has been overall in her usual state of health except for 1 week ago on December 23 patient had dysuria and suprapubic sharp pain while urinating after which she called her primary care doctor and went to get a urinalysis which was negative and was never started on antibiotics. She has had no dysuria or hematuria since. For follow-up to her outpatient work she is scheduled for a cystoscopy and received a normal abdominal ultrasound which was overall unremarkable in the last week.     Past Medical History  Past Medical History:   Diagnosis Date     Arthritis ??    probably     Basal cell carcinoma      Dermatomyositis (H)      Glaucoma      IgA deficiency (H)      Nonsenile cataract      Past Surgical History:   Procedure Laterality Date     BIOPSY OF SKIN LESION       COLONOSCOPY       [facility] UGI ENDOSCOPY W EUS N/A 5/14/2014    Procedure: COMBINED ENDOSCOPIC ULTRASOUND, ESOPHAGOSCOPY, GASTROSCOPY, DUODENOSCOPY (EGD);  Surgeon: Boston Rodriguez MD;  [facility]: [facility]     HYSTERECTOMY      partial, prolapse uterus.     augmented betamethasone dipropionate (DIPROLENE AF) 0.05 % external cream  B Complex TABS  Calcium-Magnesium-Vitamin D 600- MG-MG-UNIT TB24  Collagen Hydrolysate POWD  dorzolamide-timolol (COSOPT) 2-0.5 % ophthalmic solution  glucosamine-chondroitin 500-400 MG CAPS per capsule  hydrocortisone 2.5 %  cream  hydroxychloroquine (PLAQUENIL) 200 MG tablet  ibuprofen (ADVIL/MOTRIN) 200 MG tablet  latanoprost (XALATAN) 0.005 % ophthalmic solution  Multiple Vitamins-Minerals (MULTIPLE VITAMINS/WOMENS PO)  Multiple Vitamins-Minerals (PRESERVISION AREDS 2+MULTI VIT PO)  mycophenolate (GENERIC EQUIVALENT) 500 MG tablet  omega-3 fatty acids (FISH OIL) 1200 MG capsule  Propylene Glycol (SYSTANE COMPLETE OP)  sertraline (ZOLOFT) 25 MG tablet  triamcinolone (KENALOG) 0.1 % external cream  Vitamin D3 (CHOLECALCIFEROL) 125 MCG (5000 UT) tablet      Allergies   Allergen Reactions     Penicillins Anaphylaxis     Family History  Family History   Problem Relation Age of Onset     C.A.D. Father      Arthritis Father      Diabetes Brother      Glaucoma Brother      Breast Cancer Sister 35     Glaucoma Sister      Neurologic Disorder Sister         seizures     Cancer Sister         breast     Psoriasis Daughter      Rheumatologic Disease Daughter         Dermatomyositis     Alzheimer Disease Mother         onset in 60s     Hypertension No family hx of      Cerebrovascular Disease No family hx of         ,     Thyroid Disease No family hx of      Skin Cancer No family hx of      Macular Degeneration No family hx of      Social History   Social History     Tobacco Use     Smoking status: Former     Packs/day: 1.00     Years: 10.00     Pack years: 10.00     Types: Cigarettes     Quit date: 4/10/1979     Years since quittin.7     Smokeless tobacco: Never     Tobacco comments:     quit    Vaping Use     Vaping Use: Never used   Substance Use Topics     Alcohol use: Not Currently     Alcohol/week: 0.0 standard drinks     Comment: rare     Drug use: No      Past medical history, past surgical history, medications, allergies, family history, and social history were reviewed with the patient. No additional pertinent items.       Review of Systems   Constitutional: Positive for diaphoresis (now resolved).   Respiratory: Negative for  "shortness of breath.    Cardiovascular: Positive for chest pain (epigastric, now resolved).   Gastrointestinal: Positive for nausea (now resolved). Negative for vomiting.   Genitourinary: Negative for dysuria and hematuria.     A complete review of systems was performed with pertinent positives and negatives noted in the HPI, and all other systems negative.    Physical Exam   BP: 131/71  Pulse: 67  Temp: 97.5  F (36.4  C)  Resp: 16  Height: 165.1 cm (5' 5\")  Weight: 61.2 kg (135 lb)  SpO2: 99 %  Physical Exam  Vitals and nursing note reviewed.   Constitutional:       General: She is not in acute distress.     Appearance: She is not diaphoretic.   HENT:      Head: Atraumatic.      Mouth/Throat:      Pharynx: No oropharyngeal exudate.   Eyes:      General: No scleral icterus.     Pupils: Pupils are equal, round, and reactive to light.   Cardiovascular:      Rate and Rhythm: Normal rate and regular rhythm.      Heart sounds: Normal heart sounds.   Pulmonary:      Effort: No respiratory distress.      Breath sounds: Normal breath sounds. No wheezing or rales.   Abdominal:      General: Bowel sounds are normal.      Palpations: Abdomen is soft.      Tenderness: There is no right CVA tenderness or left CVA tenderness.      Comments: Mild suprapubic tenderness on repeat deep palpation.  Otherwise soft nontender in all 4 quadrants   Musculoskeletal:         General: No swelling or tenderness.   Skin:     General: Skin is warm.      Findings: No rash.   Neurological:      General: No focal deficit present.      Mental Status: She is oriented to person, place, and time.   Psychiatric:         Mood and Affect: Mood normal.         Behavior: Behavior normal.         ED Course     ED Course as of 01/03/23 1054   Sat Dec 31, 2022   1334 After multiple hours waiting for the second troponin, patient desires to leave and be discharged with an incomplete work-up.  Patient understands that the second troponin may be elevated which is " the definition of damage to the heart.  She will check MyChart and return if troponin elevated.  Asymptomatic upon discharge with clinical decision-making capacity     Procedures             EKG Interpretation:     EKG is performed at triage with normal sinus rhythm, 64 beats a minute, overall nonischemic axis normal QTC is 445 flat T waves in V two thirds of the 4 consistent with potential partial right bundle branch block       Interpreted by Robert Serrano MD  Time reviewed: 0819  Symptoms at time of EKG: chest pain (resolved)   Rhythm: Normal sinus   Rate: 64 bpm  Axis: Normal  Ectopy: None  Conduction: potential partial right bundle branch block  ST Segments/ T Waves: T wave flattening V2-V4  Q Waves: None  Comparison to prior: 12/11/2012    Clinical Impression: overall nonischemic EKG, normal axis, , flat T waves in V2-V4 consistent with potential partial right bundle branch block     The medical record was reviewed and interpreted.  Current labs reviewed and interpreted.  Managed outpatient prescription medications.        Results for orders placed or performed during the hospital encounter of 12/31/22   Extra Tube ([facility] Draw)     Status: None (In process)    Narrative    The following orders were created for panel order Extra Tube ([facility] Draw).  Procedure                               Abnormality         Status                     ---------                               -----------         ------                     Extra Blue Top Tube[[id number]]                              In process                 Extra Red Top Tube[[id number]]                               In process                   Please view results for these tests on the individual orders.   EKG 12 lead     Status: None (Preliminary result)   Result Value Ref Range    Systolic Blood Pressure  mmHg    Diastolic Blood Pressure  mmHg    Ventricular Rate 64 BPM    Atrial Rate 64 BPM    OH Interval 148 ms    QRS Duration 88 ms     ms     QTc 445 ms    P Axis 27 degrees    R AXIS -23 degrees    T Axis 45 degrees    Interpretation ECG       Sinus rhythm with sinus arrhythmia  Nonspecific T wave abnormality  Abnormal ECG     CBC with platelets differential     Status: None ()    Narrative    The following orders were created for panel order CBC with platelets differential.  Procedure                               Abnormality         Status                     ---------                               -----------         ------                     CBC with platelets and d...[[id number]]                                                   Please view results for these tests on the individual orders.     Medications   0.9% sodium chloride BOLUS (has no administration in time range)     Followed by   sodium chloride 0.9% infusion (has no administration in time range)        Assessments & Plan (with Medical Decision Making)   Given patient's age and the nature of the symptoms this morning we will perform full rule out of acute coronary syndrome, screen for atypical pneumonia or pneumothorax, screen for electrolyte abnormalities, early UTI, or intra-abdominal processes such as pancreatitis. If all normal after serial troponins will likely discharge with close follow-up with primary care doctor given patient is well linked in with healthcare.     This part of the medical record was transcribed by Katie Elizondo Medical Scribe, from a dictation done by Robert Serrano MD.     I have reviewed the nursing notes. I have reviewed the findings, diagnosis, plan and need for follow up with the patient.    Medical Decision Making  The patient presented with a problem that is a chronic illness severe exacerbation, progression, or side effect of treatment.    The patient's evaluation involved:  an assessment requiring an independent historian (Daughter at bedside)  ordering and review of 3+ test(s) (Urinalysis, multiple labs, EKG, chest x-ray)    The patient's  management involved prescription drug management and a decision regarding hospitalization.      New Prescriptions    No medications on file       Final diagnoses:   None       --  Robert Serrano MD  [facility] EMERGENCY DEPARTMENT  12/31/2022     Robert Serrano MD  12/31/22 1155       Robert Serrano MD  01/03/23 1050

## 2022-12-31 NOTE — DISCHARGE INSTRUCTIONS
Take antibiotics as prescribed for urinary infection.  Continue plan with urologist for follow-up  Contact your primary care doctor soon as possible to coordinate a virtual or in person visit regarding your chest pain this morning.    Please return to the emergency department if worsening chest pain shortness of breath or other worrisome symptoms

## 2022-12-31 NOTE — ED TRIAGE NOTES
"Pt presents to ER by EMS for concerns of left sided flank/rib pain. Pt states that this pain woke her from her sleep around 0530, pt states that she was feeling nauseated and having \"cold sweats\" during this episode of pain. On arrival states she is pain free.      Triage Assessment     Row Name 12/31/22 0658       Triage Assessment (Adult)    Airway WDL WDL       Respiratory WDL    Respiratory WDL WDL       Skin Circulation/Temperature WDL    Skin Circulation/Temperature WDL WDL       Cardiac WDL    Cardiac WDL WDL       Peripheral/Neurovascular WDL    Peripheral Neurovascular WDL WDL       Cognitive/Neuro/Behavioral WDL    Cognitive/Neuro/Behavioral WDL WDL              "

## 2023-01-01 LAB
ATRIAL RATE - MUSE: 64 BPM
BACTERIA UR CULT: ABNORMAL
DIASTOLIC BLOOD PRESSURE - MUSE: NORMAL MMHG
INTERPRETATION ECG - MUSE: NORMAL
P AXIS - MUSE: 27 DEGREES
PR INTERVAL - MUSE: 148 MS
QRS DURATION - MUSE: 88 MS
QT - MUSE: 432 MS
QTC - MUSE: 445 MS
R AXIS - MUSE: -23 DEGREES
SYSTOLIC BLOOD PRESSURE - MUSE: NORMAL MMHG
T AXIS - MUSE: 45 DEGREES
VENTRICULAR RATE- MUSE: 64 BPM

## 2023-01-02 NOTE — RESULT ENCOUNTER NOTE
Final Urine Culture Report on 1/1/23  [facility] Emergency Dept discharge antibiotic prescribed: Nitrofurantoin Macrocrystal-Monohydrate (Macrobid) 100 mg PO capsule, 1 capsule (100 mg) by mouth 2 times daily for 5 days  #1. Bacteria, >100,000 CFU/ML Escherichia coli , is SUSCEPTIBLE to Antibiotic.    No change in treatment per [facility] ED lab result Urine Culture protocol.

## 2023-01-03 ENCOUNTER — TELEPHONE (OUTPATIENT)
Dept: RHEUMATOLOGY | Facility: CLINIC | Age: 78
End: 2023-01-03

## 2023-01-03 NOTE — TELEPHONE ENCOUNTER
Prior Authorization Retail Medication Request    Medication/Dose: Hydroxychloroquine 200 mg daily  ICD code (if different than what is on RX):  M33.90  Previously Tried and Failed:   Rationale:  Stable dermatomyositis on therapy. Potential for flare if patient has to change medication    Insurance Name:  [employer]  Insurance ID:  [id number]    Pharmacy Information (if different than what is on RX)  Name:  [facility]  Phone: [phone]

## 2023-01-05 NOTE — TELEPHONE ENCOUNTER
Prior Authorization Not Needed per Insurance    Medication: hydroxychloroquine (PLAQUENIL) 200 MG tablet--NO PA NEEDED  Insurance Company: [employer] - Phone [phone] Fax [phone]  Expected CoPay:      Pharmacy Filling the Rx: [facility] - [place], [place] - [address] [place] [address]  Pharmacy Notified: Yes  Patient Notified: Yes **Instructed pharmacy to notify patient when script is ready to /ship.**

## 2023-01-10 ENCOUNTER — OFFICE VISIT (OUTPATIENT)
Dept: INTERNAL MEDICINE | Facility: CLINIC | Age: 78
End: 2023-01-10
Payer: COMMERCIAL

## 2023-01-10 VITALS
OXYGEN SATURATION: 97 % | SYSTOLIC BLOOD PRESSURE: 121 MMHG | WEIGHT: 140.9 LBS | BODY MASS INDEX: 23.45 KG/M2 | DIASTOLIC BLOOD PRESSURE: 65 MMHG | HEART RATE: 70 BPM

## 2023-01-10 DIAGNOSIS — R30.0 DYSURIA: Primary | ICD-10-CM

## 2023-01-10 PROCEDURE — 99214 OFFICE O/P EST MOD 30 MIN: CPT | Performed by: NURSE PRACTITIONER

## 2023-01-10 NOTE — NURSING NOTE
Mely Rashid is a 77 year old female that presents in clinic today for the following:     Chief Complaint   Patient presents with     Hospital F/U     Pt here for hospital follow up and to discuss new anti-depressant       The patient's allergies and medications were reviewed. The patient's vitals were obtained without incident. The patient does not have any other questions for the provider.     Rio Patten, EMT at 9:59 AM on 1/10/2023.  Primary Care Clinic: [phone]

## 2023-01-10 NOTE — PROGRESS NOTES
S: Mely Rashid is a 77 year old female here for ER follow-up after visit on 22 for symptoms of epigastric pain when at home. It was associated with diaphoreses and nausea.   She has felt ok since that visit.   She had a normal renal US22.     She stopped full dose anti-depressant of 25 mg.  She was experiencing headache, nausea, and diarrhea.       Patient Active Problem List   Diagnosis     Glaucoma suspect, right     Dermatomyositis (H)     IgA deficiency (H)     Herpes zoster     Encounter for long-term current use of medication     Encounter for long-term (current) use of steroids     Oral ulcer     Seborrheic dermatitis     Osteopenia     Neoplasm of uncertain behavior of skin     Routine general medical examination at a health care facility            Past Medical History:   Diagnosis Date     Arthritis ??    probably     Basal cell carcinoma      Dermatomyositis (H)      Glaucoma      IgA deficiency (H)      Nonsenile cataract             Past Surgical History:   Procedure Laterality Date     BIOPSY OF SKIN LESION       COLONOSCOPY       [facility] UGI ENDOSCOPY W EUS N/A 2014    Procedure: COMBINED ENDOSCOPIC ULTRASOUND, ESOPHAGOSCOPY, GASTROSCOPY, DUODENOSCOPY (EGD);  Surgeon: Boston Rodriguez MD;  Location: [facility]     HYSTERECTOMY      partial, prolapse uterus.            Social History     Tobacco Use     Smoking status: Former     Packs/day: 1.00     Years: 10.00     Pack years: 10.00     Types: Cigarettes     Quit date: 4/10/1979     Years since quittin.7     Smokeless tobacco: Never     Tobacco comments:     quit    Substance Use Topics     Alcohol use: Not Currently     Alcohol/week: 0.0 standard drinks     Comment: rare            Family History   Problem Relation Age of Onset     C.A.D. Father      Arthritis Father      Diabetes Brother      Glaucoma Brother      Breast Cancer Sister 35     Glaucoma Sister      Neurologic Disorder Sister         seizures     Cancer Sister          breast     Psoriasis Daughter      Rheumatologic Disease Daughter         Dermatomyositis     Alzheimer Disease Mother         onset in 60s     Hypertension No family hx of      Cerebrovascular Disease No family hx of         ,     Thyroid Disease No family hx of      Skin Cancer No family hx of      Macular Degeneration No family hx of                Allergies   Allergen Reactions     Penicillins Anaphylaxis            Current Outpatient Medications   Medication Sig Dispense Refill     augmented betamethasone dipropionate (DIPROLENE AF) 0.05 % external cream Apply to areas of dermatomyositis rash twice daily as needed. Do not use on face, underarms, groin. 50 g 1     B Complex TABS Take 1 tablet by mouth daily.       Calcium-Magnesium-Vitamin D 600- MG-MG-UNIT TB24 Take 1 tablet by mouth 2 times daily       Collagen Hydrolysate POWD Take by mouth daily       dorzolamide-timolol (COSOPT) 2-0.5 % ophthalmic solution Place 1 drop into the right eye 2 times daily 10 mL 11     glucosamine-chondroitin 500-400 MG CAPS per capsule Take 2 capsules by mouth daily.       hydrocortisone 2.5 % cream Apply topically 2 times daily For face 30 g 3     hydroxychloroquine (PLAQUENIL) 200 MG tablet Take 1 tablet (200 mg) by mouth daily Please keep 9-8-22 clinic appt for refills. 90 tablet 1     ibuprofen (ADVIL/MOTRIN) 200 MG tablet Take 200 mg by mouth every 8 hours as needed for inflammatory pain       latanoprost (XALATAN) 0.005 % ophthalmic solution Place 1 drop into both eyes At Bedtime 7.5 mL 3     Multiple Vitamins-Minerals (MULTIPLE VITAMINS/WOMENS PO)        Multiple Vitamins-Minerals (PRESERVISION AREDS 2+MULTI VIT PO) Take 1 tablet by mouth daily       mycophenolate (GENERIC EQUIVALENT) 500 MG tablet TAKE 2 TABLETS TWICE A  tablet 3     omega-3 fatty acids (FISH OIL) 1200 MG capsule Take 1 capsule by mouth daily.       Propylene Glycol (SYSTANE COMPLETE OP) Apply 1 drop to eye as needed        triamcinolone (KENALOG) 0.1 % external cream Apply topically 2 times daily For body 454 g 3     Vitamin D3 (CHOLECALCIFEROL) 125 MCG (5000 UT) tablet Take 1 tablet by mouth daily       sertraline (ZOLOFT) 25 MG tablet Take 1 tab at HS. (Patient not taking: Reported on 1/10/2023) 90 tablet 3       REVIEW OF SYSTEMS:  See above.    O: /65 (BP Location: Right arm, Patient Position: Sitting, Cuff Size: Adult Regular)   Pulse 70   Wt 63.9 kg (140 lb 14.4 oz)   SpO2 97%   BMI 23.45 kg/m      GENERAL APPEARANCE: healthy, alert and no distress  RESP: lungs clear to auscultation - no rales, rhonchi or wheezes  CV: regular rates and rhythm, normal S1 S2, no S3 or S4 and no murmur, click or rub   ABDOMEN:  soft, nontender, no HSM or masses and bowel sounds normal  NEURO: alert and oriented.  Good historian.  MUSK: ambulatory.  PSYCHE: normal.    A/P:  She will continue w/o medications at this time.    The patient voiced understanding of the information discussed and all questions were answered.     I spent a total of 30 minutes in the care of this pt during today's office visit. This time includes reviewing the patient's chart and prior history, obtaining a history, performing an examination and evaluation and counseling the patient. This time also includes ordering medications or tests necessary in addition to communication to other member's of the patient's health care team. Time spent in documentation and care coordination is included.     Gavi ISLAS, CNP

## 2023-01-13 ENCOUNTER — LAB (OUTPATIENT)
Dept: LAB | Facility: CLINIC | Age: 78
End: 2023-01-13
Payer: COMMERCIAL

## 2023-01-13 DIAGNOSIS — Z79.899 ENCOUNTER FOR LONG-TERM CURRENT USE OF MEDICATION: ICD-10-CM

## 2023-01-13 DIAGNOSIS — M33.13 DERMATOMYOSITIS (H): ICD-10-CM

## 2023-01-13 LAB
ALBUMIN SERPL-MCNC: 4 G/DL (ref 3.4–5)
ALT SERPL W P-5'-P-CCNC: 30 U/L (ref 0–50)
AST SERPL W P-5'-P-CCNC: 17 U/L (ref 0–45)
BASOPHILS # BLD AUTO: 0 10E3/UL (ref 0–0.2)
BASOPHILS NFR BLD AUTO: 1 %
CK SERPL-CCNC: 56 U/L (ref 30–225)
CRP SERPL-MCNC: <2.9 MG/L (ref 0–8)
EOSINOPHIL # BLD AUTO: 0 10E3/UL (ref 0–0.7)
EOSINOPHIL NFR BLD AUTO: 1 %
ERYTHROCYTE [DISTWIDTH] IN BLOOD BY AUTOMATED COUNT: 13.5 % (ref 10–15)
HCT VFR BLD AUTO: 41.3 % (ref 35–47)
HGB BLD-MCNC: 13 G/DL (ref 11.7–15.7)
LYMPHOCYTES # BLD AUTO: 1 10E3/UL (ref 0.8–5.3)
LYMPHOCYTES NFR BLD AUTO: 26 %
MCH RBC QN AUTO: 30 PG (ref 26.5–33)
MCHC RBC AUTO-ENTMCNC: 31.5 G/DL (ref 31.5–36.5)
MCV RBC AUTO: 95 FL (ref 78–100)
MONOCYTES # BLD AUTO: 0.4 10E3/UL (ref 0–1.3)
MONOCYTES NFR BLD AUTO: 11 %
NEUTROPHILS # BLD AUTO: 2.4 10E3/UL (ref 1.6–8.3)
NEUTROPHILS NFR BLD AUTO: 61 %
PLATELET # BLD AUTO: 205 10E3/UL (ref 150–450)
RBC # BLD AUTO: 4.33 10E6/UL (ref 3.8–5.2)
WBC # BLD AUTO: 3.9 10E3/UL (ref 4–11)

## 2023-01-13 PROCEDURE — 84450 TRANSFERASE (AST) (SGOT): CPT

## 2023-01-13 PROCEDURE — 36415 COLL VENOUS BLD VENIPUNCTURE: CPT

## 2023-01-13 PROCEDURE — 82550 ASSAY OF CK (CPK): CPT

## 2023-01-13 PROCEDURE — 82040 ASSAY OF SERUM ALBUMIN: CPT

## 2023-01-13 PROCEDURE — 86140 C-REACTIVE PROTEIN: CPT

## 2023-01-13 PROCEDURE — 85025 COMPLETE CBC W/AUTO DIFF WBC: CPT

## 2023-01-13 PROCEDURE — 84460 ALANINE AMINO (ALT) (SGPT): CPT

## 2023-01-16 DIAGNOSIS — K86.2 PANCREAS CYST: Primary | ICD-10-CM

## 2023-01-18 ENCOUNTER — TELEPHONE (OUTPATIENT)
Dept: OPHTHALMOLOGY | Facility: CLINIC | Age: 78
End: 2023-01-18

## 2023-01-18 ENCOUNTER — VIRTUAL VISIT (OUTPATIENT)
Dept: UROLOGY | Facility: CLINIC | Age: 78
End: 2023-01-18
Payer: COMMERCIAL

## 2023-01-18 VITALS — WEIGHT: 140 LBS | HEIGHT: 65 IN | BODY MASS INDEX: 23.32 KG/M2

## 2023-01-18 DIAGNOSIS — R30.0 DYSURIA: ICD-10-CM

## 2023-01-18 DIAGNOSIS — Z87.440 HISTORY OF UTI: Primary | ICD-10-CM

## 2023-01-18 DIAGNOSIS — Z79.899 ENCOUNTER FOR LONG-TERM (CURRENT) USE OF HIGH-RISK MEDICATION: Primary | ICD-10-CM

## 2023-01-18 PROCEDURE — 99204 OFFICE O/P NEW MOD 45 MIN: CPT | Mod: 95 | Performed by: PHYSICIAN ASSISTANT

## 2023-01-18 RX ORDER — ESTRADIOL 0.1 MG/G
CREAM VAGINAL
Qty: 42.5 G | Refills: 3 | Status: SHIPPED | OUTPATIENT
Start: 2023-01-18 | End: 2023-04-03

## 2023-01-18 ASSESSMENT — PAIN SCALES - GENERAL: PAINLEVEL: NO PAIN (0)

## 2023-01-18 NOTE — LETTER
1/18/2023       RE: Mely Rashid  [address]   [place] [address]     Dear Colleague,    Thank you for referring your patient, Mely Rashid, to the [facility] at [facility]. Please see a copy of my visit note below.    Send link to cell phone    Ask pt what pharmacy she wants to use    Pat is a 77 year old who is being evaluated via a billable video visit.      How would you like to obtain your AVS? MyChart  If the video visit is dropped, the invitation should be resent by: Text to cell phone: [phone]  Will anyone else be joining your video visit? No      Video-Visit Details    Type of service:  Video Visit   Video Start Time: 2:12 PM  Video End Time:2:30 PM    Originating Location (pt. Location): Home    Distant Location (provider location):  On-site  Platform used for Video Visit: Doximity    CC: recent UTI    HPI:  Mely Rashid is a pleasant 77 year old female who presents in consultation from Gavi Stacy CNP for evaluation of the above.   At baseline, has had urgency, and some urge incontinence for the past 5 years.   UA 12/23 noted 25-50 RBCs/HPF with neg UC. Presented to ED 12/31/22 with chest discomfort, chills. She had a UA/UC for dysuria, worsening of urinary control at that time. UC+ for e coli. Completed five days of Macrobid.     Intermittent discomfort with urination continues, but improved. No gross hematuria.      Normal renal US 12/30/22.    Past Medical History:   Diagnosis Date     Arthritis ??    probably     Basal cell carcinoma      Dermatomyositis (H)      Glaucoma      IgA deficiency (H)      Nonsenile cataract        Past Surgical History:   Procedure Laterality Date     BIOPSY OF SKIN LESION       COLONOSCOPY       HC UGI ENDOSCOPY W EUS N/A 05/14/2014    Procedure: COMBINED ENDOSCOPIC ULTRASOUND, ESOPHAGOSCOPY, GASTROSCOPY, DUODENOSCOPY (EGD);  Surgeon: Boston Rodriguez MD;   Location:  GI     HYSTERECTOMY      partial, prolapse uterus.       Social History     Socioeconomic History     Marital status: Single     Spouse name: Not on file     Number of children: Not on file     Years of education: Not on file     Highest education level: Not on file   Occupational History     Not on file   Tobacco Use     Smoking status: Former     Packs/day: 1.00     Years: 10.00     Pack years: 10.00     Types: Cigarettes     Quit date: 4/10/1979     Years since quittin.8     Smokeless tobacco: Never     Tobacco comments:     quit    Vaping Use     Vaping Use: Never used   Substance and Sexual Activity     Alcohol use: Not Currently     Alcohol/week: 0.0 standard drinks     Comment: rare     Drug use: No     Sexual activity: Not Currently   Other Topics Concern      Service No     Blood Transfusions No     Caffeine Concern No     Occupational Exposure No     Hobby Hazards No     Sleep Concern No     Stress Concern No     Weight Concern No     Special Diet No     Back Care Not Asked     Exercise Yes     Comment: walk, horseback ride     Bike Helmet Not Asked     Seat Belt Yes     Self-Exams Not Asked     Parent/sibling w/ CABG, MI or angioplasty before 65F 55M? No   Social History Narrative         Social Determinants of Health     Financial Resource Strain: Not on file   Food Insecurity: Not on file   Transportation Needs: Not on file   Physical Activity: Not on file   Stress: Not on file   Social Connections: Not on file   Intimate Partner Violence: Not At Risk     Fear of Current or Ex-Partner: No     Emotionally Abused: No     Physically Abused: No     Sexually Abused: No   Housing Stability: Not on file       Family History   Problem Relation Age of Onset     C.A.D. Father      Arthritis Father      Diabetes Brother      Glaucoma Brother      Breast Cancer Sister 35     Glaucoma Sister      Neurologic Disorder Sister         seizures     Cancer Sister         breast      "Psoriasis Daughter      Rheumatologic Disease Daughter         Dermatomyositis     Alzheimer Disease Mother         onset in 60s     Hypertension No family hx of      Cerebrovascular Disease No family hx of         ,     Thyroid Disease No family hx of      Skin Cancer No family hx of      Macular Degeneration No family hx of        Allergies   Allergen Reactions     Penicillins Anaphylaxis       Current Outpatient Medications   Medication     augmented betamethasone dipropionate (DIPROLENE AF) 0.05 % external cream     B Complex TABS     Calcium-Magnesium-Vitamin D 600- MG-MG-UNIT TB24     Collagen Hydrolysate POWD     dorzolamide-timolol (COSOPT) 2-0.5 % ophthalmic solution     glucosamine-chondroitin 500-400 MG CAPS per capsule     hydrocortisone 2.5 % cream     hydroxychloroquine (PLAQUENIL) 200 MG tablet     ibuprofen (ADVIL/MOTRIN) 200 MG tablet     latanoprost (XALATAN) 0.005 % ophthalmic solution     Multiple Vitamins-Minerals (MULTIPLE VITAMINS/WOMENS PO)     Multiple Vitamins-Minerals (PRESERVISION AREDS 2+MULTI VIT PO)     mycophenolate (GENERIC EQUIVALENT) 500 MG tablet     omega-3 fatty acids (FISH OIL) 1200 MG capsule     Propylene Glycol (SYSTANE COMPLETE OP)     triamcinolone (KENALOG) 0.1 % external cream     Vitamin D3 (CHOLECALCIFEROL) 125 MCG (5000 UT) tablet     No current facility-administered medications for this visit.         PEx:   Height 1.651 m (5' 5\"), weight 63.5 kg (140 lb), not currently breastfeeding.    PSYCH: NAD  EYES: EOMI  MOUTH: MMM  NEURO: AAO x3    A/P: Mely Rashid is a 77 year old female with Acute cystitis, Urethral/vaginal atrophy.  -UA/UC if symptoms  -Estrogen cream three times a week near urethra (pea-sized amount): If >$50, she will let me know and we can get via a compound pharmacy.  -follow-up 3 mo    Rody Armstrong PA-C  [facility]        26 minutes spent on the date of the encounter doing chart review, review of outside records, review of test " results, interpretation of tests, patient visit and documentation

## 2023-01-18 NOTE — TELEPHONE ENCOUNTER
Request received to assist in prior authorization to change latanoprost to tier one medication (pt been using for long period and has previously been tier 1)    Note to PA team to assist in request to insurance    Fermín Hudson RN 9:27 AM 01/18/23

## 2023-01-18 NOTE — PROGRESS NOTES
Send link to cell phone    Ask pt what pharmacy she wants to use    Pat is a 77 year old who is being evaluated via a billable video visit.      How would you like to obtain your AVS? MyChart  If the video visit is dropped, the invitation should be resent by: Text to cell phone: [phone]  Will anyone else be joining your video visit? No      Video-Visit Details    Type of service:  Video Visit   Video Start Time: 2:12 PM  Video End Time:2:30 PM    Originating Location (pt. Location): Home    Distant Location (provider location):  On-site  Platform used for Video Visit: ReturnHauler    CC: recent UTI    HPI:  Mely Rashid is a pleasant 77 year old female who presents in consultation from Gavi Stacy CNP for evaluation of the above.   At baseline, has had urgency, and some urge incontinence for the past 5 years.   UA 12/23 noted 25-50 RBCs/HPF with neg UC. Presented to ED 12/31/22 with chest discomfort, chills. She had a UA/UC for dysuria, worsening of urinary control at that time. UC+ for e coli. Completed five days of Macrobid.     Intermittent discomfort with urination continues, but improved. No gross hematuria.      Normal renal US 12/30/22.    Past Medical History:   Diagnosis Date     Arthritis ??    probably     Basal cell carcinoma      Dermatomyositis (H)      Glaucoma      IgA deficiency (H)      Nonsenile cataract        Past Surgical History:   Procedure Laterality Date     BIOPSY OF SKIN LESION       COLONOSCOPY       HC UGI ENDOSCOPY W EUS N/A 05/14/2014    Procedure: COMBINED ENDOSCOPIC ULTRASOUND, ESOPHAGOSCOPY, GASTROSCOPY, DUODENOSCOPY (EGD);  Surgeon: Boston Rodriguez MD;  Location:  GI     HYSTERECTOMY      partial, prolapse uterus.       Social History     Socioeconomic History     Marital status: Single     Spouse name: Not on file     Number of children: Not on file     Years of education: Not on file     Highest education level: Not on file   Occupational History     Not on  file   Tobacco Use     Smoking status: Former     Packs/day: 1.00     Years: 10.00     Pack years: 10.00     Types: Cigarettes     Quit date: 4/10/1979     Years since quittin.8     Smokeless tobacco: Never     Tobacco comments:     quit 1985   Vaping Use     Vaping Use: Never used   Substance and Sexual Activity     Alcohol use: Not Currently     Alcohol/week: 0.0 standard drinks     Comment: rare     Drug use: No     Sexual activity: Not Currently   Other Topics Concern      Service No     Blood Transfusions No     Caffeine Concern No     Occupational Exposure No     Hobby Hazards No     Sleep Concern No     Stress Concern No     Weight Concern No     Special Diet No     Back Care Not Asked     Exercise Yes     Comment: walk, horseback ride     Bike Helmet Not Asked     Seat Belt Yes     Self-Exams Not Asked     Parent/sibling w/ CABG, MI or angioplasty before 65F 55M? No   Social History Narrative         Social Determinants of Health     Financial Resource Strain: Not on file   Food Insecurity: Not on file   Transportation Needs: Not on file   Physical Activity: Not on file   Stress: Not on file   Social Connections: Not on file   Intimate Partner Violence: Not At Risk     Fear of Current or Ex-Partner: No     Emotionally Abused: No     Physically Abused: No     Sexually Abused: No   Housing Stability: Not on file       Family History   Problem Relation Age of Onset     C.A.D. Father      Arthritis Father      Diabetes Brother      Glaucoma Brother      Breast Cancer Sister 35     Glaucoma Sister      Neurologic Disorder Sister         seizures     Cancer Sister         breast     Psoriasis Daughter      Rheumatologic Disease Daughter         Dermatomyositis     Alzheimer Disease Mother         onset in 60s     Hypertension No family hx of      Cerebrovascular Disease No family hx of         ,     Thyroid Disease No family hx of      Skin Cancer No family hx of      Macular Degeneration No  "family hx of        Allergies   Allergen Reactions     Penicillins Anaphylaxis       Current Outpatient Medications   Medication     augmented betamethasone dipropionate (DIPROLENE AF) 0.05 % external cream     B Complex TABS     Calcium-Magnesium-Vitamin D 600- MG-MG-UNIT TB24     Collagen Hydrolysate POWD     dorzolamide-timolol (COSOPT) 2-0.5 % ophthalmic solution     glucosamine-chondroitin 500-400 MG CAPS per capsule     hydrocortisone 2.5 % cream     hydroxychloroquine (PLAQUENIL) 200 MG tablet     ibuprofen (ADVIL/MOTRIN) 200 MG tablet     latanoprost (XALATAN) 0.005 % ophthalmic solution     Multiple Vitamins-Minerals (MULTIPLE VITAMINS/WOMENS PO)     Multiple Vitamins-Minerals (PRESERVISION AREDS 2+MULTI VIT PO)     mycophenolate (GENERIC EQUIVALENT) 500 MG tablet     omega-3 fatty acids (FISH OIL) 1200 MG capsule     Propylene Glycol (SYSTANE COMPLETE OP)     triamcinolone (KENALOG) 0.1 % external cream     Vitamin D3 (CHOLECALCIFEROL) 125 MCG (5000 UT) tablet     No current facility-administered medications for this visit.         PEx:   Height 1.651 m (5' 5\"), weight 63.5 kg (140 lb), not currently breastfeeding.    PSYCH: NAD  EYES: EOMI  MOUTH: MMM  NEURO: AAO x3    A/P: Mely Rashid is a 77 year old female with Acute cystitis, Urethral/vaginal atrophy.  -UA/UC if symptoms  -Estrogen cream three times a week near urethra (pea-sized amount): If >$50, she will let me know and we can get via a compound pharmacy.  -follow-up 3 mo    Rody Armstrong PA-C  [facility]        26 minutes spent on the date of the encounter doing chart review, review of outside records, review of test results, interpretation of tests, patient visit and documentation                 "

## 2023-01-18 NOTE — PATIENT INSTRUCTIONS
Estrogen cream three times a week near urethra (pea-sized amount): If >$50, let me know and we can get via a compound pharmacy.    Please make an appointment at your local [place] lab to leave a urine sample if you develop symptoms.

## 2023-01-20 ENCOUNTER — TELEPHONE (OUTPATIENT)
Dept: UROLOGY | Facility: CLINIC | Age: 78
End: 2023-01-20

## 2023-01-20 DIAGNOSIS — Z79.899 ENCOUNTER FOR LONG-TERM CURRENT USE OF MEDICATION: ICD-10-CM

## 2023-01-20 DIAGNOSIS — M33.13 DERMATOMYOSITIS (H): ICD-10-CM

## 2023-01-20 DIAGNOSIS — Z79.899 LONG-TERM USE OF PLAQUENIL: ICD-10-CM

## 2023-01-20 RX ORDER — HYDROXYCHLOROQUINE SULFATE 200 MG/1
200 TABLET, FILM COATED ORAL DAILY
Qty: 90 TABLET | Refills: 1 | Status: SHIPPED | OUTPATIENT
Start: 2023-01-20 | End: 2023-07-06

## 2023-01-20 NOTE — TELEPHONE ENCOUNTER
PA Initiation    Medication: latanoprost (XALATAN) 0.005 % ophthalmic solution TIER EXCEPTION  Insurance Company: [employer] - Phone [phone] Fax [phone]  Pharmacy Filling the Rx: [facility] - [address]  Filling Pharmacy Phone: [phone]  Filling Pharmacy Fax: [phone]  Start Date: 1/19/2023

## 2023-01-20 NOTE — TELEPHONE ENCOUNTER
----- Message from Nadya Mendez sent at 1/19/2023  8:29 AM CST -----  Regarding: 3 month follow up  -follow-up 3 mo       OCTAVIO  1/18/23

## 2023-01-20 NOTE — TELEPHONE ENCOUNTER
Insurance dept that handles tier exceptions is closed, will call in morning to do a tier exception.

## 2023-01-20 NOTE — TELEPHONE ENCOUNTER
Medication/Dose: Hydroxychloroquine   Last Written Prescription Date: 7/6/22  Last Fill Quantity: 90, # refills: 1  Last Office Visit:  8/11/2022  Next Enc: None, due 8/11/23  Creatinine   Date Value Ref Range Status   12/31/2022 0.64 0.51 - 0.95 mg/dL Final   07/09/2021 0.58 0.52 - 1.04 mg/dL Final     Last hydroxychloroquine eye exam 8/24/22, follow up appointnet scheduled 1/13/23.    Refilled approved per protocol     Sheila Fitzegrald RN  [facility]ology [facility]

## 2023-01-20 NOTE — TELEPHONE ENCOUNTER
Prior Authorization Approval    Authorization Effective Date: 1/1/2023  Authorization Expiration Date: 12/31/2023  Medication: latanoprost (XALATAN) 0.005 % ophthalmic solution TIER EXCEPTION--APPROVED  Approved Dose/Quantity:   Reference #: [id number]   Insurance Company: [employer] - Phone [phone] Fax [phone]  Expected CoPay:       CoPay Card Available:      Foundation Assistance Needed:    Which Pharmacy is filling the prescription (Not needed for infusion/clinic administered): [facility] - [address]  Pharmacy Notified: Yes  Patient Notified: Yes **Instructed pharmacy to notify patient when script is ready to /ship.**

## 2023-01-27 ENCOUNTER — TELEPHONE (OUTPATIENT)
Dept: UROLOGY | Facility: CLINIC | Age: 78
End: 2023-01-27
Payer: COMMERCIAL

## 2023-01-31 ENCOUNTER — OFFICE VISIT (OUTPATIENT)
Dept: OPHTHALMOLOGY | Facility: CLINIC | Age: 78
End: 2023-01-31
Attending: OPHTHALMOLOGY
Payer: COMMERCIAL

## 2023-01-31 DIAGNOSIS — Z79.899 ENCOUNTER FOR LONG-TERM (CURRENT) USE OF HIGH-RISK MEDICATION: ICD-10-CM

## 2023-01-31 PROCEDURE — 92250 FUNDUS PHOTOGRAPHY W/I&R: CPT | Performed by: OPHTHALMOLOGY

## 2023-01-31 PROCEDURE — 99213 OFFICE O/P EST LOW 20 MIN: CPT | Mod: GC | Performed by: OPHTHALMOLOGY

## 2023-01-31 PROCEDURE — 99207 FUNDUS AUTOFLUORESCENCE IMAGE (FAF) OU (BOTH EYES): CPT | Mod: 26 | Performed by: OPHTHALMOLOGY

## 2023-01-31 PROCEDURE — 92082 INTERMEDIATE VISUAL FIELD XM: CPT | Performed by: OPHTHALMOLOGY

## 2023-01-31 PROCEDURE — 92134 CPTRZ OPH DX IMG PST SGM RTA: CPT | Performed by: OPHTHALMOLOGY

## 2023-01-31 PROCEDURE — G0463 HOSPITAL OUTPT CLINIC VISIT: HCPCS | Mod: 25

## 2023-01-31 ASSESSMENT — EXTERNAL EXAM - LEFT EYE: OS_EXAM: NORMAL

## 2023-01-31 ASSESSMENT — TONOMETRY
OS_IOP_MMHG: 11
IOP_METHOD: ICARE
OD_IOP_MMHG: 11

## 2023-01-31 ASSESSMENT — CONF VISUAL FIELD
OD_SUPERIOR_TEMPORAL_RESTRICTION: 0
OS_SUPERIOR_TEMPORAL_RESTRICTION: 0
OD_INFERIOR_TEMPORAL_RESTRICTION: 0
OS_NORMAL: 1
OD_SUPERIOR_NASAL_RESTRICTION: 0
METHOD: COUNTING FINGERS
OD_INFERIOR_NASAL_RESTRICTION: 0
OS_SUPERIOR_NASAL_RESTRICTION: 0
OS_INFERIOR_NASAL_RESTRICTION: 0
OD_NORMAL: 1
OS_INFERIOR_TEMPORAL_RESTRICTION: 0

## 2023-01-31 ASSESSMENT — REFRACTION_WEARINGRX
OS_AXIS: 175
OS_ADD: +2.25
OD_CYLINDER: SPHERE
OD_ADD: +2.25
SPECS_TYPE: PAL
OS_SPHERE: +0.25
OS_CYLINDER: +0.75
OD_SPHERE: +0.50

## 2023-01-31 ASSESSMENT — EXTERNAL EXAM - RIGHT EYE: OD_EXAM: NORMAL

## 2023-01-31 ASSESSMENT — VISUAL ACUITY
OS_CC+: -1
CORRECTION_TYPE: GLASSES
METHOD: SNELLEN - LINEAR
OS_CC: 20/25
OD_CC: 20/20

## 2023-01-31 ASSESSMENT — SLIT LAMP EXAM - LIDS: COMMENTS: BLEPHARITIS/MGD

## 2023-01-31 ASSESSMENT — CUP TO DISC RATIO
OS_RATIO: 0.4
OD_RATIO: 0.85

## 2023-01-31 NOTE — PROGRESS NOTES
"CC -   plaquenil    INTERVAL HISTORY -   Last visit 4/2022.  Same dose of plaquenil 200/day.     PMH-   Mely Rashid is a 77 year old  patient presenting for plaquenil.    400 mg/day since 2011-8/2016, then 200/day since (brief period of 300/day)  height 5'6\", weigth 145#., no renal disease, no tamoxifen  H/o dermatomyositis, on plaquenil and cellcept.  Sees Isaiah.    Retired surgery scheduler for ENT @ [employer]        PAST OCULAR HISTORY  No surgeries    RETINAL IMAGING  OCT  1/31/23  OD-  Mild diffuse outer changes c/w AMD, ?PVD stable compared to 4/2022  OS -  Mild diffuse outer changes, c/w AMD ?PVD stable compared to 4/2022    AF  1/31/22  OU - mild irregular, stable    OVF 10-2   1/31/23  OD - reliable, large SN defect, stable  OS - reliable, few spots, stable    mfERG 7/29/20  OU - normal      ASSESSMENT & PLAN    #.  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  Glaucoma OU   - on xalatan and timolol   - has right APD likely d/t glaucoma   - sees Dr. Navarro Q6mo      #  Tr NS OU      # Mild dry AMD OU   - no smoking (quit age 32)   - started AREDS    # TYLER    - artificial tears d/w patient 1/2023    return to clinic: in 9 months, OCT + FAF + OVF 10-2      Sima Gill MD  Ophthalmology Resident, PGY-3  [facility]          ATTESTATION     Attending Physician Attestation:      Complete documentation of historical and exam elements from today's encounter can be found in the full " encounter summary report (not reduplicated in this progress note).  I personally obtained the chief complaint(s) and history of present illness.  I confirmed and edited as necessary the review of systems, past medical/surgical history, family history, social history, and examination findings as documented by others; and I examined the patient myself.  I personally reviewed the relevant tests, images, and reports as documented above.  I personally reviewed the ophthalmic test(s) associated with this encounter, agree with the interpretation(s) as documented by the resident/fellow, and have edited the corresponding report(s) as necessary.   I formulated and edited as necessary the assessment and plan and discussed the findings and management plan with the patient and family    Shakira Chamberlain MD, PhD  , Vitreoretinal Surgery  Department of Ophthalmology  [facility]

## 2023-01-31 NOTE — PATIENT INSTRUCTIONS
-----------------------------------------  DRY EYE INSTRUCTIONS    You have dry eyes.   Artificial tears may be helpful.  Please see the list of brands below.  You may use preserved artifical tears (in a muti-use bottle) 2-4 times per day.  Do not use preserved tears more than 4 times per day.  If you wish to use artifical tears more than 4 times per day, you should use preservative-free artifical tears.  These come in single-use vials.  You can open a new vial each day to use throughout the day, but it should be discarded at the end of the day.  Thicker tears, gels, and ointments are more effective, but they may blur your vision.  Some patients prefer to use those only at bedtime.    You may also benefit from washing your eyelashes (not your eye lids) gently using your finger once or twice per day with tap water.    You may also benefit from warm compresses once or twice per day to your eyelids.  Use a clean washcloth soaked in warm water, and place it over your eyes for 5 minutes.    Avoid medicated drops for red eyes which contain vasoconstrictors.  These should not be used for more than a few days in a row, as the medication can cause serious problems if used for too many days in a row.      Example Recommended Artificial Tear Brands:    Dry Eye Drops    Optive  Systane Ultra  TheraTears  Genteal  Refresh  Blink Tears  Soothe      Gels    Refresh Liquigel  Genteal Gel  TheraTears Gel  Celluvisc  Blink Gel  Systane Gel      Ointments    Refresh PM  Lacrilube      Contact Lens Compatible    Blink Contacts  Blink Tears  Refresh Contacts  Systane Ultra  Complete Lubricating and Rewetting  Complete Blink and Clean Lens Drops      INSTRUCTIONS FOR MACULAR DEGENERATION AND AREDS/AREDS 2 VITAMINS    You have macular degeneration and are at risk for vision loss from the progression of dry macular degeneration or the development of exudative changes, the wet form of the disease.    Taking nutritional supplements according  to the Age Related Eye Disease Study (AREDS or AREDS 2) will help decrease your risk of vision loss from both the dry and the wet forms of macular degeneration.    AREDS vitamins contain the following ingredients:  Vitamin A - 15 mg of beta-carotene (equivalent to 25,000 IU of vitamin A)  Vitamin C - 500 mg  Vitamin E - 400 IU  Zinc - 80 mg of zinc as zinc oxide  Copper - 2 mg of copper as cupric oxide    AREDS 2 vitamins contain the following ingredients:  Lutein - 10 mg  Zeaxanthin - 2 mg  Vitamin C - 500 mg  Vitamin E - 400 IU  Zinc - 80 mg of zinc as zinc oxide  Copper - 2 mg of copper as cupric oxide      Examples of AREDS vitamins: Preservision, Ocuvite, I-Caps, Visivite, and others. I would suggest finding the most convenient and economical brand which follows this ingredient list.    If you are a smoker or were a smoker in the past 10-15  years, you should take AREDS 2 vitamins, and SHOULD NOT take AREDS vitamins with beta-carotene (vitamin A).  AREDS 2 vitamins do not contain beta-carotene (vitamin A), which can increase the risk of lung cancer in individuals who are actively smoking or who smoked in the past 10-15 years.    Maintain a healthy diet  Do not smoke  Monitor your vision in each eye using the [place] grid and call the office with any changes in either eye.      [phone]

## 2023-01-31 NOTE — NURSING NOTE
Chief Complaints and History of Present Illnesses   Patient presents with     Follow Up     Chief Complaint(s) and History of Present Illness(es)     Follow Up            Laterality: both eyes    Course: stable    Associated symptoms: Negative for eye pain, flashes and floaters    Treatments tried: eye drops          Comments    Here for retinal follow up. Vision is doing fine. No eye pain. No flashes or floaters. Compliant with drops.    Ronald WILBURN 12:11 PM January 31, 2023

## 2023-02-02 ENCOUNTER — PATIENT OUTREACH (OUTPATIENT)
Dept: GASTROENTEROLOGY | Facility: CLINIC | Age: 78
End: 2023-02-02
Payer: COMMERCIAL

## 2023-02-02 NOTE — PROGRESS NOTES
Called pt to discuss MRI that was scheduled without clinic being scheduled. Pt said she was surprised to get a call since she was not aware she needed further follow up. We discussed that in March 2021 Dr Rodriguez did request another follow up 2 years later, then likely will no longer need surveillance d/t her age.  Pt in agreement, scheduled virtual visit for 4/3 at 8:40am. Pt will move her MRI appt to be within one week of clinic.  Pt will need Valium prior to MRI, reminder sent to prescribe and call in to [facility] in [place] about one week prior to MRI.    Kacy Osullivan, RN, BSN,   Advanced [facility]  Care coordinator

## 2023-02-14 ENCOUNTER — MYC MEDICAL ADVICE (OUTPATIENT)
Dept: UROLOGY | Facility: CLINIC | Age: 78
End: 2023-02-14
Payer: COMMERCIAL

## 2023-02-15 ENCOUNTER — LAB (OUTPATIENT)
Dept: LAB | Facility: CLINIC | Age: 78
End: 2023-02-15
Payer: COMMERCIAL

## 2023-02-15 DIAGNOSIS — R30.0 DYSURIA: ICD-10-CM

## 2023-02-15 LAB
ALBUMIN UR-MCNC: NEGATIVE MG/DL
APPEARANCE UR: CLEAR
BACTERIA #/AREA URNS HPF: ABNORMAL /HPF
BILIRUB UR QL STRIP: NEGATIVE
COLOR UR AUTO: YELLOW
GLUCOSE UR STRIP-MCNC: NEGATIVE MG/DL
HGB UR QL STRIP: ABNORMAL
KETONES UR STRIP-MCNC: NEGATIVE MG/DL
LEUKOCYTE ESTERASE UR QL STRIP: ABNORMAL
NITRATE UR QL: NEGATIVE
PH UR STRIP: 6 [PH] (ref 5–7)
RBC #/AREA URNS AUTO: ABNORMAL /HPF
SP GR UR STRIP: >=1.03 (ref 1–1.03)
UROBILINOGEN UR STRIP-ACNC: 0.2 E.U./DL
WBC #/AREA URNS AUTO: >100 /HPF

## 2023-02-15 PROCEDURE — 81001 URINALYSIS AUTO W/SCOPE: CPT

## 2023-02-15 PROCEDURE — 87086 URINE CULTURE/COLONY COUNT: CPT

## 2023-02-17 LAB — BACTERIA UR CULT: NORMAL

## 2023-02-21 NOTE — PROGRESS NOTES
Patient states she does not have a Gynecologist at the moment.  Verified with patients PCP Dr. Stacy that they do offer pelvic exam with swab for BV, etc.  Patient will call to schedule appt.    Whitney Johnson RN, BSN  [facility]                Rody Armstrong PA-C Sanh, Nina, RN  See urgent care or GYN for pelvic exam. May need swab for BV, etc which we do not offer.     MH           Previous Messages     ----- Message -----   From: Whitney Johnson RN   Sent: 2/17/2023   2:26 PM CST   To: Rody Armstrong PA-C   Subject: RE: UA/UC Results                                 Jose Fine,     Spoke with patient, she will continue to push fluids, intakes very little bladder irritants.  An update on the foul smelling urine, it's not her urine its the vaginal area that is foul smelling ever since the dysuria started. Although today is the first day her dysuria is much milder. Denies any itching or white discharge.  Maybe have her come in for pelvic exam?     Whitney Thorpe   ----- Message -----   From: Rody Armstrong PA-C   Sent: 2/17/2023  12:34 PM CST   To: Whitney Johnson RN   Subject: RE: UA/UC Results                                 Push fluids, no irritants and encouraged she has seen improvement.     Rody   ----- Message -----   From: Whitney Johnson RN   Sent: 2/17/2023   8:20 AM CST   To: Rody Armstrong PA-C   Subject: UA/UC Results                                     Jose Fine,     I had pt do UA/UC due to still having dysuria, although she said it's about 50% better since using the estrace cream and also has foul smelling urine.  UC is mixed urogenital fernando, UA is nitrite negative with large leuks. Thoughts?     Whitney Thorpe

## 2023-03-18 ENCOUNTER — TELEPHONE (OUTPATIENT)
Dept: PULMONOLOGY | Facility: CLINIC | Age: 78
End: 2023-03-18
Payer: COMMERCIAL

## 2023-03-18 NOTE — TELEPHONE ENCOUNTER
Patient Contacted    Appointment type: RTN  Provider: ELONEL  Return date: 9/14/23  Specialty phone number: [phone]  Additional appointment(s) needed: NA  Additonal Notes: NA

## 2023-03-24 ENCOUNTER — PATIENT OUTREACH (OUTPATIENT)
Dept: GASTROENTEROLOGY | Facility: CLINIC | Age: 78
End: 2023-03-24
Payer: COMMERCIAL

## 2023-03-24 NOTE — PROGRESS NOTES
Called pt to follow up on need for Valium for upcoming MRI. Pt declined needing it, said she though more about it and thinks she will be ok without it.   Reviewed her upcoming appt, 4/3 at 8:40 with Dr Rodriguez. Pt verbalized understanding    Kacy Osullivan, RN, BSN,   [facility]  Care coordinator

## 2023-03-27 ENCOUNTER — ANCILLARY PROCEDURE (OUTPATIENT)
Dept: MRI IMAGING | Facility: CLINIC | Age: 78
End: 2023-03-27
Attending: INTERNAL MEDICINE
Payer: COMMERCIAL

## 2023-03-27 DIAGNOSIS — K86.2 PANCREAS CYST: ICD-10-CM

## 2023-03-27 PROCEDURE — 74183 MRI ABD W/O CNTR FLWD CNTR: CPT | Mod: TC | Performed by: RADIOLOGY

## 2023-03-27 PROCEDURE — A9585 GADOBUTROL INJECTION: HCPCS | Performed by: INTERNAL MEDICINE

## 2023-03-27 RX ORDER — GADOBUTROL 604.72 MG/ML
6.5 INJECTION INTRAVENOUS ONCE
Status: COMPLETED | OUTPATIENT
Start: 2023-03-27 | End: 2023-03-27

## 2023-03-27 RX ADMIN — GADOBUTROL 6.5 ML: 604.72 INJECTION INTRAVENOUS at 11:41

## 2023-03-28 ENCOUNTER — DOCUMENTATION ONLY (OUTPATIENT)
Dept: GASTROENTEROLOGY | Facility: CLINIC | Age: 78
End: 2023-03-28
Payer: COMMERCIAL

## 2023-03-28 NOTE — PROGRESS NOTES
Called PT and left VM.    Called to remind patient of their upcoming appointment with our GI clinic, on 04/03/23 at 8:40 AM with Dr. Gennaro Rodriguez. This appointment is scheduled as a video visit. You will receive a call approximately 30 minutes prior to check you in, you must be in [place] for this visit., if your appointment is virtual (video or telephone) you need to be in [place] for the visit. To reschedule or cancel patient to call [phone].      SK

## 2023-03-28 NOTE — PROGRESS NOTES
Patient called and confirmed their upcoming appointment with our GI clinic, on 04/03/23 at 8:40 AM with Dr. Gennaro Rodriguez. This appointment is scheduled as a video visit. You will receive a call approximately 30 minutes prior to check you in, you must be in [place] for this visit., if your appointment is virtual (video or telephone) you need to be in [place] for the visit. To reschedule or cancel patient to call [phone].        SK

## 2023-04-03 ENCOUNTER — VIRTUAL VISIT (OUTPATIENT)
Dept: GASTROENTEROLOGY | Facility: CLINIC | Age: 78
End: 2023-04-03
Attending: INTERNAL MEDICINE
Payer: COMMERCIAL

## 2023-04-03 DIAGNOSIS — K86.2 PANCREAS CYST: Primary | ICD-10-CM

## 2023-04-03 PROCEDURE — G0463 HOSPITAL OUTPT CLINIC VISIT: HCPCS | Mod: PN,GT | Performed by: INTERNAL MEDICINE

## 2023-04-03 PROCEDURE — 99212 OFFICE O/P EST SF 10 MIN: CPT | Mod: VID | Performed by: INTERNAL MEDICINE

## 2023-04-03 NOTE — PATIENT INSTRUCTIONS
You will find a brief summary of your discussion and care plan from today's visit below.  Dr Rodriguez has outlined the following steps after your recent clinic visit:    Recommendations    - Agreed to stop surveillance  - Follow-up as needed if develops: Abdominal pain/diagnosis of pancreatitis, jaundice, chronic diarrhea or unexplained weight loss of > 10 lb.      Please call with any questions or concerns regarding your clinic visit today.     It is a pleasure being involved in your health care.     Contacts post-consultation depending on your need:     Schedule Clinic Appointments                        [phone], option #5   M-F 7:30 - 5 pm    Sarah Osullivan, RN Care Coordinator           [phone]    Trudy De Souza RN Care Coordinator              [phone]     Yakelin Peralta, RN Care Coordinator          [phone]     GI Procedure Scheduling                               [phone], option 2     For urgent/emergent questions after business hours, you may reach the on-call GI Fellow by contacting the [facility]  at [phone].     How do I schedule labs, imaging studies, or procedures that were ordered in clinic today?      Labs: To schedule lab appointment at the [facility], use my chart or call [phone]. If you have a [place] lab closer to home where you are regularly seen you can give them a call.      Procedures: If a colonoscopy, upper endoscopy, breath test, esophageal manometry, or pH impedence was ordered today, our endoscopy team will call you to schedule this. If you have not heard from our endoscopy team within a week, please call [phone] to schedule.      Imaging Studies: If you were scheduled for a CT scan, X-ray, MRI, ultrasound, HIDA scan or other imaging study, please call [phone] to have this scheduled.      Referral: If a referral to another specialty was ordered, expect a phone call or follow instructions above. If you have not  heard from anyone regarding your referral in a week, please call our clinic to check the status.      How to I schedule a follow-up visit?  If you did not schedule a follow-up visit today, please call [phone] option #5 to schedule a follow-up office visit.      I recommend signing up for Ask The Doctor access if you have not already done so and are comfortable with using a computer.  This allows for online access to your lab results and also helps you communicate efficiently with the clinic should any questions arise in your care.

## 2023-04-03 NOTE — PROGRESS NOTES
Virtual Visit Details    Type of service:  Video Visit   Video Start Time: 8:49  Video End Time:8:57    Originating Location (pt. 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    Followed by me since 2014.    MRI 3/27/23 read as:   IMPRESSION:   1.  Numerous pancreatic cystic lesions without convincing significant  change since 2/13/2020. These may represent side branch intraductal  papillary mucinous neoplasms or other cystic neoplasms.    She is doing well. Denies abdominal pain, jaundice, weight loss of chronic diarrhea. Did have some loose stools which resolved with avoidance of lactose.    A/P: Multiple pancreatic cysts. Presumed IPMN.    Followed since at least 2014 without the development of high-risk or worrisome features.  As discussed previously, I typically stop surveillance at age 77 when stable, although guidelines are not specific re this.    She is in agreement with cessation of surveillance.    Follow-up prn if develops: Abdominal pain/diagnosis of pancreatitis, jaundice, chronic diarrhea or unexplained weight loss of > 10 lb (reviewed).    CARLOS Rodriguez MD  Professor of Medicine  Division of Gastroenterology, Hepatology and Nutrition  [facility]

## 2023-04-03 NOTE — NURSING NOTE
Is the patient currently in the state of [place]? YES    Visit mode:VIDEO    If the visit is dropped, the patient can be reconnected by: VIDEO VISIT: Text to cell phone: [phone]    Will anyone else be joining the visit? NO      How would you like to obtain your AVS? MyChart    Are changes needed to the allergy or medication list? NO    Reason for visit: return    Patient declined individual allergy and medication review by support staff because patient denies any changes since echeck-in completion and states all information entered during echeck-in remains accurate.    Miracle Carolina VF

## 2023-04-03 NOTE — LETTER
4/3/2023         RE: Mely Rashid  [address]   [place] [address]        Dear Colleague,    Thank you for referring your patient, Mely Rashid, to the [facility]. Please see a copy of my visit note below.    Virtual Visit Details    Type of service:  Video Visit   Video Start Time: 8:49  Video End Time:8:57    Originating Location (pt. 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    Followed by me since 2014.    MRI 3/27/23 read as:   IMPRESSION:   1.  Numerous pancreatic cystic lesions without convincing significant  change since 2/13/2020. These may represent side branch intraductal  papillary mucinous neoplasms or other cystic neoplasms.    She is doing well. Denies abdominal pain, jaundice, weight loss of chronic diarrhea. Did have some loose stools which resolved with avoidance of lactose.    A/P: Multiple pancreatic cysts. Presumed IPMN.    Followed since at least 2014 without the development of high-risk or worrisome features.  As discussed previously, I typically stop surveillance at age 77 when stable, although guidelines are not specific re this.    She is in agreement with cessation of surveillance.    Follow-up prn if develops: Abdominal pain/diagnosis of pancreatitis, jaundice, chronic diarrhea or unexplained weight loss of > 10 lb (reviewed).    CARLOS Rodriguez MD  Professor of Medicine  Division of Gastroenterology, Hepatology and Nutrition  [facility]

## 2023-05-02 DIAGNOSIS — H25.13 AGE-RELATED NUCLEAR CATARACT OF BOTH EYES: ICD-10-CM

## 2023-05-02 DIAGNOSIS — H40.9 UNSPECIFIED GLAUCOMA: ICD-10-CM

## 2023-05-02 DIAGNOSIS — Z01.00 EXAMINATION OF EYES AND VISION: ICD-10-CM

## 2023-05-02 DIAGNOSIS — H53.8 OTHER VISUAL DISTURBANCES: Primary | ICD-10-CM

## 2023-05-03 ENCOUNTER — OFFICE VISIT (OUTPATIENT)
Dept: OPHTHALMOLOGY | Facility: CLINIC | Age: 78
End: 2023-05-03
Attending: OPHTHALMOLOGY
Payer: COMMERCIAL

## 2023-05-03 DIAGNOSIS — H25.13 AGE-RELATED NUCLEAR CATARACT OF BOTH EYES: ICD-10-CM

## 2023-05-03 DIAGNOSIS — H53.8 OTHER VISUAL DISTURBANCES: ICD-10-CM

## 2023-05-03 DIAGNOSIS — H40.1112 CHRONIC OPEN ANGLE GLAUCOMA OF RIGHT EYE, MODERATE STAGE: Primary | ICD-10-CM

## 2023-05-03 PROCEDURE — 92133 CPTRZD OPH DX IMG PST SGM ON: CPT | Performed by: OPHTHALMOLOGY

## 2023-05-03 PROCEDURE — 99214 OFFICE O/P EST MOD 30 MIN: CPT | Performed by: OPHTHALMOLOGY

## 2023-05-03 PROCEDURE — G0463 HOSPITAL OUTPT CLINIC VISIT: HCPCS | Mod: 25 | Performed by: OPHTHALMOLOGY

## 2023-05-03 PROCEDURE — 92083 EXTENDED VISUAL FIELD XM: CPT | Performed by: OPHTHALMOLOGY

## 2023-05-03 ASSESSMENT — CONF VISUAL FIELD
OS_SUPERIOR_NASAL_RESTRICTION: 0
OS_INFERIOR_NASAL_RESTRICTION: 0
METHOD: COUNTING FINGERS
OD_SUPERIOR_TEMPORAL_RESTRICTION: 0
OD_SUPERIOR_NASAL_RESTRICTION: 0
OD_INFERIOR_TEMPORAL_RESTRICTION: 0
OS_NORMAL: 1
OD_INFERIOR_NASAL_RESTRICTION: 0
OD_NORMAL: 1
OS_SUPERIOR_TEMPORAL_RESTRICTION: 0
OS_INFERIOR_TEMPORAL_RESTRICTION: 0

## 2023-05-03 ASSESSMENT — VISUAL ACUITY
CORRECTION_TYPE: GLASSES
METHOD: SNELLEN - LINEAR
OS_CC: 20/25-3
OD_CC: 20/20-
OD_CC: 20/20-
CORRECTION_TYPE: GLASSES
OS_CC: 20/25+

## 2023-05-03 ASSESSMENT — REFRACTION_WEARINGRX
OS_SPHERE: +0.25
OD_CYLINDER: SPHERE
OS_CYLINDER: +1.25
OS_SPHERE: -0.25
SPECS_TYPE: PAL
OS_AXIS: 175
OD_SPHERE: +0.50
OS_ADD: +2.50
OS_CYLINDER: +0.75
OD_SPHERE: +0.50
OD_ADD: +2.50
OS_ADD: +2.25
OD_CYLINDER: SPHERE
OD_ADD: +2.25
OS_AXIS: 175

## 2023-05-03 ASSESSMENT — SLIT LAMP EXAM - LIDS: COMMENTS: BLEPHARITIS/MGD

## 2023-05-03 ASSESSMENT — TONOMETRY
OD_IOP_MMHG: 14
IOP_METHOD: TONOPEN
OS_IOP_MMHG: 14

## 2023-05-03 ASSESSMENT — CUP TO DISC RATIO
OD_RATIO: 0.85
OS_RATIO: 0.4

## 2023-05-03 ASSESSMENT — EXTERNAL EXAM - RIGHT EYE: OD_EXAM: NORMAL

## 2023-05-03 ASSESSMENT — EXTERNAL EXAM - LEFT EYE: OS_EXAM: NORMAL

## 2023-05-03 NOTE — PROGRESS NOTES
Chief Complaint(s) and History of Present Illness(es)     Glaucoma Follow-Up            Laterality: both eyes    Associated symptoms: Negative for flashes and floaters    Treatment side effects: none    Compliance with Treatment: misses drops infrequently    Pain scale: 0/10          Comments    Cosopt twice a day right eye - LD @ 7:00am   Latanoprost at bedtime both eyes - LD @ 10:30pm yesterday   Misti Baldwinter, COT 11:07 AM 05/03/2023                 Review of systems for the eyes was negative other than the pertinent positives/negatives listed in the HPI.      Assessment & Plan      Mely Rashid is a 77 year old female with the following diagnoses:   1. 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 Age-related nuclear cataract of both eyes         Here for intraocular pressure recheck.  Goal 13 or less right eye   Borderline to goal today in both eyes     OCT Nerve fiber layer with inferior progression right eye; left eye remains within normal limits and stable  Visual field with worsening superior defect right eye; stable and within normal limits left eye      Continue Cosopt twice a day right eye   Continue latanoprost at bedtime both eyes   Discussed selected laser trabeculoplasty (SLT) v adding brimonidine right eye   Would like to proceed with laser, but elects to check with insurance coverage first      Patient disposition:   Call to schedule selected laser trabeculoplasty (SLT) next available - right eye          Attending Physician Attestation:  Complete documentation of historical and exam elements from today's encounter can be found in the full encounter summary report (not reduplicated in this progress note).  I personally obtained the chief complaint(s) and history of present illness.  I confirmed and edited as necessary the review of systems, past medical/surgical history, family history, social history, and examination findings as documented by others; and I examined the  patient myself.  I personally reviewed the relevant tests, images, and reports as documented above.  I formulated and edited as necessary the assessment and plan and discussed the findings and management plan with the patient and family. . - Edwin Navarro MD

## 2023-05-03 NOTE — PATIENT INSTRUCTIONS
Selected laser trabeculoplasty (SLT) right eye: CPT = 92225    Moderate primary open angle glaucoma (POAG): ICD10 = H40.1112

## 2023-05-15 ENCOUNTER — ALLIED HEALTH/NURSE VISIT (OUTPATIENT)
Dept: OPHTHALMOLOGY | Facility: CLINIC | Age: 78
End: 2023-05-15
Attending: OPHTHALMOLOGY
Payer: COMMERCIAL

## 2023-05-15 DIAGNOSIS — Z79.899 ENCOUNTER FOR LONG-TERM (CURRENT) USE OF HIGH-RISK MEDICATION: ICD-10-CM

## 2023-05-15 PROCEDURE — 92274 MULTIFOCAL ERG W/I&R: CPT

## 2023-05-15 PROCEDURE — 92274 MULTIFOCAL ERG W/I&R: CPT | Mod: 26 | Performed by: OPHTHALMOLOGY

## 2023-05-15 NOTE — NURSING NOTE
"Chief Complaints and History of Present Illnesses   Patient presents with     procedure mfERG     mfERG for plaquenil monitoring  400 mg/day since 2011-8/2016, then 200/day since (brief period of 300/day)  height 5'6\", weight 145lbs  Alanis LariosShaveLogic [facility] 3:39 PM May 15, 2023        Chief Complaint(s) and History of Present Illness(es)     procedure mfERG            Comments: mfERG for plaquenil monitoring  400 mg/day since 2011-8/2016, then 200/day since (brief period of 300/day)  height 5'6\", weight 145lbs  Alanis Zeis [employer] [facility] 3:39 PM May 15, 2023                   "

## 2023-05-18 ENCOUNTER — MYC MEDICAL ADVICE (OUTPATIENT)
Dept: INTERNAL MEDICINE | Facility: CLINIC | Age: 78
End: 2023-05-18
Payer: COMMERCIAL

## 2023-05-21 NOTE — PROGRESS NOTES
"Multifocal ERG Results    Diagnostic Indication:   Plaquenil use 400/day till 8/2016, then 200/day ongoing.  Height 5'6\", weight 145#, no renal disease.  The patient has not been able to perform well on the visual field test, and so an mfERG was obtained to further assess her retinas.    Diagnostic findings:  A 103 hexagon stimulus pattern was used to obtain a mfERG in both eyes.    For both eyes, the test was noted to be reliable by the technician.  The waveform tracings were normal with good SNR.  The amplitude plot showed a well formed foveal peak.  The amplitude values were within normal limits. The latencies did not show any abnormal increase.  The ring tracings were essentially normal, though the central tracings had high amplitudes.  The ring ratios were essentially within normal limits for ring amplitudes.  The results were very similar to the prior mfERG in 2020.        Diagnostic impression:    1.  Normal mfERG both eyes.    2.  No evidence of  plaquenil toxicity.  No significant changes since the last mfERG in 2020.    Clinical recommendations: clinical correlation recommended.  "

## 2023-05-24 ENCOUNTER — TELEPHONE (OUTPATIENT)
Dept: OPHTHALMOLOGY | Facility: CLINIC | Age: 78
End: 2023-05-24
Payer: COMMERCIAL

## 2023-05-24 NOTE — TELEPHONE ENCOUNTER
Spoke to pt at 1450    Scheduled SLT July 5th at 1:30 and made 30 minute time slot to block the 1:45 PM on a Wednesday glaucoma day     Pt aware of date/time/location at [facility]    Fermín Hudson RN 2:53 PM 05/24/23          [facility] Call Center    Phone Message    May a detailed message be left on voicemail: yes     Reason for Call: Other: Pt calling in requesting a call back about scheduling a laser procedure with Dr. Navarro. Pt called in previously on 5/18/2023 and hasn't heard back. Writer read the message and sent Cory Flower a Teams message with Pt's MRN and phone number, but didn't hear back. Please reach out to Pt to schedule the SLT or to advise why it hasn't been scheduled yet. Thank you!    Action Taken: Message routed to:  [facility] ([facility] Ophthalmology    Travel Screening: Not Applicable

## 2023-05-31 ENCOUNTER — MYC MEDICAL ADVICE (OUTPATIENT)
Dept: INTERNAL MEDICINE | Facility: CLINIC | Age: 78
End: 2023-05-31
Payer: COMMERCIAL

## 2023-06-01 ENCOUNTER — HEALTH MAINTENANCE LETTER (OUTPATIENT)
Age: 78
End: 2023-06-01

## 2023-06-20 ENCOUNTER — DOCUMENTATION ONLY (OUTPATIENT)
Dept: LAB | Facility: CLINIC | Age: 78
End: 2023-06-20
Payer: COMMERCIAL

## 2023-06-21 DIAGNOSIS — M33.13 DERMATOMYOSITIS (H): Primary | ICD-10-CM

## 2023-06-21 NOTE — PROGRESS NOTES
Medication monitoring labs ordered per protocol.  Sheila Fitzgerald RN  Adult Rhe[facility]ology Clinic

## 2023-07-03 ENCOUNTER — LAB (OUTPATIENT)
Dept: LAB | Facility: CLINIC | Age: 78
End: 2023-07-03
Payer: COMMERCIAL

## 2023-07-03 DIAGNOSIS — Z79.899 LONG-TERM USE OF PLAQUENIL: ICD-10-CM

## 2023-07-03 DIAGNOSIS — M33.13 DERMATOMYOSITIS (H): ICD-10-CM

## 2023-07-03 DIAGNOSIS — Z79.899 ENCOUNTER FOR LONG-TERM CURRENT USE OF MEDICATION: ICD-10-CM

## 2023-07-03 LAB
ALBUMIN SERPL BCG-MCNC: 4.6 G/DL (ref 3.5–5.2)
ALT SERPL W P-5'-P-CCNC: 19 U/L (ref 0–50)
AST SERPL W P-5'-P-CCNC: 25 U/L (ref 0–45)
BASOPHILS # BLD AUTO: 0 10E3/UL (ref 0–0.2)
BASOPHILS NFR BLD AUTO: 1 %
CK SERPL-CCNC: 64 U/L (ref 26–192)
CREAT SERPL-MCNC: 0.6 MG/DL (ref 0.51–0.95)
CRP SERPL-MCNC: <3 MG/L
EOSINOPHIL # BLD AUTO: 0.1 10E3/UL (ref 0–0.7)
EOSINOPHIL NFR BLD AUTO: 2 %
ERYTHROCYTE [DISTWIDTH] IN BLOOD BY AUTOMATED COUNT: 13 % (ref 10–15)
ERYTHROCYTE [SEDIMENTATION RATE] IN BLOOD BY WESTERGREN METHOD: 6 MM/HR (ref 0–30)
GFR SERPL CREATININE-BSD FRML MDRD: >90 ML/MIN/1.73M2
HCT VFR BLD AUTO: 41.8 % (ref 35–47)
HGB BLD-MCNC: 13.4 G/DL (ref 11.7–15.7)
IMM GRANULOCYTES # BLD: 0 10E3/UL
IMM GRANULOCYTES NFR BLD: 0 %
LYMPHOCYTES # BLD AUTO: 1.1 10E3/UL (ref 0.8–5.3)
LYMPHOCYTES NFR BLD AUTO: 29 %
MCH RBC QN AUTO: 30.2 PG (ref 26.5–33)
MCHC RBC AUTO-ENTMCNC: 32.1 G/DL (ref 31.5–36.5)
MCV RBC AUTO: 94 FL (ref 78–100)
MONOCYTES # BLD AUTO: 0.4 10E3/UL (ref 0–1.3)
MONOCYTES NFR BLD AUTO: 10 %
NEUTROPHILS # BLD AUTO: 2.3 10E3/UL (ref 1.6–8.3)
NEUTROPHILS NFR BLD AUTO: 59 %
PLATELET # BLD AUTO: 197 10E3/UL (ref 150–450)
RBC # BLD AUTO: 4.43 10E6/UL (ref 3.8–5.2)
WBC # BLD AUTO: 3.9 10E3/UL (ref 4–11)

## 2023-07-03 PROCEDURE — 82040 ASSAY OF SERUM ALBUMIN: CPT

## 2023-07-03 PROCEDURE — 84450 TRANSFERASE (AST) (SGOT): CPT

## 2023-07-03 PROCEDURE — 36415 COLL VENOUS BLD VENIPUNCTURE: CPT

## 2023-07-03 PROCEDURE — 86140 C-REACTIVE PROTEIN: CPT

## 2023-07-03 PROCEDURE — 82550 ASSAY OF CK (CPK): CPT

## 2023-07-03 PROCEDURE — 82565 ASSAY OF CREATININE: CPT

## 2023-07-03 PROCEDURE — 84460 ALANINE AMINO (ALT) (SGPT): CPT

## 2023-07-03 PROCEDURE — 85652 RBC SED RATE AUTOMATED: CPT

## 2023-07-03 PROCEDURE — 85025 COMPLETE CBC W/AUTO DIFF WBC: CPT

## 2023-07-05 ENCOUNTER — OFFICE VISIT (OUTPATIENT)
Dept: OPHTHALMOLOGY | Facility: CLINIC | Age: 78
End: 2023-07-05
Attending: OPHTHALMOLOGY
Payer: COMMERCIAL

## 2023-07-05 DIAGNOSIS — H40.051 BORDERLINE GLAUCOMA OF RIGHT EYE WITH OCULAR HYPERTENSION: Primary | ICD-10-CM

## 2023-07-05 PROCEDURE — 250N000009 HC RX 250: Performed by: OPHTHALMOLOGY

## 2023-07-05 PROCEDURE — 65855 TRABECULOPLASTY LASER SURG: CPT | Mod: RT | Performed by: OPHTHALMOLOGY

## 2023-07-05 RX ADMIN — APRACLONIDINE HYDROCHLORIDE 1 DROP: 10 SOLUTION/ DROPS OPHTHALMIC at 13:22

## 2023-07-05 ASSESSMENT — TONOMETRY
OD_IOP_MMHG: 16
OD_IOP_MMHG: 12
OS_IOP_MMHG: 12
OS_IOP_MMHG: 16
IOP_METHOD: TONOPEN
IOP_METHOD: TONOPEN

## 2023-07-05 ASSESSMENT — REFRACTION_WEARINGRX
OD_CYLINDER: SPHERE
OS_SPHERE: +0.25
OS_ADD: +2.25
OD_SPHERE: +0.50
OS_AXIS: 175
OS_CYLINDER: +0.75
OD_ADD: +2.25
SPECS_TYPE: PAL

## 2023-07-05 ASSESSMENT — CONF VISUAL FIELD
OD_INFERIOR_TEMPORAL_RESTRICTION: 0
OS_INFERIOR_NASAL_RESTRICTION: 0
OD_SUPERIOR_NASAL_RESTRICTION: 0
OD_NORMAL: 1
OS_SUPERIOR_NASAL_RESTRICTION: 0
OD_INFERIOR_NASAL_RESTRICTION: 0
OD_SUPERIOR_TEMPORAL_RESTRICTION: 0
OS_NORMAL: 1
METHOD: COUNTING FINGERS
OS_SUPERIOR_TEMPORAL_RESTRICTION: 0
OS_INFERIOR_TEMPORAL_RESTRICTION: 0

## 2023-07-05 ASSESSMENT — VISUAL ACUITY
OS_CC+: -2
OS_CC: 20/25
OD_CC: 20/20
METHOD: SNELLEN - LINEAR
CORRECTION_TYPE: GLASSES

## 2023-07-05 NOTE — NURSING NOTE
Chief Complaints and History of Present Illnesses   Patient presents with     Glaucoma Follow-Up     Chief Complaint(s) and History of Present Illness(es)     Glaucoma Follow-Up            Laterality: both eyes    Associated symptoms: Negative for eye pain, flashes and floaters    Compliance with Treatment: always          Comments    Here for glaucoma follow up. Vision is about the same. Compliant with drops. No flashes or floaters. No eye pain.    Ronald Messina COT 1:31 PM July 5, 2023

## 2023-07-05 NOTE — PROGRESS NOTES
Chief Complaint(s) and History of Present Illness(es)     Glaucoma Follow-Up            Laterality: both eyes    Associated symptoms: Negative for eye pain, flashes and floaters    Compliance with Treatment: always          Comments    Here for glaucoma follow up. Vision is about the same. Compliant with   drops. No flashes or floaters. No eye pain.    Ronald Messina COT 1:31 PM July 5, 2023                Review of systems for the eyes was negative other than the pertinent positives/negatives listed in the HPI.      Assessment & Plan      Mely Rashid is a 78 year old female with the following diagnoses:   1. 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   Return precautions reviewed       Patient disposition:   Return in about 6 weeks (around 8/16/2023) for VT only.           Attending Physician Attestation:  Complete documentation of historical and exam elements from today's encounter can be found in the full encounter summary report (not reduplicated in this progress note).  I personally obtained the chief complaint(s) and history of present illness.  I confirmed and edited as necessary the review of systems, past medical/surgical history, family history, social history, and examination findings as documented by others; and I examined the patient myself.  I personally reviewed the relevant tests, images, and reports as documented above.  I formulated and edited as necessary the assessment and plan and discussed the findings and management plan with the patient and family. . - Edwin Navarro MD

## 2023-07-06 RX ORDER — HYDROXYCHLOROQUINE SULFATE 200 MG/1
200 TABLET, FILM COATED ORAL DAILY
Qty: 90 TABLET | Refills: 0 | Status: SHIPPED | OUTPATIENT
Start: 2023-07-06 | End: 2023-09-29

## 2023-07-06 NOTE — TELEPHONE ENCOUNTER
HYDROXYCHLOROQUINE TABS 200MG      Last Written Prescription Date:  1-20-23  Last Fill Quantity: 90,   # refills: 1  Last Office Visit : 9-8-22  Future Office visit:  9-14-23    Last plaquenil screening 1-31-23 1-31-23 Eye note Dr. Chamberlain:  recheck plaquenil 9 months as precaution              - repeat mfERG this summer  formerly [facility]  10-31-23    Creatinine   Date Value Ref Range Status   07/03/2023 0.60 0.51 - 0.95 mg/dL Final   07/09/2021 0.58 0.52 - 1.04 mg/dL Final   RF 90 day  Eye follow up- FYI to provider.

## 2023-08-07 ENCOUNTER — OFFICE VISIT (OUTPATIENT)
Dept: DERMATOLOGY | Facility: CLINIC | Age: 78
End: 2023-08-07
Payer: COMMERCIAL

## 2023-08-07 DIAGNOSIS — Z79.899 LONG-TERM USE OF PLAQUENIL: ICD-10-CM

## 2023-08-07 DIAGNOSIS — D22.9 MULTIPLE MELANOCYTIC NEVI: ICD-10-CM

## 2023-08-07 DIAGNOSIS — Z79.899 ENCOUNTER FOR LONG-TERM CURRENT USE OF MEDICATION: ICD-10-CM

## 2023-08-07 DIAGNOSIS — M33.13 DERMATOMYOSITIS (H): Primary | ICD-10-CM

## 2023-08-07 DIAGNOSIS — L82.1 SEBORRHEIC KERATOSIS: ICD-10-CM

## 2023-08-07 DIAGNOSIS — M33.13 DERMATOMYOSITIS (H): ICD-10-CM

## 2023-08-07 DIAGNOSIS — L81.4 SOLAR LENTIGO: ICD-10-CM

## 2023-08-07 DIAGNOSIS — Z85.828 HISTORY OF NONMELANOMA SKIN CANCER: ICD-10-CM

## 2023-08-07 DIAGNOSIS — B35.3 TINEA PEDIS OF BOTH FEET: ICD-10-CM

## 2023-08-07 DIAGNOSIS — D84.9 IMMUNOSUPPRESSION (H): ICD-10-CM

## 2023-08-07 DIAGNOSIS — B35.1 ONYCHOMYCOSIS: ICD-10-CM

## 2023-08-07 DIAGNOSIS — D18.01 CHERRY ANGIOMA: ICD-10-CM

## 2023-08-07 PROCEDURE — 99214 OFFICE O/P EST MOD 30 MIN: CPT | Mod: GC | Performed by: DERMATOLOGY

## 2023-08-07 RX ORDER — PRENATAL VIT 91/IRON/FOLIC/DHA 28-975-200
COMBINATION PACKAGE (EA) ORAL 2 TIMES DAILY
Qty: 42 G | Refills: 3 | Status: SHIPPED | OUTPATIENT
Start: 2023-08-07 | End: 2024-04-23

## 2023-08-07 RX ORDER — CICLOPIROX 80 MG/ML
SOLUTION TOPICAL
Qty: 6.6 ML | Refills: 11 | Status: SHIPPED | OUTPATIENT
Start: 2023-08-07 | End: 2024-04-23

## 2023-08-07 ASSESSMENT — PAIN SCALES - GENERAL: PAINLEVEL: NO PAIN (0)

## 2023-08-07 NOTE — PATIENT INSTRUCTIONS
Recommend starting terbinafine cream, applied to affected areas of scale on the bottom of the feet twice daily for the next 3-4 weeks. Can repeat treatment if flaring again at another point in the year.    Start ciclopirox solution, apply to affected toenails and surrounding skin nightly. Wash off with rubbing alcohol once weekly.    Switch from triamcinolone cream to gentle moisturizer (CeraVe, Cetaphil, Vanicream, or otherwise).

## 2023-08-07 NOTE — LETTER
8/7/2023       RE: Mely Rashid  [address]   [place] [address]     Dear Colleague,    Thank you for referring your patient, Mely Rashid, to the [facility] [place] at [facility]. Please see a copy of my visit note below.    [facility] Dermatology Note  Encounter Date: Aug 7, 2023  Office Visit     Dermatology Problem List:  1. 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  2. History of IgA deficiency  3. History of BCC on back s/p excision (2016)  4. Tinea pedis w/ onychomycosis  - KOH (8/7/23) negative.  - Current tx: terbinafine cream BID for 3-4 weeks (as needed for flares), ciclopirox lacquer to affected toenails daily  ____________________________________________    Assessment & Plan:     1. Dermatomyositis: stable on mycophenolate and hydroxychloroquine. Patient is not endorsing muscle aches or flares of her disease. Exam is reassuring today. Labs from 1 month ago were reviewed - patient denies needing refills at this time. No side effects from systemic medications. Following routinely with ophthalmology for eye exams.  - Continue mycophenolate 1000 mg twice daily  - Continue hydroxychloroquine 200 mg daily  - Continue augmented betamethasone cream to hands twice daily as needed  - Continue triamcinolone cream to legs/feet BID as needed  - Continue hydrocortisone to face as needed    2. Xerosis, legs  Clinically, skin today appears to be primarily  related to dryness versus dermatomyositis. Recommend switching from topical steroids to a gentle moisturizer (e.g. Cetaphil, CeraVe, Vanicream).     3. Tinea pedis with onychomycosis  Clinically, plantar aspect of feet/toes appear suspicious for tinea pedis. THOMAS performed in clinic today was negative, though given high suspicion and in the setting of long-term immunosuppression, recommend topical treatment. Discussed that toenails will likely not completely resolve with combination cream/lacquer, though they should keep fungal infection better controlled.   - Start terbinafine cream BID to affected scaly areas on the feet for 3-4 weeks - can use as needed for flares if new scaling/itching develops on feet  - Start ciclopirox solution, apply lacquer daily to toenails and surrounding skin, washing off with rubbing alcohol every week    4. Benign skin findings - banal-appearing melanocytic nevi, solar lentigines, seborrheic keratoses, cherry angiomas, and history of NMSC  Reassured patient of benign skin findings - there is no need for further treatment. Discussed signs/symptoms concerning for NMSC and melanoma. Recommend routine use of sunscreen SPF 30+, reapplying every 2-3 hours when outside.    Procedures Performed:   - KOH prep was obtained and interpreted as negative.    Follow-up: 1 year(s) in-person, or earlier for new or changing lesions    Staff and Resident:     Aster Ahumada MD  PGY3 Dermatology Resident    Staff Physician Comments:   I saw and evaluated the patient with the resident and I edited the assessment and plan as documented in the note. I was present for the examination.    Shekhar Garcias MD   of Dermatology  Department of Dermatology  [employer]      ____________________________________________    CC: Derm Problem (Pt following up for dermatomyositis.)    HPI:  Ms. Mely Rashid is a(n) 78 year old female who presents today as a return  patient for dermatomyositis. Last visit was 8/11/2022.    - Pat states that her dermatomyositis has overall been well controlled since her last visit. She reports experiencing some flaky skin on her legs, treated with triamcinolone cream, and dry/itchy skin on her back which she attributes to her dermatomyositis. Typically during the summer is when she usually flares. No associated rashes noted.   - Some days, Lisa states that she feels more overall weak and fatigued than others. Walking can be challenging some days, and other days she reports walking a few miles without problems. Cannot pinpoint a specific muscle or joint that is weaker than others.  - She reports taking a collagen supplement and calcium supplement over the past year, with improvement in her skin/symptoms noted.   - She reports taking mycophenolate 1000 mg twice daily and hydroxychloroquine 200 mg daily. Tolerating well without side effects. Last eye exam was 1/2023. She reports using the betamethasone cream to hands once weekly, triamcinolone on legs/feet 3-4 times/week, and hydrocortisone to the face rarely (<1 time/month).     - For her skin exam today, she denies having any spots of concern that are new, growing, changing, tender, crusting, or bleeding.  - Reports having a fungal infection on her L great toenail that she treated with bleach, appears to be coming off.  - She has a history of BCC in 2016. She states that her youngest daughter had multiple spots sampled on her back, unsure if any were skin cancer. She reports wearing UV clothing routinely when she is outside.     Labs Reviewed:  CBC, AST/ALT, Cr, ESR/CRP, albumin, CK (7/3/23) normal    Physical Exam:  Vitals: There were no vitals taken for this visit.  SKIN: Total skin exam was performed. The exam included the head/face, neck, both arms, chest, back, buttocks, abdomen, both legs, digits and/or nails.   - There is fine xerotic scale appreciated diffusely on bilateral lower legs.  -  Thicker scale with peeling of skin on bilateral plantar aspect of feet and toes.  - There is yellow discoloration and thickening of multiple toenails with evidence of subungual debris. Nail on left great toe appears to be notably thickened and curved laterally.  - There are dome shaped bright red papules on the trunk and extremities.   - Multiple regular brown pigmented macules and papules are identified on the trunk and extremities.   - Scattered brown macules on sun exposed areas.  - There are waxy stuck on tan to brown papules on the face, trunk, and extremities.   - No other lesions of concern on areas examined.     Medications:  Current Outpatient Medications   Medication    augmented betamethasone dipropionate (DIPROLENE AF) 0.05 % external cream    B Complex TABS    Calcium-Magnesium-Vitamin D 600- MG-MG-UNIT TB24    Collagen Hydrolysate POWD    dorzolamide-timolol (COSOPT) 2-0.5 % ophthalmic solution    glucosamine-chondroitin 500-400 MG CAPS per capsule    hydrocortisone 2.5 % cream    hydroxychloroquine (PLAQUENIL) 200 MG tablet    ibuprofen (ADVIL/MOTRIN) 200 MG tablet    latanoprost (XALATAN) 0.005 % ophthalmic solution    Multiple Vitamins-Minerals (MULTIPLE VITAMINS/WOMENS PO)    Multiple Vitamins-Minerals (PRESERVISION AREDS 2+MULTI VIT PO)    mycophenolate (GENERIC EQUIVALENT) 500 MG tablet    omega-3 fatty acids (FISH OIL) 1200 MG capsule    Propylene Glycol (SYSTANE COMPLETE OP)    triamcinolone (KENALOG) 0.1 % external cream    Vitamin D3 (CHOLECALCIFEROL) 125 MCG (5000 UT) tablet     No current facility-administered medications for this visit.      Past Medical History:   Patient Active Problem List   Diagnosis    Glaucoma suspect, right    Dermatomyositis (H)    IgA deficiency (H)    Herpes zoster    Encounter for long-term current use of medication    Encounter for long-term (current) use of steroids    Oral ulcer    Seborrheic dermatitis    Osteopenia    Neoplasm of uncertain behavior of  skin    Routine general medical examination at a health care facility     Past Medical History:   Diagnosis Date    Arthritis ??    probably    Basal cell carcinoma     Dermatomyositis (H)     Glaucoma     IgA deficiency (H)     Nonsenile cataract      CC Referred Self, MD  No address on file on close of this encounter.

## 2023-08-07 NOTE — NURSING NOTE
Chief Complaint   Patient presents with    Derm Problem     Pt following up for dermatomyositis.     Jin Garcia, EMT

## 2023-08-09 ENCOUNTER — TELEPHONE (OUTPATIENT)
Dept: DERMATOLOGY | Facility: CLINIC | Age: 78
End: 2023-08-09
Payer: COMMERCIAL

## 2023-08-09 ENCOUNTER — OFFICE VISIT (OUTPATIENT)
Dept: OPHTHALMOLOGY | Facility: CLINIC | Age: 78
End: 2023-08-09
Attending: OPHTHALMOLOGY
Payer: COMMERCIAL

## 2023-08-09 DIAGNOSIS — H40.051 BORDERLINE GLAUCOMA OF RIGHT EYE WITH OCULAR HYPERTENSION: ICD-10-CM

## 2023-08-09 PROCEDURE — 92015 DETERMINE REFRACTIVE STATE: CPT

## 2023-08-09 PROCEDURE — 99213 OFFICE O/P EST LOW 20 MIN: CPT | Performed by: OPHTHALMOLOGY

## 2023-08-09 PROCEDURE — G0463 HOSPITAL OUTPT CLINIC VISIT: HCPCS | Mod: 25 | Performed by: OPHTHALMOLOGY

## 2023-08-09 RX ORDER — LATANOPROST 50 UG/ML
1 SOLUTION/ DROPS OPHTHALMIC AT BEDTIME
Qty: 7.5 ML | Refills: 2 | Status: SHIPPED | OUTPATIENT
Start: 2023-08-09 | End: 2024-03-05

## 2023-08-09 RX ORDER — DORZOLAMIDE HYDROCHLORIDE AND TIMOLOL MALEATE 20; 5 MG/ML; MG/ML
1 SOLUTION/ DROPS OPHTHALMIC 2 TIMES DAILY
Qty: 10 ML | Refills: 11 | Status: SHIPPED | OUTPATIENT
Start: 2023-08-09 | End: 2024-03-05

## 2023-08-09 ASSESSMENT — TONOMETRY
OS_IOP_MMHG: 13
OS_IOP_MMHG: 17
OD_IOP_MMHG: 10
OD_IOP_MMHG: 11
OS_IOP_MMHG: 12
IOP_METHOD: TONOPEN
OD_IOP_MMHG: 14

## 2023-08-09 ASSESSMENT — REFRACTION_MANIFEST
OS_CYLINDER: +0.75
OS_ADD: +2.50
OS_SPHERE: +0.25
OS_AXIS: 180
OD_SPHERE: +0.50
OD_CYLINDER: SPHERE
OD_ADD: +2.50

## 2023-08-09 ASSESSMENT — CONF VISUAL FIELD
OD_INFERIOR_NASAL_RESTRICTION: 0
OD_INFERIOR_TEMPORAL_RESTRICTION: 0
METHOD: COUNTING FINGERS
OS_SUPERIOR_NASAL_RESTRICTION: 0
OS_INFERIOR_NASAL_RESTRICTION: 0
OD_SUPERIOR_TEMPORAL_RESTRICTION: 0
OD_NORMAL: 1
OS_NORMAL: 1
OS_INFERIOR_TEMPORAL_RESTRICTION: 0
OD_SUPERIOR_NASAL_RESTRICTION: 0
OS_SUPERIOR_TEMPORAL_RESTRICTION: 0

## 2023-08-09 ASSESSMENT — VISUAL ACUITY
OS_CC+: -1
OD_CC: 20/20
OS_CC: 20/25
METHOD: SNELLEN - LINEAR
CORRECTION_TYPE: GLASSES

## 2023-08-09 ASSESSMENT — SLIT LAMP EXAM - LIDS: COMMENTS: BLEPHARITIS/MGD

## 2023-08-09 ASSESSMENT — EXTERNAL EXAM - LEFT EYE: OS_EXAM: NORMAL

## 2023-08-09 ASSESSMENT — REFRACTION_WEARINGRX
OS_SPHERE: +0.25
OD_CYLINDER: SPHERE
OD_SPHERE: +0.50
SPECS_TYPE: PAL
OS_AXIS: 175
OS_CYLINDER: +0.75
OD_ADD: +2.25
OS_ADD: +2.25

## 2023-08-09 ASSESSMENT — CUP TO DISC RATIO
OD_RATIO: 0.85
OS_RATIO: 0.4

## 2023-08-09 ASSESSMENT — EXTERNAL EXAM - RIGHT EYE: OD_EXAM: NORMAL

## 2023-08-09 NOTE — NURSING NOTE
Chief Complaints and History of Present Illnesses   Patient presents with    Follow Up     Chief Complaint(s) and History of Present Illness(es)       Follow Up              Laterality: both eyes    Course: stable    Associated symptoms: Negative for eye pain, flashes and floaters    Treatments tried: eye drops and artificial tears              Comments    Here for glaucoma follow up. Vision is about the same. Compliant with drops. No flashes or floaters. No eye pain.    Ronald Messina COT 1:58 PM August 9, 2023

## 2023-08-09 NOTE — PROGRESS NOTES
Chief Complaint(s) and History of Present Illness(es)     Follow Up            Laterality: both eyes    Course: stable    Associated symptoms: Negative for eye pain, flashes and floaters    Treatments tried: eye drops and artificial tears          Comments    Here for glaucoma follow up. Vision is about the same. Compliant with   drops. No flashes or floaters. No eye pain.    Ronald Messina COT 1:58 PM August 9, 2023                Review of systems for the eyes was negative other than the pertinent positives/negatives listed in the HPI.      Assessment & Plan      Mely Rashid is a 78 year old female with the following diagnoses:   1. 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           Attending Physician Attestation:  Complete documentation of historical and exam elements from today's encounter can be found in the full encounter summary report (not reduplicated in this progress note).  I personally obtained the chief complaint(s) and history of present illness.  I confirmed and edited as necessary the review of systems, past medical/surgical history, family history, social history, and examination findings as documented by others; and I examined the patient myself.  I personally reviewed the relevant tests, images, and reports as documented above.  I formulated and edited as necessary the assessment and plan and discussed the findings and management plan with the patient and family. . - Edwin Navarro MD

## 2023-08-09 NOTE — TELEPHONE ENCOUNTER
Via phone patient is schedule for the following:    Appointment type: Return  Provider: Dr. Garcias  Return date:   Specialty phone number: [phone]

## 2023-08-10 NOTE — TELEPHONE ENCOUNTER
mycophenolate (GENERIC EQUIVALENT) 500 MG tablet   360 tablet 3 5/2/2022       Last Office Visit: 09-  Future Office visit:  9-    CBC RESULTS:   Recent Labs   Lab Test 07/03/23  1041   WBC 3.9*   RBC 4.43   HGB 13.4   HCT 41.8   MCV 94   MCH 30.2   MCHC 32.1   RDW 13.0          Creatinine   Date Value Ref Range Status   07/03/2023 0.60 0.51 - 0.95 mg/dL Final   07/09/2021 0.58 0.52 - 1.04 mg/dL Final   ]    Liver Function Studies -   Recent Labs   Lab Test 07/03/23  1041 01/13/23  0942 12/31/22  0803   PROTTOTAL  --   --  6.6   ALBUMIN 4.6   < > 4.5   BILITOTAL  --   --  0.4   ALKPHOS  --   --  82   AST 25   < > 28   ALT 19   < > 31    < > = values in this interval not displayed.

## 2023-08-15 RX ORDER — MYCOPHENOLATE MOFETIL 500 MG/1
1000 TABLET ORAL 2 TIMES DAILY
Qty: 360 TABLET | Refills: 1 | Status: SHIPPED | OUTPATIENT
Start: 2023-08-15 | End: 2024-03-02

## 2023-09-02 ENCOUNTER — HEALTH MAINTENANCE LETTER (OUTPATIENT)
Age: 78
End: 2023-09-02

## 2023-09-14 ENCOUNTER — OFFICE VISIT (OUTPATIENT)
Dept: PULMONOLOGY | Facility: CLINIC | Age: 78
End: 2023-09-14
Attending: INTERNAL MEDICINE
Payer: COMMERCIAL

## 2023-09-14 VITALS — HEART RATE: 67 BPM | DIASTOLIC BLOOD PRESSURE: 69 MMHG | SYSTOLIC BLOOD PRESSURE: 126 MMHG | OXYGEN SATURATION: 96 %

## 2023-09-14 DIAGNOSIS — Z79.899 LONG-TERM USE OF PLAQUENIL: Primary | ICD-10-CM

## 2023-09-14 DIAGNOSIS — Z79.899 ENCOUNTER FOR LONG-TERM CURRENT USE OF MEDICATION: ICD-10-CM

## 2023-09-14 DIAGNOSIS — M33.13 DERMATOMYOSITIS (H): ICD-10-CM

## 2023-09-14 PROCEDURE — G0463 HOSPITAL OUTPT CLINIC VISIT: HCPCS | Performed by: INTERNAL MEDICINE

## 2023-09-14 PROCEDURE — 99214 OFFICE O/P EST MOD 30 MIN: CPT | Performed by: INTERNAL MEDICINE

## 2023-09-14 ASSESSMENT — PAIN SCALES - GENERAL: PAINLEVEL: NO PAIN (0)

## 2023-09-14 NOTE — NURSING NOTE
Chief Complaint   Patient presents with    Follow Up     1 year follow up      Vitals were taken and medications were reconciled.     Silvia Ingram RMA  10:16 AM

## 2023-09-14 NOTE — LETTER
2023         RE: Mely Rashid  [address]   [place] [address]        Dear Colleague,    Thank you for referring your patient, Mely Rashid, to the [facility] [place]. Please see a copy of my visit note below.    [facility]  Rheumatology Clinic  Carlos Colon MD  2023     Name: Mely Rashid  MRN: [id number]  Age: 74 year old  : 1945  Referring provider: Referred Self     Problem list:   1. Dermatomyositis with skin findings, minimal weakness on exam but some myalgias, and a daughter with a similar presentation. Steroid responsive  2. IgA deficiency per the outside records with high-titer BRE of 1:2560, other antibodies thus far negative.   3. 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     Assessment and Plan:    She would appear to be in remission or near remission from her dermatomyositis.  However, this is the best she has felt in a long time and she would like to continue to feel this well and she does not seem to be having any toxicity from medication.    Accordingly we will make no changes.      She also follows with Dr. Garcias in dermatology and he is an expert in dermatomyositis.  Her manifestations of disease have always been essentially limited to the skin and I discussed with her my situation of minimal availability now at the [place].  After discussion she is willing to continue her follow-up with Dr. Garcias so long as he agrees and see me or another rheumatologist only on an as-needed basis.    I will notify him to that effect and make sure he is in agreement.    .     ASHISH Colon MD, PhD    Rheumatology    HPI:   Mely Rashid is a 74 year old female with a history of dermatomyositis who presents for follow-up. I last evaluated her on 18 at which time she reported mild  "shortness of breath on exertion, but was otherwise stable and had no skin, muscle, or joint symptoms. Plan was to redo HRCT prior to further reducing MMF.     Today, she reports that she is doing okay overall. She feels that some of her symptoms have worsened this summer, noting worsening symptoms of muscle aches. These symptoms are mostly limited to her upper body, and she denies deficits in strength. She also feels that her skin symptoms have worsened, noting that her hands feel \"raw.\" She has been treating her skin symptoms with a topical cream, but she is unsure what kind of cream. He fatigue has also worsened in the summer, which she attributes to sleeping less. She also notes that her scalp has been itching, causing her to lose hair. She is continued on 1 tablet of plaquenil (200mg) per day. Her last ophthalmologic exam in 11/2018 was unremarkable for plaquenil toxicity. She is also continued on 500mg CellCept daily.     September 8, 2022 interval history:    We last saw the patient in person a little over 3 years ago but have seen her virtually since during the pandemic.    She continues however to do beautifully.  She remains on MMF at 1 g twice daily.  On that dose she has had no significant laboratory toxicity and no significant infections anytime in the last 3 years, although she notes that she hardly goes anywhere since the beginning of COVID.    She had some residual skin involvement 3 years ago only saw her but she started taking oral collagen and thinks that that has really helped that and largely resolved.    She has had longstanding problems with loose stools but stopped dairy and found that that helped a lot.  She had loose stools prior to the initiation of MMF and had no worsening with the initiation of MMF so does not think that is related.    She does have some questions about vaccinations.  She recently got the Shingrix shot.  Her insurance is actually denying payment for that, which is " incomprehensible given that she is 77 years old and therefore FDA approved for it, and furthermore is on MMF which puts her at increased risk.    She is otherwise doing stably.  She had PFTs done earlier this year that were completely stable.  Her strength likewise feels stable and normal to her.    September 14, 2023 interval history:    Since we have last seen the patient a year ago she says she has been really good.  She had virtually no skin manifestations of her disease and has never developed any significant muscle weakness or breathing difficulties.  Periodically she will have some fatigue that she notices that seems to affect her strength for 1 to 2 days and that she is better.  She does really well if she takes a nap in the afternoon.    She does continue on the mycophenolate.  She tolerates that quite well.  She has 3 slightly loose stools in the morning and that is good for the rest of the day.    In addition to denying any skin manifestations of disease she also has not had a Raynauds phenomenon develop.    In terms of lung disease there is just no evidence of it.  She has had pulmonary function test in the past that were stable.  She recently adopted a horse and is active taking care of it.  She did have a CT of the abdomen also that shows no basilar lung disease on that study.    Last PFTs were done in March 2022 and those were stable.    She continues to follow with Dr. Garcias in dermatology as well.           Review of Systems:   Pertinent items are noted in HPI or as below, remainder of complete ROS is negative.      No recent problems with hearing or vision. No swallowing problems.   No breathing difficulty, shortness of breath, coughing, or wheezing  No chest pain or palpitations  No heart burn, indigestion, abdominal pain, nausea, vomiting, diarrhea  No urination problems, no bloody, cloudy urine, no dysuria  No numbing, tingling, weakness  No headaches or confusion  No rashes. No easy bleeding  or bruising.     Active Medications:   Current Outpatient Medications:     B Complex TABS, Take 1 tablet by mouth daily., Disp: , Rfl:     Calcium Carb-Cholecalciferol (CALCIUM 500 +D PO), Take 3 tablets by mouth daily., Disp: , Rfl:     Cholecalciferol (VITAMIN D) 1000 UNIT capsule, Take 1 capsule by mouth daily., Disp: , Rfl:     desonide (DESOWEN) 0.05 % cream, Apply topically 2 times daily as needed (rash), Disp: 30 g, Rfl: 2    desoximetasone (TOPICORT) 0.25 % cream, Apply topically 2 times daily, Disp: 120 g, Rfl: 3    diazepam (VALIUM) 5 MG tablet, Take 1 tablet (5 mg) by mouth once as needed for anxiety or sleep (Take one on arrival for MRI. May repeat in 15 min if inadequate relief.), Disp: 2 tablet, Rfl: 0    glucosamine-chondroitinoitin (GLUCOSAMINE CHONDR COMPLEX) 500-400 MG CAPS, Take 2 capsules by mouth daily., Disp: , Rfl:     hydroxychloroquine (PLAQUENIL) 200 MG tablet, Take 1 tablet (200 mg) by mouth daily (*annual eye exam/plaquenil screening- due ), Disp: 90 tablet, Rfl: 1    latanoprost (XALATAN) 0.005 % ophthalmic solution, Place 1 drop into the right eye At Bedtime, Disp: 3 Bottle, Rfl: 11    loperamide (IMODIUM A-D) 2 MG tablet, Take 2 mg by mouth every morning, Disp: , Rfl:     mycophenolate (GENERIC EQUIVALENT) 500 MG tablet, Take 1 tablet (500 mg) by mouth daily, Disp: 1 tablet, Rfl: 0    omega-3 fatty acids (FISH OIL) 1200 MG capsule, Take 1 capsule by mouth daily., Disp: , Rfl:     vitamin E 400 UNIT capsule, Take by mouth daily, Disp: , Rfl:       Allergies:   Penicillins      Past Medical History:  Basal cell carcinoma   Dermatomyositis   Glaucoma   IgA deficiency   Non senile cataract   Herpes zoster   Oral ulcer   Seborrheic dermatitis   Osteopenia   Skin neoplasm      Past Surgical History:  Skin lesion biopsy   Esophagogastroduodenoscopy 2014   Hysterectomy partial, prolapse uterus     Family History:   Father: coronary artery disease, arthritis   Brother: diabetes mellitus,  glaucoma  Sister: breast cancer, glaucoma, seizures  Daughter: psoriasis, dermatomyositis   Mother: alzheimer's       Social History:   Former smoker, quit on 04/10/1079.   Rare alcohol use.   Lives independently      Physical Exam:   /69 (BP Location: Right arm, Patient Position: Sitting, Cuff Size: Adult Regular)   Pulse 67   SpO2 96%    Wt Readings from Last 4 Encounters:   01/18/23 63.5 kg (140 lb)   01/10/23 63.9 kg (140 lb 14.4 oz)   12/31/22 61.2 kg (135 lb)   12/06/22 63.6 kg (140 lb 4.8 oz)     Constitutional: Well-developed, appearing stated age; cooperative  Eyes: Normal EOM, PERRLA, vision, conjunctiva, sclera  ENT: Normal external ears, nose, hearing, lips, teeth, gums, throat. No mucous membrane lesions, normal saliva pool  Neck: No mass or thyroid enlargement  Resp: Lungs clear to auscultation, nl to palpation  CV: RRR, no murmurs, rubs or gallops, no edema  GI: No ABD mass or tenderness, no HSM  : Not tested  Lymph: No cervical, supraclavicular, inguinal or epitrochlear nodes  MS: The TMJ, neck, shoulder, elbow, wrist, spine, hip, knee, ankle, and foot MTP/IP joints were examined and found normal. No active synovitis or altered joint anatomy. Full joint ROM. Normal  strength. No dactylitis,  tenosynovitis, enthesopathy.  Skin: No nail pitting, alopecia, rash, or lesions.  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 This is tender.  Resolved erythema over MCP joints and elbows.   Neuro: Normal cranial nerves, strength, sensation, DTRs.   Psych: Normal judgement, orientation, memory, affect.     Laboratory:       Latest Ref Rng & Units 12/31/2022     8:03 AM 1/13/2023     9:42 AM 7/3/2023    10:41 AM   RHEUM RESULTS   Albumin 3.4 - 5.0 g/dL  4.0     Albumin 3.5 - 5.2 g/dL 4.5   4.6    ALT 0 - 50 U/L 31  30  19    AST 0 - 45 U/L 28  17  25    CK Total 26 - 192 U/L  56  64    Creatinine 0.51 - 0.95  mg/dL 0.64   0.60    CRP Inflammation 0.0 - 8.0 mg/L  <2.9     CRP Inflammation <5.00 mg/L   <3.00    GFR Estimate >60 mL/min/1.73m2 >90   >90    Hematocrit 35.0 - 47.0 % 42.1  41.3  41.8    Hemoglobin 11.7 - 15.7 g/dL 13.3  13.0  13.4    WBC 4.0 - 11.0 10e3/uL 8.4  3.9  3.9    RBC Count 3.80 - 5.20 10e6/uL 4.49  4.33  4.43    RDW 10.0 - 15.0 % 13.3  13.5  13.0    MCHC 31.5 - 36.5 g/dL 31.6  31.5  32.1    MCV 78 - 100 fL 94  95  94    Platelet Count 150 - 450 10e3/uL 204  205  197    Sed Rate 0 - 30 mm/hr   6          Ribonucleic Protein IgG Antibody   Date Value Ref Range Status   03/22/2012 0  Final     Comment:     Reference range: 0 to 40  Unit: AU/mL  (Note)  INTERPRETIVE INFORMATION: Ribonucleic Protein (LIGIA), IgG   29 AU/mL or Less ............. Negative   30 - 40 AU/mL ................ Equivocal   41 AU/mL or Greater .......... Positive  RNP antibody is seen in % of mixed connective tissue  disease and is considered specific for this syndrome if  other antibodies are negative. RNP is also present in  20-30% of systemic lupus erythematosus (SLE) and 15-25% of  progressive systemic sclerosis (PSS).     Smith Antibody IgG   Date Value Ref Range Status   03/22/2012 0  Final     Comment:     Reference range: 0 to 40  Unit: AU/mL  (Note)  INTERPRETIVE INFORMATION: FITZGERALD (LIGIA) Ab, IgG   29 AU/mL or Less ............. Negative   30 - 40 AU/mL ................ Equivocal   41 AU/mL or Greater .......... Positive  Fitzgerald antibody is very specific for systemic lupus  erythematosus (SLE) but only occurs in 30-35% of SLE cases.  The presence of antibodies to Fitzgerald is often associated  with renal disease.     SSA (RO) Antibody IgG   Date Value Ref Range Status   03/22/2012 0  Final     Comment:     Reference range: 0 to 40  Unit: AU/mL  (Note)  INTERPRETIVE INFORMATION: SSA (Ro) (LIGIA) Ab, IgG   29 AU/mL or Less ............. Negative   30 - 40 AU/mL ................ Equivocal   41 AU/mL or Greater ..........  Positive  SSA (Ro) antibody is seen in 70-75% of Sjogren syndrome  cases, 30-40% of systemic lupus erythematosus (SLE) and  5-10% of progressive systemic sclerosis (PSS).     SSB (LA) Antibody IgG   Date Value Ref Range Status   03/22/2012 0  Final     Comment:     Reference range: 0 to 40  Unit: AU/mL  (Note)  INTERPRETIVE INFORMATION: SSB (La) (LIGIA) Ab, IgG   29 AU/mL or Less ............. Negative   30 - 40 AU/mL ................ Equivocal   41 AU/mL or Greater .......... Positive  SSB (La) antibody is seen in 50-60% of Sjogren syndrome  cases and is specific if it is the only LIGIA antibody  present. 15-25% of patients with systemic lupus  erythematosus (SLE) and 5-10% of patients with progressive  systemic sclerosis (PSS) also have this antibody.     Scleroderma Antibody IgG   Date Value Ref Range Status   03/22/2012 0  Final     Comment:     Reference range: 0 to 40  Unit: AU/mL  (Note)  INTERPRETIVE INFORMATION: Scleroderma (Scl-70) (LIGIA) Ab, IgG   29 AU/mL or Less ............. Negative   30 - 40 AU/mL ................ Equivocal   41 AU/mL or Greater .......... Positive  Scleroderma (Scl-70) antibody is seen in 20-60% of patients  with scleroderma and is considered diagnostic and specific  for scleroderma if it is the only LIGIA antibody present.  Scl-70 is also seen in approximately 25% of progressive  systemic sclerosis (PSS).  Performed by [facility],  [address] [place] [address],[place] [address] [phone]  [email or web address], Dayana Kraft MD, Lab. Director     Centromere Trisha IgG   Date Value Ref Range Status   03/22/2012 0  Final     Comment:     Reference range: 0 to 40  Unit: AU/mL  (Note)  INTERPRETIVE INFORMATION: Centromere Ab, IgG   29 AU/mL or Less ............. Negative   30 - 40 AU/mL ................ Equivocal   41 AU/mL or Greater .......... Positive  Centromere antibodies are present in 80-90% of individuals  with CREST variant scleroderma.  This antibody is also seen  in 30% of Raynaud patients,  12% of patients with mixed  connective-tissue disease, diffuse scleroderma,  interstitial pulmonary fibrosis, primary biliary cirrhosis,  and in a smaller percent of patients with systemic lupus  erythematosus (SLE) and RA.  Performed by [facility],  [address] [place] [id number] [phone]  [email or web address], Dayana Kraft MD, Lab. Director         Again, thank you for allowing me to participate in the care of your patient.        Sincerely,        Carlos Colon MD

## 2023-09-14 NOTE — PROGRESS NOTES
[facility]  Rheumatology Clinic  Carlos Colon MD  2023     Name: Mely Rashid  MRN: [id number]  Age: 74 year old  : 1945  Referring provider: Referred Self     Problem list:   1. Dermatomyositis with skin findings, minimal weakness on exam but some myalgias, and a daughter with a similar presentation. Steroid responsive  2. IgA deficiency per the outside records with high-titer BRE of 1:2560, other antibodies thus far negative.   3. 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     Assessment and Plan:    She would appear to be in remission or near remission from her dermatomyositis.  However, this is the best she has felt in a long time and she would like to continue to feel this well and she does not seem to be having any toxicity from medication.    Accordingly we will make no changes.      She also follows with Dr. Garcias in dermatology and he is an expert in dermatomyositis.  Her manifestations of disease have always been essentially limited to the skin and I discussed with her my situation of minimal availability now at the [facility].  After discussion she is willing to continue her follow-up with Dr. Garcias so long as he agrees and see me or another rheumatologist only on an as-needed basis.    I will notify him to that effect and make sure he is in agreement.    .     ASHISH Colon MD, PhD    Rheumatology    HPI:   Mely Rashid is a 74 year old female with a history of dermatomyositis who presents for follow-up. I last evaluated her on 18 at which time she reported mild shortness of breath on exertion, but was otherwise stable and had no skin, muscle, or joint symptoms. Plan was to redo HRCT prior to further reducing MMF.     Today, she reports that she is doing okay overall. She feels that some of her symptoms have worsened this summer, noting worsening symptoms of  "muscle aches. These symptoms are mostly limited to her upper body, and she denies deficits in strength. She also feels that her skin symptoms have worsened, noting that her hands feel \"raw.\" She has been treating her skin symptoms with a topical cream, but she is unsure what kind of cream. He fatigue has also worsened in the summer, which she attributes to sleeping less. She also notes that her scalp has been itching, causing her to lose hair. She is continued on 1 tablet of plaquenil (200mg) per day. Her last ophthalmologic exam in 11/2018 was unremarkable for plaquenil toxicity. She is also continued on 500mg CellCept daily.     September 8, 2022 interval history:    We last saw the patient in person a little over 3 years ago but have seen her virtually since during the pandemic.    She continues however to do beautifully.  She remains on MMF at 1 g twice daily.  On that dose she has had no significant laboratory toxicity and no significant infections anytime in the last 3 years, although she notes that she hardly goes anywhere since the beginning of COVID.    She had some residual skin involvement 3 years ago only saw her but she started taking oral collagen and thinks that that has really helped that and largely resolved.    She has had longstanding problems with loose stools but stopped dairy and found that that helped a lot.  She had loose stools prior to the initiation of MMF and had no worsening with the initiation of MMF so does not think that is related.    She does have some questions about vaccinations.  She recently got the Shingrix shot.  Her insurance is actually denying payment for that, which is incomprehensible given that she is 77 years old and therefore FDA approved for it, and furthermore is on MMF which puts her at increased risk.    She is otherwise doing stably.  She had PFTs done earlier this year that were completely stable.  Her strength likewise feels stable and normal to " her.    September 14, 2023 interval history:    Since we have last seen the patient a year ago she says she has been really good.  She had virtually no skin manifestations of her disease and has never developed any significant muscle weakness or breathing difficulties.  Periodically she will have some fatigue that she notices that seems to affect her strength for 1 to 2 days and that she is better.  She does really well if she takes a nap in the afternoon.    She does continue on the mycophenolate.  She tolerates that quite well.  She has 3 slightly loose stools in the morning and that is good for the rest of the day.    In addition to denying any skin manifestations of disease she also has not had a Raynauds phenomenon develop.    In terms of lung disease there is just no evidence of it.  She has had pulmonary function test in the past that were stable.  She recently adopted a horse and is active taking care of it.  She did have a CT of the abdomen also that shows no basilar lung disease on that study.    Last PFTs were done in March 2022 and those were stable.    She continues to follow with Dr. Garcias in dermatology as well.           Review of Systems:   Pertinent items are noted in HPI or as below, remainder of complete ROS is negative.      No recent problems with hearing or vision. No swallowing problems.   No breathing difficulty, shortness of breath, coughing, or wheezing  No chest pain or palpitations  No heart burn, indigestion, abdominal pain, nausea, vomiting, diarrhea  No urination problems, no bloody, cloudy urine, no dysuria  No numbing, tingling, weakness  No headaches or confusion  No rashes. No easy bleeding or bruising.     Active Medications:   Current Outpatient Medications:     B Complex TABS, Take 1 tablet by mouth daily., Disp: , Rfl:     Calcium Carb-Cholecalciferol (CALCIUM 500 +D PO), Take 3 tablets by mouth daily., Disp: , Rfl:     Cholecalciferol (VITAMIN D) 1000 UNIT capsule, Take 1  capsule by mouth daily., Disp: , Rfl:     desonide (DESOWEN) 0.05 % cream, Apply topically 2 times daily as needed (rash), Disp: 30 g, Rfl: 2    desoximetasone (TOPICORT) 0.25 % cream, Apply topically 2 times daily, Disp: 120 g, Rfl: 3    diazepam (VALIUM) 5 MG tablet, Take 1 tablet (5 mg) by mouth once as needed for anxiety or sleep (Take one on arrival for MRI. May repeat in 15 min if inadequate relief.), Disp: 2 tablet, Rfl: 0    glucosamine-chondroitinoitin (GLUCOSAMINE CHONDR COMPLEX) 500-400 MG CAPS, Take 2 capsules by mouth daily., Disp: , Rfl:     hydroxychloroquine (PLAQUENIL) 200 MG tablet, Take 1 tablet (200 mg) by mouth daily (*annual eye exam/plaquenil screening- due ), Disp: 90 tablet, Rfl: 1    latanoprost (XALATAN) 0.005 % ophthalmic solution, Place 1 drop into the right eye At Bedtime, Disp: 3 Bottle, Rfl: 11    loperamide (IMODIUM A-D) 2 MG tablet, Take 2 mg by mouth every morning, Disp: , Rfl:     mycophenolate (GENERIC EQUIVALENT) 500 MG tablet, Take 1 tablet (500 mg) by mouth daily, Disp: 1 tablet, Rfl: 0    omega-3 fatty acids (FISH OIL) 1200 MG capsule, Take 1 capsule by mouth daily., Disp: , Rfl:     vitamin E 400 UNIT capsule, Take by mouth daily, Disp: , Rfl:       Allergies:   Penicillins      Past Medical History:  Basal cell carcinoma   Dermatomyositis   Glaucoma   IgA deficiency   Non senile cataract   Herpes zoster   Oral ulcer   Seborrheic dermatitis   Osteopenia   Skin neoplasm      Past Surgical History:  Skin lesion biopsy   Esophagogastroduodenoscopy 2014   Hysterectomy partial, prolapse uterus     Family History:   Father: coronary artery disease, arthritis   Brother: diabetes mellitus, glaucoma  Sister: breast cancer, glaucoma, seizures  Daughter: psoriasis, dermatomyositis   Mother: alzheimer's       Social History:   Former smoker, quit on 04/10/1079.   Rare alcohol use.   Lives independently      Physical Exam:   /69 (BP Location: Right arm, Patient Position:  Sitting, Cuff Size: Adult Regular)   Pulse 67   SpO2 96%    Wt Readings from Last 4 Encounters:   01/18/23 63.5 kg (140 lb)   01/10/23 63.9 kg (140 lb 14.4 oz)   12/31/22 61.2 kg (135 lb)   12/06/22 63.6 kg (140 lb 4.8 oz)     Constitutional: Well-developed, appearing stated age; cooperative  Eyes: Normal EOM, PERRLA, vision, conjunctiva, sclera  ENT: Normal external ears, nose, hearing, lips, teeth, gums, throat. No mucous membrane lesions, normal saliva pool  Neck: No mass or thyroid enlargement  Resp: Lungs clear to auscultation, nl to palpation  CV: RRR, no murmurs, rubs or gallops, no edema  GI: No ABD mass or tenderness, no HSM  : Not tested  Lymph: No cervical, supraclavicular, inguinal or epitrochlear nodes  MS: The TMJ, neck, shoulder, elbow, wrist, spine, hip, knee, ankle, and foot MTP/IP joints were examined and found normal. No active synovitis or altered joint anatomy. Full joint ROM. Normal  strength. No dactylitis,  tenosynovitis, enthesopathy.  Skin: No nail pitting, alopecia, rash, or lesions.  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 This is tender.  Resolved erythema over MCP joints and elbows.   Neuro: Normal cranial nerves, strength, sensation, DTRs.   Psych: Normal judgement, orientation, memory, affect.     Laboratory:       Latest Ref Rng & Units 12/31/2022     8:03 AM 1/13/2023     9:42 AM 7/3/2023    10:41 AM   RHEUM RESULTS   Albumin 3.4 - 5.0 g/dL  4.0     Albumin 3.5 - 5.2 g/dL 4.5   4.6    ALT 0 - 50 U/L 31  30  19    AST 0 - 45 U/L 28  17  25    CK Total 26 - 192 U/L  56  64    Creatinine 0.51 - 0.95 mg/dL 0.64   0.60    CRP Inflammation 0.0 - 8.0 mg/L  <2.9     CRP Inflammation <5.00 mg/L   <3.00    GFR Estimate >60 mL/min/1.73m2 >90   >90    Hematocrit 35.0 - 47.0 % 42.1  41.3  41.8    Hemoglobin 11.7 - 15.7 g/dL 13.3  13.0  13.4    WBC 4.0 - 11.0 10e3/uL 8.4  3.9  3.9    RBC Count  3.80 - 5.20 10e6/uL 4.49  4.33  4.43    RDW 10.0 - 15.0 % 13.3  13.5  13.0    MCHC 31.5 - 36.5 g/dL 31.6  31.5  32.1    MCV 78 - 100 fL 94  95  94    Platelet Count 150 - 450 10e3/uL 204  205  197    Sed Rate 0 - 30 mm/hr   6          Ribonucleic Protein IgG Antibody   Date Value Ref Range Status   03/22/2012 0  Final     Comment:     Reference range: 0 to 40  Unit: AU/mL  (Note)  INTERPRETIVE INFORMATION: Ribonucleic Protein (LIGIA), IgG   29 AU/mL or Less ............. Negative   30 - 40 AU/mL ................ Equivocal   41 AU/mL or Greater .......... Positive  RNP antibody is seen in % of mixed connective tissue  disease and is considered specific for this syndrome if  other antibodies are negative. RNP is also present in  20-30% of systemic lupus erythematosus (SLE) and 15-25% of  progressive systemic sclerosis (PSS).     Smith Antibody IgG   Date Value Ref Range Status   03/22/2012 0  Final     Comment:     Reference range: 0 to 40  Unit: AU/mL  (Note)  INTERPRETIVE INFORMATION: FITZGERALD (LIGIA) Ab, IgG   29 AU/mL or Less ............. Negative   30 - 40 AU/mL ................ Equivocal   41 AU/mL or Greater .......... Positive  Fitzgerald antibody is very specific for systemic lupus  erythematosus (SLE) but only occurs in 30-35% of SLE cases.  The presence of antibodies to Fitzgerald is often associated  with renal disease.     SSA (RO) Antibody IgG   Date Value Ref Range Status   03/22/2012 0  Final     Comment:     Reference range: 0 to 40  Unit: AU/mL  (Note)  INTERPRETIVE INFORMATION: SSA (Ro) (LIGIA) Ab, IgG   29 AU/mL or Less ............. Negative   30 - 40 AU/mL ................ Equivocal   41 AU/mL or Greater .......... Positive  SSA (Ro) antibody is seen in 70-75% of Sjogren syndrome  cases, 30-40% of systemic lupus erythematosus (SLE) and  5-10% of progressive systemic sclerosis (PSS).     SSB (LA) Antibody IgG   Date Value Ref Range Status   03/22/2012 0  Final     Comment:     Reference range: 0 to 40  Unit:  AU/mL  (Note)  INTERPRETIVE INFORMATION: SSB (La) (LIGIA) Ab, IgG   29 AU/mL or Less ............. Negative   30 - 40 AU/mL ................ Equivocal   41 AU/mL or Greater .......... Positive  SSB (La) antibody is seen in 50-60% of Sjogren syndrome  cases and is specific if it is the only LIGIA antibody  present. 15-25% of patients with systemic lupus  erythematosus (SLE) and 5-10% of patients with progressive  systemic sclerosis (PSS) also have this antibody.     Scleroderma Antibody IgG   Date Value Ref Range Status   03/22/2012 0  Final     Comment:     Reference range: 0 to 40  Unit: AU/mL  (Note)  INTERPRETIVE INFORMATION: Scleroderma (Scl-70) (LIGIA) Ab, IgG   29 AU/mL or Less ............. Negative   30 - 40 AU/mL ................ Equivocal   41 AU/mL or Greater .......... Positive  Scleroderma (Scl-70) antibody is seen in 20-60% of patients  with scleroderma and is considered diagnostic and specific  for scleroderma if it is the only LIGIA antibody present.  Scl-70 is also seen in approximately 25% of progressive  systemic sclerosis (PSS).  Performed by [facility],  [address] [place] [address] [phone]  [email or web address], Dayana Kraft MD, Lab. Director     Centromere Trisha IgG   Date Value Ref Range Status   03/22/2012 0  Final     Comment:     Reference range: 0 to 40  Unit: AU/mL  (Note)  INTERPRETIVE INFORMATION: Centromere Ab, IgG   29 AU/mL or Less ............. Negative   30 - 40 AU/mL ................ Equivocal   41 AU/mL or Greater .......... Positive  Centromere antibodies are present in 80-90% of individuals  with CREST variant scleroderma.  This antibody is also seen  in 30% of Raynaud patients, 12% of patients with mixed  connective-tissue disease, diffuse scleroderma,  interstitial pulmonary fibrosis, primary biliary cirrhosis,  and in a smaller percent of patients with systemic lupus  erythematosus (SLE) and RA.  Performed by [facility],  [address], [facility] [place] [address]  [phone]  [email or web address], Dayana Kraft MD, Lab. Director

## 2023-09-29 DIAGNOSIS — Z79.899 ENCOUNTER FOR LONG-TERM CURRENT USE OF MEDICATION: ICD-10-CM

## 2023-09-29 DIAGNOSIS — M33.13 DERMATOMYOSITIS (H): ICD-10-CM

## 2023-09-29 DIAGNOSIS — Z79.899 LONG-TERM USE OF PLAQUENIL: ICD-10-CM

## 2023-09-29 RX ORDER — HYDROXYCHLOROQUINE SULFATE 200 MG/1
200 TABLET, FILM COATED ORAL DAILY
Qty: 60 TABLET | Refills: 1 | Status: SHIPPED | OUTPATIENT
Start: 2023-09-29 | End: 2024-01-22

## 2023-09-29 NOTE — TELEPHONE ENCOUNTER
hydroxychloroquine (PLAQUENIL) 200 MG tablet     90 tablet 0 7/6/2023     Creatinine   Date Value Ref Range Status   07/03/2023 0.60 0.51 - 0.95 mg/dL Final   07/09/2021 0.58 0.52 - 1.04 mg/dL Final     Eye exam:  1/31/23 9/14/2023  [facility]    Carlos Colon MD  Rheumatology

## 2023-10-20 DIAGNOSIS — Z79.899 ENCOUNTER FOR LONG-TERM (CURRENT) USE OF HIGH-RISK MEDICATION: Primary | ICD-10-CM

## 2023-10-30 DIAGNOSIS — H40.051 BORDERLINE GLAUCOMA OF RIGHT EYE WITH OCULAR HYPERTENSION: Primary | ICD-10-CM

## 2023-10-31 ENCOUNTER — OFFICE VISIT (OUTPATIENT)
Dept: OPHTHALMOLOGY | Facility: CLINIC | Age: 78
End: 2023-10-31
Attending: OPHTHALMOLOGY
Payer: COMMERCIAL

## 2023-10-31 DIAGNOSIS — H40.051 BORDERLINE GLAUCOMA OF RIGHT EYE WITH OCULAR HYPERTENSION: ICD-10-CM

## 2023-10-31 DIAGNOSIS — H25.13 AGE-RELATED NUCLEAR CATARACT OF BOTH EYES: ICD-10-CM

## 2023-10-31 DIAGNOSIS — Z79.899 ENCOUNTER FOR LONG-TERM (CURRENT) USE OF HIGH-RISK MEDICATION: ICD-10-CM

## 2023-10-31 DIAGNOSIS — H40.1112 CHRONIC OPEN ANGLE GLAUCOMA OF RIGHT EYE, MODERATE STAGE: Primary | ICD-10-CM

## 2023-10-31 PROCEDURE — 92083 EXTENDED VISUAL FIELD XM: CPT | Mod: 26 | Performed by: OPHTHALMOLOGY

## 2023-10-31 PROCEDURE — 99213 OFFICE O/P EST LOW 20 MIN: CPT | Mod: 25 | Performed by: OPHTHALMOLOGY

## 2023-10-31 PROCEDURE — 99214 OFFICE O/P EST MOD 30 MIN: CPT | Mod: GC | Performed by: OPHTHALMOLOGY

## 2023-10-31 PROCEDURE — 92133 CPTRZD OPH DX IMG PST SGM ON: CPT | Performed by: OPHTHALMOLOGY

## 2023-10-31 PROCEDURE — G0463 HOSPITAL OUTPT CLINIC VISIT: HCPCS | Performed by: OPHTHALMOLOGY

## 2023-10-31 PROCEDURE — 92134 CPTRZ OPH DX IMG PST SGM RTA: CPT | Performed by: OPHTHALMOLOGY

## 2023-10-31 PROCEDURE — 99207 FUNDUS AUTOFLUORESCENCE IMAGE (FAF) OU (BOTH EYES): CPT | Mod: 26 | Performed by: OPHTHALMOLOGY

## 2023-10-31 PROCEDURE — 99207 OVF 24-2 DYNAMIC OU: CPT | Mod: 26 | Performed by: OPHTHALMOLOGY

## 2023-10-31 PROCEDURE — 92082 INTERMEDIATE VISUAL FIELD XM: CPT | Performed by: OPHTHALMOLOGY

## 2023-10-31 PROCEDURE — 92133 CPTRZD OPH DX IMG PST SGM ON: CPT | Mod: 26 | Performed by: OPHTHALMOLOGY

## 2023-10-31 PROCEDURE — 92083 EXTENDED VISUAL FIELD XM: CPT | Performed by: OPHTHALMOLOGY

## 2023-10-31 PROCEDURE — 999N000103 HC STATISTIC NO CHARGE FACILITY FEE

## 2023-10-31 PROCEDURE — 92250 FUNDUS PHOTOGRAPHY W/I&R: CPT | Performed by: OPHTHALMOLOGY

## 2023-10-31 RX ORDER — BRIMONIDINE TARTRATE 2 MG/ML
1 SOLUTION/ DROPS OPHTHALMIC 3 TIMES DAILY
Qty: 10 ML | Refills: 1 | Status: SHIPPED | OUTPATIENT
Start: 2023-10-31 | End: 2023-12-12

## 2023-10-31 ASSESSMENT — REFRACTION_WEARINGRX
OS_AXIS: 175
OS_CYLINDER: +0.75
OD_CYLINDER: SPHERE
OD_SPHERE: +0.50
OD_CYLINDER: SPHERE
OS_CYLINDER: +0.75
OS_AXIS: 175
OS_SPHERE: +0.25
OD_ADD: +2.25
OS_SPHERE: +0.25
OS_ADD: +2.25
SPECS_TYPE: PAL
OD_ADD: +2.25
SPECS_TYPE: PAL
OD_SPHERE: +0.50
OS_ADD: +2.25

## 2023-10-31 ASSESSMENT — TONOMETRY
OD_IOP_MMHG: 14
OD_IOP_MMHG: 15
IOP_METHOD: APPLANATION
OS_IOP_MMHG: 17
IOP_METHOD: TONOPEN
OD_IOP_MMHG: 15
IOP_METHOD: TONOPEN
OS_IOP_MMHG: 17
OS_IOP_MMHG: 16

## 2023-10-31 ASSESSMENT — SLIT LAMP EXAM - LIDS
COMMENTS: BLEPHARITIS/MGD
COMMENTS: BLEPHARITIS/MGD

## 2023-10-31 ASSESSMENT — VISUAL ACUITY
OS_CC: 20/25
OS_CC+: +1
OS_CC: 20/25
CORRECTION_TYPE: GLASSES
OD_CC+: -2
CORRECTION_TYPE: GLASSES
METHOD: SNELLEN - LINEAR
OD_CC: 20/20
OD_CC: 20/20
METHOD: SNELLEN - LINEAR
OS_CC+: +1
OD_CC+: -2

## 2023-10-31 ASSESSMENT — CUP TO DISC RATIO
OD_RATIO: 0.85
OD_RATIO: 0.85
OS_RATIO: 0.4
OS_RATIO: 0.4

## 2023-10-31 ASSESSMENT — EXTERNAL EXAM - LEFT EYE
OS_EXAM: NORMAL
OS_EXAM: NORMAL

## 2023-10-31 ASSESSMENT — EXTERNAL EXAM - RIGHT EYE
OD_EXAM: NORMAL
OD_EXAM: NORMAL

## 2023-10-31 ASSESSMENT — CONF VISUAL FIELD
COMMENTS: VF PERFORMED TODAY
COMMENTS: VF PERFORMED TODAY

## 2023-10-31 NOTE — NURSING NOTE
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 Last seen August 9, 2023.      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 Last seen August 9, 2023.               Comments    Pt reports no noticeable changes in vision since last seen, appears stable. BE  Denies new floaters/flashes/pain. BE  Compliant with drops and taking as prescribed; lgtts 9 PM BE & 9 AM RE    Mean Tate OA, October 31, 2023

## 2023-10-31 NOTE — PROGRESS NOTES
"CC -   plaquenil    INTERVAL HISTORY -   Last visit with me 1/2023      PMH-   Mely Rashid is a 78 year old  patient presenting for plaquenil.    400 mg/day since 2011-8/2016, then 200/day since (brief period of 300/day)  height 5'6\", weigth 145#., no renal disease, no tamoxifen  H/o dermatomyositis, on plaquenil and cellcept.  Sees Isaiah.    Retired surgery scheduler for ENT @ [employer]        PAST OCULAR HISTORY  No surgeries    RETINAL IMAGING  OCT  10/31/2023  OD-  Mild diffuse outer changes c/w AMD, ?PVD stable compared to 4/2022  OS -  Mild diffuse outer changes, c/w AMD ?PVD stable compared to 4/2022    AF  10/31/2023  OU - mild irregular, stable    OVF 10-2   10/31/2023  OD - reliable, large SN defect & scattered, stable  OS - reliable, few spots, stable    mfERG 7/29/20  OU - normal      ASSESSMENT & PLAN    #.  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  Glaucoma OU   - on xalatan and timolol   - has right APD likely d/t glaucoma   - sees Dr. Navarro Q6mo      #  Tr NS OU      # Mild dry AMD OU   - no smoking (quit age 32)   - started AREDS    # TYLER    - artificial tears d/w patient 1/2023    return to clinic: in 9 months, OCT + FAF + OVF 10-2      ATTESTATION     Attending Physician Attestation:      Complete documentation of historical and exam elements from today's encounter can be found in the full encounter summary report (not reduplicated in this progress note).  I personally obtained the chief complaint(s) and " history of present illness.  I confirmed and edited as necessary the review of systems, past medical/surgical history, family history, social history, and examination findings as documented by others; and I examined the patient myself.  I personally reviewed the relevant tests, images, and reports as documented above.  I formulated and edited as necessary the assessment and plan and discussed the findings and management plan with the patient and family    Shakira Chamberlain MD, PhD  , Vitreoretinal Surgery  Department of Ophthalmology  [facility]

## 2023-10-31 NOTE — NURSING NOTE
Chief Complaints and History of Present Illnesses   Patient presents with    Monitor Current High Risk Meds     Follow up Encounter for long-term (current) use of high-risk medication, Plaquenil.  Last seen January 31, 2023.      Chief Complaint(s) and History of Present Illness(es)       Monitor Current High Risk Meds              Laterality: both eyes    Onset: months ago    Associated symptoms: Negative for eye pain, photophobia, flashes and floaters    Treatments tried: eye drops and artificial tears    Pain scale: 0/10    Comments: Follow up Encounter for long-term (current) use of high-risk medication, Plaquenil.  Last seen January 31, 2023.               Comments    Pt reports no noticeable changes in vision over the last year BE  Denies new floaters/flashes/pain. BE   Complaint with drops and taking as prescribed; lgtts 9 PM BE and 9 AM RE.     Venu Tate OA, October 31, 2023

## 2023-10-31 NOTE — PROGRESS NOTES
HPI       Glaucoma Follow Up    In both eyes.  Associated symptoms include Negative for double vision, eye pain, redness and photophobia.  Treatment compliance is always.  Pain was noted as 0/10. 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 Last seen August 9, 2023.              Comments    Pt reports no noticeable changes in vision since last seen, appears stable. BE  Denies new floaters/flashes/pain. BE  Compliant with drops and taking as prescribed; lgtts 9 PM BE & 9 AM RE    Mean Tate OA, October 31, 2023            Last edited by Akua Perdomo on 10/31/2023 12:09 PM.           Review of systems for the eyes was negative other than the pertinent positives/negatives listed in the HPI.      Assessment & Plan      Mely Rashid is a 78 year old female with the following diagnoses:   1. Chronic open angle glaucoma of right eye, moderate stage    2. Age-related nuclear cataract of both eyes       Right eye arcuate scotoma - stable on 24-2 today c/t 05/2023  Left eye still normal with scattered defects today  OCT rNFL 10/31/2023   Right: progressive thinning   Left: stable   S/P Selected laser trabeculoplasty (SLT) #1 right eye 7/5/23  Intraocular pressure 14 -> 11/10 in 08/09/2023 now 14/16 mm Hg  Good response    Discussed R/B/A/I CE/IOL + OMNI 360+ECP. Will aim emmetropia. Mild hyperopia left eye.   However, patient opts to trial additional medical therapy for now    Continue cosopt twice a day right eye   Continue latanoprost at bedtime both eyes   Start brimonidine right eye and recheck in 6 weeks      Patient disposition:   Return in 6 weeks (on 12/12/2023) for Vision, pressure.         My privilege to be part of your care,  Gregorio Mckeon MD, MSc  Ophthalmology PGY-4 resident physician    Attending Physician Attestation:  Complete documentation of historical and exam elements from today's encounter can be found in the full encounter summary report (not  reduplicated in this progress note).  I personally obtained the chief complaint(s) and history of present illness.  I confirmed and edited as necessary the review of systems, past medical/surgical history, family history, social history, and examination findings as documented by others; and I examined the patient myself.  I personally reviewed the relevant tests, images, and reports as documented above.  I formulated and edited as necessary the assessment and plan and discussed the findings and management plan with the patient and family.Attending Physician Image/Tesing Attestation: I personally reviewed the ophthalmic test(s) associated with this encounter, agree with the interpretation(s) as documented by the resident/fellow, and have edited the corresponding report(s) as necessary.   . - Edwin Navarro MD

## 2023-12-12 ENCOUNTER — OFFICE VISIT (OUTPATIENT)
Dept: OPHTHALMOLOGY | Facility: CLINIC | Age: 78
End: 2023-12-12
Attending: OPHTHALMOLOGY
Payer: COMMERCIAL

## 2023-12-12 DIAGNOSIS — H40.1112 CHRONIC OPEN ANGLE GLAUCOMA OF RIGHT EYE, MODERATE STAGE: Primary | ICD-10-CM

## 2023-12-12 PROCEDURE — G0463 HOSPITAL OUTPT CLINIC VISIT: HCPCS | Performed by: OPHTHALMOLOGY

## 2023-12-12 PROCEDURE — 99213 OFFICE O/P EST LOW 20 MIN: CPT | Performed by: OPHTHALMOLOGY

## 2023-12-12 RX ORDER — BRIMONIDINE TARTRATE 2 MG/ML
1 SOLUTION/ DROPS OPHTHALMIC 3 TIMES DAILY
Qty: 15 ML | Refills: 2 | Status: SHIPPED | OUTPATIENT
Start: 2023-12-12 | End: 2024-01-05

## 2023-12-12 ASSESSMENT — REFRACTION_WEARINGRX
OS_AXIS: 175
OD_SPHERE: +0.50
OD_ADD: +2.25
OS_ADD: +2.25
SPECS_TYPE: PAL
OS_SPHERE: +0.25
OD_CYLINDER: SPHERE
OS_CYLINDER: +0.75

## 2023-12-12 ASSESSMENT — EXTERNAL EXAM - LEFT EYE: OS_EXAM: NORMAL

## 2023-12-12 ASSESSMENT — TONOMETRY
OS_IOP_MMHG: 15
OS_IOP_MMHG: 16
OD_IOP_MMHG: 12
IOP_METHOD: TONOPEN
OD_IOP_MMHG: 12
IOP_METHOD: APPLANATION

## 2023-12-12 ASSESSMENT — VISUAL ACUITY
OS_CC: 20/25-3/+2
METHOD: SNELLEN - LINEAR
OD_CC: 20/20-3
CORRECTION_TYPE: GLASSES

## 2023-12-12 ASSESSMENT — EXTERNAL EXAM - RIGHT EYE: OD_EXAM: NORMAL

## 2023-12-12 ASSESSMENT — SLIT LAMP EXAM - LIDS: COMMENTS: BLEPHARITIS/MGD

## 2023-12-12 ASSESSMENT — CUP TO DISC RATIO
OS_RATIO: 0.4
OD_RATIO: 0.85

## 2023-12-12 NOTE — PROGRESS NOTES
HPI       Glaucoma Follow-Up    In right eye.             Comments    Here for IOP check after starting brimonidine   She thinks it is going okay overall, no side effects.  She thinks her eye has been feeling more comfortable, like there was a pressure sensation before that is gone now.    No other new concerns/changes     Oc meds:  cosopt twice a day right eye  - LD @ 8:00am today   latanoprost at bedtime both eyes - LD @ 10:00pm yesterday   brimonidine TID right eye  - LD @ 12:30pm today     Misti Elliott, AKILA 2:03 PM 12/12/2023                Last edited by Misti Elliott on 12/12/2023  2:03 PM.           Review of systems for the eyes was negative other than the pertinent positives/negatives listed in the HPI.      Assessment & Plan      Mely Rashid is a 78 year old female with the following diagnoses:   1. Chronic open angle glaucoma of right eye, moderate stage          Started brimonidine three times a day right eye last visit  Feeling good.  No new concerns  S/P Selected laser trabeculoplasty (SLT) #1 right eye 7/5/23    Intraocular pressure acceptable  Goal 12 or less right eye   Continue cosopt twice a day right eye   Continue latanoprost at bedtime both eyes   Continue brimonidine right eye       Patient disposition:   Return in about 2 months (around 2/12/2024) for VT only, OCT NFL, 24-2 Dynamic VF.      Attending Physician Attestation:  Complete documentation of historical and exam elements from today's encounter can be found in the full encounter summary report (not reduplicated in this progress note).  I personally obtained the chief complaint(s) and history of present illness.  I confirmed and edited as necessary the review of systems, past medical/surgical history, family history, social history, and examination findings as documented by others; and I examined the patient myself.  I personally reviewed the relevant tests, images, and reports as documented above.  I formulated and  edited as necessary the assessment and plan and discussed the findings and management plan with the patient and family..   . - Edwin Navarro MD

## 2024-01-22 DIAGNOSIS — Z79.899 LONG-TERM USE OF PLAQUENIL: ICD-10-CM

## 2024-01-22 DIAGNOSIS — Z79.899 ENCOUNTER FOR LONG-TERM CURRENT USE OF MEDICATION: ICD-10-CM

## 2024-01-22 DIAGNOSIS — M33.13 DERMATOMYOSITIS (H): ICD-10-CM

## 2024-01-22 RX ORDER — HYDROXYCHLOROQUINE SULFATE 200 MG/1
200 TABLET, FILM COATED ORAL DAILY
Qty: 60 TABLET | Refills: 1 | Status: SHIPPED | OUTPATIENT
Start: 2024-01-22 | End: 2024-01-25

## 2024-01-22 NOTE — TELEPHONE ENCOUNTER
NOV 8/2024    Dermatomyositis: stable on mycophenolate and hydroxychloroquine. Patient is not endorsing muscle aches or flares of her disease. Exam is reassuring today. Labs from 1 month ago were reviewed - patient denies needing refills at this time. No side effects from systemic medications. Following routinely with ophthalmology for eye exams.  - Continue mycophenolate 1000 mg twice daily  - Continue hydroxychloroquine 200 mg daily  - Continue augmented betamethasone cream to hands twice daily as needed  - Continue triamcinolone cream to legs/feet BID as needed  - Continue hydrocortisone to face as needed

## 2024-01-25 ENCOUNTER — MYC MEDICAL ADVICE (OUTPATIENT)
Dept: DERMATOLOGY | Facility: CLINIC | Age: 79
End: 2024-01-25
Payer: COMMERCIAL

## 2024-01-25 DIAGNOSIS — Z79.899 LONG-TERM USE OF PLAQUENIL: ICD-10-CM

## 2024-01-25 DIAGNOSIS — M33.13 DERMATOMYOSITIS (H): ICD-10-CM

## 2024-01-25 DIAGNOSIS — Z79.899 ENCOUNTER FOR LONG-TERM CURRENT USE OF MEDICATION: ICD-10-CM

## 2024-01-26 RX ORDER — HYDROXYCHLOROQUINE SULFATE 200 MG/1
200 TABLET, FILM COATED ORAL DAILY
Qty: 60 TABLET | Refills: 1 | Status: SHIPPED | OUTPATIENT
Start: 2024-01-26

## 2024-01-29 RX ORDER — HYDROXYCHLOROQUINE SULFATE 200 MG/1
200 TABLET, FILM COATED ORAL DAILY
Qty: 60 TABLET | Refills: 5 | Status: SHIPPED | OUTPATIENT
Start: 2024-01-29 | End: 2024-04-23

## 2024-02-06 ENCOUNTER — LAB (OUTPATIENT)
Dept: LAB | Facility: CLINIC | Age: 79
End: 2024-02-06
Payer: COMMERCIAL

## 2024-02-06 DIAGNOSIS — K21.00 GASTROESOPHAGEAL REFLUX DISEASE WITH ESOPHAGITIS WITHOUT HEMORRHAGE: ICD-10-CM

## 2024-02-06 DIAGNOSIS — M33.13 DERMATOMYOSITIS (H): ICD-10-CM

## 2024-02-06 LAB
BASOPHILS # BLD AUTO: 0 10E3/UL (ref 0–0.2)
BASOPHILS NFR BLD AUTO: 0 %
EOSINOPHIL # BLD AUTO: 0.1 10E3/UL (ref 0–0.7)
EOSINOPHIL NFR BLD AUTO: 1 %
ERYTHROCYTE [DISTWIDTH] IN BLOOD BY AUTOMATED COUNT: 13.5 % (ref 10–15)
ERYTHROCYTE [SEDIMENTATION RATE] IN BLOOD BY WESTERGREN METHOD: 4 MM/HR (ref 0–30)
HCT VFR BLD AUTO: 41.4 % (ref 35–47)
HGB BLD-MCNC: 12.9 G/DL (ref 11.7–15.7)
IMM GRANULOCYTES # BLD: 0 10E3/UL
IMM GRANULOCYTES NFR BLD: 0 %
LYMPHOCYTES # BLD AUTO: 1.2 10E3/UL (ref 0.8–5.3)
LYMPHOCYTES NFR BLD AUTO: 28 %
MCH RBC QN AUTO: 29.8 PG (ref 26.5–33)
MCHC RBC AUTO-ENTMCNC: 31.2 G/DL (ref 31.5–36.5)
MCV RBC AUTO: 96 FL (ref 78–100)
MONOCYTES # BLD AUTO: 0.4 10E3/UL (ref 0–1.3)
MONOCYTES NFR BLD AUTO: 10 %
NEUTROPHILS # BLD AUTO: 2.5 10E3/UL (ref 1.6–8.3)
NEUTROPHILS NFR BLD AUTO: 60 %
PLATELET # BLD AUTO: 185 10E3/UL (ref 150–450)
RBC # BLD AUTO: 4.33 10E6/UL (ref 3.8–5.2)
WBC # BLD AUTO: 4.2 10E3/UL (ref 4–11)

## 2024-02-06 PROCEDURE — 80048 BASIC METABOLIC PNL TOTAL CA: CPT

## 2024-02-06 PROCEDURE — 84450 TRANSFERASE (AST) (SGOT): CPT

## 2024-02-06 PROCEDURE — 82550 ASSAY OF CK (CPK): CPT

## 2024-02-06 PROCEDURE — 82040 ASSAY OF SERUM ALBUMIN: CPT

## 2024-02-06 PROCEDURE — 86140 C-REACTIVE PROTEIN: CPT

## 2024-02-06 PROCEDURE — 85025 COMPLETE CBC W/AUTO DIFF WBC: CPT

## 2024-02-06 PROCEDURE — 84460 ALANINE AMINO (ALT) (SGPT): CPT

## 2024-02-06 PROCEDURE — 85652 RBC SED RATE AUTOMATED: CPT

## 2024-02-06 PROCEDURE — 36415 COLL VENOUS BLD VENIPUNCTURE: CPT

## 2024-02-07 ENCOUNTER — MYC MEDICAL ADVICE (OUTPATIENT)
Dept: DERMATOLOGY | Facility: CLINIC | Age: 79
End: 2024-02-07
Payer: COMMERCIAL

## 2024-02-07 LAB
ALBUMIN SERPL BCG-MCNC: 4.5 G/DL (ref 3.5–5.2)
ALT SERPL W P-5'-P-CCNC: 20 U/L (ref 0–50)
AST SERPL W P-5'-P-CCNC: 21 U/L (ref 0–45)
CK SERPL-CCNC: 62 U/L (ref 26–192)
CREAT SERPL-MCNC: 0.66 MG/DL (ref 0.51–0.95)
CRP SERPL-MCNC: <3 MG/L
EGFRCR SERPLBLD CKD-EPI 2021: 90 ML/MIN/1.73M2

## 2024-02-08 ENCOUNTER — OFFICE VISIT (OUTPATIENT)
Dept: INTERNAL MEDICINE | Facility: CLINIC | Age: 79
End: 2024-02-08
Payer: COMMERCIAL

## 2024-02-08 VITALS
BODY MASS INDEX: 23.03 KG/M2 | HEIGHT: 65 IN | HEART RATE: 68 BPM | SYSTOLIC BLOOD PRESSURE: 110 MMHG | WEIGHT: 138.2 LBS | OXYGEN SATURATION: 97 % | DIASTOLIC BLOOD PRESSURE: 73 MMHG

## 2024-02-08 DIAGNOSIS — Z12.31 VISIT FOR SCREENING MAMMOGRAM: Primary | ICD-10-CM

## 2024-02-08 DIAGNOSIS — Z29.11 NEED FOR VACCINATION AGAINST RESPIRATORY SYNCYTIAL VIRUS: ICD-10-CM

## 2024-02-08 DIAGNOSIS — F32.A DEPRESSION, UNSPECIFIED DEPRESSION TYPE: ICD-10-CM

## 2024-02-08 DIAGNOSIS — K21.00 GASTROESOPHAGEAL REFLUX DISEASE WITH ESOPHAGITIS WITHOUT HEMORRHAGE: ICD-10-CM

## 2024-02-08 DIAGNOSIS — Z13.29 SCREENING FOR THYROID DISORDER: ICD-10-CM

## 2024-02-08 DIAGNOSIS — Z23 NEED FOR TDAP VACCINATION: ICD-10-CM

## 2024-02-08 LAB
ANION GAP SERPL CALCULATED.3IONS-SCNC: 13 MMOL/L (ref 7–15)
BUN SERPL-MCNC: 21.4 MG/DL (ref 8–23)
CALCIUM SERPL-MCNC: 9.6 MG/DL (ref 8.8–10.2)
CHLORIDE SERPL-SCNC: 105 MMOL/L (ref 98–107)
CREAT SERPL-MCNC: 0.66 MG/DL (ref 0.51–0.95)
DEPRECATED HCO3 PLAS-SCNC: 26 MMOL/L (ref 22–29)
EGFRCR SERPLBLD CKD-EPI 2021: 90 ML/MIN/1.73M2
GLUCOSE SERPL-MCNC: 101 MG/DL (ref 70–99)
POTASSIUM SERPL-SCNC: 4.2 MMOL/L (ref 3.4–5.3)
SODIUM SERPL-SCNC: 144 MMOL/L (ref 135–145)

## 2024-02-08 PROCEDURE — 99417 PROLNG OP E/M EACH 15 MIN: CPT | Performed by: NURSE PRACTITIONER

## 2024-02-08 PROCEDURE — 99215 OFFICE O/P EST HI 40 MIN: CPT | Performed by: NURSE PRACTITIONER

## 2024-02-08 RX ORDER — RESPIRATORY SYNCYTIAL VIRUS VACCINE 120MCG/0.5
0.5 KIT INTRAMUSCULAR ONCE
Qty: 1 EACH | Refills: 0 | Status: CANCELLED | OUTPATIENT
Start: 2024-02-08 | End: 2024-02-08

## 2024-02-08 RX ORDER — CITALOPRAM HYDROBROMIDE 10 MG/1
10 TABLET ORAL DAILY
Qty: 60 TABLET | Refills: 3 | Status: SHIPPED | OUTPATIENT
Start: 2024-02-08 | End: 2024-02-14

## 2024-02-08 ASSESSMENT — ANXIETY QUESTIONNAIRES
GAD7 TOTAL SCORE: 5
1. FEELING NERVOUS, ANXIOUS, OR ON EDGE: SEVERAL DAYS
6. BECOMING EASILY ANNOYED OR IRRITABLE: MORE THAN HALF THE DAYS
GAD7 TOTAL SCORE: 5
7. FEELING AFRAID AS IF SOMETHING AWFUL MIGHT HAPPEN: NOT AT ALL
2. NOT BEING ABLE TO STOP OR CONTROL WORRYING: NOT AT ALL
IF YOU CHECKED OFF ANY PROBLEMS ON THIS QUESTIONNAIRE, HOW DIFFICULT HAVE THESE PROBLEMS MADE IT FOR YOU TO DO YOUR WORK, TAKE CARE OF THINGS AT HOME, OR GET ALONG WITH OTHER PEOPLE: SOMEWHAT DIFFICULT
3. WORRYING TOO MUCH ABOUT DIFFERENT THINGS: NOT AT ALL
5. BEING SO RESTLESS THAT IT IS HARD TO SIT STILL: SEVERAL DAYS

## 2024-02-08 ASSESSMENT — PATIENT HEALTH QUESTIONNAIRE - PHQ9
5. POOR APPETITE OR OVEREATING: SEVERAL DAYS
SUM OF ALL RESPONSES TO PHQ QUESTIONS 1-9: 11

## 2024-02-08 NOTE — PROGRESS NOTES
Medicare Annual Wellness Questionnaire:  This 78 year old year old female presents for a Medicare Wellness Exam.    Fall Risk Assessment:  Have you fallen 2 or more times in the last year?  No    How many times were you injured due to a fall in the last year?  none    PHQ-2:  Over the last 2 weeks, how often have you been bothered by feeling down, depressed, or hopeless?  Several Days (1)     Over the last 2 weeks, how often have you had little interest or pleasure in doing things?  Several Days (1)     Social History:  What is your marital status?      Who lives in your household?  Just me and the dog    Does your home have loose rugs in the hallway:     No    Does your home have grab bars in the bathroom:    Yes     Does your home have handrails on the stairs?  Yes     Does your home have poorly lit areas?    Yes     Do you feel threatened or controlled by a partner, ex-partner or anyone in your life?   No    Has anyone hurt you physically, for example by pushing, hitting, slapping or kicking you or forcing you to have sex?   No    Do you need help with the phone, transportation, shopping, preparing meals, housework, laundry, medications or managing money?   No    Sexual Health:  Are you sexually active?    No    If yes, with men, women, or both?   N/A    If yes, how many partners?  N/A    If yes, are you using condoms?    N/A    Have you had any sexually transmitted infections in the last year?   No    Do you have any sexual concerns?    No    Women Only:  Women: What year did you stop having periods (approximate age)?  Mid '80s    Women: Any vaginal bleeding in the last year?    No    Women: Have you ever had an abnormal Pap smear?    No    General Health Assessment:  Have you noticed any hearing difficulties?   No    Do you wear hearing aids?   No    Have you seen a hearing professional such as an audiologist in the last 1 year?   No    Do you have vision difficulty?    Yes     Do you wear glasses or  contacts?   Yes     Have you seen an eye doctor in the last 1 year?   Yes     How many servings of fruits and vegetables do you eat a day?  Fruit: 1  Vegetables: 1.5    How often do you exercise in a week?  Walk daily-almost    How long and what kind of exercise do you do?  40 min,    Tobacco and Alcohol History:  Do you use tobacco/nicotine products?    No    If yes, please list the method of use and average weekly consumption?  N/A    Do you use any other drugs?   No         Do you drink alcohol?   No    If you drink alcohol, how many drinks per week?  N/A    Advanced Directive:  Have you completed an Advance Directives document?  Yes     If yes, have you given a copy to the clinic?       Do you need information on Advance Directives?   Yes     Zayda Gonzalez, EMT at 3:15 PM on 2/8/2024

## 2024-02-08 NOTE — PROGRESS NOTES
Pat is a 78 year old that presents in clinic today for the following:     Chief Complaint   Patient presents with     Medicare Visit           2/8/2024     1:40 PM   Additional Questions   Roomed by Zayda Gonzalez   Accompanied by N/A     Screenings as of today     PHQ-2 Total Score (Adult) - Positive if 3 or more points; Administer   PHQ-9 if positive 4        Zayda Gonzalez at 1:51 PM on 2/8/2024

## 2024-02-08 NOTE — PATIENT INSTRUCTIONS
Philip Rosas :  therapist at the [facility] in [facility]] (the former [facility]).    Recommend vaccines:    Tdap  RSV

## 2024-02-09 NOTE — PROGRESS NOTES
"S: Mely Rashid is a 78 year old female here for her Preventive Annual Exam.  She is feeling well in general.    She is living alone with a dog. Most days they walk outdoors weather permitting.  Her daughter who was previously living with her now lives in affordable housing in a building with bed bugs.  Her daughter's mental illness limits their communication as Lisa states \"I can't say anything that is not offensive to her.\" Her daughter does not leave her apartment often. Lisa has three other children, one in town, one in [place] and one in [facility].     She has an appointment in Dermatology with Dr. Shekhar Toledo.    Lisa feels low mood. She does not have the energy to even start activities she thinks she might be interested in.  She has difficulty focusing, reading or anything that needs focus.  She would like to re-try a mood stabilizer.  She has participated with a women's group where she has adopted a horse and she spends time at the barn caring for the horse.  She enjoys socializing with these women but does not think she is \"up to par\" with them, thinks they will find her ignorant or boring, like her  did.  Her  left her and four children. He always made her feel she wasn't good enough. Then after her divorce she wasn't welcomed in the [employer].        Patient Active Problem List   Diagnosis     Glaucoma suspect, right     Dermatomyositis (H)     IgA deficiency (H)     Herpes zoster     Encounter for long-term current use of medication     Encounter for long-term (current) use of steroids     Oral ulcer     Seborrheic dermatitis     Osteopenia     Neoplasm of uncertain behavior of skin     Routine general medical examination at a health care facility        Past Medical History:   Diagnosis Date     Arthritis ??    probably     Basal cell carcinoma      Dermatomyositis (H)      Glaucoma      IgA deficiency (H)      Nonsenile cataract         Past Surgical History:   Procedure " Laterality Date     BIOPSY OF SKIN LESION       COLONOSCOPY       [facility] ENDOSCOPY W EUS N/A 2014    Procedure: COMBINED ENDOSCOPIC ULTRASOUND, ESOPHAGOSCOPY, GASTROSCOPY, DUODENOSCOPY (EGD);  Surgeon: Boston Rodriguez MD;  Location: [facility]     HYSTERECTOMY      partial, prolapse uterus.          Last colon cancer screen: 3/22/22   Last mammogram:22   Last Dexa scan: 20 :  She takes calcium and vitamin D daily and has decided she would not treat osteoporosis if she was diagnosed with it because she had gum issues from Alendronate.    Immunization History   Administered Date(s) Administered     COVID-19 12+ (-) (MODERNA) 10/27/2023     COVID-19 Bivalent 12+ (Pfizer) 10/10/2022     COVID-19 MONOVALENT 12+ (Pfizer) 2021, 2021, 2021     COVID-19 Monovalent 12+ (Pfizer ) 2022     Flu 65+ Years 2018, 10/04/2019     Flu, Unspecified 10/03/2022     Influenza (H1N1) 2009     Influenza (High Dose) 3 valent vaccine 2015, 10/23/2017     Influenza (IIV3) PF 2009, 10/01/2011, 10/23/2014     Influenza Vaccine 65+ (Fluzone HD) 2020, 10/12/2021, 10/03/2022, 10/05/2023     Pneumo Conj 13-V (2010&after) 2015     Pneumococcal 23 valent 06/10/2014     TDAP (Adacel,Boostrix) 2009, 2012     Zoster recombinant adjuvanted (SHINGRIX) 2022, 2023       Social History     Tobacco Use     Smoking status: Former     Packs/day: 1.00     Years: 10.00     Additional pack years: 0.00     Total pack years: 10.00     Types: Cigarettes     Quit date: 4/10/1979     Years since quittin.8     Smokeless tobacco: Never     Tobacco comments:     quit    Substance Use Topics     Alcohol use: Not Currently     Alcohol/week: 0.0 standard drinks of alcohol     Comment: rare       Family History   Problem Relation Age of Onset     C.A.D. Father      Arthritis Father      Diabetes Brother      Glaucoma Brother      Breast Cancer Sister 35      Glaucoma Sister      Neurologic Disorder Sister         seizures     Cancer Sister         breast     Psoriasis Daughter      Rheumatologic Disease Daughter         Dermatomyositis     Alzheimer Disease Mother         onset in 60s     Hypertension No family hx of      Cerebrovascular Disease No family hx of         ,     Thyroid Disease No family hx of      Skin Cancer No family hx of      Macular Degeneration No family hx of           Allergies   Allergen Reactions     Penicillins Anaphylaxis          Current Outpatient Medications   Medication Sig Dispense Refill     augmented betamethasone dipropionate (DIPROLENE AF) 0.05 % external cream Apply to areas of dermatomyositis rash twice daily as needed. Do not use on face, underarms, groin. 50 g 1     B Complex TABS Take 1 tablet by mouth daily.       brimonidine (ALPHAGAN) 0.2 % ophthalmic solution Place 1 drop into the right eye 3 times daily 10 mL 5     Calcium-Magnesium-Vitamin D 600- MG-MG-UNIT TB24 Take 1 tablet by mouth 2 times daily       Collagen Hydrolysate POWD Take by mouth daily       dorzolamide-timolol (COSOPT) 2-0.5 % ophthalmic solution Place 1 drop into the right eye 2 times daily 10 mL 11     glucosamine-chondroitin 500-400 MG CAPS per capsule Take 2 capsules by mouth daily.       hydroxychloroquine (PLAQUENIL) 200 MG tablet Take 1 tablet (200 mg) by mouth daily Next eye exam due 1/31/24. 60 tablet 1     ibuprofen (ADVIL/MOTRIN) 200 MG tablet Take 200 mg by mouth every 8 hours as needed for inflammatory pain       latanoprost (XALATAN) 0.005 % ophthalmic solution Place 1 drop into both eyes At Bedtime 7.5 mL 2     Multiple Vitamins-Minerals (MULTIPLE VITAMINS/WOMENS PO)        Multiple Vitamins-Minerals (PRESERVISION AREDS 2+MULTI VIT PO) Take 1 tablet by mouth daily       mycophenolate (GENERIC EQUIVALENT) 500 MG tablet Take 2 tablets (1,000 mg) by mouth 2 times daily 360 tablet 1     omeprazole (PRILOSEC) 20 MG DR capsule Take 1 capsule (20  "mg) by mouth daily 60 capsule 1     Propylene Glycol (SYSTANE COMPLETE OP) Apply 1 drop to eye as needed       Vitamin D3 (CHOLECALCIFEROL) 125 MCG (5000 UT) tablet Take 1 tablet by mouth daily       ciclopirox (PENLAC) 8 % external solution Apply to adjacent skin and affected nails daily.  Remove with alcohol every 7 days, then repeat. (Patient not taking: Reported on 2/8/2024) 6.6 mL 11     hydrocortisone 2.5 % cream Apply topically 2 times daily For face (Patient not taking: Reported on 2/8/2024) 30 g 3     hydroxychloroquine (PLAQUENIL) 200 MG tablet Take 1 tablet (200 mg) by mouth daily Next eye exam due 1/31/24. (Patient not taking: Reported on 2/8/2024) 60 tablet 5     omega-3 fatty acids (FISH OIL) 1200 MG capsule Take 1 capsule by mouth daily. (Patient not taking: Reported on 2/8/2024)       terbinafine (LAMISIL) 1 % external cream Apply topically 2 times daily To affected areas of scaling on the feet for 3-4 weeks until resolved. Can use twice daily as needed for flares of scaling/itching on the feet/toes. (Patient not taking: Reported on 2/8/2024) 42 g 3     triamcinolone (KENALOG) 0.1 % external cream Apply topically 2 times daily For body (Patient not taking: Reported on 2/8/2024) 454 g 3       REVIEW OF SYSTEMS:  See above.    O:   /73 (BP Location: Right arm, Patient Position: Sitting, Cuff Size: Adult Regular)   Pulse 68   Ht 1.651 m (5' 5\")   Wt 62.7 kg (138 lb 3.2 oz)   SpO2 97%   BMI 23.00 kg/m    GENERAL APPEARANCE: alert and no distress  EYES: EOMI,  PERRL, sclera white, conjunctiva normal.  HENT: ear canals and TM's normal and mouth without ulcers or lesions  RESP: lungs clear to auscultation - no rales, rhonchi or wheezes  CV: regular rates and rhythm, normal S1 S2, no S3 or S4 and no murmur, click or rub   ABDOMEN:  soft, nontender, no HSM or masses and bowel sounds normal  NEURO: alert and oriented.  Good historian.  MUSK: ambulatory.  SKIN: normal.  BREASTS: no masses.  LYMPH: " neg axillary, cervical, supra and infraclavicular nodes.  EXT: warm.  Edema ;NONE.  PSYCHE: pleasant, normal    The 10-year ASCVD risk score (Quinten DK, et al., 2019) is: 13.7%    Values used to calculate the score:      Age: 78 years      Sex: Female      Is Non- : No      Diabetic: No      Tobacco smoker: No      Systolic Blood Pressure: 110 mmHg      Is BP treated: No      HDL Cholesterol: 63 mg/dL      Total Cholesterol: 207 mg/dL    A/P:  Lisa was seen today for medicare visit.    Diagnoses and all orders for this visit:    Visit for screening mammogram  -     MA SCREENING DIGITAL BILAT - Future  (s+30); Future    Recommend  Tdap vaccination        -     As she works in her garden    Recommend vaccination against respiratory syncytial virus    Gastroesophageal reflux disease with esophagitis without hemorrhage  -     omeprazole (PRILOSEC) 20 MG DR capsule; Take 1 capsule (20 mg) by mouth daily  -     Basic metabolic panel  (Ca, Cl, CO2, Creat, Gluc, K, Na, BUN); Future    Depression, unspecified depression type  -     citalopram (CELEXA) 10 MG tablet; Take 1 tablet (10 mg) by mouth daily. Start by taking 0.5 tab and may increase after 1 month to one tab a day.    Screening for thyroid disorder  -     TSH with free T4 reflex; Future    Other orders  -     REVIEW OF HEALTH MAINTENANCE PROTOCOL ORDERS    The patient voiced understanding of the information discussed and all questions were answered.     I spent a total of 60 minutes in the care of this pt during today's office visit. This time includes reviewing the patient's chart and prior history, obtaining a history, performing an examination and evaluation and counseling the patient. This time also includes ordering medications or tests necessary in addition to communication to other member's of the patient's health care team. Time spent in documentation and care coordination is included.     Gavi ISLAS, CNP

## 2024-02-13 ENCOUNTER — TELEPHONE (OUTPATIENT)
Dept: INTERNAL MEDICINE | Facility: CLINIC | Age: 79
End: 2024-02-13
Payer: COMMERCIAL

## 2024-02-13 DIAGNOSIS — F32.A DEPRESSION, UNSPECIFIED DEPRESSION TYPE: ICD-10-CM

## 2024-02-13 NOTE — TELEPHONE ENCOUNTER
[facility] Call Center    Phone Message    May a detailed message be left on voicemail: yes     Reason for Call: Medication Question or concern regarding medication   Prescription Clarification  Name of Medication: citalopram (CELEXA) 10 MG tablet   Prescribing Provider: Gavi Stacy   Pharmacy: [facility] order   What on the order needs clarification? Pharmacy needing call back to clarify above medication

## 2024-02-14 RX ORDER — CITALOPRAM HYDROBROMIDE 10 MG/1
TABLET ORAL
Qty: 75 TABLET | Refills: 3 | Status: SHIPPED | OUTPATIENT
Start: 2024-02-14 | End: 2024-04-23 | Stop reason: SINTOL

## 2024-02-14 NOTE — TELEPHONE ENCOUNTER
"After reviewing the visit note on 2/13/24, I sent with the correct direction :   \"Start by taking 0.5 tab and may increase after 1 month to one tab a day\"   "

## 2024-02-21 ENCOUNTER — MYC MEDICAL ADVICE (OUTPATIENT)
Dept: INTERNAL MEDICINE | Facility: CLINIC | Age: 79
End: 2024-02-21
Payer: COMMERCIAL

## 2024-02-23 ENCOUNTER — ANCILLARY PROCEDURE (OUTPATIENT)
Dept: MAMMOGRAPHY | Facility: HOSPITAL | Age: 79
End: 2024-02-23
Attending: NURSE PRACTITIONER
Payer: COMMERCIAL

## 2024-02-23 DIAGNOSIS — E78.00 ELEVATED LDL CHOLESTEROL LEVEL: Primary | ICD-10-CM

## 2024-02-23 DIAGNOSIS — Z12.31 VISIT FOR SCREENING MAMMOGRAM: ICD-10-CM

## 2024-02-23 PROCEDURE — 77063 BREAST TOMOSYNTHESIS BI: CPT

## 2024-03-01 DIAGNOSIS — H40.1112 CHRONIC OPEN ANGLE GLAUCOMA OF RIGHT EYE, MODERATE STAGE: Primary | ICD-10-CM

## 2024-03-02 ENCOUNTER — MYC REFILL (OUTPATIENT)
Dept: PULMONOLOGY | Facility: CLINIC | Age: 79
End: 2024-03-02
Payer: COMMERCIAL

## 2024-03-02 DIAGNOSIS — M33.13 DERMATOMYOSITIS (H): ICD-10-CM

## 2024-03-04 NOTE — TELEPHONE ENCOUNTER
mycophenolate (GENERIC EQUIVALENT) 500 MG tablet       Last Written Prescription Date:  8/15/2023  Last Fill Quantity: 360,   # refills: 1  Last Office Visit: 9/14/2023  Future Office visit:  none    CBC RESULTS:   Recent Labs   Lab Test 02/06/24  1444   WBC 4.2   RBC 4.33   HGB 12.9   HCT 41.4   MCV 96   MCH 29.8   MCHC 31.2*   RDW 13.5          Creatinine   Date Value Ref Range Status   02/06/2024 0.66 0.51 - 0.95 mg/dL Final   02/06/2024 0.66 0.51 - 0.95 mg/dL Final   07/09/2021 0.58 0.52 - 1.04 mg/dL Final   ]    Liver Function Studies -   Recent Labs   Lab Test 02/06/24  1444 01/13/23  0942 12/31/22  0803   PROTTOTAL  --   --  6.6   ALBUMIN 4.5   < > 4.5   BILITOTAL  --   --  0.4   ALKPHOS  --   --  82   AST 21   < > 28   ALT 20   < > 31    < > = values in this interval not displayed.       Routing refill request to provider for review/approval because:  DMARD medication.  - labs current

## 2024-03-05 ENCOUNTER — OFFICE VISIT (OUTPATIENT)
Dept: OPHTHALMOLOGY | Facility: CLINIC | Age: 79
End: 2024-03-05
Attending: OPHTHALMOLOGY
Payer: COMMERCIAL

## 2024-03-05 DIAGNOSIS — H40.051 BORDERLINE GLAUCOMA OF RIGHT EYE WITH OCULAR HYPERTENSION: ICD-10-CM

## 2024-03-05 DIAGNOSIS — H25.813 MIXED TYPE AGE-RELATED CATARACT, BOTH EYES: ICD-10-CM

## 2024-03-05 DIAGNOSIS — H40.1112 CHRONIC OPEN ANGLE GLAUCOMA OF RIGHT EYE, MODERATE STAGE: ICD-10-CM

## 2024-03-05 DIAGNOSIS — H40.1113 CHRONIC OPEN ANGLE GLAUCOMA OF RIGHT EYE, SEVERE STAGE: Primary | ICD-10-CM

## 2024-03-05 PROCEDURE — 92083 EXTENDED VISUAL FIELD XM: CPT | Performed by: OPHTHALMOLOGY

## 2024-03-05 PROCEDURE — G0463 HOSPITAL OUTPT CLINIC VISIT: HCPCS | Performed by: OPHTHALMOLOGY

## 2024-03-05 PROCEDURE — 92133 CPTRZD OPH DX IMG PST SGM ON: CPT | Performed by: OPHTHALMOLOGY

## 2024-03-05 PROCEDURE — 99214 OFFICE O/P EST MOD 30 MIN: CPT | Performed by: OPHTHALMOLOGY

## 2024-03-05 PROCEDURE — 76519 ECHO EXAM OF EYE: CPT | Performed by: OPHTHALMOLOGY

## 2024-03-05 RX ORDER — LATANOPROST 50 UG/ML
1 SOLUTION/ DROPS OPHTHALMIC AT BEDTIME
Qty: 7.5 ML | Refills: 2 | Status: SHIPPED | OUTPATIENT
Start: 2024-03-05

## 2024-03-05 RX ORDER — MYCOPHENOLATE MOFETIL 500 MG/1
1000 TABLET ORAL 2 TIMES DAILY
Qty: 360 TABLET | Refills: 1 | Status: SHIPPED | OUTPATIENT
Start: 2024-03-05

## 2024-03-05 RX ORDER — DORZOLAMIDE HYDROCHLORIDE AND TIMOLOL MALEATE 20; 5 MG/ML; MG/ML
1 SOLUTION/ DROPS OPHTHALMIC 2 TIMES DAILY
Qty: 10 ML | Refills: 11 | Status: SHIPPED | OUTPATIENT
Start: 2024-03-05 | End: 2024-05-30

## 2024-03-05 ASSESSMENT — TONOMETRY
OS_IOP_MMHG: 17
OD_IOP_MMHG: 16
IOP_METHOD: APPLANATION
IOP_METHOD: TONOPEN
OD_IOP_MMHG: 14
OS_IOP_MMHG: 16

## 2024-03-05 ASSESSMENT — EXTERNAL EXAM - RIGHT EYE: OD_EXAM: NORMAL

## 2024-03-05 ASSESSMENT — VISUAL ACUITY
OS_CC+: -1
OS_CC: 20/25
CORRECTION_TYPE: GLASSES
METHOD: SNELLEN - LINEAR
OD_CC: 20/25

## 2024-03-05 ASSESSMENT — EXTERNAL EXAM - LEFT EYE: OS_EXAM: NORMAL

## 2024-03-05 ASSESSMENT — PACHYMETRY
OS_CT(UM): 527
OD_CT(UM): 503

## 2024-03-05 ASSESSMENT — REFRACTION_WEARINGRX
OS_CYLINDER: +0.75
OS_ADD: +2.25
OD_CYLINDER: SPHERE
SPECS_TYPE: PAL
OD_SPHERE: +0.50
OS_SPHERE: +0.25
OS_AXIS: 175
OD_ADD: +2.25

## 2024-03-05 ASSESSMENT — CUP TO DISC RATIO
OD_RATIO: 0.85
OS_RATIO: 0.4

## 2024-03-05 ASSESSMENT — CONF VISUAL FIELD: COMMENTS: VF TODAY

## 2024-03-05 NOTE — NURSING NOTE
Chief Complaints and History of Present Illnesses   Patient presents with    Follow Up     Chronic open angle glaucoma of right eye,     Chief Complaint(s) and History of Present Illness(es)       Follow Up              Comments: Chronic open angle glaucoma of right eye,              Comments    Pt states no change in VA since last visit  No flashes or floaters  No eye pain or headaches  Using:   cosopt twice a day right eye    latanoprost at bedtime both eyes   brimonidine right eye TID     Rosa Navarro COT 12:54 PM March 5, 2024   .

## 2024-03-05 NOTE — PROGRESS NOTES
HPI       Follow Up     Additional comments: Chronic open angle glaucoma of right eye,             Comments    Pt states no change in VA since last visit  No flashes or floaters  No eye pain or headaches  Using:   cosopt twice a day right eye    latanoprost at bedtime both eyes   brimonidine right eye TID     Rosa Navarro COT 12:54 PM March 5, 2024   .              Last edited by Rosa Navarro on 3/5/2024 12:54 PM.           Review of systems for the eyes was negative other than the pertinent positives/negatives listed in the HPI.      Assessment & Plan      Mely Rashid is a 78 year old female with the following diagnoses:   1. Chronic open angle glaucoma of right eye, severe stage    2. Mixed type age-related cataract, both eyes        Continuing brimonidine three times a day right eye.  Cosopt twice per day in the right eye.  She has noted some redness on her right eye, she is unsure when this started.  Feeling good. No new concerns. Redness on the right is not bothering her.  S/P Selected laser trabeculoplasty (SLT) #1 right eye 7/5/23    Intraocular pressure above goal today right eye   Goal 12 or less right eye   Progression by OCT and visual field reviewed  RBA to xen + cataract extraction discussed, will schedule    Special equipment/needs:    Anesthesia:MAC with block  Dilation:Moderate  Iris expansion:IFIS  Pseudoexfoliation: No pseudoexfoliation  Trypan Blue: No  Open xen c Mitomycin-C   Goal emmetropia     Continue cosopt twice a day right eye   Continue latanoprost at bedtime both eyes   Continue brimonidine right eye       Attending Physician Attestation:  Complete documentation of historical and exam elements from today's encounter can be found in the full encounter summary report (not reduplicated in this progress note).  I personally obtained the chief complaint(s) and history of present illness.  I confirmed and edited as necessary the review of systems, past medical/surgical history, family  history, social history, and examination findings as documented by others; and I examined the patient myself.  I personally reviewed the relevant tests, images, and reports as documented above.  I formulated and edited as necessary the assessment and plan and discussed the findings and management plan with the patient and family..  Today with Mely Rashid , I reviewed the indications, risks, benefits, and alternatives of the proposed surgical procedure including, but not limited to, failure obtain the desired result  and need for additional surgery, bleeding, infection, loss of vision, loss of the eye, and the remote possibility of permanent damage to any organ system or death with the use of anesthesia.  I provided multiple opportunities for the questions, answered all questions to the best of my ability, and confirmed that my answers and my discussion were understood.     . - Edwin Navarro MD

## 2024-03-06 ENCOUNTER — TELEPHONE (OUTPATIENT)
Dept: OPHTHALMOLOGY | Facility: CLINIC | Age: 79
End: 2024-03-06
Payer: COMMERCIAL

## 2024-03-06 PROBLEM — H40.051 BORDERLINE GLAUCOMA OF RIGHT EYE WITH OCULAR HYPERTENSION: Status: ACTIVE | Noted: 2024-03-05

## 2024-03-06 PROBLEM — H40.1113 CHRONIC OPEN ANGLE GLAUCOMA OF RIGHT EYE, SEVERE STAGE: Status: ACTIVE | Noted: 2024-03-05

## 2024-03-06 NOTE — TELEPHONE ENCOUNTER
Left voicemail for patient regarding scheduling surgery with Dr. Navarro.  Provided contact number to discuss.  [phone]    Nisreen La, on 3/6/2024 at 9:40 AM

## 2024-03-06 NOTE — TELEPHONE ENCOUNTER
Spoke with the patient and was able to confirm all scheduled information.     Patient is scheduled for surgery with: Dr. Navarro    Surgery Date: 5/6     [facility]    H&P: to be completed by Primary Care team - patient instructed to schedule per patient, this will be scheduled with [facility]     Post-op:  5/7, 5/16, 5/30, in-person visit, with Dr Navarro    Patient will receive a phone call from pre-admission nurses 1-2 days prior to surgery with arrival time and NPO instructions.    Patient aware times are subject to change up until day before surgery.     Patient questions/concerns:  Patient would like some documentation regarding what surgery will be performed. Will have [facility] reach out to patient to discuss.      Surgery packet was sent via US mail 3/6      Nisreen La on 3/6/2024 at 10:39 AM

## 2024-03-15 ENCOUNTER — TELEPHONE (OUTPATIENT)
Dept: OPHTHALMOLOGY | Facility: CLINIC | Age: 79
End: 2024-03-15
Payer: COMMERCIAL

## 2024-03-15 RX ORDER — BRIMONIDINE TARTRATE 2 MG/ML
1 SOLUTION/ DROPS OPHTHALMIC 3 TIMES DAILY
Qty: 10 ML | Refills: 5 | Status: SHIPPED | OUTPATIENT
Start: 2024-03-15 | End: 2024-05-30

## 2024-03-15 NOTE — TELEPHONE ENCOUNTER
Discussed intraocular lens options for cataract surgery.  Monofocal lens recommended    Edwin Navarro MD  , Comprehensive Ophthalmology  Department of Ophthalmology and Visual Neurosciences  [facility]

## 2024-04-23 ENCOUNTER — OFFICE VISIT (OUTPATIENT)
Dept: INTERNAL MEDICINE | Facility: CLINIC | Age: 79
End: 2024-04-23
Payer: COMMERCIAL

## 2024-04-23 VITALS
BODY MASS INDEX: 22.88 KG/M2 | OXYGEN SATURATION: 98 % | WEIGHT: 137.5 LBS | SYSTOLIC BLOOD PRESSURE: 103 MMHG | HEART RATE: 66 BPM | DIASTOLIC BLOOD PRESSURE: 68 MMHG

## 2024-04-23 DIAGNOSIS — Z01.818 PREOP GENERAL PHYSICAL EXAM: ICD-10-CM

## 2024-04-23 DIAGNOSIS — Z29.11 NEED FOR VACCINATION AGAINST RESPIRATORY SYNCYTIAL VIRUS: ICD-10-CM

## 2024-04-23 DIAGNOSIS — Z13.220 SCREENING FOR HYPERLIPIDEMIA: Primary | ICD-10-CM

## 2024-04-23 DIAGNOSIS — Z23 NEED FOR TDAP VACCINATION: ICD-10-CM

## 2024-04-23 PROCEDURE — 99214 OFFICE O/P EST MOD 30 MIN: CPT | Performed by: NURSE PRACTITIONER

## 2024-04-23 RX ORDER — RESPIRATORY SYNCYTIAL VIRUS VACCINE 120MCG/0.5
0.5 KIT INTRAMUSCULAR ONCE
Qty: 1 EACH | Refills: 0 | Status: CANCELLED | OUTPATIENT
Start: 2024-04-23 | End: 2024-04-23

## 2024-04-23 NOTE — PATIENT INSTRUCTIONS
Recommend obtaining RSV vaccine from local pharmacy.  Recommend obtaining Tdap at local pharmacy.    How to Take Your Medication Before Surgery   HOLD (do not take) Aspirin for 7 days prior to surgery.  Hold all non-steroidals and vitamins for 7 days before surgery.  Do not take any meds the day of surgery unless instructed to by Eye Clinic.  Preparing for Your Surgery  Getting started  A nurse will call you to review your health history and instructions. They will give you an arrival time based on your scheduled surgery time. Please be ready to share:  Your doctor's clinic name and phone number  Your medical, surgical, and anesthesia history  A list of allergies and sensitivities  A list of medicines, including herbal treatments and over-the-counter drugs  Whether the patient has a legal guardian (ask how to send us the papers in advance)  Please tell us if you're pregnant--or if there's any chance you might be pregnant. Some surgeries may injure a fetus (unborn baby), so they require a pregnancy test. Surgeries that are safe for a fetus don't always need a test, and you can choose whether to have one.   If you have a child who's having surgery, please ask for a copy of Preparing for Your Child's Surgery.    Preparing for surgery  Within 10 to 30 days of surgery: Have a pre-op exam (sometimes called an H&P, or History and Physical). This can be done at a clinic or pre-operative center.  If you're having a , you may not need this exam. Talk to your care team.  At your pre-op exam, talk to your care team about all medicines you take. If you need to stop any medicines before surgery, ask when to start taking them again.  We do this for your safety. Many medicines can make you bleed too much during surgery. Some change how well surgery (anesthesia) drugs work.  Call your insurance company to let them know you're having surgery. (If you don't have insurance, call [phone].)  Call your clinic if there's any  change in your health. This includes signs of a cold or flu (sore throat, runny nose, cough, rash, fever). It also includes a scrape or scratch near the surgery site.  If you have questions on the day of surgery, call your hospital or surgery center.  Eating and drinking guidelines  For your safety: Unless your surgeon tells you otherwise, follow the guidelines below.  Eat and drink as usual until 8 hours before you arrive for surgery. After that, no food or milk.  Drink clear liquids until 2 hours before you arrive. These are liquids you can see through, like water, Gatorade, and Propel Water. They also include plain black coffee and tea (no cream or milk), candy, and breath mints. You can spit out gum when you arrive.  If you drink alcohol: Stop drinking it the night before surgery.  If your care team tells you to take medicine on the morning of surgery, it's okay to take it with a sip of water.  Preventing infection  Shower or bathe the night before and morning of your surgery. Follow the instructions your clinic gave you. (If no instructions, use regular soap.)  Don't shave or clip hair near your surgery site. We'll remove the hair if needed.  Don't smoke or vape the morning of surgery. You may chew nicotine gum up to 2 hours before surgery. A nicotine patch is okay.  Note: Some surgeries require you to completely quit smoking and nicotine. Check with your surgeon.  Your care team will make every effort to keep you safe from infection. We will:  Clean our hands often with soap and water (or an alcohol-based hand rub).  Clean the skin at your surgery site with a special soap that kills germs.  Give you a special gown to keep you warm. (Cold raises the risk of infection.)  Wear special hair covers, masks, gowns and gloves during surgery.  Give antibiotic medicine, if prescribed. Not all surgeries need antibiotics.  What to bring on the day of surgery  Photo ID and insurance card  Copy of your health care  directive, if you have one  Glasses and hearing aids (bring cases)  You can't wear contacts during surgery  Inhaler and eye drops, if you use them (tell us about these when you arrive)  CPAP machine or breathing device, if you use them  A few personal items, if spending the night  If you have . . .  A pacemaker, ICD (cardiac defibrillator) or other implant: Bring the ID card.  An implanted stimulator: Bring the remote control.  A legal guardian: Bring a copy of the certified (court-stamped) guardianship papers.  Please remove any jewelry, including body piercings. Leave jewelry and other valuables at home.  If you're going home the day of surgery  You must have a responsible adult drive you home. They should stay with you overnight as well.  If you don't have someone to stay with you, and you aren't safe to go home alone, we may keep you overnight. Insurance often won't pay for this.  After surgery  If it's hard to control your pain or you need more pain medicine, please call your surgeon's office.  Questions?   If you have any questions for your care team, list them here: _________________________________________________________________________________________________________________________________________________________________________ ____________________________________ ____________________________________ ____________________________________  For informational purposes only. Not to replace the advice of your health care provider. Copyright   2003, 2019 [employer] [facility]. All rights reserved. Clinically reviewed by Bre Larson MD. [employer] [id number] - REV 12/22.

## 2024-04-23 NOTE — PROGRESS NOTES
Preoperative Evaluation  [facility] INTERNAL MEDICINE [address]  4TH FLOOR  [facility] [address]  Phone: [phone]  Fax: [phone]  Primary Provider: Myron Steele  Pre-op Performing Provider: MYRON STEELE  Apr 23, 2024       Pat is a 78 year old, presenting for the following:pre-op exam        4/23/2024     2:15 PM   Additional Questions   Roomed by MJL, EMT     Surgical Information  Surgery/Procedure: Phacoemulsion  [place] [facility] [place] Dr. Navarro  Surgery Date: 5/06/24  Time of Surgery: 11:05am  Where patient plans to recover: At home with family  Fax number for surgical facility: Note does not need to be faxed, will be available electronically in Epic.    Assessment & Plan     The proposed surgical procedure is considered LOW risk.      Screening for hyperlipidemia  - Lipid panel reflex to direct LDL Fasting; Future    Preop general physical exam  No labs were needed for this low risk procedure as she had normal labs in February.    How to Take Your Medication Before Surgery   HOLD (do not take) Aspirin for 7 days prior to surgery.  Hold all non-steroidals and vitamins for 7 days before surgery.  Do not take any meds the day of surgery unless instructed to by Eye Clinic.      Recommend obtaining RSV vaccine from local pharmacy.  Recommend obtaining Tdap at local pharmacy.       Recommendation  APPROVAL GIVEN to proceed with proposed procedure, without further diagnostic evaluation.       I spent a total of 30  minutes in the care of this pt during today's office visit. This time includes reviewing the patient's chart and prior history, obtaining a history, performing an examination and evaluation and counseling the patient. This time also includes ordering medications or tests necessary in addition to communication to other member's of the patient's health care team. Time spent in documentation and care coordination is included.     Myron  Tawanna ISLAS CNP    Subjective       HPI related to upcoming procedure: Pt is in normal state of health.        4/22/2024     3:11 PM   Preop Questions   1. Have you ever had a heart attack or stroke? No   2. Have you ever had surgery on your heart or blood vessels, such as a stent placement, a coronary artery bypass, or surgery on an artery in your head, neck, heart, or legs? No   3. Do you have chest pain with activity? No   4. Do you have a history of  heart failure? No   5. Do you currently have a cold, bronchitis or symptoms of other infection? No   6. Do you have a cough, shortness of breath, or wheezing? No   7. Do you or anyone in your family have previous history of blood clots? No   8. Do you or does anyone in your family have a serious bleeding problem such as prolonged bleeding following surgeries or cuts? No   9. Have you ever had problems with anemia or been told to take iron pills? No   10. Have you had any abnormal blood loss such as black, tarry or bloody stools, or abnormal vaginal bleeding? No   11. Have you ever had a blood transfusion? No   12. Are you willing to have a blood transfusion if it is medically needed before, during, or after your surgery? Yes   13. Have you or any of your relatives ever had problems with anesthesia? UNKNOWN -    14. Do you have sleep apnea, excessive snoring or daytime drowsiness? No   15. Do you have any artifical heart valves or other implanted medical devices like a pacemaker, defibrillator, or continuous glucose monitor? No   16. Do you have artificial joints? No   17. Are you allergic to latex? 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  care facility 06/09/2016     Priority: Medium    Neoplasm of uncertain behavior of skin 02/28/2016     Priority: Medium    Osteopenia 04/16/2015     Priority: Medium    Oral ulcer 04/12/2015     Priority: Medium    Seborrheic dermatitis 04/12/2015     Priority: Medium    Encounter for long-term (current) use of steroids 04/17/2014     Priority: Medium    Encounter for long-term current use of medication 02/10/2014     Priority: Medium     Problem list name updated by automated process. Provider to review      Herpes zoster 06/14/2013     Priority: Medium     Problem list name updated by automated process. 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    probably    Basal cell carcinoma     Dermatomyositis (H)     Glaucoma     IgA deficiency (H)     Nonsenile cataract      Past Surgical History:   Procedure Laterality Date    BIOPSY OF SKIN LESION      COLONOSCOPY      [facility] UGI ENDOSCOPY W EUS N/A 05/14/2014    Procedure: COMBINED ENDOSCOPIC ULTRASOUND, ESOPHAGOSCOPY, GASTROSCOPY, DUODENOSCOPY (EGD);  Surgeon: Boston Rodriguez MD;  Location:  GI    HYSTERECTOMY      partial, prolapse uterus.     Current Outpatient Medications   Medication Sig Dispense Refill    augmented betamethasone dipropionate (DIPROLENE AF) 0.05 % external cream Apply to areas of dermatomyositis rash twice daily as needed. Do not use on face, underarms, groin. 50 g 1    B Complex TABS Take 1 tablet by mouth daily.      brimonidine (ALPHAGAN) 0.2 % ophthalmic solution Place 1 drop into the right eye 3 times daily 10 mL 5    Calcium-Magnesium-Vitamin D 600- MG-MG-UNIT TB24 Take 1 tablet by mouth 2 times daily      citalopram (CELEXA) 10 MG tablet Start by taking 0.5 tab and may increase after 1 month to one tab a day. 75 tablet 3     Collagen Hydrolysate POWD Take by mouth daily      dorzolamide-timolol (COSOPT) 2-0.5 % ophthalmic solution Place 1 drop into the right eye 2 times daily 10 mL 11    glucosamine-chondroitin 500-400 MG CAPS per capsule Take 2 capsules by mouth daily.      hydroxychloroquine (PLAQUENIL) 200 MG tablet Take 1 tablet (200 mg) by mouth daily Next eye exam due 1/31/24. 60 tablet 1    ibuprofen (ADVIL/MOTRIN) 200 MG tablet Take 200 mg by mouth every 8 hours as needed for inflammatory pain      latanoprost (XALATAN) 0.005 % ophthalmic solution Place 1 drop into both eyes at bedtime 7.5 mL 2    Multiple Vitamins-Minerals (MULTIPLE VITAMINS/WOMENS PO)       Multiple Vitamins-Minerals (PRESERVISION AREDS 2+MULTI VIT PO) Take 1 tablet by mouth daily      mycophenolate (GENERIC EQUIVALENT) 500 MG tablet Take 2 tablets (1,000 mg) by mouth 2 times daily 360 tablet 1    omeprazole (PRILOSEC) 20 MG DR capsule Take 1 capsule (20 mg) by mouth daily 60 capsule 1    Propylene Glycol (SYSTANE COMPLETE OP) Apply 1 drop to eye as needed      Vitamin D3 (CHOLECALCIFEROL) 125 MCG (5000 UT) tablet Take 1 tablet by mouth daily      ciclopirox (PENLAC) 8 % external solution Apply to adjacent skin and affected nails daily.  Remove with alcohol every 7 days, then repeat. (Patient not taking: Reported on 2/8/2024) 6.6 mL 11    hydrocortisone 2.5 % cream Apply topically 2 times daily For face (Patient not taking: Reported on 2/8/2024) 30 g 3    hydroxychloroquine (PLAQUENIL) 200 MG tablet Take 1 tablet (200 mg) by mouth daily Next eye exam due 1/31/24. (Patient not taking: Reported on 2/8/2024) 60 tablet 5    omega-3 fatty acids (FISH OIL) 1200 MG capsule Take 1 capsule by mouth daily. (Patient not taking: Reported on 2/8/2024)      terbinafine (LAMISIL) 1 % external cream Apply topically 2 times daily To affected areas of scaling on the feet for 3-4 weeks until resolved. Can use twice daily as needed for flares of scaling/itching on the feet/toes.  "(Patient not taking: Reported on 2024) 42 g 3    triamcinolone (KENALOG) 0.1 % external cream Apply topically 2 times daily For body (Patient not taking: Reported on 2024) 454 g 3       Allergies   Allergen Reactions    Penicillins Anaphylaxis        Social History     Tobacco Use    Smoking status: Former     Current packs/day: 0.00     Average packs/day: 1 pack/day for 10.0 years (10.0 ttl pk-yrs)     Types: Cigarettes     Start date: 4/10/1969     Quit date: 4/10/1979     Years since quittin.0    Smokeless tobacco: Never    Tobacco comments:     quit    Substance Use Topics    Alcohol use: Not Currently     Comment: rare     Family History   Problem Relation Age of Onset    Alzheimer Disease Mother         onset in 60s    C.A.D. Father     Arthritis Father     Breast Cancer Sister 35    Glaucoma Sister     Neurologic Disorder Sister         seizures    Cancer Sister         breast    Diabetes Brother     Glaucoma Brother     Psoriasis Daughter     Mental Illness Daughter     Rheumatologic Disease Daughter         Dermatomyositis    Hypertension No family hx of     Cerebrovascular Disease No family hx of         ,    Thyroid Disease No family hx of     Skin Cancer No family hx of     Macular Degeneration No family hx of      History   Drug Use No         Review of Systems    Review of Systems  Constitutional, HEENT, cardiovascular, pulmonary, gi and gu systems are negative, except as otherwise noted.    Objective    /68 (BP Location: Right arm, Patient Position: Sitting, Cuff Size: Adult Regular)   Pulse 66   Wt 62.4 kg (137 lb 8 oz)   SpO2 98%   BMI 22.88 kg/m     Estimated body mass index is 22.88 kg/m  as calculated from the following:    Height as of 24: 1.651 m (5' 5\").    Weight as of this encounter: 62.4 kg (137 lb 8 oz).  Physical Exam  GENERAL: alert and no distress  EYES: Eyes grossly normal to inspection, conjunctivae and sclerae normal  HENT: ear canals and TM's normal, " mouth without ulcers or lesions  NECK: no adenopathy, no asymmetry, masses, or scars  RESP: lungs clear to auscultation - no rales, rhonchi or wheezes  CV: regular rate and rhythm, normal S1 S2, no S3 or S4, no murmur, click or rub, no peripheral edema  MS: no gross musculoskeletal defects noted, no edema  PSYCH: mentation appears normal, affect normal/bright      Diagnostics  No labs were ordered during this visit.   No EKG required for low risk surgery (cataract, skin procedure, breast biopsy, etc).   Latest Reference Range & Units 02/06/24 14:44   Sodium 135 - 145 mmol/L 144   Potassium 3.4 - 5.3 mmol/L 4.2   Chloride 98 - 107 mmol/L 105   Carbon Dioxide (CO2) 22 - 29 mmol/L 26   Urea Nitrogen 8.0 - 23.0 mg/dL 21.4   Creatinine 0.51 - 0.95 mg/dL  0.51 - 0.95 mg/dL 0.66  0.66   GFR Estimate >60 mL/min/1.73m2  >60 mL/min/1.73m2 90  90   Calcium 8.8 - 10.2 mg/dL 9.6   Anion Gap 7 - 15 mmol/L 13   Albumin 3.5 - 5.2 g/dL 4.5   ALT 0 - 50 U/L 20   AST 0 - 45 U/L 21   CK Total 26 - 192 U/L 62   CRP Inflammation <5.00 mg/L <3.00   Glucose 70 - 99 mg/dL 101 (H)   WBC 4.0 - 11.0 10e3/uL 4.2   Hemoglobin 11.7 - 15.7 g/dL 12.9   Hematocrit 35.0 - 47.0 % 41.4   Platelet Count 150 - 450 10e3/uL 185   RBC Count 3.80 - 5.20 10e6/uL 4.33   MCV 78 - 100 fL 96   MCH 26.5 - 33.0 pg 29.8   MCHC 31.5 - 36.5 g/dL 31.2 (L)   RDW 10.0 - 15.0 % 13.5   % Neutrophils % 60   % Lymphocytes % 28   % Monocytes % 10   % Eosinophils % 1   % Basophils % 0   Absolute Basophils 0.0 - 0.2 10e3/uL 0.0   Absolute Eosinophils 0.0 - 0.7 10e3/uL 0.1   Absolute Immature Granulocytes <=0.4 10e3/uL 0.0   Absolute Lymphocytes 0.8 - 5.3 10e3/uL 1.2   Absolute Monocytes 0.0 - 1.3 10e3/uL 0.4   % Immature Granulocytes % 0   Absolute Neutrophils 1.6 - 8.3 10e3/uL 2.5   Sed Rate 0 - 30 mm/hr 4     Revised Cardiac Risk Index (RCRI)  The patient has the following serious cardiovascular risks for perioperative complications:   - No serious cardiac risks = 0 points      RCRI Interpretation: 0 points: Class I (very low risk - 0.4% complication rate)         Signed Electronically by: ROSHAN Pavon CNP  Copy of this evaluation report is provided to requesting physician.

## 2024-04-29 ENCOUNTER — ANESTHESIA EVENT (OUTPATIENT)
Dept: SURGERY | Facility: AMBULATORY SURGERY CENTER | Age: 79
End: 2024-04-29
Payer: COMMERCIAL

## 2024-05-01 ENCOUNTER — MYC MEDICAL ADVICE (OUTPATIENT)
Dept: SURGERY | Facility: AMBULATORY SURGERY CENTER | Age: 79
End: 2024-05-01
Payer: COMMERCIAL

## 2024-05-01 NOTE — TELEPHONE ENCOUNTER
Spoke to pt at 1340    Pt states received call from pre-op nursing this morning and went over prep.    Fermín Hudson RN 1:41 PM 05/01/24

## 2024-05-06 ENCOUNTER — HOSPITAL ENCOUNTER (OUTPATIENT)
Facility: AMBULATORY SURGERY CENTER | Age: 79
Discharge: HOME | End: 2024-05-06
Attending: OPHTHALMOLOGY
Payer: COMMERCIAL

## 2024-05-06 ENCOUNTER — ANESTHESIA (OUTPATIENT)
Dept: SURGERY | Facility: AMBULATORY SURGERY CENTER | Age: 79
End: 2024-05-06
Payer: COMMERCIAL

## 2024-05-06 VITALS
HEART RATE: 60 BPM | BODY MASS INDEX: 22.82 KG/M2 | WEIGHT: 137 LBS | HEIGHT: 65 IN | DIASTOLIC BLOOD PRESSURE: 58 MMHG | TEMPERATURE: 98 F | SYSTOLIC BLOOD PRESSURE: 105 MMHG | RESPIRATION RATE: 16 BRPM | OXYGEN SATURATION: 95 %

## 2024-05-06 DIAGNOSIS — H25.813 COMBINED FORM OF AGE-RELATED CATARACT, BOTH EYES: ICD-10-CM

## 2024-05-06 DIAGNOSIS — H40.1113 CHRONIC OPEN ANGLE GLAUCOMA OF RIGHT EYE, SEVERE STAGE: Primary | ICD-10-CM

## 2024-05-06 PROCEDURE — 66984 XCAPSL CTRC RMVL W/O ECP: CPT | Mod: RT | Performed by: OPHTHALMOLOGY

## 2024-05-06 PROCEDURE — C1783 OCULAR IMP, AQUEOUS DRAIN DE: HCPCS | Mod: RT

## 2024-05-06 PROCEDURE — 66984 XCAPSL CTRC RMVL W/O ECP: CPT | Performed by: ANESTHESIOLOGY

## 2024-05-06 PROCEDURE — 66991 XCAPSL CTRC RMVL INSJ 1+: CPT | Mod: RT

## 2024-05-06 PROCEDURE — 99100 ANES PT EXTEME AGE<1 YR&>70: CPT | Performed by: ANESTHESIOLOGY

## 2024-05-06 PROCEDURE — 66984 XCAPSL CTRC RMVL W/O ECP: CPT | Performed by: NURSE ANESTHETIST, CERTIFIED REGISTERED

## 2024-05-06 PROCEDURE — 99100 ANES PT EXTEME AGE<1 YR&>70: CPT | Performed by: NURSE ANESTHETIST, CERTIFIED REGISTERED

## 2024-05-06 DEVICE — LENS CC60WF 18.5 CLAREON UV ASPHERIC BICONVEX IOL: Type: IMPLANTABLE DEVICE | Site: EYE | Status: FUNCTIONAL

## 2024-05-06 DEVICE — IMPLANTABLE DEVICE: Type: IMPLANTABLE DEVICE | Site: EYE | Status: FUNCTIONAL

## 2024-05-06 RX ORDER — MOXIFLOXACIN 5 MG/ML
1 SOLUTION/ DROPS OPHTHALMIC 3 TIMES DAILY
Qty: 3 ML | Refills: 1 | Status: SHIPPED | OUTPATIENT
Start: 2024-05-06 | End: 2024-05-30

## 2024-05-06 RX ORDER — FENTANYL CITRATE 50 UG/ML
INJECTION, SOLUTION INTRAMUSCULAR; INTRAVENOUS PRN
Status: DISCONTINUED | OUTPATIENT
Start: 2024-05-06 | End: 2024-05-06

## 2024-05-06 RX ORDER — SODIUM CHLORIDE, SODIUM LACTATE, POTASSIUM CHLORIDE, CALCIUM CHLORIDE 600; 310; 30; 20 MG/100ML; MG/100ML; MG/100ML; MG/100ML
INJECTION, SOLUTION INTRAVENOUS CONTINUOUS
Status: DISCONTINUED | OUTPATIENT
Start: 2024-05-06 | End: 2024-05-06 | Stop reason: HOSPADM

## 2024-05-06 RX ORDER — LIDOCAINE 40 MG/G
CREAM TOPICAL
Status: DISCONTINUED | OUTPATIENT
Start: 2024-05-06 | End: 2024-05-06 | Stop reason: HOSPADM

## 2024-05-06 RX ORDER — ONDANSETRON 2 MG/ML
4 INJECTION INTRAMUSCULAR; INTRAVENOUS EVERY 30 MIN PRN
Status: DISCONTINUED | OUTPATIENT
Start: 2024-05-06 | End: 2024-05-06 | Stop reason: HOSPADM

## 2024-05-06 RX ORDER — OXYCODONE HYDROCHLORIDE 5 MG/1
5 TABLET ORAL
Status: DISCONTINUED | OUTPATIENT
Start: 2024-05-06 | End: 2024-05-07 | Stop reason: HOSPADM

## 2024-05-06 RX ORDER — MOXIFLOXACIN IN NACL,ISO-OS/PF 0.3MG/0.3
SYRINGE (ML) INTRAOCULAR PRN
Status: DISCONTINUED | OUTPATIENT
Start: 2024-05-06 | End: 2024-05-06 | Stop reason: HOSPADM

## 2024-05-06 RX ORDER — PROPOFOL 10 MG/ML
INJECTION, EMULSION INTRAVENOUS PRN
Status: DISCONTINUED | OUTPATIENT
Start: 2024-05-06 | End: 2024-05-06

## 2024-05-06 RX ORDER — HYDROMORPHONE HYDROCHLORIDE 1 MG/ML
0.4 INJECTION, SOLUTION INTRAMUSCULAR; INTRAVENOUS; SUBCUTANEOUS EVERY 5 MIN PRN
Status: DISCONTINUED | OUTPATIENT
Start: 2024-05-06 | End: 2024-05-06 | Stop reason: HOSPADM

## 2024-05-06 RX ORDER — HYDROMORPHONE HYDROCHLORIDE 1 MG/ML
0.2 INJECTION, SOLUTION INTRAMUSCULAR; INTRAVENOUS; SUBCUTANEOUS EVERY 5 MIN PRN
Status: DISCONTINUED | OUTPATIENT
Start: 2024-05-06 | End: 2024-05-06 | Stop reason: HOSPADM

## 2024-05-06 RX ORDER — ONDANSETRON 4 MG/1
4 TABLET, ORALLY DISINTEGRATING ORAL EVERY 30 MIN PRN
Status: DISCONTINUED | OUTPATIENT
Start: 2024-05-06 | End: 2024-05-07 | Stop reason: HOSPADM

## 2024-05-06 RX ORDER — TIMOLOL MALEATE 5 MG/ML
SOLUTION/ DROPS OPHTHALMIC PRN
Status: DISCONTINUED | OUTPATIENT
Start: 2024-05-06 | End: 2024-05-06 | Stop reason: HOSPADM

## 2024-05-06 RX ORDER — NALOXONE HYDROCHLORIDE 0.4 MG/ML
0.1 INJECTION, SOLUTION INTRAMUSCULAR; INTRAVENOUS; SUBCUTANEOUS
Status: DISCONTINUED | OUTPATIENT
Start: 2024-05-06 | End: 2024-05-06 | Stop reason: HOSPADM

## 2024-05-06 RX ORDER — ONDANSETRON 2 MG/ML
4 INJECTION INTRAMUSCULAR; INTRAVENOUS EVERY 30 MIN PRN
Status: DISCONTINUED | OUTPATIENT
Start: 2024-05-06 | End: 2024-05-07 | Stop reason: HOSPADM

## 2024-05-06 RX ORDER — PROPARACAINE HYDROCHLORIDE 5 MG/ML
1 SOLUTION/ DROPS OPHTHALMIC ONCE
Status: COMPLETED | OUTPATIENT
Start: 2024-05-06 | End: 2024-05-06

## 2024-05-06 RX ORDER — PREDNISOLONE ACETATE 10 MG/ML
1 SUSPENSION/ DROPS OPHTHALMIC 4 TIMES DAILY
Qty: 10 ML | Refills: 1 | Status: SHIPPED | OUTPATIENT
Start: 2024-05-06 | End: 2024-08-22

## 2024-05-06 RX ORDER — ONDANSETRON 4 MG/1
4 TABLET, ORALLY DISINTEGRATING ORAL EVERY 30 MIN PRN
Status: DISCONTINUED | OUTPATIENT
Start: 2024-05-06 | End: 2024-05-06 | Stop reason: HOSPADM

## 2024-05-06 RX ORDER — OXYCODONE HYDROCHLORIDE 5 MG/1
10 TABLET ORAL
Status: DISCONTINUED | OUTPATIENT
Start: 2024-05-06 | End: 2024-05-07 | Stop reason: HOSPADM

## 2024-05-06 RX ORDER — FENTANYL CITRATE 50 UG/ML
25 INJECTION, SOLUTION INTRAMUSCULAR; INTRAVENOUS EVERY 5 MIN PRN
Status: DISCONTINUED | OUTPATIENT
Start: 2024-05-06 | End: 2024-05-06 | Stop reason: HOSPADM

## 2024-05-06 RX ORDER — FENTANYL CITRATE 50 UG/ML
50 INJECTION, SOLUTION INTRAMUSCULAR; INTRAVENOUS EVERY 5 MIN PRN
Status: DISCONTINUED | OUTPATIENT
Start: 2024-05-06 | End: 2024-05-06 | Stop reason: HOSPADM

## 2024-05-06 RX ORDER — KETOROLAC TROMETHAMINE 4 MG/ML
1 SOLUTION/ DROPS OPHTHALMIC 4 TIMES DAILY
Qty: 5 ML | Refills: 1 | Status: SHIPPED | OUTPATIENT
Start: 2024-05-06 | End: 2024-05-30

## 2024-05-06 RX ORDER — DEXAMETHASONE SODIUM PHOSPHATE 4 MG/ML
INJECTION, SOLUTION INTRA-ARTICULAR; INTRALESIONAL; INTRAMUSCULAR; INTRAVENOUS; SOFT TISSUE PRN
Status: DISCONTINUED | OUTPATIENT
Start: 2024-05-06 | End: 2024-05-06 | Stop reason: HOSPADM

## 2024-05-06 RX ORDER — NALOXONE HYDROCHLORIDE 0.4 MG/ML
0.1 INJECTION, SOLUTION INTRAMUSCULAR; INTRAVENOUS; SUBCUTANEOUS
Status: DISCONTINUED | OUTPATIENT
Start: 2024-05-06 | End: 2024-05-07 | Stop reason: HOSPADM

## 2024-05-06 RX ORDER — LIDOCAINE HYDROCHLORIDE 20 MG/ML
INJECTION, SOLUTION INFILTRATION; PERINEURAL PRN
Status: DISCONTINUED | OUTPATIENT
Start: 2024-05-06 | End: 2024-05-06

## 2024-05-06 RX ORDER — CYCLOPENTOLAT/TROPIC/PHENYLEPH 1%-1%-2.5%
1 DROPS (EA) OPHTHALMIC (EYE)
Status: COMPLETED | OUTPATIENT
Start: 2024-05-06 | End: 2024-05-06

## 2024-05-06 RX ORDER — TETRACAINE HYDROCHLORIDE 5 MG/ML
SOLUTION OPHTHALMIC PRN
Status: DISCONTINUED | OUTPATIENT
Start: 2024-05-06 | End: 2024-05-06 | Stop reason: HOSPADM

## 2024-05-06 RX ORDER — ACETAMINOPHEN 325 MG/1
975 TABLET ORAL ONCE
Status: COMPLETED | OUTPATIENT
Start: 2024-05-06 | End: 2024-05-06

## 2024-05-06 RX ORDER — SODIUM CHLORIDE, SODIUM LACTATE, POTASSIUM CHLORIDE, CALCIUM CHLORIDE 600; 310; 30; 20 MG/100ML; MG/100ML; MG/100ML; MG/100ML
INJECTION, SOLUTION INTRAVENOUS CONTINUOUS
Status: DISCONTINUED | OUTPATIENT
Start: 2024-05-06 | End: 2024-05-07 | Stop reason: HOSPADM

## 2024-05-06 RX ORDER — BALANCED SALT SOLUTION 6.4; .75; .48; .3; 3.9; 1.7 MG/ML; MG/ML; MG/ML; MG/ML; MG/ML; MG/ML
SOLUTION OPHTHALMIC PRN
Status: DISCONTINUED | OUTPATIENT
Start: 2024-05-06 | End: 2024-05-06 | Stop reason: HOSPADM

## 2024-05-06 RX ADMIN — Medication 1 DROP: at 09:59

## 2024-05-06 RX ADMIN — Medication 1 DROP: at 10:09

## 2024-05-06 RX ADMIN — LIDOCAINE HYDROCHLORIDE 60 MG: 20 INJECTION, SOLUTION INFILTRATION; PERINEURAL at 11:12

## 2024-05-06 RX ADMIN — SODIUM CHLORIDE, SODIUM LACTATE, POTASSIUM CHLORIDE, CALCIUM CHLORIDE: 600; 310; 30; 20 INJECTION, SOLUTION INTRAVENOUS at 11:13

## 2024-05-06 RX ADMIN — PROPOFOL 20 MG: 10 INJECTION, EMULSION INTRAVENOUS at 11:15

## 2024-05-06 RX ADMIN — PROPOFOL 30 MG: 10 INJECTION, EMULSION INTRAVENOUS at 11:13

## 2024-05-06 RX ADMIN — SODIUM CHLORIDE, SODIUM LACTATE, POTASSIUM CHLORIDE, CALCIUM CHLORIDE: 600; 310; 30; 20 INJECTION, SOLUTION INTRAVENOUS at 10:10

## 2024-05-06 RX ADMIN — Medication 1 DROP: at 10:04

## 2024-05-06 RX ADMIN — ACETAMINOPHEN 975 MG: 325 TABLET ORAL at 09:59

## 2024-05-06 RX ADMIN — PROPARACAINE HYDROCHLORIDE 1 DROP: 5 SOLUTION/ DROPS OPHTHALMIC at 09:58

## 2024-05-06 RX ADMIN — FENTANYL CITRATE 25 MCG: 50 INJECTION, SOLUTION INTRAMUSCULAR; INTRAVENOUS at 11:12

## 2024-05-06 RX ADMIN — FENTANYL CITRATE 25 MCG: 50 INJECTION, SOLUTION INTRAMUSCULAR; INTRAVENOUS at 12:02

## 2024-05-06 NOTE — ANESTHESIA PREPROCEDURE EVALUATION
Anesthesia Pre-Procedure Evaluation    Patient: Mely Rashid   MRN: [id number] : 1945        Procedure : Procedure(s):  RIGHT EYE PHACOEMULSIFICATION, CATARACT, WITH STANDARD INTRAOCULAR LENS IMPLANT INSERTION  TRABECULAR MICRO-BYPASS WITH XEN GEL STENT          Past Medical History:   Diagnosis Date    Arthritis ??    probably    Basal cell carcinoma     Dermatomyositis (H)     Glaucoma     IgA deficiency (H)     Nonsenile cataract       Past Surgical History:   Procedure Laterality Date    BIOPSY OF SKIN LESION      COLONOSCOPY      [facility] UGI ENDOSCOPY W EUS N/A 2014    Procedure: COMBINED ENDOSCOPIC ULTRASOUND, ESOPHAGOSCOPY, GASTROSCOPY, DUODENOSCOPY (EGD);  Surgeon: Boston Rodriguez MD;  Location[facility]    HYSTERECTOMY      partial, prolapse uterus.      Allergies   Allergen Reactions    Penicillins Anaphylaxis      Social History     Tobacco Use    Smoking status: Former     Current packs/day: 0.00     Average packs/day: 1 pack/day for 10.0 years (10.0 ttl pk-yrs)     Types: Cigarettes     Start date: 4/10/1969     Quit date: 4/10/1979     Years since quittin.1    Smokeless tobacco: Never    Tobacco comments:     quit    Substance Use Topics    Alcohol use: Not Currently     Comment: rare      Wt Readings from Last 1 Encounters:   24 62.1 kg (137 lb)              OUTSIDE LABS:  CBC:   Lab Results   Component Value Date    WBC 4.2 2024    WBC 3.9 (L) 2023    HGB 12.9 2024    HGB 13.4 2023    HCT 41.4 2024    HCT 41.8 2023     2024     2023     BMP:   Lab Results   Component Value Date     2024     2022    POTASSIUM 4.2 2024    POTASSIUM 3.7 2022    CHLORIDE 105 2024    CHLORIDE 105 2022    CO2 26 2024    CO2 25 2022    BUN 21.4 2024    BUN 16.7 2022    CR 0.66 2024    CR 0.66 2024     (H) 2024     (H) 2022  "    COAGS:   Lab Results   Component Value Date    PTT 27 04/09/2022    INR 0.94 04/09/2022     POC: No results found for: \"BGM\", \"HCG\", \"HCGS\"  HEPATIC:   Lab Results   Component Value Date    ALBUMIN 4.5 02/06/2024    PROTTOTAL 6.6 12/31/2022    ALT 20 02/06/2024    AST 21 02/06/2024    ALKPHOS 82 12/31/2022    BILITOTAL 0.4 12/31/2022     OTHER:   Lab Results   Component Value Date    SUKHJINDER 9.6 02/06/2024    LIPASE 46 12/31/2022    TSH 2.13 04/14/2022    CRP <2.9 01/13/2023    SED 4 02/06/2024       Anesthesia Plan    ASA Status:  2       Anesthesia Type: MAC.   Induction: Intravenous.   Maintenance: TIVA.        Consents    Anesthesia Plan(s) and associated risks, benefits, and realistic alternatives discussed. Questions answered and patient/representative(s) expressed understanding.     - Discussed:     - Discussed with:  Patient            Postoperative Care    Pain management: Multi-modal analgesia.   PONV prophylaxis: Ondansetron (or other 5HT-3)     Comments:               Jennifer Tello MD    I have reviewed the pertinent notes and labs in the chart from the past 30 days and (re)examined the patient.  Any updates or changes from those notes are reflected in this note.                  "

## 2024-05-06 NOTE — OP NOTE
PREOPERATIVE DIAGNOSIS:  1. Chronic open angle glaucoma of right eye, severe stage    2. Combined form of age-related cataract, both eyes      POSTOPERATIVE DIAGNOSIS: Same   PROCEDURES:   1. Cataract extraction with intraocular lens implant right eye   2. Xen Gel Implantation right eye     SURGEON: Edwin Navarro M.D.  Assistant: Lily Wilder MD           INDICATIONS: The patient Mely Rashid presented to the eye clinic with decreased vision secondary to cataract and glaucoma in the right eye. The risks, benefits and alternatives to surgery were discussed. The patient elected to proceed. All questions were answered to the patient's satisfaction.   DESCRIPTION OF PROCEDURE:  Prior to the procedure, appropriate cardiac and respiratory monitors were applied to the patient. In the pre-operative holding area, a drop of topical tetracaine followed by lidocaine gel followed by povidone iodine.  The patient was brought to the operating room where a surgical pause was carried out to identify with all members of the surgical team the correct surgical site.  With adequate anesthesia, the right eye was prepped and draped in the usual sterile fashion.  A lid speculum was placed, and the operating microscope was rotated into position.  A paracentesis was created.  Through this limbal paracentesis, the anterior chamber was inflated with viscoelastic. A temporal wound was created at the limbus using a 2.5 mm blade. A capsulorrhexis was initiated using a bent 25-gauge needle and was completed in continuous and circular fashion using the capsulorrhexis forceps. The lens nucleus was hydrodissected using balanced salt solution.  The lens nucleus was rotated and removed using phacoemulsification in a stop and chop technique.  Residual cortical material was removed using irrigation-aspiration.  The capsular bag was reinflated to its maximal extent with viscoelastic.  A 18.5 diopter CC60WF was inserted into the capsular bag.   The lens power selected was reviewed using the intraocular lens power measurements that were obtained preoperatively to confirm that the correct lens was selected for the desired post-operative refractive state.  A 10-0 nylon suture was placed in the temporal cornea and temporarily tied.     Attention was again made to the placement of the Xen Gel implant.  A 7-0 vicryl traction suture was placed in the superior cornea.  A superior peritomy was fashioned and dissected beneath the Tenon's membrane.  A Maninder  was used to broadly dissect posteriorly.  Light cautery was performed.  Presoaked sponges of Mitomycin C (2 mg/mL) + Lidocaine with epinephrine were placed subTenon for a duration of 30 seconds.  Care was taken to avoid contact with the cut surfaces and cornea.  The sponges were removed and the eye copiously irrigated with BSS.  A distant of 2 mm posterior to the limbus was marked superonasally.  The Xen gel was primed with BSS.  The trochar was tunneled through the sclera from the marking to the anterior chamber and the Xen Gel implant was advanced. The Xen gel drains into the subconjunctival space.  A 10-0 nylon horizontal mattress suture was placed at the distal end of the stent to improve the contour of the stent along the sclera.  The remaining viscoelastic was removed from the anterior chamber in its entirety, the wound were hydrated and found to be self-sealing.  The nylon suture was permanently secured and knot buried. The stent was found to flow well.  The conjunctiva and Tenon's were closed using 9-0 vicryl on a BV needle.  A low lying bleb was identified superiorly.  Tactile pressure was confirmed to be in a normal range.  The lid speculum was removed, Maxitrol ointment followed by a patch and shield were applied.  The patient tolerated the procedure well, and there were no complications.     PLAN: The patient will be discharged to home and will follow up tomorrow morning in the eye  clinic.  EBL:  2-3 mL   Complications:  None  Implant Name Type Inv. Item Serial No.  Lot No. LRB No. Used Action   LENS CC60WF 18.5 CLAREON UV ASPHERIC BICONVEX IOL - [id number] Lens/Eye Implant LENS CC60WF 18.5 CLAREON UV ASPHERIC BICONVEX IOL [id number] [employer]  Right 1 Implanted   SYSTEM GLCM XEN PRCN DERM INJECTOR [facility] 5513-001 - [id number] Lens/Eye Implant SYSTEM GLCM XEN PRCN DERM INJECTOR [facility] 5513-001 [id number] [employer]  Right 1 Implanted             Attending Physician Procedure Attestation: I was present for the entire procedure       Edwin Navarro MD  , Comprehensive Ophthalmology  Department of Ophthalmology and Visual Neurosciences  [facility]

## 2024-05-06 NOTE — ANESTHESIA CARE TRANSFER NOTE
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    Anesthesia Type:   MAC     Note:    Oropharynx: oropharynx clear of all foreign objects and spontaneously breathing  Level of Consciousness: awake  Oxygen Supplementation: room air    Independent Airway: airway patency satisfactory and stable  Dentition: dentition unchanged  Vital Signs Stable: post-procedure vital signs reviewed and stable  Report to RN Given: handoff report given  Patient transferred to: Phase II    Handoff Report: Identifed the Patient, Identified the Reponsible Provider, Reviewed the pertinent medical history, Discussed the surgical course, Reviewed Intra-OP anesthesia mangement and issues during anesthesia, Set expectations for post-procedure period and Allowed opportunity for questions and acknowledgement of understanding      Vitals:  Vitals Value Taken Time   /64    Temp 98.0    Pulse 53    Resp 18    SpO2 99        Electronically Signed By: RSOHAN Cuba CRNA  May 6, 2024  12:29 PM

## 2024-05-06 NOTE — ANESTHESIA POSTPROCEDURE EVALUATION
Patient: Mely Rashid    Procedure: Procedure(s):  RIGHT EYE PHACOEMULSIFICATION, CATARACT, WITH STANDARD INTRAOCULAR LENS IMPLANT INSERTION  TRABECULAR MICRO-BYPASS WITH XEN GEL STENT       Anesthesia Type:  MAC    Note:  Disposition: Outpatient   Postop Pain Control: Uneventful            Sign Out: Well controlled pain   PONV: No   Neuro/Psych: Uneventful            Sign Out: Acceptable/Baseline neuro status   Airway/Respiratory: Uneventful            Sign Out: Acceptable/Baseline resp. status   CV/Hemodynamics: Uneventful            Sign Out: Acceptable CV status; No obvious hypovolemia; No obvious fluid overload   Other NRE: NONE   DID A NON-ROUTINE EVENT OCCUR? No           Last vitals:  Vitals Value Taken Time   /58 05/06/24 1242   Temp 36.7  C (98  F) 05/06/24 1227   Pulse 60 05/06/24 1242   Resp 16 05/06/24 1242   SpO2 95 % 05/06/24 1242       Electronically Signed By: Jennifer Tello MD  May 6, 2024  3:49 PM  
Private car

## 2024-05-06 NOTE — DISCHARGE INSTRUCTIONS
[facility]  Home Care Following Anesthesia  For 24 hours after surgery:  Get plenty of rest.  A responsible adult must stay with you for at least 24 hours after you leave the surgery center.  Do not drive or use heavy equipment.  If you have weakness or tingling, don't drive or use heavy equipment until this feeling goes away.   Do not drink alcohol.   Avoid strenuous or risky activities.  Ask for help when climbing stairs.  You may feel lightheaded.  IF so, sit for a few minutes before standing.  Have someone help you get up.   If you have nausea (feel sick to your stomach): Drink only clear liquids such as apple juice, ginger ale, broth or 7-Up.  Rest may also help.  Be sure to drink enough fluids.  Move to a regular diet as you feel able.   You may have a slight fever.  Call the doctor if your fever is over 100 F (37.7 C) (taken under the tongue) or lasts longer than 24 hours.  You may have a dry mouth, a sore throat, muscle aches or trouble sleeping. These should go away after 24 hours.  Do not make important or legal decisions.   It is recommended to avoid smoking.               Tips for taking pain medications  To get the best pain relief possible, remember these points:  Take pain medications as directed, before pain becomes severe.  Pain medication can upset your stomach: taking it with food may help.  Constipation is a common side effect of pain medication. Drink plenty of  fluids.  Eat foods high in fiber. Take a stool softener if recommended by your doctor or pharmacist.  Do not drink alcohol, drive or operate machinery while taking pain medications.  Ask about other ways to control pain, such as with heat, ice or relaxation.    Tylenol/Acetaminophen Consumption    If you feel your pain relief is insufficient, you may take Tylenol/Acetaminophen in addition to your narcotic pain medication.   Be careful not to exceed 4,000 mg of Tylenol/Acetaminophen in a 24 hour  period from all sources.  If you are taking extra strength Tylenol/acetaminophen (500 mg), the maximum dose is 8 tablets in 24 hours.  If you are taking regular strength acetaminophen (325 mg), the maximum dose is 12 tablets in 24 hours.    Call a doctor for any of the following:  Signs of infection (fever, growing tenderness at the surgery site, a large amount of drainage or bleeding, severe pain, foul-smelling drainage, redness, swelling).  It has been over 8 to 10 hours since surgery and you are still not able to urinate (pass water).  Headache for over 24 hours.  Numbness, tingling or weakness the day after surgery (if you had spinal anesthesia).  Signs of Covid-19 infection (temperature over 100 degrees, shortness of breath, cough, loss of taste/smell, generalized body aches, persistent headache, chills, sore throat, nausea/vomiting/diarrhea)  Your doctor is:       Dr. Edwin Navarro, Ophthalmology: [phone]               Or dial [phone] and ask for the resident on call for:  Ophthalmology  For emergency care, call the:  [facility]:  [phone] (TTY for hearing impaired: [phone])

## 2024-05-06 NOTE — BRIEF OP NOTE
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  Complications: None.  Implants:   Implant Name Type Inv. Item Serial No.  Lot No. LRB No. Used Action   LENS CC60WF 18.5 CLAREON UV ASPHERIC BICONVEX IOL - [id number] Lens/Eye Implant LENS CC60WF 18.5 CLAREON UV ASPHERIC BICONVEX IOL [id number] [employer]  Right 1 Implanted   SYSTEM GLCM XEN PRCN DERM INJECTOR [facility] 5513-001 - [id number] Lens/Eye Implant SYSTEM GLCM XEN PRCN DERM INJECTOR [facility] 5513-001 [id number] [employer]  Right 1 Implanted

## 2024-05-07 ENCOUNTER — OFFICE VISIT (OUTPATIENT)
Dept: OPHTHALMOLOGY | Facility: CLINIC | Age: 79
End: 2024-05-07
Attending: OPHTHALMOLOGY
Payer: COMMERCIAL

## 2024-05-07 ENCOUNTER — NURSE TRIAGE (OUTPATIENT)
Dept: NURSING | Facility: CLINIC | Age: 79
End: 2024-05-07
Payer: COMMERCIAL

## 2024-05-07 DIAGNOSIS — Z98.890 POSTOPERATIVE EYE STATE: Primary | ICD-10-CM

## 2024-05-07 PROCEDURE — G0463 HOSPITAL OUTPT CLINIC VISIT: HCPCS | Performed by: OPHTHALMOLOGY

## 2024-05-07 PROCEDURE — 99024 POSTOP FOLLOW-UP VISIT: CPT | Mod: GC | Performed by: OPHTHALMOLOGY

## 2024-05-07 ASSESSMENT — VISUAL ACUITY
OS_CC: 20/20
OD_SC+: -3
CORRECTION_TYPE: GLASSES
OS_SC: 20/25
OD_SC: 20/25
METHOD: SNELLEN - LINEAR
OS_SC+: -3
OS_CC+: -2

## 2024-05-07 ASSESSMENT — TONOMETRY
IOP_METHOD: APPLANATE
OD_IOP_MMHG: 4
OD_IOP_MMHG: 13
IOP_METHOD: ICARE
OS_IOP_MMHG: 4

## 2024-05-07 ASSESSMENT — EXTERNAL EXAM - RIGHT EYE: OD_EXAM: NORMAL

## 2024-05-07 ASSESSMENT — EXTERNAL EXAM - LEFT EYE: OS_EXAM: NORMAL

## 2024-05-07 NOTE — TELEPHONE ENCOUNTER
Spoke to pt at 0955    Eye feels scratchy-- feels eyelid closed with patch over eye.    Pt comfortable removing eye sheild/patch ok to remove and may use frequent preserative free systane.    Reviewed may use clean, damp wash cloth to gentle remove any mattering on lids on closed eye.    Reviewed with pt may come in sooner for exam also today.    Pt will keep appt as scheduled this afternoon at this time.    Fermín Hudson RN 10:03 AM 05/07/24

## 2024-05-07 NOTE — NURSING NOTE
Chief Complaints and History of Present Illnesses   Patient presents with    Post Op (Ophthalmology) Right Eye     1 day PO     Chief Complaint(s) and History of Present Illness(es)       Post Op (Ophthalmology) Right Eye              Laterality: right eye    Onset: sudden    Onset: 1 day ago    Associated symptoms: dryness, eye pain, redness, photophobia, itching and foreign body sensation.  Negative for tearing and discharge    Treatments tried: artificial tears    Pain scale: 7/10    Comments: 1 day PO              Comments    Pt here RE pain, FBS, dryness, itchy, and redness.  Pt had CE IOL and glaucoma tube combo Sx yesterday.  Shield stayed on all, pt removed shield at 9-10 am after calling here to see if she could take it off.  Pt has treated with AT's, no post op drops.    AKILA Maurice May 7, 2024 2:45 PM

## 2024-05-07 NOTE — PROGRESS NOTES
HPI       Post Op (Ophthalmology) Right Eye              Laterality: right eye    Onset: sudden    Onset: 1 day ago    Associated symptoms: dryness, eye pain, redness, photophobia, itching and foreign body sensation.  Negative for tearing and discharge    Treatments tried: artificial tears    Pain scale: 7/10    Comments: 1 day PO              Comments    Pt here RE pain, FBS, dryness, itchy, and redness.  Pt had CE IOL and glaucoma tube combo Sx yesterday.  Shield stayed on all, pt removed shield at 9-10 am after calling here to see if she could take it off.  Pt has treated with AT's, no post op drops.    AKILA Maurice May 7, 2024 2:45 PM                Last edited by Nicholas Montgomery COT on 5/7/2024  2:45 PM.          Review of systems for the eyes was negative other than the pertinent positives/negatives listed in the HPI.      Assessment & Plan      Mely Rashid is a 78 year old female with the following diagnoses:   1. Postoperative eye state       Here s/p ceiol/xen right eye Postoperative day 1   Doing well today   VA 20/25, IOP 4 on applanation  Anterior chamber formed, good bleb without leak    Start moxifloxacin three times a day right eye   Start prednisolone four times a day right eye   Start ketorolac four times a day  right eye     Continue Cosopt (blue top) twice a day right eye     Switch latanoprost (teal top) at bedtime to the LEFT EYE ONLY  Stop brimonidine (purple top)    Keep patch in place at night for 2 weeks  Post-operative do's and don'ts reviewed, questions answered  Return precautions reviewed         Patient disposition:   Return in about 1 week (around 5/14/2024) for VT only.         Lily Wilder MD  Resident Physician, PGY-4  Department of Ophthalmology     Attending Physician Attestation:  Complete documentation of historical and exam elements from today's encounter can be found in the full encounter summary report (not reduplicated in this progress note).  I personally  obtained the chief complaint(s) and history of present illness.  I confirmed and edited as necessary the review of systems, past medical/surgical history, family history, social history, and examination findings as documented by others; and I examined the patient myself.  I personally reviewed the relevant tests, images, and reports as documented above.  I formulated and edited as necessary the assessment and plan and discussed the findings and management plan with the patient and family. . - Edwin Navarro MD

## 2024-05-07 NOTE — TELEPHONE ENCOUNTER
Nurse Triage SBAR    Is this a 2nd Level Triage? YES, LICENSED PRACTITIONER REVIEW IS REQUIRED    Situation: POSTOP day 1 Dr Navarro  RIGHT eye:   1. Cataract extraction with intraocular lens implant right eye   2. Xen Gel Implantation right eye     - right eye feels really scratchy and dry and she would like to put in drops, she has Systane preservative free but wants to make sure that is Okay to use.  -Has not started ( told she would start at appt today @2:30 PM)  -moxifloxacin TID   -Ketorolac QID  Prednisolone QID  -Written instructions leave gauze patch on  He verbally said to take off gauze, and she has done so,  And used  cup at night.    Background:   1. Chronic open angle glaucoma of right eye, severe stage    2. Combined form of age-related cataract, both eyes        Assessment:    She would like to use Systane gtts, if that is okay?    Protocol Recommended Disposition: review with team      Recommendation:  call patient    Routed to provider    Does the patient meet one of the following criteria for ADS visit consideration? No    Reason for Disposition    Caller has URGENT question and triager unable to answer question    Protocols used: Post-Op Symptoms and [id number]

## 2024-05-07 NOTE — PATIENT INSTRUCTIONS
Start moxifloxacin three times a day right eye     Start prednisolone four times a day right eye     Start ketorolac four times a day  right eye     Continue Cosopt (blue top) twice a day right eye     Switch latanoprost (teal top) at bedtime to the LEFT EYE ONLY    Stop brimonidine (purple top)

## 2024-05-16 ENCOUNTER — OFFICE VISIT (OUTPATIENT)
Dept: OPHTHALMOLOGY | Facility: CLINIC | Age: 79
End: 2024-05-16
Attending: OPHTHALMOLOGY
Payer: COMMERCIAL

## 2024-05-16 DIAGNOSIS — Z98.890 POSTOPERATIVE EYE STATE: Primary | ICD-10-CM

## 2024-05-16 PROCEDURE — 99024 POSTOP FOLLOW-UP VISIT: CPT | Performed by: OPHTHALMOLOGY

## 2024-05-16 PROCEDURE — G0463 HOSPITAL OUTPT CLINIC VISIT: HCPCS | Performed by: OPHTHALMOLOGY

## 2024-05-16 ASSESSMENT — TONOMETRY
OS_IOP_MMHG: 15
IOP_METHOD: TONOPEN
OD_IOP_MMHG: 08

## 2024-05-16 ASSESSMENT — VISUAL ACUITY
OD_SC+: -2
OD_SC: 20/20
OS_CC: 20/25
METHOD: SNELLEN - LINEAR
CORRECTION_TYPE: GLASSES
OS_CC+: -1

## 2024-05-16 ASSESSMENT — EXTERNAL EXAM - RIGHT EYE: OD_EXAM: NORMAL

## 2024-05-16 ASSESSMENT — EXTERNAL EXAM - LEFT EYE: OS_EXAM: NORMAL

## 2024-05-16 NOTE — NURSING NOTE
Chief Complaints and History of Present Illnesses   Patient presents with    Post Op (Ophthalmology) Right Eye     s/p ceiol/xen right eye Postoperative Week  1   Doing good ,feel gritty today   Moxifloxacin tid RE  Cosopt bid RE  Prednisolone qid RE  Latanoprost every day AMPARO PERRY 1:34 PM May 16, 2024          Chief Complaint(s) and History of Present Illness(es)       Post Op (Ophthalmology) Right Eye              Laterality: right eye    Associated symptoms: foreign body sensation.  Negative for eye pain, flashes and floaters    Treatments tried: eye drops    Comments: s/p ceiol/xen right eye Postoperative Week  1   Doing good ,feel gritty today   Moxifloxacin tid RE  Cosopt bid RE  Prednisolone qid RE  Latanoprost every day AMPARO PERRY 1:34 PM May 16, 2024

## 2024-05-16 NOTE — PROGRESS NOTES
HPI       Post Op (Ophthalmology) Right Eye    In right eye.  Associated symptoms include foreign body sensation.  Negative for eye pain, flashes and floaters.  Treatments tried include eye drops. Additional comments: s/p ceiol/xen right eye Postoperative Week  1   Doing good ,feel gritty today   Moxifloxacin tid RE  Cosopt bid RE  Prednisolone qid RE  Latanoprost every day AMPARO Mccray COA 1:34 PM May 16, 2024               Last edited by Alanis Mccray on 5/16/2024  1:35 PM.          Review of systems for the eyes was negative other than the pertinent positives/negatives listed in the HPI.      Assessment & Plan      Mely Rashid is a 78 year old female with the following diagnoses:   1. Postoperative eye state - Right Eye           Here s/p ceiol/xen right eye Postoperative week 1  Doing well today   VA improved at 20/20, IOP 8 on applanation  Anterior chamber formed, good bleb without leak     Stop moxifloxacin   Stop Cosopt    Continue prednisolone four times a day right eye   Continue ketorolac four times a day  right eye     Resume latanoprost (teal top) at bedtime Both eyes      Post-operative do's and don'ts reviewed, questions answered  Return precautions reviewed     Patient disposition:   Return in about 2 weeks (around 5/30/2024) for VT only, Refraction.           Attending Physician Attestation:  Complete documentation of historical and exam elements from today's encounter can be found in the full encounter summary report (not reduplicated in this progress note).  I personally obtained the chief complaint(s) and history of present illness.  I confirmed and edited as necessary the review of systems, past medical/surgical history, family history, social history, and examination findings as documented by others; and I examined the patient myself.  I personally reviewed the relevant tests, images, and reports as documented above.  I formulated and edited as necessary the assessment and plan and  discussed the findings and management plan with the patient and family. . - Edwin Navarro MD

## 2024-05-16 NOTE — PATIENT INSTRUCTIONS
Stop moxifloxacin three times a day right eye     Stop Cosopt (blue top) twice a day right eye     Continue prednisolone four times a day right eye     Continue ketorolac four times a day  right eye     Resume latanoprost (teal top) at bedtime in BOTH EYES

## 2024-05-20 ENCOUNTER — MYC MEDICAL ADVICE (OUTPATIENT)
Dept: OPHTHALMOLOGY | Facility: CLINIC | Age: 79
End: 2024-05-20
Payer: COMMERCIAL

## 2024-05-21 NOTE — TELEPHONE ENCOUNTER
Would you be able to help with this.  I think she would like to  at pharmacy near her home.  Thank you

## 2024-05-22 NOTE — TELEPHONE ENCOUNTER
Pred fany ready for pickup at [facility] pharmacy.  ready x 2 days. ketorolac last filled 5/6/34. Insurance wont fill til 5/24. Called pt.asked her to contact [facility] pharmacy.

## 2024-05-24 ENCOUNTER — OFFICE VISIT (OUTPATIENT)
Dept: OPHTHALMOLOGY | Facility: CLINIC | Age: 79
End: 2024-05-24
Attending: OPTOMETRIST
Payer: COMMERCIAL

## 2024-05-24 ENCOUNTER — TELEPHONE (OUTPATIENT)
Dept: OPHTHALMOLOGY | Facility: CLINIC | Age: 79
End: 2024-05-24
Payer: COMMERCIAL

## 2024-05-24 DIAGNOSIS — H25.13 AGE-RELATED NUCLEAR CATARACT OF BOTH EYES: ICD-10-CM

## 2024-05-24 DIAGNOSIS — Z98.890 POSTOPERATIVE EYE STATE: Primary | ICD-10-CM

## 2024-05-24 PROCEDURE — G0463 HOSPITAL OUTPT CLINIC VISIT: HCPCS | Performed by: OPTOMETRIST

## 2024-05-24 PROCEDURE — 99024 POSTOP FOLLOW-UP VISIT: CPT | Performed by: OPTOMETRIST

## 2024-05-24 ASSESSMENT — TONOMETRY
OD_IOP_MMHG: 11
IOP_METHOD: TONOPEN
OS_IOP_MMHG: 15

## 2024-05-24 ASSESSMENT — VISUAL ACUITY
OD_SC: 20/25
METHOD: SNELLEN - LINEAR
OS_CC: 20/30

## 2024-05-24 ASSESSMENT — EXTERNAL EXAM - LEFT EYE: OS_EXAM: NORMAL

## 2024-05-24 ASSESSMENT — EXTERNAL EXAM - RIGHT EYE: OD_EXAM: NORMAL

## 2024-05-24 NOTE — PATIENT INSTRUCTIONS
Removed broken suture which was causing an FB sensation and irritation left eye cornea at 2 oclock with forceps  Cataracts follow  Follow with Dr. Navarro  Use Systane until seen

## 2024-05-24 NOTE — NURSING NOTE
Chief Complaints and History of Present Illnesses   Patient presents with    Post Op (Ophthalmology) Right Eye     2 week post op RE  Red, scratchy and sore  feels raw started this am  Latanoprost every day BE  Prednisolone qid RE  Ketorolac qid RE  Alanis Dziuk COA 1:50 PM May 24, 2024        Chief Complaint(s) and History of Present Illness(es)       Post Op (Ophthalmology) Right Eye              Laterality: right eye    Associated symptoms: redness, itching and foreign body sensation.  Negative for flashes and floaters    Treatments tried: eye drops    Pain scale: 6/10    Comments: 2 week post op RE  Red, scratchy and sore  feels raw started this am  Latanoprost every day BE  Prednisolone qid RE  Ketorolac qid RE  Alanis Dziuk COA 1:50 PM May 24, 2024

## 2024-05-24 NOTE — TELEPHONE ENCOUNTER
[facility] Call Center    Phone Message    May a detailed message be left on voicemail: yes     Reason for Call: Symptoms or Concerns     If patient has red-flag symptoms, warm transfer to triage line    Current symptom or concern: Right eye has a solid scratchiness.     Symptoms have been present for: this morning.    Has patient previously been seen for this? No    By :     Date:     Are there any new or worsening symptoms? Will not go away.    Action Taken: Message routed to:  [facility] Ophthalmology    Travel Screening: Not Applicable

## 2024-05-24 NOTE — TELEPHONE ENCOUNTER
Spoke to pt at 1155    New scratchiness in eye noticed this AM and not improving with artificial tears.    Some redness    No acute vision changes noticed.    S/p Cataract/Xen right eye eye 5-6-2024    Pt would like exam today before long weekend.    Scheduled at 1330 with Dr. Tyler    Pt aware of date/time/location at [facility]     Fermín Hudson RN 11:59 AM 05/24/24

## 2024-05-24 NOTE — PROGRESS NOTES
Assessment & Plan       Mely Rashid is a 78 year old female with the following diagnoses:   1. Postoperative eye state - Right Eye    2. Age-related nuclear cataract of both eyes          Removed broken suture which was causing an FB sensation and irritation left eye cornea at 2 oclock with forceps  Cataracts follow  Follow with Dr. Navarro  Use Systane until seen     Patient disposition:   No follow-ups on file.          Complete documentation of historical and exam elements from today's encounter can be found in the full encounter summary report (not reduplicated in this progress note). I personally obtained the chief complaint(s) and history of present illness.  I confirmed and edited as necessary the review of systems, past medical/surgical history, family history, social history, and examination findings as documented by others; and I examined the patient myself. I personally reviewed the relevant tests, images, and reports as documented above. I formulated and edited as necessary the assessment and plan and discussed the findings and management plan with the patient and family.  Dr. Otoniel Tyler

## 2024-05-30 ENCOUNTER — OFFICE VISIT (OUTPATIENT)
Dept: OPHTHALMOLOGY | Facility: CLINIC | Age: 79
End: 2024-05-30
Attending: OPHTHALMOLOGY
Payer: COMMERCIAL

## 2024-05-30 DIAGNOSIS — H40.1113 CHRONIC OPEN ANGLE GLAUCOMA OF RIGHT EYE, SEVERE STAGE: ICD-10-CM

## 2024-05-30 DIAGNOSIS — Z98.890 POSTOPERATIVE EYE STATE: Primary | ICD-10-CM

## 2024-05-30 DIAGNOSIS — Z96.1 PSEUDOPHAKIA: ICD-10-CM

## 2024-05-30 PROCEDURE — G0463 HOSPITAL OUTPT CLINIC VISIT: HCPCS | Performed by: OPHTHALMOLOGY

## 2024-05-30 PROCEDURE — 92015 DETERMINE REFRACTIVE STATE: CPT

## 2024-05-30 PROCEDURE — 99024 POSTOP FOLLOW-UP VISIT: CPT | Performed by: OPHTHALMOLOGY

## 2024-05-30 ASSESSMENT — REFRACTION_MANIFEST
OD_ADD: +2.50
OD_CYLINDER: +0.50
OD_AXIS: 152
OD_SPHERE: -0.25

## 2024-05-30 ASSESSMENT — TONOMETRY
OD_IOP_MMHG: 13
IOP_METHOD: TONOPEN
OS_IOP_MMHG: 15

## 2024-05-30 ASSESSMENT — VISUAL ACUITY
OS_CC: 20/20
CORRECTION_TYPE: GLASSES
OD_SC: 20/20
METHOD: SNELLEN - LINEAR
OS_CC+: -1
OD_SC+: -1

## 2024-05-30 ASSESSMENT — EXTERNAL EXAM - LEFT EYE: OS_EXAM: NORMAL

## 2024-05-30 ASSESSMENT — EXTERNAL EXAM - RIGHT EYE: OD_EXAM: NORMAL

## 2024-05-30 NOTE — PROGRESS NOTES
HPI       Post Op (Ophthalmology) Right Eye    In right eye.  This started weeks ago.  Associated symptoms include Negative for eye pain, flashes, floaters and foreign body sensation.  Treatments tried include eye drops and glasses.  Pain was noted as 0/10. Additional comments: 3 week s/p CE/IOL/Xen right eye 5/6/24.     Patient states vision is good right eye in the last few weeks. Patient notes comfort is better since Friday when a stitch was removed. Patient states compliant with eye drops.              Comments    Ocular meds:   - Prednisolone QID right eye   - Ketorolac QID right eye   - Latanoprost at bedtime each eye     AKILA Carbajal 1:59 PM 05/30/2024            Last edited by Nicole Elizabeth on 5/30/2024  1:59 PM.          Review of systems for the eyes was negative other than the pertinent positives/negatives listed in the HPI.      Assessment & Plan      Mely Rashid is a 78 year old female with the following diagnoses:   1. Postoperative eye state - Right Eye    2. Chronic open angle glaucoma of right eye, severe stage    3. Pseudophakia         Here s/p ceiol/xen right eye Postoperative week 3  Suture removed by Jayson last week  IOP 13 on applanation  Anterior chamber formed, good bleb without leak     Reduce prednisolone THREE times a day right eye   Stop ketorolac four times a day  right eye   Continue latanoprost (teal top) at bedtime Both eyes      Post-operative do's and don'ts reviewed, questions answered  Return precautions reviewed       Patient disposition:   Return in about 3 weeks (around 6/20/2024) for DFE.           Attending Physician Attestation:  Complete documentation of historical and exam elements from today's encounter can be found in the full encounter summary report (not reduplicated in this progress note).  I personally obtained the chief complaint(s) and history of present illness.  I confirmed and edited as necessary the review of systems, past medical/surgical history,  family history, social history, and examination findings as documented by others; and I examined the patient myself.  I personally reviewed the relevant tests, images, and reports as documented above.  I formulated and edited as necessary the assessment and plan and discussed the findings and management plan with the patient and family. . - Edwin Navarro MD

## 2024-05-30 NOTE — NURSING NOTE
Chief Complaints and History of Present Illnesses   Patient presents with    Post Op (Ophthalmology) Right Eye     3 week s/p CE/IOL/Xen right eye 5/6/24.     Patient states vision is good right eye in the last few weeks. Patient notes comfort is better since Friday when a stitch was removed. Patient states compliant with eye drops.      Chief Complaint(s) and History of Present Illness(es)       Post Op (Ophthalmology) Right Eye              Laterality: right eye    Onset: weeks ago    Associated symptoms: Negative for eye pain, flashes, floaters and foreign body sensation    Treatments tried: eye drops and glasses    Pain scale: 0/10    Comments: 3 week s/p CE/IOL/Xen right eye 5/6/24.     Patient states vision is good right eye in the last few weeks. Patient notes comfort is better since Friday when a stitch was removed. Patient states compliant with eye drops.               Comments    Ocular meds:   - Prednisolone QID right eye   - Ketorolac QID right eye   - Latanoprost at bedtime each eye     AKILA Carbajal 1:59 PM 05/30/2024

## 2024-05-30 NOTE — PATIENT INSTRUCTIONS
REDUCE prednisolone THREE times a day right eye     Stop ketorolac     Continue latanoprost (teal top) at bedtime in BOTH EYES

## 2024-06-05 ENCOUNTER — MYC MEDICAL ADVICE (OUTPATIENT)
Dept: INTERNAL MEDICINE | Facility: CLINIC | Age: 79
End: 2024-06-05

## 2024-06-05 ENCOUNTER — LAB (OUTPATIENT)
Dept: LAB | Facility: CLINIC | Age: 79
End: 2024-06-05
Payer: COMMERCIAL

## 2024-06-05 DIAGNOSIS — R30.0 DYSURIA: ICD-10-CM

## 2024-06-05 DIAGNOSIS — R31.9 URINARY TRACT INFECTION WITH HEMATURIA, SITE UNSPECIFIED: Primary | ICD-10-CM

## 2024-06-05 DIAGNOSIS — N39.0 URINARY TRACT INFECTION WITH HEMATURIA, SITE UNSPECIFIED: Primary | ICD-10-CM

## 2024-06-05 DIAGNOSIS — N39.0 URINARY TRACT INFECTION: Primary | ICD-10-CM

## 2024-06-05 LAB
ALBUMIN UR-MCNC: 100 MG/DL
APPEARANCE UR: ABNORMAL
BACTERIA #/AREA URNS HPF: ABNORMAL /HPF
BILIRUB UR QL STRIP: NEGATIVE
CAOX CRY #/AREA URNS HPF: ABNORMAL /HPF
COLOR UR AUTO: YELLOW
GLUCOSE UR STRIP-MCNC: NEGATIVE MG/DL
HGB UR QL STRIP: ABNORMAL
KETONES UR STRIP-MCNC: NEGATIVE MG/DL
LEUKOCYTE ESTERASE UR QL STRIP: ABNORMAL
NITRATE UR QL: NEGATIVE
PH UR STRIP: 5.5 [PH] (ref 5–7)
RBC #/AREA URNS AUTO: ABNORMAL /HPF
SP GR UR STRIP: >=1.03 (ref 1–1.03)
UROBILINOGEN UR STRIP-ACNC: 0.2 E.U./DL
WBC #/AREA URNS AUTO: ABNORMAL /HPF

## 2024-06-05 PROCEDURE — 81001 URINALYSIS AUTO W/SCOPE: CPT

## 2024-06-05 PROCEDURE — 87186 SC STD MICRODIL/AGAR DIL: CPT

## 2024-06-05 PROCEDURE — 87086 URINE CULTURE/COLONY COUNT: CPT

## 2024-06-05 RX ORDER — NITROFURANTOIN 25; 75 MG/1; MG/1
100 CAPSULE ORAL EVERY 12 HOURS SCHEDULED
Status: ACTIVE | OUTPATIENT
Start: 2024-06-05 | End: 2024-06-12

## 2024-06-06 LAB — BACTERIA UR CULT: ABNORMAL

## 2024-06-06 RX ORDER — NITROFURANTOIN 25; 75 MG/1; MG/1
100 CAPSULE ORAL 2 TIMES DAILY
Qty: 14 CAPSULE | Refills: 0 | Status: SHIPPED | OUTPATIENT
Start: 2024-06-06 | End: 2024-06-13

## 2024-06-06 NOTE — TELEPHONE ENCOUNTER
Patient calling stating send the prescription to [facility] pharmacy. She needs it right away. Please call her when prescription has been sent.

## 2024-06-06 NOTE — TELEPHONE ENCOUNTER
Called and notify patient Rx sent to [facility] pharmacy,    Julian Faye CMA (DEMETRIA) at 12:43 PM on 6/6/2024

## 2024-06-08 ENCOUNTER — HEALTH MAINTENANCE LETTER (OUTPATIENT)
Age: 79
End: 2024-06-08

## 2024-06-11 DIAGNOSIS — N39.0 URINARY TRACT INFECTION WITHOUT HEMATURIA, SITE UNSPECIFIED: Primary | ICD-10-CM

## 2024-06-11 RX ORDER — LEVOFLOXACIN 250 MG/1
250 TABLET, FILM COATED ORAL DAILY
Qty: 5 TABLET | Refills: 0 | Status: SHIPPED | OUTPATIENT
Start: 2024-06-11 | End: 2024-08-22

## 2024-06-20 ENCOUNTER — OFFICE VISIT (OUTPATIENT)
Dept: OPHTHALMOLOGY | Facility: CLINIC | Age: 79
End: 2024-06-20
Attending: OPHTHALMOLOGY
Payer: COMMERCIAL

## 2024-06-20 DIAGNOSIS — Z98.890 POSTOPERATIVE EYE STATE: Primary | ICD-10-CM

## 2024-06-20 DIAGNOSIS — H40.1113 CHRONIC OPEN ANGLE GLAUCOMA OF RIGHT EYE, SEVERE STAGE: ICD-10-CM

## 2024-06-20 DIAGNOSIS — Z96.1 PSEUDOPHAKIA: ICD-10-CM

## 2024-06-20 PROCEDURE — G0463 HOSPITAL OUTPT CLINIC VISIT: HCPCS | Performed by: OPHTHALMOLOGY

## 2024-06-20 PROCEDURE — 99024 POSTOP FOLLOW-UP VISIT: CPT | Performed by: OPHTHALMOLOGY

## 2024-06-20 ASSESSMENT — TONOMETRY
OS_IOP_MMHG: 16
IOP_METHOD: APPLANATION
OD_IOP_MMHG: 13
OD_IOP_MMHG: 13
OS_IOP_MMHG: 16

## 2024-06-20 ASSESSMENT — CONF VISUAL FIELD
OD_SUPERIOR_NASAL_RESTRICTION: 0
METHOD: COUNTING FINGERS
OS_INFERIOR_NASAL_RESTRICTION: 0
OS_NORMAL: 1
OS_SUPERIOR_NASAL_RESTRICTION: 0
OD_NORMAL: 1
OD_INFERIOR_TEMPORAL_RESTRICTION: 0
OS_INFERIOR_TEMPORAL_RESTRICTION: 0
OD_INFERIOR_NASAL_RESTRICTION: 0
OS_SUPERIOR_TEMPORAL_RESTRICTION: 0
OD_SUPERIOR_TEMPORAL_RESTRICTION: 0

## 2024-06-20 ASSESSMENT — EXTERNAL EXAM - RIGHT EYE: OD_EXAM: NORMAL

## 2024-06-20 ASSESSMENT — VISUAL ACUITY
OS_CC+: -1
METHOD: SNELLEN - LINEAR
OS_CC: 20/25
OD_SC+: -2
OD_SC: 20/25

## 2024-06-20 ASSESSMENT — EXTERNAL EXAM - LEFT EYE: OS_EXAM: NORMAL

## 2024-06-20 NOTE — PROGRESS NOTES
"HPI       Post Op (Ophthalmology) Right Eye    In right eye.  Associated symptoms include dryness (some).  Negative for eye pain, tearing, flashes, floaters, itching and burning.  Pain was noted as 0/10.             Comments    Pat is here post right cataract surgery with IOL CARSON implant (5-6-24) for a dilated exam. History of glaucoma of right eye. Today Pat states right eye seems to be doing well, but she sees \"prisms\" temporally when she adds gtts. Right eye feels \"heavy/thick?\" Just different from left eye.    Derian Perkins COT 10:57 AM June 20, 2024             Last edited by Derian Perkins on 6/20/2024 10:57 AM.          Review of systems for the eyes was negative other than the pertinent positives/negatives listed in the HPI.      Assessment & Plan      Mely Rashid is a 78 year old female with the following diagnoses:   1. Postoperative eye state - Right Eye    2. Chronic open angle glaucoma of right eye, severe stage    3. Pseudophakia - Right Eye         s/p ceiol/xen right eye 5/6/2024  IOP 13 on applanation  Doing well  Suture removed today      Reduce prednisolone TWO times a day right eye   Continue latanoprost (teal top) at bedtime Both eyes   Increase artificial tears to twice a day and as needed        Patient disposition:   Return in about 3 weeks (around 7/11/2024) for VT only, Refraction.           Attending Physician Attestation:  Complete documentation of historical and exam elements from today's encounter can be found in the full encounter summary report (not reduplicated in this progress note).  I personally obtained the chief complaint(s) and history of present illness.  I confirmed and edited as necessary the review of systems, past medical/surgical history, family history, social history, and examination findings as documented by others; and I examined the patient myself.  I personally reviewed the relevant tests, images, and reports as documented above.  I formulated and edited as " necessary the assessment and plan and discussed the findings and management plan with the patient and family. . - Edwin Navarro MD

## 2024-06-20 NOTE — NURSING NOTE
"Chief Complaints and History of Present Illnesses   Patient presents with    Post Op (Ophthalmology) Right Eye     Chief Complaint(s) and History of Present Illness(es)       Post Op (Ophthalmology) Right Eye              Laterality: right eye    Associated symptoms: dryness (some).  Negative for eye pain, tearing, flashes, floaters, itching and burning    Pain scale: 0/10              Comments    Pat is here post right cataract surgery with IOL CARSON implant (5-6-24) for a dilated exam. History of glaucoma of right eye. Today Pat states right eye seems to be doing well, but she sees \"prisms\" temporally when she adds gtts. Right eye feels \"heavy/thick?\" Just different from left eye.    Derian Perkins COT 10:57 AM June 20, 2024                      "

## 2024-06-24 ENCOUNTER — MYC MEDICAL ADVICE (OUTPATIENT)
Dept: INTERNAL MEDICINE | Facility: CLINIC | Age: 79
End: 2024-06-24
Payer: COMMERCIAL

## 2024-06-25 ENCOUNTER — VIRTUAL VISIT (OUTPATIENT)
Dept: INTERNAL MEDICINE | Facility: CLINIC | Age: 79
End: 2024-06-25
Payer: COMMERCIAL

## 2024-06-25 DIAGNOSIS — U07.1 INFECTION DUE TO 2019 NOVEL CORONAVIRUS: Primary | ICD-10-CM

## 2024-06-25 PROCEDURE — 99214 OFFICE O/P EST MOD 30 MIN: CPT | Mod: 95 | Performed by: INTERNAL MEDICINE

## 2024-07-17 DIAGNOSIS — Z79.899 ENCOUNTER FOR LONG-TERM (CURRENT) USE OF HIGH-RISK MEDICATION: Primary | ICD-10-CM

## 2024-07-18 ENCOUNTER — OFFICE VISIT (OUTPATIENT)
Dept: OPHTHALMOLOGY | Facility: CLINIC | Age: 79
End: 2024-07-18
Attending: OPHTHALMOLOGY
Payer: COMMERCIAL

## 2024-07-18 DIAGNOSIS — H40.1113 CHRONIC OPEN ANGLE GLAUCOMA OF RIGHT EYE, SEVERE STAGE: ICD-10-CM

## 2024-07-18 DIAGNOSIS — Z96.1 PSEUDOPHAKIA: ICD-10-CM

## 2024-07-18 DIAGNOSIS — Z98.890 POSTOPERATIVE EYE STATE: Primary | ICD-10-CM

## 2024-07-18 PROCEDURE — G0463 HOSPITAL OUTPT CLINIC VISIT: HCPCS | Performed by: OPHTHALMOLOGY

## 2024-07-18 PROCEDURE — 99024 POSTOP FOLLOW-UP VISIT: CPT | Performed by: OPHTHALMOLOGY

## 2024-07-18 ASSESSMENT — REFRACTION_MANIFEST
OS_ADD: +2.50
OS_CYLINDER: +0.50
OD_SPHERE: -0.50
OD_CYLINDER: +0.50
OS_AXIS: 175
OD_AXIS: 135
OD_ADD: +2.50
OS_SPHERE: -0.50

## 2024-07-18 ASSESSMENT — VISUAL ACUITY
OS_SC+: -1
OD_SC: 20/25
OD_SC+: -2
METHOD: SNELLEN - LINEAR
METHOD_MR_RETINOSCOPY: 1
OS_SC: 20/40

## 2024-07-18 ASSESSMENT — TONOMETRY
OD_IOP_MMHG: 14
IOP_METHOD: APPLANATION
OS_IOP_MMHG: 20

## 2024-07-18 ASSESSMENT — EXTERNAL EXAM - LEFT EYE: OS_EXAM: NORMAL

## 2024-07-18 ASSESSMENT — EXTERNAL EXAM - RIGHT EYE: OD_EXAM: NORMAL

## 2024-07-18 NOTE — PROGRESS NOTES
HPI       Follow Up    In both eyes.  Since onset it is stable.  Associated symptoms include Negative for eye pain, flashes and floaters.  Treatments tried include eye drops.             Comments    Here for follow up. VA is about the same. No eye pain. No flashes or floaters. Compliant with latanoprost and prednisolone.    Ronald Messina COT 10:32 AM July 18, 2024             Last edited by Ronald Messina on 7/18/2024 10:32 AM.          Review of systems for the eyes was negative other than the pertinent positives/negatives listed in the HPI.      Assessment & Plan      Mely Rashid is a 79 year old female with the following diagnoses:   1. Postoperative eye state - Right Eye    2. Chronic open angle glaucoma of right eye, severe stage    3. Pseudophakia - Right Eye        s/p ceiol/xen right eye 5/6/2024  IOP 13 on applanation  Doing well, missed appt last week due to recovery from COVID  Currently on prednisolone TWO times a day right eye   Using latanoprost (teal top) at bedtime Both eyes     Reduce prednisolone to daily now in the right eye  Other drops as before    Patient disposition:   Return in about 4 weeks (around 8/15/2024) for VT only, OCT NFL.           Attending Physician Attestation:  Complete documentation of historical and exam elements from today's encounter can be found in the full encounter summary report (not reduplicated in this progress note).  I personally obtained the chief complaint(s) and history of present illness.  I confirmed and edited as necessary the review of systems, past medical/surgical history, family history, social history, and examination findings as documented by others; and I examined the patient myself.  I personally reviewed the relevant tests, images, and reports as documented above.  I formulated and edited as necessary the assessment and plan and discussed the findings and management plan with the patient and family. . - Edwin Navarro MD

## 2024-07-25 ENCOUNTER — LAB (OUTPATIENT)
Dept: LAB | Facility: CLINIC | Age: 79
End: 2024-07-25
Payer: COMMERCIAL

## 2024-07-25 ENCOUNTER — MYC MEDICAL ADVICE (OUTPATIENT)
Dept: INTERNAL MEDICINE | Facility: CLINIC | Age: 79
End: 2024-07-25

## 2024-07-25 DIAGNOSIS — R30.0 DYSURIA: ICD-10-CM

## 2024-07-25 DIAGNOSIS — R30.0 DYSURIA: Primary | ICD-10-CM

## 2024-07-25 LAB
ALBUMIN UR-MCNC: 100 MG/DL
APPEARANCE UR: ABNORMAL
BACTERIA #/AREA URNS HPF: ABNORMAL /HPF
BILIRUB UR QL STRIP: NEGATIVE
COLOR UR AUTO: YELLOW
GLUCOSE UR STRIP-MCNC: NEGATIVE MG/DL
HGB UR QL STRIP: ABNORMAL
KETONES UR STRIP-MCNC: NEGATIVE MG/DL
LEUKOCYTE ESTERASE UR QL STRIP: ABNORMAL
NITRATE UR QL: NEGATIVE
PH UR STRIP: 6 [PH] (ref 5–7)
RBC #/AREA URNS AUTO: ABNORMAL /HPF
SP GR UR STRIP: >=1.03 (ref 1–1.03)
SQUAMOUS #/AREA URNS AUTO: ABNORMAL /LPF
UROBILINOGEN UR STRIP-ACNC: 0.2 E.U./DL
WBC #/AREA URNS AUTO: >100 /HPF
WBC CLUMPS #/AREA URNS HPF: PRESENT /HPF

## 2024-07-25 PROCEDURE — 81001 URINALYSIS AUTO W/SCOPE: CPT

## 2024-07-25 PROCEDURE — 87186 SC STD MICRODIL/AGAR DIL: CPT

## 2024-07-25 PROCEDURE — 87086 URINE CULTURE/COLONY COUNT: CPT

## 2024-07-26 RX ORDER — LEVOFLOXACIN 250 MG/1
250 TABLET, FILM COATED ORAL DAILY
Qty: 5 TABLET | Refills: 0 | Status: SHIPPED | OUTPATIENT
Start: 2024-07-26 | End: 2024-08-22

## 2024-07-27 LAB — BACTERIA UR CULT: ABNORMAL

## 2024-07-30 ENCOUNTER — OFFICE VISIT (OUTPATIENT)
Dept: OPHTHALMOLOGY | Facility: CLINIC | Age: 79
End: 2024-07-30
Attending: OPHTHALMOLOGY
Payer: COMMERCIAL

## 2024-07-30 DIAGNOSIS — Z79.899 ENCOUNTER FOR LONG-TERM (CURRENT) USE OF HIGH-RISK MEDICATION: ICD-10-CM

## 2024-07-30 PROCEDURE — 92250 FUNDUS PHOTOGRAPHY W/I&R: CPT | Mod: 59 | Performed by: OPHTHALMOLOGY

## 2024-07-30 PROCEDURE — 99214 OFFICE O/P EST MOD 30 MIN: CPT | Mod: 24 | Performed by: OPHTHALMOLOGY

## 2024-07-30 PROCEDURE — 99207 FUNDUS AUTOFLUORESCENCE IMAGE (FAF) OU (BOTH EYES): CPT | Mod: 26 | Performed by: OPHTHALMOLOGY

## 2024-07-30 PROCEDURE — G0463 HOSPITAL OUTPT CLINIC VISIT: HCPCS | Performed by: OPHTHALMOLOGY

## 2024-07-30 PROCEDURE — 92134 CPTRZ OPH DX IMG PST SGM RTA: CPT | Performed by: OPHTHALMOLOGY

## 2024-07-30 PROCEDURE — 92082 INTERMEDIATE VISUAL FIELD XM: CPT | Performed by: OPHTHALMOLOGY

## 2024-07-30 ASSESSMENT — CUP TO DISC RATIO
OD_RATIO: 0.85
OS_RATIO: 0.4

## 2024-07-30 ASSESSMENT — REFRACTION_WEARINGRX
OS_SPHERE: +0.25
OD_CYLINDER: SPHERE
OD_SPHERE: +0.50
OS_AXIS: 175
OD_ADD: +2.25
OS_CYLINDER: +0.75
SPECS_TYPE: PAL
OS_ADD: +2.25

## 2024-07-30 ASSESSMENT — VISUAL ACUITY
OD_SC: 20/25
OS_CC+: -3
METHOD: SNELLEN - LINEAR
OS_CC: 20/25
OD_SC+: +2

## 2024-07-30 ASSESSMENT — EXTERNAL EXAM - LEFT EYE: OS_EXAM: NORMAL

## 2024-07-30 ASSESSMENT — TONOMETRY
IOP_METHOD: TONOPEN
OS_IOP_MMHG: 12
OD_IOP_MMHG: 9

## 2024-07-30 ASSESSMENT — EXTERNAL EXAM - RIGHT EYE: OD_EXAM: NORMAL

## 2024-07-30 ASSESSMENT — SLIT LAMP EXAM - LIDS: COMMENTS: BLEPHARITIS/MGD

## 2024-07-30 NOTE — NURSING NOTE
"Chief Complaints and History of Present Illnesses   Patient presents with    Follow Up     9m follow up - Encounter for long-term (current) use of high-risk medication     Chief Complaint(s) and History of Present Illness(es)       Follow Up              Comments: 9m follow up - Encounter for long-term (current) use of high-risk medication              Comments    Pt is here for follow up on plaquenil. Pt has been taking 200mg daily for the last 9-10 years. Pt has no major concerns regarding medication. Pt is experiencing a \"prism\" in right eye that is new since surgery. Denies floaters, or eye pain.     Treatment:   AREDS BID   AT both eyes prn   Prednisolone qday right eye   Latanoprost (teal top) at bedtime Both eyes    Tammi Davis 12:07 PM  July 30, 2024                        "

## 2024-07-30 NOTE — PROGRESS NOTES
"CC -   plaquenil    INTERVAL HISTORY -   Last visit with me 10/2023, had CE/IOL +XenGel stent 5/2024 OD happy with vision      PMH-   Mely Rashid is a 79 year old  patient presenting for plaquenil.    400 mg/day since 2011-8/2016, then 200/day since (brief period of 300/day)  height 5'6\", weigth 145#., no renal disease, no tamoxifen  H/o dermatomyositis, on plaquenil and cellcept.  Sees Isaiah.    Retired surgery scheduler for ENT @ [employer]        PAST OCULAR HISTORY  CE/IOL + XenGel OD 5/2024 (Melanie)      RETINAL IMAGING  OCT  07/30/2024  OD-  Mild diffuse outer changes c/w AMD, ?PVD stable   OS -  Mild diffuse outer changes, c/w AMD ?PVD stable     AF  07/30/2024  OU - mild irregular, stable    OVF 10-2   07/30/2024  OD - reliable, large SN defect & scattered, stable  OS - reliable, few spots, stable    mfERG 5/2023  OU - normal      ASSESSMENT & PLAN    #.  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  Glaucoma OU   - on xalatan and timolol, Xengel OD   - has right APD likely d/t glaucoma   - sees Dr. Navarro Q6mo      #  NS OS   - likely VS    # PCO OD    notes small blur, will see Melanie      # Mild dry AMD OU   - no smoking (quit age 32)   - started AREDS    # TYLER    - artificial tears d/w patient 7/2024    Return in about 9 months (around 4/30/2025) for DFE OU, OCT OU, FAF OU, OVF 10-2 OU - Plaquenil protocol same as 10/2023.       ATTESTATION     Attending Physician Attestation:      Complete " documentation of historical and exam elements from today's encounter can be found in the full encounter summary report (not reduplicated in this progress note).  I personally obtained the chief complaint(s) and history of present illness.  I confirmed and edited as necessary the review of systems, past medical/surgical history, family history, social history, and examination findings as documented by others; and I examined the patient myself.  I personally reviewed the relevant tests, images, and reports as documented above.  I formulated and edited as necessary the assessment and plan and discussed the findings and management plan with the patient and family    Shakira Chamberlain MD, PhD  , Vitreoretinal Surgery  Department of Ophthalmology  [facility]

## 2024-08-09 ENCOUNTER — OFFICE VISIT (OUTPATIENT)
Dept: DERMATOLOGY | Facility: CLINIC | Age: 79
End: 2024-08-09
Payer: COMMERCIAL

## 2024-08-09 DIAGNOSIS — M33.13 DERMATOMYOSITIS (H): Primary | ICD-10-CM

## 2024-08-09 DIAGNOSIS — L82.1 SEBORRHEIC KERATOSIS: ICD-10-CM

## 2024-08-09 DIAGNOSIS — D84.9 IMMUNOSUPPRESSION (H): ICD-10-CM

## 2024-08-09 PROCEDURE — 99214 OFFICE O/P EST MOD 30 MIN: CPT | Performed by: DERMATOLOGY

## 2024-08-09 ASSESSMENT — PAIN SCALES - GENERAL: PAINLEVEL: NO PAIN (0)

## 2024-08-09 NOTE — LETTER
8/9/2024       RE: Mely Rashid  [address]   [place] [id number]     Dear Colleague,    Thank you for referring your patient, Mely Rashid, to the [facility]ERMAT[facility] [place] at [facility]. Please see a copy of my visit note below.    [facility] Dermatology Note    Encounter Date: Aug 9, 2024    Dermatology Problem List:  1. 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  2. History of IgA deficiency  3. History of BCC on back s/p excision (2016)  4. Tinea pedis w/ onychomycosis  - KOH (8/7/23) negative.  - Current tx: terbinafine cream BID for 3-4 weeks (as needed for flares), ciclopirox lacquer to affected toenails daily    ______________________________________    Impression/Plan:  1. Dermatomyositis, well controlled on current regimen with minimal skin and no active muscle or pulmonary disease  - Discussed risks/benefits of reducing dose of mycophenolate, patient defers at this time  - Patient will send [employer] message if she decides to reduce dose   - Continue current regimen of mycophenolate 1000 mg BID, hydroxychloroquine 200 mg daily, topicals (betamethasone cream to hands twice daily, hydrocortisone to face PRN)  - Check PFTs, CBC, CMP, CRP, CK    2. Reassurance for benign lesions not treated today including seborrheic keratoses.     Follow-up in 1 year.       Staff Involved:  Staff and Scribe    I, Katie Novoa, am serving as a scribe; to document services  personally performed by Shekhar Garcias MD -based on data collection and the provider's statements to me.    Provider Disclosure:   The documentation recorded by the scribe accurately reflects the services I personally performed and the decisions made by me.    Shekhar Garcias MD   of Dermatology  Department of Dermatology  [employer] [employer]      CC:   Chief Complaint   Patient presents with     Derm Problem     No areas of concern       History of Present Illness:  Ms. Mely Rashid is a 79 year old female who presents as a return patient here for a dermatomyositis follow up. Patient reports no active rashes or muscle issues. Patient was having breathing issues while recovering from COVID and was thinking about getting PFTs. She is on Plaquenil 200 mg daily and mycophenolate 1000 mg BID.     Patient is otherwise feeling well, with no additional skin concerns.     Labs:  2/2024 CBC, CMP, CRP, CK reviewed    Physical exam:  Vitals: There were no vitals taken for this visit.  GEN: This is a well developed, well-nourished female in no acute distress, in a pleasant mood.    SKIN: Pryor phototype II  - Focused examination of the face, scalp, neck, upper chest, upper back, arms, and hands was performed.  - Faint periocular erythema.  - Faint erythema on the knuckles.   - No other lesions of concern on areas examined.     Past Medical History:   Past Medical History:   Diagnosis Date     Arthritis ??    probably     Basal cell carcinoma      Dermatomyositis (H)      Glaucoma      IgA deficiency (H)      Nonsenile cataract      Past Surgical History:   Procedure Laterality Date     BIOPSY OF SKIN LESION       COLONOSCOPY       [facility] UGI ENDOSCOPY W EUS N/A 05/14/2014    Procedure: COMBINED ENDOSCOPIC ULTRASOUND, ESOPHAGOSCOPY, GASTROSCOPY, DUODENOSCOPY (EGD);  Surgeon: Boston Rodriguez MD;  Location: [facility]     HYSTERECTOMY      partial, prolapse uterus.      PHACOEMULSIFICATION WITH STANDARD INTRAOCULAR LENS IMPLANT Right 5/6/2024    Procedure: RIGHT EYE PHACOEMULSIFICATION, CATARACT, WITH STANDARD INTRAOCULAR LENS IMPLANT INSERTION;  Surgeon: Edwin Nvaarro MD;  Location: [facility] OR     TRABECULAR MICRO-BYPASS WITH XEN GEL STENT Right 5/6/2024    Procedure: TRABECULAR MICRO-BYPASS WITH XEN GEL STENT;  Surgeon: Edwin Navarro MD;  Location: [facility] OR       Social History:   reports that she quit smoking about 45 years ago. Her smoking use included cigarettes. She started smoking about 55 years ago. She has a 10 pack-year smoking history. She has never used smokeless tobacco. She reports that she does not currently use alcohol. She reports that she does not use drugs.    Family History:  Family History   Problem Relation Age of Onset     Alzheimer Disease Mother         onset in 60s     C.A.D. Father      Arthritis Father      Breast Cancer Sister 35     Glaucoma Sister      Neurologic Disorder Sister         seizures     Cancer Sister         breast     Diabetes Brother      Glaucoma Brother      Psoriasis Daughter      Mental Illness Daughter      Rheumatologic Disease Daughter         Dermatomyositis     Hypertension No family hx of      Cerebrovascular Disease No family hx of         ,     Thyroid Disease No family hx of      Skin Cancer No family hx of      Macular Degeneration No family hx of        Medications:  Current Outpatient Medications   Medication Sig Dispense Refill     B Complex TABS Take 1 tablet by mouth daily.       Calcium-Magnesium-Vitamin D 600- MG-MG-UNIT TB24 Take 1 tablet by mouth 2 times daily       Collagen Hydrolysate POWD Take by mouth daily       glucosamine-chondroitin 500-400 MG CAPS per capsule Take 2 capsules by mouth daily.       hydroxychloroquine (PLAQUENIL) 200 MG tablet Take 1 tablet (200 mg) by mouth daily Next eye exam due 1/31/24. 60 tablet 1     ibuprofen (ADVIL/MOTRIN) 200 MG tablet Take 200 mg by mouth every 8  hours as needed for inflammatory pain       latanoprost (XALATAN) 0.005 % ophthalmic solution Place 1 drop into both eyes at bedtime 7.5 mL 2     Multiple Vitamins-Minerals (MULTIPLE VITAMINS/WOMENS PO)        Multiple Vitamins-Minerals (PRESERVISION AREDS 2+MULTI VIT PO) Take 1 tablet by mouth daily       mycophenolate (GENERIC EQUIVALENT) 500 MG tablet Take 2 tablets (1,000 mg) by mouth 2 times daily 360 tablet 1     prednisoLONE acetate (PRED FORTE) 1 % ophthalmic suspension Apply 1 drop to eye 4 times daily To operative eye (Patient taking differently: Apply 1 drop to eye 4 times daily To operative eye. Down to 1 drop daily) 10 mL 1     Propylene Glycol (SYSTANE COMPLETE OP) Apply 1 drop to eye as needed       augmented betamethasone dipropionate (DIPROLENE AF) 0.05 % external cream Apply to areas of dermatomyositis rash twice daily as needed. Do not use on face, underarms, groin. (Patient not taking: Reported on 8/9/2024) 50 g 1     levofloxacin (LEVAQUIN) 250 MG tablet Take 1 tablet (250 mg) by mouth daily (Patient not taking: Reported on 8/9/2024) 5 tablet 0     levofloxacin (LEVAQUIN) 250 MG tablet Take 1 tablet (250 mg) by mouth daily (Patient not taking: Reported on 8/9/2024) 5 tablet 0     Vitamin D3 (CHOLECALCIFEROL) 125 MCG (5000 UT) tablet Take 1 tablet by mouth daily (Patient not taking: Reported on 8/9/2024)       Allergies   Allergen Reactions     Penicillins Anaphylaxis                 Again, thank you for allowing me to participate in the care of your patient.      Sincerely,    Shekhar Garcias MD

## 2024-08-09 NOTE — PROGRESS NOTES
[facility] Dermatology Note    Encounter Date: Aug 9, 2024    Dermatology Problem List:  1. 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  2. History of IgA deficiency  3. History of BCC on back s/p excision (2016)  4. Tinea pedis w/ onychomycosis  - KOH (8/7/23) negative.  - Current tx: terbinafine cream BID for 3-4 weeks (as needed for flares), ciclopirox lacquer to affected toenails daily    ______________________________________    Impression/Plan:  1. Dermatomyositis, well controlled on current regimen with minimal skin and no active muscle or pulmonary disease  - Discussed risks/benefits of reducing dose of mycophenolate, patient defers at this time  - Patient will send [employer] message if she decides to reduce dose   - Continue current regimen of mycophenolate 1000 mg BID, hydroxychloroquine 200 mg daily, topicals (betamethasone cream to hands twice daily, hydrocortisone to face PRN)  - Check PFTs, CBC, CMP, CRP, CK    2. Reassurance for benign lesions not treated today including seborrheic keratoses.     Follow-up in 1 year.       Staff Involved:  Staff and Scribe    I, Katie Novoa, am serving as a scribe; to document services personally performed by Shekhar Garcias MD -based on data collection and the provider's statements to me.    Provider Disclosure:   The documentation recorded by the scribe accurately reflects the services I personally performed and the decisions made by me.    Shekhar Garcias MD   of  Dermatology  Department of Dermatology  [facility]      CC:   Chief Complaint   Patient presents with    Derm Problem     No areas of concern       History of Present Illness:  Ms. Mely Rashid is a 79 year old female who presents as a return patient here for a dermatomyositis follow up. Patient reports no active rashes or muscle issues. Patient was having breathing issues while recovering from COVID and was thinking about getting PFTs. She is on Plaquenil 200 mg daily and mycophenolate 1000 mg BID.     Patient is otherwise feeling well, with no additional skin concerns.     Labs:  2/2024 CBC, CMP, CRP, CK reviewed    Physical exam:  Vitals: There were no vitals taken for this visit.  GEN: This is a well developed, well-nourished female in no acute distress, in a pleasant mood.    SKIN: Pryor phototype II  - Focused examination of the face, scalp, neck, upper chest, upper back, arms, and hands was performed.  - Faint periocular erythema.  - Faint erythema on the knuckles.   - No other lesions of concern on areas examined.     Past Medical History:   Past Medical History:   Diagnosis Date    Arthritis ??    probably    Basal cell carcinoma     Dermatomyositis (H)     Glaucoma     IgA deficiency (H)     Nonsenile cataract      Past Surgical History:   Procedure Laterality Date    BIOPSY OF SKIN LESION      COLONOSCOPY      [facility] UGI ENDOSCOPY W EUS N/A 05/14/2014    Procedure: COMBINED ENDOSCOPIC ULTRASOUND, ESOPHAGOSCOPY, GASTROSCOPY, DUODENOSCOPY (EGD);  Surgeon: Boston Rodriguez MD;  Location:  GI    HYSTERECTOMY      partial, prolapse uterus.    PHACOEMULSIFICATION WITH STANDARD INTRAOCULAR LENS IMPLANT Right 5/6/2024    Procedure: RIGHT EYE PHACOEMULSIFICATION, CATARACT, WITH STANDARD INTRAOCULAR LENS IMPLANT INSERTION;  Surgeon: Edwin Navarro MD;  Location: [facility] OR    TRABECULAR MICRO-BYPASS WITH XEN GEL STENT Right 5/6/2024    Procedure: TRABECULAR  MICRO-BYPASS WITH XEN GEL STENT;  Surgeon: Edwin Navarro MD;  Location: [facility] OR       Social History:   reports that she quit smoking about 45 years ago. Her smoking use included cigarettes. She started smoking about 55 years ago. She has a 10 pack-year smoking history. She has never used smokeless tobacco. She reports that she does not currently use alcohol. She reports that she does not use drugs.    Family History:  Family History   Problem Relation Age of Onset    Alzheimer Disease Mother         onset in 60s    C.A.D. Father     Arthritis Father     Breast Cancer Sister 35    Glaucoma Sister     Neurologic Disorder Sister         seizures    Cancer Sister         breast    Diabetes Brother     Glaucoma Brother     Psoriasis Daughter     Mental Illness Daughter     Rheumatologic Disease Daughter         Dermatomyositis    Hypertension No family hx of     Cerebrovascular Disease No family hx of         ,    Thyroid Disease No family hx of     Skin Cancer No family hx of     Macular Degeneration No family hx of        Medications:  Current Outpatient Medications   Medication Sig Dispense Refill    B Complex TABS Take 1 tablet by mouth daily.      Calcium-Magnesium-Vitamin D 600- MG-MG-UNIT TB24 Take 1 tablet by mouth 2 times daily      Collagen Hydrolysate POWD Take by mouth daily      glucosamine-chondroitin 500-400 MG CAPS per capsule Take 2 capsules by mouth daily.      hydroxychloroquine (PLAQUENIL) 200 MG tablet Take 1 tablet (200 mg) by mouth daily Next eye exam due 1/31/24. 60 tablet 1    ibuprofen (ADVIL/MOTRIN) 200 MG tablet Take 200 mg by mouth every 8 hours as needed for inflammatory pain      latanoprost (XALATAN) 0.005 % ophthalmic solution Place 1 drop into both eyes at bedtime 7.5 mL 2    Multiple Vitamins-Minerals (MULTIPLE VITAMINS/WOMENS PO)       Multiple Vitamins-Minerals (PRESERVISION AREDS 2+MULTI VIT PO) Take 1 tablet by mouth daily      mycophenolate (GENERIC EQUIVALENT) 500  MG tablet Take 2 tablets (1,000 mg) by mouth 2 times daily 360 tablet 1    prednisoLONE acetate (PRED FORTE) 1 % ophthalmic suspension Apply 1 drop to eye 4 times daily To operative eye (Patient taking differently: Apply 1 drop to eye 4 times daily To operative eye. Down to 1 drop daily) 10 mL 1    Propylene Glycol (SYSTANE COMPLETE OP) Apply 1 drop to eye as needed      augmented betamethasone dipropionate (DIPROLENE AF) 0.05 % external cream Apply to areas of dermatomyositis rash twice daily as needed. Do not use on face, underarms, groin. (Patient not taking: Reported on 8/9/2024) 50 g 1    levofloxacin (LEVAQUIN) 250 MG tablet Take 1 tablet (250 mg) by mouth daily (Patient not taking: Reported on 8/9/2024) 5 tablet 0    levofloxacin (LEVAQUIN) 250 MG tablet Take 1 tablet (250 mg) by mouth daily (Patient not taking: Reported on 8/9/2024) 5 tablet 0    Vitamin D3 (CHOLECALCIFEROL) 125 MCG (5000 UT) tablet Take 1 tablet by mouth daily (Patient not taking: Reported on 8/9/2024)       Allergies   Allergen Reactions    Penicillins Anaphylaxis

## 2024-08-09 NOTE — NURSING NOTE
Dermatology Rooming Note    Mely Rashid's goals for this visit include:   Chief Complaint   Patient presents with    Derm Problem     No areas of concern     Jean Pierre Philip, EMT  Clinic Support  [facility]     [phone]    Employed by [employer]

## 2024-08-22 ENCOUNTER — OFFICE VISIT (OUTPATIENT)
Dept: OPHTHALMOLOGY | Facility: CLINIC | Age: 79
End: 2024-08-22
Attending: OPHTHALMOLOGY
Payer: COMMERCIAL

## 2024-08-22 DIAGNOSIS — H40.051 BORDERLINE GLAUCOMA OF RIGHT EYE WITH OCULAR HYPERTENSION: ICD-10-CM

## 2024-08-22 DIAGNOSIS — H40.051 BORDERLINE GLAUCOMA OF RIGHT EYE WITH OCULAR HYPERTENSION: Primary | ICD-10-CM

## 2024-08-22 PROCEDURE — 99213 OFFICE O/P EST LOW 20 MIN: CPT | Performed by: OPHTHALMOLOGY

## 2024-08-22 PROCEDURE — 92133 CPTRZD OPH DX IMG PST SGM ON: CPT | Performed by: OPHTHALMOLOGY

## 2024-08-22 PROCEDURE — G0463 HOSPITAL OUTPT CLINIC VISIT: HCPCS | Performed by: OPHTHALMOLOGY

## 2024-08-22 ASSESSMENT — VISUAL ACUITY
OS_SC: 20/25
METHOD: SNELLEN - LINEAR
OS_SC+: -2
OD_SC: 20/25
OD_SC+: -2

## 2024-08-22 ASSESSMENT — TONOMETRY
OD_IOP_MMHG: 11
OD_IOP_MMHG: 9
IOP_METHOD: APPLANATION
IOP_METHOD: TONOPEN
OS_IOP_MMHG: 11

## 2024-08-22 ASSESSMENT — REFRACTION_WEARINGRX
OS_CYLINDER: +0.75
OS_ADD: +2.25
OD_SPHERE: +0.50
SPECS_TYPE: PAL
OS_SPHERE: +0.25
OD_ADD: +2.25
OD_CYLINDER: SPHERE
OS_AXIS: 175

## 2024-08-22 ASSESSMENT — CUP TO DISC RATIO
OS_RATIO: 0.4
OD_RATIO: 0.85

## 2024-08-22 ASSESSMENT — EXTERNAL EXAM - LEFT EYE: OS_EXAM: NORMAL

## 2024-08-22 ASSESSMENT — SLIT LAMP EXAM - LIDS: COMMENTS: BLEPHARITIS/MGD

## 2024-08-22 ASSESSMENT — EXTERNAL EXAM - RIGHT EYE: OD_EXAM: NORMAL

## 2024-08-22 NOTE — PROGRESS NOTES
"HPI       Follow Up    Associated symptoms include discharge.  Negative for eye pain, redness, flashes and floaters.  Treatments tried include eye drops.  Pain was noted as 0/10. Additional comments: 5 week follow up              Comments    Pt states she has not updated glasses Rx yet.   She feels she sees a \"prism\" or light reflection right eye since CE/IOL   Denies eye pain or discomfort.   Occasionally has white discharge after taking Prednisolone.     Ocular Meds:  Prednisolone once daily right eye   Latanaprost qPM each eye   AT PRN each eye   AREDS BID     Gideon Padilla 2:56 PM August 22, 2024            Last edited by Gideon Padilla on 8/22/2024  2:56 PM.          Review of systems for the eyes was negative other than the pertinent positives/negatives listed in the HPI.      Assessment & Plan      Mely Rashid is a 79 year old female with the following diagnoses:   1. 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           Attending Physician Attestation:  Complete documentation of historical and exam elements from today's encounter can be found in the full encounter summary report (not reduplicated in this progress note).  I personally obtained the chief complaint(s) and history of present illness.  I confirmed and edited as necessary the review of systems, past medical/surgical history, family history, social history, and examination findings as documented by others; and I examined the patient myself.  I personally reviewed the relevant tests, images, and reports as documented above.  I formulated and edited as necessary the " assessment and plan and discussed the findings and management plan with the patient and family. . - Edwin Navarro MD

## 2024-08-22 NOTE — NURSING NOTE
"Chief Complaints and History of Present Illnesses   Patient presents with    Follow Up     5 week follow up      Chief Complaint(s) and History of Present Illness(es)       Follow Up              Associated symptoms: discharge.  Negative for eye pain, redness, flashes and floaters    Treatments tried: eye drops    Pain scale: 0/10    Comments: 5 week follow up               Comments    Pt states she has not updated glasses Rx yet.   She feels she sees a \"prism\" or light reflection right eye since CE/IOL   Denies eye pain or discomfort.   Occasionally has white discharge after taking Prednisolone.     Ocular Meds:  Prednisolone once daily right eye   Latanaprost qPM each eye   AT PRN each eye   AREDS BID     Gideon Ho 2:56 PM August 22, 2024                    "

## 2024-08-29 ENCOUNTER — MYC MEDICAL ADVICE (OUTPATIENT)
Dept: INTERNAL MEDICINE | Facility: CLINIC | Age: 79
End: 2024-08-29
Payer: COMMERCIAL

## 2024-09-19 ENCOUNTER — OFFICE VISIT (OUTPATIENT)
Dept: INTERNAL MEDICINE | Facility: CLINIC | Age: 79
End: 2024-09-19
Payer: COMMERCIAL

## 2024-09-19 VITALS
SYSTOLIC BLOOD PRESSURE: 124 MMHG | WEIGHT: 141.4 LBS | HEART RATE: 80 BPM | DIASTOLIC BLOOD PRESSURE: 80 MMHG | OXYGEN SATURATION: 96 % | BODY MASS INDEX: 23.53 KG/M2

## 2024-09-19 DIAGNOSIS — H61.22 IMPACTED CERUMEN OF LEFT EAR: ICD-10-CM

## 2024-09-19 DIAGNOSIS — Z23 NEED FOR TDAP VACCINATION: ICD-10-CM

## 2024-09-19 DIAGNOSIS — M79.672 LEFT FOOT PAIN: ICD-10-CM

## 2024-09-19 DIAGNOSIS — G62.9 PERIPHERAL POLYNEUROPATHY: Primary | ICD-10-CM

## 2024-09-19 NOTE — PROGRESS NOTES
"  Assessment & Plan     Peripheral polyneuropathy  - Alpha-Lipoic Acid 300 MG TABS; Take 600 mg by mouth daily.    Left foot pain  -Podiatry referral.    Impacted cerumen of left ear  - REMOVE IMPACTED CERUMEN today.    I spent a total of 30 minutes in the care of this pt during today's office visit. This time includes reviewing the patient's chart and prior history, obtaining a history, performing an examination and evaluation and counseling the patient. This time also includes ordering medications or tests necessary in addition to communication to other member's of the patient's health care team. Time spent in documentation and care coordination is included.     Gavi Tawanna ISLAS, CNP      Subjective   Pat is a 79 year old, presenting for the following health issues:  NEUROPATHY (Pt concerned about neuropathic pain in feet. Pt researched exercises online to aid with pain and it seems to be effective, but pain still present. Burning sensation after sitting after walking for a time. General aching. Tylenol seems to be effective. Hx of injury to right foot - a horse stepped on her toes about 8 years ago, pt suspects it was broken but never had it examined. ) and Nail Problem (Issue with big toe nail on left foot. )      9/19/2024     4:54 PM   Additional Questions   Roomed by jack Juliomariposa Rashid is a 79 year old female presents with bilateral foot cramping which is intermittent.  She is flat footed and was told in the past to wear hard soled shoes.  She cannot walk long distances due to pain in the feet.    She has a thickened dystrophic left first toenail which has  from the nailbed.  She took oral treatment for onychomycosis but then noted the nail separates half way up the face of the toe.  It is painful when her bedsheets rub on the dystrophic toe, or when she wears shoes.      She has also noted her left ear \"closes up\" when she lies on her left side.      History of Present Illness "       Reason for visit:  Feet hurt   She is taking medications regularly.       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  There is no height or weight on file to calculate BMI.  Physical Exam   GENERAL: alert and no distress  RESP: no distress  CV: see VS  EARS: left ear almost completely blocked with cerumen.  MS: no gross musculoskeletal defects noted, no edema  SKIN: left first toe thickened and  from the nailbed.The tissue under the nail appears dry and firm.  PSYCH: mentation appears normal, affect normal/bright            Signed Electronically by: ROSHAN Pavon CNP

## 2024-09-19 NOTE — PATIENT INSTRUCTIONS
Ask Dr. Ricci:    Foot cramping  Toenail doesn't grow out, is it a nailbed issue? Take off the entire nail?  Peripheral neuropathy.  Was told top avoid shoes with flex soles

## 2024-09-20 ENCOUNTER — PATIENT OUTREACH (OUTPATIENT)
Dept: CARE COORDINATION | Facility: CLINIC | Age: 79
End: 2024-09-20
Payer: COMMERCIAL

## 2024-10-10 ENCOUNTER — MYC MEDICAL ADVICE (OUTPATIENT)
Dept: DERMATOLOGY | Facility: CLINIC | Age: 79
End: 2024-10-10
Payer: COMMERCIAL

## 2024-10-10 DIAGNOSIS — M33.13 DERMATOMYOSITIS (H): Primary | ICD-10-CM

## 2024-10-10 RX ORDER — BETAMETHASONE DIPROPIONATE 0.5 MG/G
CREAM TOPICAL 2 TIMES DAILY
Qty: 60 G | Refills: 11 | Status: SHIPPED | OUTPATIENT
Start: 2024-10-10

## 2024-10-10 NOTE — TELEPHONE ENCOUNTER
1. Dermatomyositis, well controlled on current regimen with minimal skin and no active muscle or pulmonary disease  - Discussed risks/benefits of reducing dose of mycophenolate, patient defers at this time  - Patient will send [employer] message if she decides to reduce dose   - Continue current regimen of mycophenolate 1000 mg BID, hydroxychloroquine 200 mg daily, topicals (betamethasone cream to hands twice daily, hydrocortisone to face PRN)  - Check PFTs, CBC, CMP, CRP, CK     2. Reassurance for benign lesions not treated today including seborrheic keratoses.      Follow-up in 1 year.

## 2024-11-05 ENCOUNTER — PRE VISIT (OUTPATIENT)
Dept: ORTHOPEDICS | Facility: CLINIC | Age: 79
End: 2024-11-05

## 2024-11-05 ENCOUNTER — OFFICE VISIT (OUTPATIENT)
Dept: ORTHOPEDICS | Facility: CLINIC | Age: 79
End: 2024-11-05
Attending: NURSE PRACTITIONER
Payer: COMMERCIAL

## 2024-11-05 DIAGNOSIS — L60.3 NAIL DYSTROPHY: Primary | ICD-10-CM

## 2024-11-05 DIAGNOSIS — M79.672 LEFT FOOT PAIN: ICD-10-CM

## 2024-11-05 PROCEDURE — 99202 OFFICE O/P NEW SF 15 MIN: CPT | Performed by: PODIATRIST

## 2024-11-05 NOTE — LETTER
11/5/2024      Mely Rashid  [address]   [place] [address]      Dear Colleague,    Thank you for referring your patient, Mely Rashid, to the [facility]. Please see a copy of my visit note below.    Date of Service: 11/5/2024    Chief Complaint:   Chief Complaint   Patient presents with     Consult     Nail, left hallux. Right 5th toe injury, DOI: 3 weeks ago. Horse stepped on toe.         HPI: Mely is a 79 year old female who presents today for further evaluation of left hallux nail. She notes that the nail is growing out thick and painful. She does not recall trauma to the toe. She notes that this has been like this for years. She has tried OTC anti-fungals.     Review of Systems: No n/v/d/f/c/ns/sob/cp    PMH:   Past Medical History:   Diagnosis Date     Arthritis ??    probably     Basal cell carcinoma      Dermatomyositis (H)      Glaucoma      IgA deficiency (H)      Nonsenile cataract        PSxH:   Past Surgical History:   Procedure Laterality Date     BIOPSY OF SKIN LESION       COLONOSCOPY       [facility] UGI ENDOSCOPY W EUS N/A 05/14/2014    Procedure: COMBINED ENDOSCOPIC ULTRASOUND, ESOPHAGOSCOPY, GASTROSCOPY, DUODENOSCOPY (EGD);  Surgeon: Boston Rodriguez MD;  Location: [facility]     HYSTERECTOMY      partial, prolapse uterus.     PHACOEMULSIFICATION WITH STANDARD INTRAOCULAR LENS IMPLANT Right 5/6/2024    Procedure: RIGHT EYE PHACOEMULSIFICATION, CATARACT, WITH STANDARD INTRAOCULAR LENS IMPLANT INSERTION;  Surgeon: Edwin Navarro MD;  Location: [facility] OR     TRABECULAR MICRO-BYPASS WITH XEN GEL STENT Right 5/6/2024    Procedure: TRABECULAR MICRO-BYPASS WITH XEN GEL STENT;  Surgeon: Edwin Navarro MD;  Location: [facility] OR       Allergies: Penicillins    SH:   Social History     Socioeconomic History     Marital status: Single     Spouse name: Not on file     Number of children: Not on file     Years of education: Not on file      Highest education level: Not on file   Occupational History     Not on file   Tobacco Use     Smoking status: Former     Current packs/day: 0.00     Average packs/day: 1 pack/day for 10.0 years (10.0 ttl pk-yrs)     Types: Cigarettes     Start date: 4/10/1969     Quit date: 4/10/1979     Years since quittin.6     Smokeless tobacco: Never     Tobacco comments:     quit    Vaping Use     Vaping status: Never Used   Substance and Sexual Activity     Alcohol use: Not Currently     Comment: rare     Drug use: No     Sexual activity: Not Currently   Other Topics Concern      Service No     Blood Transfusions No     Caffeine Concern No     Occupational Exposure No     Hobby Hazards No     Sleep Concern No     Stress Concern No     Weight Concern No     Special Diet No     Back Care Not Asked     Exercise Yes     Comment: walk, horseback ride     Bike Helmet Not Asked     Seat Belt Yes     Self-Exams Not Asked     Parent/sibling w/ CABG, MI or angioplasty before 65F 55M? No   Social History Narrative         Social Drivers of Health     Financial Resource Strain: Low Risk  (2024)    Financial Resource Strain      Within the past 12 months, have you or your family members you live with been unable to get utilities (heat, electricity) when it was really needed?: No   Food Insecurity: Low Risk  (2024)    Food Insecurity      Within the past 12 months, did you worry that your food would run out before you got money to buy more?: No      Within the past 12 months, did the food you bought just not last and you didn t have money to get more?: No   Transportation Needs: Low Risk  (2024)    Transportation Needs      Within the past 12 months, has lack of transportation kept you from medical appointments, getting your medicines, non-medical meetings or appointments, work, or from getting things that you need?: No   Physical Activity: Not on file   Stress: Not on file   Social Connections: Not on  file   Interpersonal Safety: Low Risk  (2/8/2024)    Interpersonal Safety      Do you feel physically and emotionally safe where you currently live?: Yes      Within the past 12 months, have you been hit, slapped, kicked or otherwise physically hurt by someone?: No      Within the past 12 months, have you been humiliated or emotionally abused in other ways by your partner or ex-partner?: No   Housing Stability: Low Risk  (2/8/2024)    Housing Stability      Do you have housing? : Yes      Are you worried about losing your housing?: No       FH:   Family History   Problem Relation Age of Onset     Alzheimer Disease Mother         onset in 60s     C.A.D. Father      Arthritis Father      Breast Cancer Sister 35     Glaucoma Sister      Neurologic Disorder Sister         seizures     Cancer Sister         breast     Diabetes Brother      Glaucoma Brother      Psoriasis Daughter      Mental Illness Daughter      Rheumatologic Disease Daughter         Dermatomyositis     Hypertension No family hx of      Cerebrovascular Disease No family hx of         ,     Thyroid Disease No family hx of      Skin Cancer No family hx of      Macular Degeneration No family hx of        Objective:  Data Unavailable Data Unavailable Data Unavailable Data Unavailable Data Unavailable 0 lbs 0 oz    PT and DP pulses are 2/4 bilaterally. CRT is instant. Positive pedal hair.   Gross sensation is intact bilaterally.   Equinus is noted bilaterally. No pain with active or passive ROM of the ankle, MTJ, 1st ray, or halluces bilaterally,.   Nail of the left hallux is dystrophic and lytic on the distal 1/3rd of the nail. Trauma lines noted on the nail. No open lesions are noted.     Assessment:   Encounter Diagnoses   Name Primary?     Nail dystrophy Yes     Left foot pain          Plan:  - Pt seen and evaluated.  - Nail was trimmed back.  - She would like to keep the nail for now. She can use an electric nail file on it.  - Activity as tolerated.  -  See again PRN.              Again, thank you for allowing me to participate in the care of your patient.        Sincerely,        Harshad Ricci DPM

## 2024-11-05 NOTE — PROGRESS NOTES
Date of Service: 11/5/2024    Chief Complaint:   Chief Complaint   Patient presents with    Consult     Nail, left hallux. Right 5th toe injury, DOI: 3 weeks ago. Horse stepped on toe.         HPI: Mely is a 79 year old female who presents today for further evaluation of left hallux nail. She notes that the nail is growing out thick and painful. She does not recall trauma to the toe. She notes that this has been like this for years. She has tried OTC anti-fungals.     Review of Systems: No n/v/d/f/c/ns/sob/cp    PMH:   Past Medical History:   Diagnosis Date    Arthritis ??    probably    Basal cell carcinoma     Dermatomyositis (H)     Glaucoma     IgA deficiency (H)     Nonsenile cataract        PSxH:   Past Surgical History:   Procedure Laterality Date    BIOPSY OF SKIN LESION      COLONOSCOPY      [facility] UGI ENDOSCOPY W EUS N/A 05/14/2014    Procedure: COMBINED ENDOSCOPIC ULTRASOUND, ESOPHAGOSCOPY, GASTROSCOPY, DUODENOSCOPY (EGD);  Surgeon: Boston Rodriguez MD;  Location: [facility]    HYSTERECTOMY      partial, prolapse uterus.    PHACOEMULSIFICATION WITH STANDARD INTRAOCULAR LENS IMPLANT Right 5/6/2024    Procedure: RIGHT EYE PHACOEMULSIFICATION, CATARACT, WITH STANDARD INTRAOCULAR LENS IMPLANT INSERTION;  Surgeon: Edwin Navarro MD;  Location: [facility] OR    TRABECULAR MICRO-BYPASS WITH XEN GEL STENT Right 5/6/2024    Procedure: TRABECULAR MICRO-BYPASS WITH XEN GEL STENT;  Surgeon: Edwin Navarro MD;  Location: [facility] OR       Allergies: Penicillins    SH:   Social History     Socioeconomic History    Marital status: Single     Spouse name: Not on file    Number of children: Not on file    Years of education: Not on file    Highest education level: Not on file   Occupational History    Not on file   Tobacco Use    Smoking status: Former     Current packs/day: 0.00     Average packs/day: 1 pack/day for 10.0 years (10.0 ttl pk-yrs)     Types: Cigarettes     Start date: 4/10/1969     Quit date: 4/10/1979      Years since quittin.6    Smokeless tobacco: Never    Tobacco comments:     quit 1985   Vaping Use    Vaping status: Never Used   Substance and Sexual Activity    Alcohol use: Not Currently     Comment: rare    Drug use: No    Sexual activity: Not Currently   Other Topics Concern     Service No    Blood Transfusions No    Caffeine Concern No    Occupational Exposure No    Hobby Hazards No    Sleep Concern No    Stress Concern No    Weight Concern No    Special Diet No    Back Care Not Asked    Exercise Yes     Comment: walk, horseback ride    Bike Helmet Not Asked    Seat Belt Yes    Self-Exams Not Asked    Parent/sibling w/ CABG, MI or angioplasty before 65F 55M? No   Social History Narrative         Social Drivers of Health     Financial Resource Strain: Low Risk  (2024)    Financial Resource Strain     Within the past 12 months, have you or your family members you live with been unable to get utilities (heat, electricity) when it was really needed?: No   Food Insecurity: Low Risk  (2024)    Food Insecurity     Within the past 12 months, did you worry that your food would run out before you got money to buy more?: No     Within the past 12 months, did the food you bought just not last and you didn t have money to get more?: No   Transportation Needs: Low Risk  (2024)    Transportation Needs     Within the past 12 months, has lack of transportation kept you from medical appointments, getting your medicines, non-medical meetings or appointments, work, or from getting things that you need?: No   Physical Activity: Not on file   Stress: Not on file   Social Connections: Not on file   Interpersonal Safety: Low Risk  (2024)    Interpersonal Safety     Do you feel physically and emotionally safe where you currently live?: Yes     Within the past 12 months, have you been hit, slapped, kicked or otherwise physically hurt by someone?: No     Within the past 12 months, have you been  humiliated or emotionally abused in other ways by your partner or ex-partner?: No   Housing Stability: Low Risk  (2/8/2024)    Housing Stability     Do you have housing? : Yes     Are you worried about losing your housing?: No       FH:   Family History   Problem Relation Age of Onset    Alzheimer Disease Mother         onset in 60s    C.A.D. Father     Arthritis Father     Breast Cancer Sister 35    Glaucoma Sister     Neurologic Disorder Sister         seizures    Cancer Sister         breast    Diabetes Brother     Glaucoma Brother     Psoriasis Daughter     Mental Illness Daughter     Rheumatologic Disease Daughter         Dermatomyositis    Hypertension No family hx of     Cerebrovascular Disease No family hx of         ,    Thyroid Disease No family hx of     Skin Cancer No family hx of     Macular Degeneration No family hx of        Objective:  Data Unavailable Data Unavailable Data Unavailable Data Unavailable Data Unavailable 0 lbs 0 oz    PT and DP pulses are 2/4 bilaterally. CRT is instant. Positive pedal hair.   Gross sensation is intact bilaterally.   Equinus is noted bilaterally. No pain with active or passive ROM of the ankle, MTJ, 1st ray, or halluces bilaterally,.   Nail of the left hallux is dystrophic and lytic on the distal 1/3rd of the nail. Trauma lines noted on the nail. No open lesions are noted.     Assessment:   Encounter Diagnoses   Name Primary?    Nail dystrophy Yes    Left foot pain          Plan:  - Pt seen and evaluated.  - Nail was trimmed back.  - She would like to keep the nail for now. She can use an electric nail file on it.  - Activity as tolerated.  - See again PRN.

## 2024-11-06 ENCOUNTER — MYC REFILL (OUTPATIENT)
Dept: DERMATOLOGY | Facility: CLINIC | Age: 79
End: 2024-11-06
Payer: COMMERCIAL

## 2024-11-06 DIAGNOSIS — M33.13 DERMATOMYOSITIS (H): ICD-10-CM

## 2024-11-07 RX ORDER — MYCOPHENOLATE MOFETIL 500 MG/1
1000 TABLET ORAL 2 TIMES DAILY
Qty: 360 TABLET | Refills: 1 | Status: SHIPPED | OUTPATIENT
Start: 2024-11-07

## 2024-11-07 NOTE — TELEPHONE ENCOUNTER
Encounter Date: Aug 9, 2024     Dermatology Problem List:  1. 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  2. History of IgA deficiency  3. History of BCC on back s/p excision (2016)  4. Tinea pedis w/ onychomycosis  - KOH (8/7/23) negative.  - Current tx: terbinafine cream BID for 3-4 weeks (as needed for flares), ciclopirox lacquer to affected toenails daily     ______________________________________     Impression/Plan:  1. Dermatomyositis, well controlled on current regimen with minimal skin and no active muscle or pulmonary disease  - Discussed risks/benefits of reducing dose of mycophenolate, patient defers at this time  - Patient will send CoSchedule message if she decides to reduce dose   - Continue current regimen of mycophenolate 1000 mg BID, hydroxychloroquine 200 mg daily, topicals (betamethasone cream to hands twice daily, hydrocortisone to face PRN)  - Check PFTs, CBC, CMP, CRP, CK     2. Reassurance for benign lesions not treated today including seborrheic keratoses.      Follow-up in 1 year.

## 2025-01-13 DIAGNOSIS — H40.051 BORDERLINE GLAUCOMA OF RIGHT EYE WITH OCULAR HYPERTENSION: Primary | ICD-10-CM

## 2025-01-14 RX ORDER — LATANOPROST 50 UG/ML
SOLUTION/ DROPS OPHTHALMIC
Qty: 7.5 ML | Refills: 3 | Status: SHIPPED | OUTPATIENT
Start: 2025-01-14

## 2025-01-14 NOTE — TELEPHONE ENCOUNTER
Latanoprost Ophthalmic Solution 0.005 %   Place 1 drop into both eyes at bedtime - Both Eyes    Last Written Prescription Date:  3/5/24  Last Fill Quantity: 7.5 ,   # refills: 2  Last Office Visit : 8/22/24  Latanaprost qPM each eye   Future Office visit:  1/23/25 Navarro     Refilled per protocol.

## 2025-01-23 ENCOUNTER — OFFICE VISIT (OUTPATIENT)
Dept: OPHTHALMOLOGY | Facility: CLINIC | Age: 80
End: 2025-01-23
Attending: OPHTHALMOLOGY
Payer: COMMERCIAL

## 2025-01-23 DIAGNOSIS — H40.051 BORDERLINE GLAUCOMA OF RIGHT EYE WITH OCULAR HYPERTENSION: Primary | ICD-10-CM

## 2025-01-23 DIAGNOSIS — Z96.1 PSEUDOPHAKIA: ICD-10-CM

## 2025-01-23 PROCEDURE — 92083 EXTENDED VISUAL FIELD XM: CPT | Performed by: OPHTHALMOLOGY

## 2025-01-23 PROCEDURE — 92133 CPTRZD OPH DX IMG PST SGM ON: CPT | Performed by: OPHTHALMOLOGY

## 2025-01-23 ASSESSMENT — TONOMETRY
OD_IOP_MMHG: 13
OS_IOP_MMHG: 14
OD_IOP_MMHG: 14
IOP_METHOD: TONOPEN
OS_IOP_MMHG: 14

## 2025-01-23 ASSESSMENT — CONF VISUAL FIELD
OD_INFERIOR_TEMPORAL_RESTRICTION: 0
OD_SUPERIOR_NASAL_RESTRICTION: 0
OD_NORMAL: 1
OS_INFERIOR_NASAL_RESTRICTION: 0
OD_SUPERIOR_TEMPORAL_RESTRICTION: 0
OS_INFERIOR_TEMPORAL_RESTRICTION: 0
OS_NORMAL: 1
OS_SUPERIOR_NASAL_RESTRICTION: 0
OD_INFERIOR_NASAL_RESTRICTION: 0
OS_SUPERIOR_TEMPORAL_RESTRICTION: 0

## 2025-01-23 ASSESSMENT — CUP TO DISC RATIO
OD_RATIO: 0.85
OS_RATIO: 0.4

## 2025-01-23 ASSESSMENT — REFRACTION_WEARINGRX
OS_SPHERE: +0.25
OD_SPHERE: +0.25
OD_AXIS: 153
OD_CYLINDER: +0.50
OS_AXIS: 177
OS_ADD: +2.50
OD_ADD: +2.50
OS_CYLINDER: +0.75

## 2025-01-23 ASSESSMENT — SLIT LAMP EXAM - LIDS: COMMENTS: BLEPHARITIS/MGD

## 2025-01-23 ASSESSMENT — VISUAL ACUITY
CORRECTION_TYPE: GLASSES
METHOD: SNELLEN - LINEAR
OS_CC+: -2
OD_CC: 20/20
OS_CC: 20/20
OD_CC+: -1

## 2025-01-23 ASSESSMENT — EXTERNAL EXAM - RIGHT EYE: OD_EXAM: NORMAL

## 2025-01-23 ASSESSMENT — EXTERNAL EXAM - LEFT EYE: OS_EXAM: NORMAL

## 2025-01-23 NOTE — PROGRESS NOTES
HPI    Vision good ou near and far with new glasses.   Still has prism like effect od only - stable, since last visit.   Denies new flashes, floaters, curtain, pain.     Ocular meds:   Latanaprost qPM each eye   Systane PRN each eye   AREDS - stopped using 2-3 mths ago     Plaquenil  200 mg once daily       Sheila PERRY, January 23, 2025 1:24 PM        Last edited by Sheila Galeas COA on 1/23/2025  1:32 PM.          Review of systems for the eyes was negative other than the pertinent positives/negatives listed in the HPI.      Assessment & Plan      Mely Rashid is a 79 year old female with the following diagnoses:   1. 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 Pseudophakia - Right Eye         Here for glaucoma recheck  S/P ceiol/xen + Mitomycin-C right eye 5/6/2024  S/P Selected laser trabeculoplasty (SLT) #1 right eye 7/5/23   Goal 12 or less right eye   IOP 13/14 on applanation  Using latanoprost (teal top) at bedtime both eyes     Borderline intraocular pressure today  Overall stable testing  Monitor closely for now   Continue latanoprost at bedtime both eyes   Artificial tears twice a day and as needed       Patient disposition:   Return in about 3 months (around 4/23/2025) for Coordinate with Dalton rubi; VT, 24-2 VF .         Attending Physician Attestation:  Complete documentation of historical and exam elements from today's encounter can be found in the full encounter summary report (not reduplicated in this progress note).  I personally obtained the chief complaint(s) and history of present illness.  I confirmed and edited as necessary the review of systems, past medical/surgical history, family history, social history, and examination findings as documented by others; and I examined the patient myself.  I personally reviewed the relevant tests, images, and reports as documented above.  I formulated and edited as necessary the assessment and plan and discussed the findings  and management plan with the patient and family. .The longitudinal plan of care for the diagnosis(es)/condition(s) as documented were addressed during this visit. Due to the added complexity in care, I will continue to support Mely Rashid in the subsequent management and with the ongoing continuity of care  - Edwin Navarro MD

## 2025-01-23 NOTE — NURSING NOTE
Israel Moyer   4/20/2017 8:30 AM   Anticoagulation Therapy Visit    Description:  81 year old male   Provider:  [facility] ANTI COAWILLIAM   Department:  [facility]           INR as of 4/20/2017     Today's INR 3.8!      Anticoagulation Summary as of 4/20/2017     INR goal 2.0-3.0   Today's INR 3.8!   Full instructions 5 mg on Mon, Wed; 2.5 mg all other days   Next INR check 5/4/2017    Indications   Long term current use of anticoagulant therapy [Z79.01]  ATRIAL FIBRILLATION [I48.91]         Your next Anticoagulation Clinic appointment(s)     May 04, 2017  9:15 AM CDT   Anticoagulation Visit with [facility]   [facility]    [address] [place] [id number] [phone]              Contact Numbers     Please call [phone] to cancel and/or reschedule your appointment.  Please call [phone] with any problems or questions regarding your therapy          April 2017 Details    Sun Mon Tue Wed Thu Fri Sat           1                 2               3               4               5               6               7               8                 9               10               11               12               13               14               15                 16               17               18               19               20      2.5 mg   See details      21      2.5 mg         22      2.5 mg           23      2.5 mg         24      5 mg         25      2.5 mg         26      5 mg         27      2.5 mg         28      2.5 mg         29      2.5 mg           30      2.5 mg                Date Details   04/20 This INR check               How to take your warfarin dose     To take:  2.5 mg Take 1 of the 2.5 mg tablets.    To take:  5 mg Take 2 of the 2.5 mg tablets.           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   Still has prism like effect od only - stable, since last visit.   Denies new flashes, floaters, curtain, pain.     Ocular meds:   Latanaprost qPM each eye   Systane PRN each eye   AREDS - stopped using 2-3 mths ago     Plaquenil  200 mg once daily       Sheila PERRY, January 23, 2025 1:24 PM                               8               9               10               11               12               13                 14               15               16               17               18               19               20                 21               22               23               24               25               26               27                 28               29               30               31                   Date Details   No additional details    Date of next INR:  5/4/2017         How to take your warfarin dose     To take:  2.5 mg Take 1 of the 2.5 mg tablets.    To take:  5 mg Take 2 of the 2.5 mg tablets.

## 2025-03-11 ENCOUNTER — ANCILLARY PROCEDURE (OUTPATIENT)
Dept: MAMMOGRAPHY | Facility: HOSPITAL | Age: 80
End: 2025-03-11
Attending: NURSE PRACTITIONER
Payer: COMMERCIAL

## 2025-03-11 DIAGNOSIS — Z12.31 VISIT FOR SCREENING MAMMOGRAM: ICD-10-CM

## 2025-03-11 PROCEDURE — 77067 SCR MAMMO BI INCL CAD: CPT

## 2025-03-11 PROCEDURE — 77063 BREAST TOMOSYNTHESIS BI: CPT

## 2025-03-14 ENCOUNTER — MYC REFILL (OUTPATIENT)
Dept: DERMATOLOGY | Facility: CLINIC | Age: 80
End: 2025-03-14
Payer: COMMERCIAL

## 2025-03-14 DIAGNOSIS — Z79.899 LONG-TERM USE OF PLAQUENIL: ICD-10-CM

## 2025-03-14 DIAGNOSIS — Z79.899 ENCOUNTER FOR LONG-TERM CURRENT USE OF MEDICATION: ICD-10-CM

## 2025-03-14 DIAGNOSIS — M33.13 DERMATOMYOSITIS (H): ICD-10-CM

## 2025-03-17 RX ORDER — HYDROXYCHLOROQUINE SULFATE 200 MG/1
200 TABLET, FILM COATED ORAL DAILY
Qty: 60 TABLET | Refills: 1 | Status: SHIPPED | OUTPATIENT
Start: 2025-03-17

## 2025-03-25 ENCOUNTER — OFFICE VISIT (OUTPATIENT)
Dept: INTERNAL MEDICINE | Facility: CLINIC | Age: 80
End: 2025-03-25
Payer: COMMERCIAL

## 2025-03-25 VITALS
SYSTOLIC BLOOD PRESSURE: 118 MMHG | DIASTOLIC BLOOD PRESSURE: 80 MMHG | WEIGHT: 138.8 LBS | TEMPERATURE: 97.9 F | BODY MASS INDEX: 23.13 KG/M2 | OXYGEN SATURATION: 97 % | RESPIRATION RATE: 16 BRPM | HEART RATE: 75 BPM | HEIGHT: 65 IN

## 2025-03-25 DIAGNOSIS — Z23 NEED FOR TDAP VACCINATION: ICD-10-CM

## 2025-03-25 DIAGNOSIS — R22.2 MASS ON BACK: ICD-10-CM

## 2025-03-25 DIAGNOSIS — Z13.220 SCREENING FOR HYPERLIPIDEMIA: Primary | ICD-10-CM

## 2025-03-25 PROCEDURE — 3074F SYST BP LT 130 MM HG: CPT | Performed by: NURSE PRACTITIONER

## 2025-03-25 PROCEDURE — 99213 OFFICE O/P EST LOW 20 MIN: CPT | Performed by: NURSE PRACTITIONER

## 2025-03-25 PROCEDURE — 3079F DIAST BP 80-89 MM HG: CPT | Performed by: NURSE PRACTITIONER

## 2025-03-25 ASSESSMENT — ENCOUNTER SYMPTOMS: BACK PAIN: 1

## 2025-03-25 NOTE — PROGRESS NOTES
Assessment & Plan       Mass on back  - US Soft Tissue Chest Wall or Upper Back; Future  - Adult Gen Surg  Referral; Future    I spent a total of 25  minutes in the care of this pt during today's office visit. This time includes reviewing the patient's chart and prior history, obtaining a history, performing an examination and evaluation and counseling the patient. This time also includes ordering medications or tests necessary in addition to communication to other member's of the patient's health care team. Time spent in documentation and care coordination is included.     Gavi Tawanna ISLAS, GABRIELA        Subjective   Pat is a 79 year old, presenting for the following health issues:  Back Pain (Pt has lump on back on left side)      3/25/2025     1:14 PM   Additional Questions   Roomed by Zayda LANGLEY   Accompanied by N/A     Mely Klinen is a 79 year old female who presents with a mass on her left upper back.  She does not remember ever knowing about this previously.  She had a Dermatology appt.8/9/24. She noticed when her back rests against the back of a chair she can feel the pressure.  She noticed this about three weeks ago.      History of Present Illness       Reason for visit:  Lump on my back  Symptom onset:  3-4 weeks ago  Symptoms include:  Just the lump.  Symptom intensity:  Mild  Symptom progression:  Staying the same  Had these symptoms before:  No   She is taking medications regularly.        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  Physical Exam   GENERAL: alert and no distress  MS: There is a 5.5 cm x 7.5 cm mass on the left lateral back which feels like it has definite borders.Fluctuant.  PSYCH: mentation appears normal, affect normal/bright            Signed Electronically by: ROSHAN Pavon CNP    "

## 2025-03-27 ENCOUNTER — HOSPITAL ENCOUNTER (OUTPATIENT)
Dept: ULTRASOUND IMAGING | Facility: HOSPITAL | Age: 80
Discharge: HOME | End: 2025-03-27
Attending: NURSE PRACTITIONER
Payer: COMMERCIAL

## 2025-03-27 DIAGNOSIS — R22.2 MASS ON BACK: ICD-10-CM

## 2025-03-27 PROCEDURE — 76604 US EXAM CHEST: CPT | Mod: 52

## 2025-03-27 NOTE — TELEPHONE ENCOUNTER
REFERRAL INFORMATION:  Referring Provider:  Gavi Stacy APRN CNP   Referring Clinic:  [facility]   Reason for Visit/Diagnosis: R22.2 (ICD-10-CM) - Mass on back        FUTURE VISIT INFORMATION:  Appointment Date: 4/7/25  Appointment Time:      NOTES RECORD STATUS  DETAILS   OFFICE NOTE from Referring Provider Internal 3/25/25   ENDOSCOPY (EGD)  Internal 5/14/14   PERTINENT LABS Internal    IMAGING (CT, MRI, US, XR)  Internal 3/25/25-US soft tissue chest wall or upper back     Records Requested    Facility    Fax:    Outcome

## 2025-03-28 PROBLEM — D17.30 LIPOMA OF SKIN AND SUBCUTANEOUS TISSUE: Status: ACTIVE | Noted: 2025-03-28

## 2025-04-07 ENCOUNTER — PRE VISIT (OUTPATIENT)
Dept: SURGERY | Facility: CLINIC | Age: 80
End: 2025-04-07

## 2025-04-07 ENCOUNTER — OFFICE VISIT (OUTPATIENT)
Dept: SURGERY | Facility: CLINIC | Age: 80
End: 2025-04-07
Attending: NURSE PRACTITIONER
Payer: COMMERCIAL

## 2025-04-07 VITALS
WEIGHT: 137.7 LBS | OXYGEN SATURATION: 96 % | HEIGHT: 65 IN | BODY MASS INDEX: 22.94 KG/M2 | DIASTOLIC BLOOD PRESSURE: 86 MMHG | SYSTOLIC BLOOD PRESSURE: 135 MMHG | HEART RATE: 70 BPM

## 2025-04-07 DIAGNOSIS — R22.2 MASS ON BACK: ICD-10-CM

## 2025-04-07 DIAGNOSIS — D17.1 LIPOMA OF SKIN AND SUBCUTANEOUS TISSUE OF TRUNK: Primary | ICD-10-CM

## 2025-04-07 PROCEDURE — 3079F DIAST BP 80-89 MM HG: CPT | Performed by: SURGERY

## 2025-04-07 PROCEDURE — 99204 OFFICE O/P NEW MOD 45 MIN: CPT | Performed by: SURGERY

## 2025-04-07 PROCEDURE — 3075F SYST BP GE 130 - 139MM HG: CPT | Performed by: SURGERY

## 2025-04-07 PROCEDURE — 1126F AMNT PAIN NOTED NONE PRSNT: CPT | Performed by: SURGERY

## 2025-04-07 ASSESSMENT — PAIN SCALES - GENERAL: PAINLEVEL_OUTOF10: NO PAIN (0)

## 2025-04-07 NOTE — LETTER
4/7/2025       RE: Mely Rashid  [address]   [place] [address]     Dear Colleague,    Thank you for referring your patient, Mely Rashid, to the [facility] [place] at [facility]. Please see a copy of my visit note below.    Gavi Stacy  [address] [address]    MELY RASHID    Patient seen in consultation for Lipoma by Gavi Stacy      I had the pleasure of seeing Mely Rashid in my office on 4/7/2025 for evaluation.    CC:   Chief Complaint   Patient presents with     New Patient     Mass on back     Today diagnosis (D17.1) Lipoma of skin and subcutaneous tissue of trunk  (primary encounter diagnosis)  Comment:   Plan:     (R22.2) Mass on back  Comment:   Plan: Case Request: EXCISION, MASS, BACK              HPI: 79-year-old female referred to clinic for evaluation of a left back mass.  Patient states that she noticed a lump in the mirror approximately 2 to 3 months ago.  Since that time she has noticed increased discomfort with direct pressure over the portion of her back.  She was evaluated by her PCP who obtained an ultrasound showing subcutaneous mass most consistent with a lipoma.  She has never had this before.  It does interfere with wearing her clothing.    PAST MEDICAL HISTORY:  Past Medical History:   Diagnosis Date     Arthritis ??    probably     Basal cell carcinoma      Dermatomyositis (H)      Glaucoma      IgA deficiency (H)      Nonsenile cataract        PAST SURGICAL HISTORY:  Past Surgical History:   Procedure Laterality Date     BIOPSY OF SKIN LESION       COLONOSCOPY       [facility] UGI ENDOSCOPY W EUS N/A 05/14/2014    Procedure: COMBINED ENDOSCOPIC ULTRASOUND, ESOPHAGOSCOPY, GASTROSCOPY, DUODENOSCOPY (EGD);  Surgeon: Boston Rodriguez MD;  Location: [facility]     HYSTERECTOMY      partial, prolapse uterus.     PHACOEMULSIFICATION WITH STANDARD INTRAOCULAR  LENS IMPLANT Right 5/6/2024    Procedure: RIGHT EYE PHACOEMULSIFICATION, CATARACT, WITH STANDARD INTRAOCULAR LENS IMPLANT INSERTION;  Surgeon: Edwin Navarro MD;  Location: [facility] OR     TRABECULAR MICRO-BYPASS WITH XEN GEL STENT Right 5/6/2024    Procedure: TRABECULAR MICRO-BYPASS WITH XEN GEL STENT;  Surgeon: Edwin Navarro MD;  Location: [facility] OR       MEDICATIONS:    Current Outpatient Medications:      augmented betamethasone dipropionate (DIPROLENE AF) 0.05 % external cream, Apply topically 2 times daily., Disp: 60 g, Rfl: 11     B Complex TABS, Take 1 tablet by mouth daily., Disp: , Rfl:      Calcium-Magnesium-Vitamin D 600- MG-MG-UNIT TB24, Take 1 tablet by mouth 2 times daily, Disp: , Rfl:      Collagen Hydrolysate POWD, Take by mouth daily, Disp: , Rfl:      glucosamine-chondroitin 500-400 MG CAPS per capsule, Take 2 capsules by mouth daily., Disp: , Rfl:      hydroxychloroquine (PLAQUENIL) 200 MG tablet, Take 1 tablet (200 mg) by mouth daily. Next eye exam due 1/31/24., Disp: 60 tablet, Rfl: 1     ibuprofen (ADVIL/MOTRIN) 200 MG tablet, Take 200 mg by mouth every 8 hours as needed for inflammatory pain, Disp: , Rfl:      latanoprost (XALATAN) 0.005 % ophthalmic solution, PLACE ONE DROP INTO BOTH EYES AT BEDTIME, Disp: 7.5 mL, Rfl: 3     mycophenolate (GENERIC EQUIVALENT) 500 MG tablet, Take 2 tablets (1,000 mg) by mouth 2 times daily., Disp: 360 tablet, Rfl: 1     Propylene Glycol (SYSTANE COMPLETE OP), Apply 1 drop to eye as needed, Disp: , Rfl:     ALLERGIES:  Allergies   Allergen Reactions     Penicillins Anaphylaxis       SOCIAL HISTORY:  Social History     Socioeconomic History     Marital status: Single     Spouse name: Not on file     Number of children: Not on file     Years of education: Not on file     Highest education level: Not on file   Occupational History     Not on file   Tobacco Use     Smoking status: Former     Current packs/day: 0.00     Average packs/day: 1 pack/day  for 10.0 years (10.0 ttl pk-yrs)     Types: Cigarettes     Start date: 4/10/1969     Quit date: 4/10/1979     Years since quittin.0     Smokeless tobacco: Never     Tobacco comments:     quit    Vaping Use     Vaping status: Never Used   Substance and Sexual Activity     Alcohol use: Not Currently     Comment: rare     Drug use: No     Sexual activity: Not Currently   Other Topics Concern      Service No     Blood Transfusions No     Caffeine Concern No     Occupational Exposure No     Hobby Hazards No     Sleep Concern No     Stress Concern No     Weight Concern No     Special Diet No     Back Care Not Asked     Exercise Yes     Comment: walk, horseback ride     Bike Helmet Not Asked     Seat Belt Yes     Self-Exams Not Asked     Parent/sibling w/ CABG, MI or angioplasty before 65F 55M? No   Social History Narrative         Social Drivers of Health     Financial Resource Strain: Low Risk  (2024)    Financial Resource Strain      Within the past 12 months, have you or your family members you live with been unable to get utilities (heat, electricity) when it was really needed?: No   Food Insecurity: Low Risk  (2024)    Food Insecurity      Within the past 12 months, did you worry that your food would run out before you got money to buy more?: No      Within the past 12 months, did the food you bought just not last and you didn t have money to get more?: No   Transportation Needs: Low Risk  (2024)    Transportation Needs      Within the past 12 months, has lack of transportation kept you from medical appointments, getting your medicines, non-medical meetings or appointments, work, or from getting things that you need?: No   Physical Activity: Not on file   Stress: Not on file   Social Connections: Not on file   Interpersonal Safety: Low Risk  (3/25/2025)    Interpersonal Safety      Do you feel physically and emotionally safe where you currently live?: Yes      Within the past 12  "months, have you been hit, slapped, kicked or otherwise physically hurt by someone?: No      Within the past 12 months, have you been humiliated or emotionally abused in other ways by your partner or ex-partner?: No   Housing Stability: Low Risk  (2/8/2024)    Housing Stability      Do you have housing? : Yes      Are you worried about losing your housing?: No       FAMILY HISTORY:   Family History   Problem Relation Age of Onset     Alzheimer Disease Mother         onset in 60s     C.A.D. Father      Arthritis Father      Breast Cancer Sister 35     Glaucoma Sister      Neurologic Disorder Sister         seizures     Cancer Sister         breast     Diabetes Brother      Glaucoma Brother      Psoriasis Daughter      Mental Illness Daughter      Rheumatologic Disease Daughter         Dermatomyositis     Hypertension No family hx of      Cerebrovascular Disease No family hx of         ,     Thyroid Disease No family hx of      Skin Cancer No family hx of      Macular Degeneration No family hx of      No significant family history of cardiovascular disease otherwise.        PHYSICAL EXAMINATION:  Vitals: /86 (BP Location: Left arm, Patient Position: Sitting, Cuff Size: Adult Regular)   Pulse 70   Ht 1.651 m (5' 5\")   Wt 62.5 kg (137 lb 11.2 oz)   SpO2 96%   BMI 22.91 kg/m    BMI= Body mass index is 22.91 kg/m .  GENERAL/PSYCH: Patient is awake, A&Ox3, NAD, stable mood, good judgement and insight.  HEAD: Atraumatic, Normocephalic  EYES: Anicteric. Pupils equal and reactive  NECK: No masses/LNs. Trachea midline.  CHEST: Symmetrical, Respiratory effort WNL, no stridor.  HEART: Regular Rate and Rhythm.   ABDOMEN: soft, non-tender, non-distended  LOWER EXTREMITIES: No gross deformity. Pulses palpable and equal bilaterally.  SKIN: No visible generalized rash. Palpable, rubbery, mobile subcutaneous mass under the left scapula. No drainage, No erythema, Minimal tenderness        LABS:    No visits with results " within 3 Month(s) from this visit.   Latest known visit with results is:   Lab on 07/25/2024   Component Date Value     Color Urine 07/25/2024 Yellow      Appearance Urine 07/25/2024 Cloudy (A)      Glucose Urine 07/25/2024 Negative      Bilirubin Urine 07/25/2024 Negative      Ketones Urine 07/25/2024 Negative      Specific Gravity Urine 07/25/2024 >=1.030      Blood Urine 07/25/2024 Moderate (A)      pH Urine 07/25/2024 6.0      Protein Albumin Urine 07/25/2024 100 (A)      Urobilinogen Urine 07/25/2024 0.2      Nitrite Urine 07/25/2024 Negative      Leukocyte Esterase Urine 07/25/2024 Moderate (A)      Bacteria Urine 07/25/2024 Few (A)      RBC Urine 07/25/2024 0-2      WBC Urine 07/25/2024 >100 (A)      Squamous Epithelials Uri* 07/25/2024 None Seen      WBC Clumps Urine 07/25/2024 Present (A)      Culture 07/25/2024 >100,000 CFU/mL Escherichia coli (A)        STUDIES: U/S: FINDINGS/IMPRESSION: Corresponding to the palpable lump is an ovoid isoechoic to slightly hyperechoic mass measuring 8.7 x 5.2 x 1.9 cm suggestive of a lipoma     IMPRESSION and PLAN:  Lipoma of skin and subcutaneous tissue of trunk  Palpable Lipoma on Left back just inferior to the scapula    Plan for excision in the surgery center under local anesthetic     Risks and benefits discussed in detail with the patient.        All questions were answered.      Again, thank you for allowing me to participate in the care of your patient.      Sincerely,    Carlos Johnston DO

## 2025-04-07 NOTE — NURSING NOTE
[employer] message sent to the patient with surgical logistics and post op teaching information for review.  The patient was asked to contact this office with any questions or if they would like to review this information over the telephone.

## 2025-04-07 NOTE — PROGRESS NOTES
Gavi Stacy  [address] [address]    MELY JAFFEN    Patient seen in consultation for Lipoma by Gavi Stacy      I had the pleasure of seeing Mely Rashid in my office on 4/7/2025 for evaluation.    CC:   Chief Complaint   Patient presents with    New Patient     Mass on back     Today diagnosis (D17.1) Lipoma of skin and subcutaneous tissue of trunk  (primary encounter diagnosis)  Comment:   Plan:     (R22.2) Mass on back  Comment:   Plan: Case Request: EXCISION, MASS, BACK              HPI: 79-year-old female referred to clinic for evaluation of a left back mass.  Patient states that she noticed a lump in the mirror approximately 2 to 3 months ago.  Since that time she has noticed increased discomfort with direct pressure over the portion of her back.  She was evaluated by her PCP who obtained an ultrasound showing subcutaneous mass most consistent with a lipoma.  She has never had this before.  It does interfere with wearing her clothing.    PAST MEDICAL HISTORY:  Past Medical History:   Diagnosis Date    Arthritis ??    probably    Basal cell carcinoma     Dermatomyositis (H)     Glaucoma     IgA deficiency (H)     Nonsenile cataract        PAST SURGICAL HISTORY:  Past Surgical History:   Procedure Laterality Date    BIOPSY OF SKIN LESION      COLONOSCOPY      [facility] UGI ENDOSCOPY W EUS N/A 05/14/2014    Procedure: COMBINED ENDOSCOPIC ULTRASOUND, ESOPHAGOSCOPY, GASTROSCOPY, DUODENOSCOPY (EGD);  Surgeon: Boston Rodriguez MD;  Location: [facility]    HYSTERECTOMY      partial, prolapse uterus.    PHACOEMULSIFICATION WITH STANDARD INTRAOCULAR LENS IMPLANT Right 5/6/2024    Procedure: RIGHT EYE PHACOEMULSIFICATION, CATARACT, WITH STANDARD INTRAOCULAR LENS IMPLANT INSERTION;  Surgeon: Edwin Navarro MD;  Location: [facility] OR    TRABECULAR MICRO-BYPASS WITH XEN GEL STENT Right 5/6/2024    Procedure: TRABECULAR MICRO-BYPASS WITH XEN GEL STENT;  Surgeon: Edwin Navarro MD;   Location: [facility] OR       MEDICATIONS:    Current Outpatient Medications:     augmented betamethasone dipropionate (DIPROLENE AF) 0.05 % external cream, Apply topically 2 times daily., Disp: 60 g, Rfl: 11    B Complex TABS, Take 1 tablet by mouth daily., Disp: , Rfl:     Calcium-Magnesium-Vitamin D 600- MG-MG-UNIT TB24, Take 1 tablet by mouth 2 times daily, Disp: , Rfl:     Collagen Hydrolysate POWD, Take by mouth daily, Disp: , Rfl:     glucosamine-chondroitin 500-400 MG CAPS per capsule, Take 2 capsules by mouth daily., Disp: , Rfl:     hydroxychloroquine (PLAQUENIL) 200 MG tablet, Take 1 tablet (200 mg) by mouth daily. Next eye exam due 24., Disp: 60 tablet, Rfl: 1    ibuprofen (ADVIL/MOTRIN) 200 MG tablet, Take 200 mg by mouth every 8 hours as needed for inflammatory pain, Disp: , Rfl:     latanoprost (XALATAN) 0.005 % ophthalmic solution, PLACE ONE DROP INTO BOTH EYES AT BEDTIME, Disp: 7.5 mL, Rfl: 3    mycophenolate (GENERIC EQUIVALENT) 500 MG tablet, Take 2 tablets (1,000 mg) by mouth 2 times daily., Disp: 360 tablet, Rfl: 1    Propylene Glycol (SYSTANE COMPLETE OP), Apply 1 drop to eye as needed, Disp: , Rfl:     ALLERGIES:  Allergies   Allergen Reactions    Penicillins Anaphylaxis       SOCIAL HISTORY:  Social History     Socioeconomic History    Marital status: Single     Spouse name: Not on file    Number of children: Not on file    Years of education: Not on file    Highest education level: Not on file   Occupational History    Not on file   Tobacco Use    Smoking status: Former     Current packs/day: 0.00     Average packs/day: 1 pack/day for 10.0 years (10.0 ttl pk-yrs)     Types: Cigarettes     Start date: 4/10/1969     Quit date: 4/10/1979     Years since quittin.0    Smokeless tobacco: Never    Tobacco comments:     quit    Vaping Use    Vaping status: Never Used   Substance and Sexual Activity    Alcohol use: Not Currently     Comment: rare    Drug use: No    Sexual activity: Not  Currently   Other Topics Concern     Service No    Blood Transfusions No    Caffeine Concern No    Occupational Exposure No    Hobby Hazards No    Sleep Concern No    Stress Concern No    Weight Concern No    Special Diet No    Back Care Not Asked    Exercise Yes     Comment: walk, horseback ride    Bike Helmet Not Asked    Seat Belt Yes    Self-Exams Not Asked    Parent/sibling w/ CABG, MI or angioplasty before 65F 55M? No   Social History Narrative         Social Drivers of Health     Financial Resource Strain: Low Risk  (2/8/2024)    Financial Resource Strain     Within the past 12 months, have you or your family members you live with been unable to get utilities (heat, electricity) when it was really needed?: No   Food Insecurity: Low Risk  (2/8/2024)    Food Insecurity     Within the past 12 months, did you worry that your food would run out before you got money to buy more?: No     Within the past 12 months, did the food you bought just not last and you didn t have money to get more?: No   Transportation Needs: Low Risk  (2/8/2024)    Transportation Needs     Within the past 12 months, has lack of transportation kept you from medical appointments, getting your medicines, non-medical meetings or appointments, work, or from getting things that you need?: No   Physical Activity: Not on file   Stress: Not on file   Social Connections: Not on file   Interpersonal Safety: Low Risk  (3/25/2025)    Interpersonal Safety     Do you feel physically and emotionally safe where you currently live?: Yes     Within the past 12 months, have you been hit, slapped, kicked or otherwise physically hurt by someone?: No     Within the past 12 months, have you been humiliated or emotionally abused in other ways by your partner or ex-partner?: No   Housing Stability: Low Risk  (2/8/2024)    Housing Stability     Do you have housing? : Yes     Are you worried about losing your housing?: No       FAMILY HISTORY:   Family  "History   Problem Relation Age of Onset    Alzheimer Disease Mother         onset in 60s    C.A.D. Father     Arthritis Father     Breast Cancer Sister 35    Glaucoma Sister     Neurologic Disorder Sister         seizures    Cancer Sister         breast    Diabetes Brother     Glaucoma Brother     Psoriasis Daughter     Mental Illness Daughter     Rheumatologic Disease Daughter         Dermatomyositis    Hypertension No family hx of     Cerebrovascular Disease No family hx of         ,    Thyroid Disease No family hx of     Skin Cancer No family hx of     Macular Degeneration No family hx of      No significant family history of cardiovascular disease otherwise.        PHYSICAL EXAMINATION:  Vitals: /86 (BP Location: Left arm, Patient Position: Sitting, Cuff Size: Adult Regular)   Pulse 70   Ht 1.651 m (5' 5\")   Wt 62.5 kg (137 lb 11.2 oz)   SpO2 96%   BMI 22.91 kg/m    BMI= Body mass index is 22.91 kg/m .  GENERAL/PSYCH: Patient is awake, A&Ox3, NAD, stable mood, good judgement and insight.  HEAD: Atraumatic, Normocephalic  EYES: Anicteric. Pupils equal and reactive  NECK: No masses/LNs. Trachea midline.  CHEST: Symmetrical, Respiratory effort WNL, no stridor.  HEART: Regular Rate and Rhythm.   ABDOMEN: soft, non-tender, non-distended  LOWER EXTREMITIES: No gross deformity. Pulses palpable and equal bilaterally.  SKIN: No visible generalized rash. Palpable, rubbery, mobile subcutaneous mass under the left scapula. No drainage, No erythema, Minimal tenderness        LABS:    No visits with results within 3 Month(s) from this visit.   Latest known visit with results is:   Lab on 07/25/2024   Component Date Value    Color Urine 07/25/2024 Yellow     Appearance Urine 07/25/2024 Cloudy (A)     Glucose Urine 07/25/2024 Negative     Bilirubin Urine 07/25/2024 Negative     Ketones Urine 07/25/2024 Negative     Specific Gravity Urine 07/25/2024 >=1.030     Blood Urine 07/25/2024 Moderate (A)     pH Urine " 07/25/2024 6.0     Protein Albumin Urine 07/25/2024 100 (A)     Urobilinogen Urine 07/25/2024 0.2     Nitrite Urine 07/25/2024 Negative     Leukocyte Esterase Urine 07/25/2024 Moderate (A)     Bacteria Urine 07/25/2024 Few (A)     RBC Urine 07/25/2024 0-2     WBC Urine 07/25/2024 >100 (A)     Squamous Epithelials Uri* 07/25/2024 None Seen     WBC Clumps Urine 07/25/2024 Present (A)     Culture 07/25/2024 >100,000 CFU/mL Escherichia coli (A)        STUDIES: U/S: FINDINGS/IMPRESSION: Corresponding to the palpable lump is an ovoid isoechoic to slightly hyperechoic mass measuring 8.7 x 5.2 x 1.9 cm suggestive of a lipoma     IMPRESSION and PLAN:  Lipoma of skin and subcutaneous tissue of trunk  Palpable Lipoma on Left back just inferior to the scapula    Plan for excision in the surgery center under local anesthetic     Risks and benefits discussed in detail with the patient.        All questions were answered.

## 2025-04-07 NOTE — PATIENT INSTRUCTIONS
You met with Dr. Carlos Johnston.      Today's visit instructions:    Please call our office when you would like to schedule your procedure.    Reminder:  Surgery Requirements  Your surgery will be at the [facility]- 5th Floor  You will need to arrive 1 hour early based on the location of your surgery.  You will need a Pre-op physical before your procedure- your surgeon will do this the day of surgery.   Stop any blood thinners, vitamins, minerals, or herbal supplements 5 days before surgery.  If you are taking a prescribed blood thinner or weight loss medication please let us know for specific instructions.  You may eat/drink/drive without restrictions/limitations.  Wash with Hibiclens (can be purchased at any pharmacy) or or you can stop at any [facility] and they will give you a bottle to use  the night before and morning of surgery. See instructions in the Surgery Packet.  If you would like a procedure estimate please call Cost of Care at [phone] during the hours of 8 AM - 3 PM (option #2 for on-line request and option #3 for a representative).   Billing Customer Service:  [phone]    If you have questions please contact Rosalba RN or Maria T RN during regular clinic hours, Monday through Friday 7:30 AM - 4:00 PM, or you can contact us via [facility] at anytime.       If you have urgent needs after-hours, weekends, or holidays please call the hospital at [phone] and ask to speak with our on-call General Surgery Team.    Appointment [phone]  Nurse Advice (Rosalba or Maria T): [phone]   Surgery Scheduler (Sita): [phone]  Billing Customer Service:  [phone]  Fax: [phone]    Mass/Lump Removal       Before the procedure:  Do not shave the area where the lump/mass is located.     Incision care   You may take a shower the day after surgery. Carefully wash your incision with soap and water. Do not submerge yourself in  water (bath, whirlpool, hot tub, pool, lake) for 14 days after surgery.   Remove the gauze bandage 1-2 days after surgery but leave the medical tape (Steri-Strips) or glue in place. These will loosen and fall off on their own 1-2 weeks after surgery.     Always wash your hands before touching your incisions or removing bandages.   It is not unusual to form a collection of fluid or blood under your incision that may feel firm or squishy- it can take several weeks to months for your body to reabsorb it.  At times, it may even drain.  If that should happen keep the area clean with soap, water,  and cover with a clean gauze dressing. You can change this daily or as needed.     Other medicines   Wait to start aspirin or blood thinners until the day after surgery. You can continue your regular medicines at your normal time the day after surgery.   Your pain medicine may cause constipation (hard, dry stools). To help with this, take the stool softener your doctor gave you or an over-the-counter stool softener or laxative. You can stop taking this when you are no longer taking pain medicine and your bowel movements are back to normal.      For pain or discomfort   Please use over-the-counter medication, use acetaminophen (Tylenol) or ibuprofen (Advil, Motrin) as instructed on the box for discomfort. If you were prescribed narcotic pain medicine, take as needed and as instructed on the bottle (narcotics are not always prescribed for this procedure). Do not take Tylenol if it is in your narcotic pain medication.    Use an ice pack on your surgical cut (incision) for 20 minutes at a time as needed for the first 24 hours. Be sure to protect your skin by putting a cloth between the ice pack and your skin.      Activities   No driving until you feel it s safe to do so. Don t drive while taking narcotic pain medicine.      Diet   You can eat your regular meals after surgery.      Results   You should know any test results about 5  to 7 business days after surgery       When to call the doctor   Call your doctor if you have:   A fever above 101 F (38.3 C) (taken under the tongue), or a fever or chills lasting more than a day.   Redness at the incision site.   Any fluid or blood draining from the incision, especially if it smells bad.    Severe pain that doesn t improve with pain medicine.      We will call you 2 to 4 days after surgery to review this handout, answer questions and help arrange after-surgery care. If you have questions or concerns, please call [phone] during regular office hours. If you need to call after business hours, call [phone] and ask to page the surgeon on-call.     IMPORTANT:  Prior to your surgical procedure, a nurse will be contacting you to obtain a health history.   If they do not reach you by noon the day prior to your surgery, your surgery will be cancelled. Phone:  [phone] ([facility]) or [phone] ([facility]).

## 2025-04-07 NOTE — NURSING NOTE
"Chief Complaint   Patient presents with    New Patient     Mass on back       Vitals:    04/07/25 1248 04/07/25 1258   BP:  135/86   BP Location:  Left arm   Patient Position:  Sitting   Cuff Size:  Adult Regular   Pulse:  70   SpO2:  96%   Weight:  62.5 kg (137 lb 11.2 oz)   Height: 1.651 m (5' 5\") 1.651 m (5' 5\")       Body mass index is 22.91 kg/m .                          Quincy Morales, EMT    "

## 2025-04-07 NOTE — ASSESSMENT & PLAN NOTE
Palpable Lipoma on Left back just inferior to the scapula    Plan for excision in the surgery center under local anesthetic     Risks and benefits discussed in detail with the patient.

## 2025-04-08 ENCOUNTER — PATIENT OUTREACH (OUTPATIENT)
Dept: OTHER | Facility: CLINIC | Age: 80
End: 2025-04-08
Payer: COMMERCIAL

## 2025-04-08 NOTE — TELEPHONE ENCOUNTER
Discussed ACP with patient. Please refer to serious illness flowsheet for all clinically meaningful ACP-related information.  Serious Illness Conversation        4/8/2025    11:00 AM   SERIOUS ILLNESS CONVERSATION   People Present Patient   How much do the people closest to you know about your priorities and wishes for your care? Some discussion. Further discussion needed.   Given goals and what we know about the illness, what is the recommendation you gave to the patient? Create/update a Health Care Directive (provided Honoring Choices handout and/or placed ACP facilitator order);Discuss goals and values with loved ones   Recommendation/Next Step Comment Pt confirmed 2017 HCD (on file) is the most current form.; additionally, confirmed HCAs. [facility] encouraged pt to review health care wishes with care team. Pt plans to review 2017 HCD and complete a new one PRN.   Provided Serious Illness Family Communication Guide No   Other Resource Comments [facility], [email or web address]

## 2025-04-30 DIAGNOSIS — Z79.899 ENCOUNTER FOR LONG-TERM (CURRENT) USE OF HIGH-RISK MEDICATION: Primary | ICD-10-CM

## (undated) DEVICE — DRAPE EYE SHEET 8441

## (undated) DEVICE — DRSG TEGADERM 4X4 3/4" 1626W

## (undated) DEVICE — GLOVE BIOGEL PI MICRO SZ 7.5 48575

## (undated) DEVICE — EYE TIP IRRIGATION & ASPIRATION POLYMER CVD 0.3MM 8065751512

## (undated) DEVICE — EYE SHIELD OPHTHALMIC 8MM MEROCEL 400106

## (undated) DEVICE — ESU CORD BIPOLAR GREEN 10-4000

## (undated) DEVICE — SOL WATER IRRIG 500ML BOTTLE 2F7113

## (undated) DEVICE — EYE SHIELD PLASTIC

## (undated) DEVICE — EYE PACK CUSTOM ANTERIOR 30DEG TIP CENTURION PPK6682-04

## (undated) DEVICE — Device

## (undated) DEVICE — SU VICRYL 9-0 BV100-3 5" V402G

## (undated) DEVICE — CATARACT BLADE PACK 2.6MM 58001899

## (undated) DEVICE — EYE TIP BIPOLAR 18GA ERASER 221250

## (undated) DEVICE — SU ETHILON 10-0 TG-160-6 DA 12" 7756G

## (undated) DEVICE — PACK CATARACT CUSTOM ASC SEY15CPUMC

## (undated) DEVICE — EYE INSTRUMENT WIPE MEROCEL W/ADHESIVE 223625

## (undated) DEVICE — LINEN TOWEL PACK X5 5464

## (undated) RX ORDER — ACETAMINOPHEN 325 MG/1
TABLET ORAL
Status: DISPENSED
Start: 2024-05-06

## (undated) RX ORDER — PROPOFOL 10 MG/ML
INJECTION, EMULSION INTRAVENOUS
Status: DISPENSED
Start: 2024-05-06

## (undated) RX ORDER — FENTANYL CITRATE 50 UG/ML
INJECTION, SOLUTION INTRAMUSCULAR; INTRAVENOUS
Status: DISPENSED
Start: 2024-05-06